# Patient Record
Sex: MALE | Race: WHITE | NOT HISPANIC OR LATINO | Employment: OTHER | URBAN - METROPOLITAN AREA
[De-identification: names, ages, dates, MRNs, and addresses within clinical notes are randomized per-mention and may not be internally consistent; named-entity substitution may affect disease eponyms.]

---

## 2017-01-20 DIAGNOSIS — E11.9 TYPE 2 DIABETES MELLITUS WITHOUT COMPLICATION, WITHOUT LONG-TERM CURRENT USE OF INSULIN (HCC): ICD-10-CM

## 2017-06-07 ENCOUNTER — HOSPITAL ENCOUNTER (OUTPATIENT)
Facility: MEDICAL CENTER | Age: 72
End: 2017-06-07
Attending: FAMILY MEDICINE
Payer: MEDICARE

## 2017-06-07 ENCOUNTER — OFFICE VISIT (OUTPATIENT)
Dept: MEDICAL GROUP | Facility: MEDICAL CENTER | Age: 72
End: 2017-06-07
Payer: MEDICARE

## 2017-06-07 VITALS
HEIGHT: 71 IN | WEIGHT: 228 LBS | BODY MASS INDEX: 31.92 KG/M2 | TEMPERATURE: 98.9 F | OXYGEN SATURATION: 96 % | DIASTOLIC BLOOD PRESSURE: 86 MMHG | HEART RATE: 94 BPM | SYSTOLIC BLOOD PRESSURE: 126 MMHG

## 2017-06-07 DIAGNOSIS — B02.9 HERPES ZOSTER WITHOUT COMPLICATION: ICD-10-CM

## 2017-06-07 DIAGNOSIS — E11.9 TYPE 2 DIABETES MELLITUS WITHOUT COMPLICATION, WITHOUT LONG-TERM CURRENT USE OF INSULIN (HCC): ICD-10-CM

## 2017-06-07 DIAGNOSIS — R35.0 INCREASED URINARY FREQUENCY: ICD-10-CM

## 2017-06-07 DIAGNOSIS — R10.9 LEFT FLANK PAIN: ICD-10-CM

## 2017-06-07 LAB
APPEARANCE UR: NORMAL
BILIRUB UR STRIP-MCNC: NORMAL MG/DL
COLOR UR AUTO: YELLOW
GLUCOSE BLD-MCNC: 210 MG/DL (ref 70–100)
GLUCOSE UR STRIP.AUTO-MCNC: 250 MG/DL
HBA1C MFR BLD: 8.5 % (ref ?–5.8)
INT CON NEG: NEGATIVE
INT CON POS: POSITIVE
KETONES UR STRIP.AUTO-MCNC: NORMAL MG/DL
LEUKOCYTE ESTERASE UR QL STRIP.AUTO: NORMAL
NITRITE UR QL STRIP.AUTO: NORMAL
PH UR STRIP.AUTO: 5 [PH] (ref 5–8)
PROT UR QL STRIP: 30 MG/DL
RBC UR QL AUTO: NORMAL
SP GR UR STRIP.AUTO: 1.03
UROBILINOGEN UR STRIP-MCNC: NORMAL MG/DL

## 2017-06-07 PROCEDURE — 87086 URINE CULTURE/COLONY COUNT: CPT

## 2017-06-07 PROCEDURE — 4040F PNEUMOC VAC/ADMIN/RCVD: CPT | Performed by: FAMILY MEDICINE

## 2017-06-07 PROCEDURE — 3045F PR MOST RECENT HEMOGLOBIN A1C LEVEL 7.0-9.0%: CPT | Performed by: FAMILY MEDICINE

## 2017-06-07 PROCEDURE — 81002 URINALYSIS NONAUTO W/O SCOPE: CPT | Performed by: FAMILY MEDICINE

## 2017-06-07 PROCEDURE — 3017F COLORECTAL CA SCREEN DOC REV: CPT | Mod: 8P | Performed by: FAMILY MEDICINE

## 2017-06-07 PROCEDURE — 1036F TOBACCO NON-USER: CPT | Performed by: FAMILY MEDICINE

## 2017-06-07 PROCEDURE — 83036 HEMOGLOBIN GLYCOSYLATED A1C: CPT | Performed by: FAMILY MEDICINE

## 2017-06-07 PROCEDURE — 1101F PT FALLS ASSESS-DOCD LE1/YR: CPT | Performed by: FAMILY MEDICINE

## 2017-06-07 PROCEDURE — G8417 CALC BMI ABV UP PARAM F/U: HCPCS | Performed by: FAMILY MEDICINE

## 2017-06-07 PROCEDURE — 99214 OFFICE O/P EST MOD 30 MIN: CPT | Performed by: FAMILY MEDICINE

## 2017-06-07 PROCEDURE — G8432 DEP SCR NOT DOC, RNG: HCPCS | Performed by: FAMILY MEDICINE

## 2017-06-07 RX ORDER — VALACYCLOVIR HYDROCHLORIDE 1 G/1
1000 TABLET, FILM COATED ORAL 3 TIMES DAILY
Qty: 21 TAB | Refills: 0 | Status: SHIPPED | OUTPATIENT
Start: 2017-06-07 | End: 2018-08-07

## 2017-06-07 ASSESSMENT — PATIENT HEALTH QUESTIONNAIRE - PHQ9: CLINICAL INTERPRETATION OF PHQ2 SCORE: 0

## 2017-06-07 NOTE — ASSESSMENT & PLAN NOTE
Patient is complaining of left flank pain for the last 5 days. He denies any injury to the area. He has not noticed any hematuria or pain with urination. He denies any trauma to the area. He reports that in the past when he has strained the area it resolves within a couple of days. Patient has never had shingles.

## 2017-06-07 NOTE — ASSESSMENT & PLAN NOTE
Patient is concerned because his blood sugars have been running high for the last 5 days. They've been over 200 which is unusual for him. He hasn't changed his medications or what he is eating. He is urinating more frequently since this started. He denies any fever or chills. No nausea or vomiting.

## 2017-06-07 NOTE — MR AVS SNAPSHOT
"Saleem Giraldofrancisco javier   2017 1:40 PM   Office Visit   MRN: [id number]    Department:  [facility]   Dept Phone:  [phone]    Description:  Male : 1945   Provider:  Georgia Kuhn M.D.           Reason for Visit     Back Pain left lower side      Allergies as of 2017     No Known Allergies      You were diagnosed with     Left flank pain   [558990]       Type 2 diabetes mellitus without complication, without long-term current use of insulin (CMS-HCC)   [7277110]       Increased urinary frequency   [762567]       Herpes zoster without complication   [990431]         Vital Signs     Blood Pressure Pulse Temperature Height Weight Body Mass Index    126/86 mmHg 94 37.2 °C (98.9 °F) 1.791 m (5' 10.51\") 103.42 kg (228 lb) 32.24 kg/m2    Oxygen Saturation Smoking Status                96% Former Smoker          Basic Information     Date Of Birth Sex Race Ethnicity Preferred Language    1945 Male White Non- English      Problem List              ICD-10-CM Priority Class Noted - Resolved    Swelling of left hand M79.89   2016 - Present    Obesity (BMI 30-39.9) E66.9   2016 - Present    Type 2 diabetes mellitus without complication, without long-term current use of insulin (CMS-HCC) E11.9   2016 - Present    Essential hypertension I10   2016 - Present    Acquired hypothyroidism E03.9   2016 - Present    Mixed hyperlipidemia E78.2   2016 - Present    Primary insomnia F51.01   2016 - Present    Screening for AAA (abdominal aortic aneurysm) Z13.6   2016 - Present    Left flank pain R10.9   2017 - Present    Increased urinary frequency R35.0   2017 - Present      Health Maintenance        Date Due Completion Dates    DIABETES MONOFILAMENT / LE EXAM 1946 ---    RETINAL SCREENING 10/20/1961 ---    URINE ACR / MICROALBUMIN 10/20/1961 ---    IMM DTaP/Tdap/Td Vaccine (1 - Tdap) 10/20/1962 ---    COLONOSCOPY 10/20/1993 ---    IMM ZOSTER " VACCINE 10/20/2003 ---    SERUM CREATININE 8/11/2015 8/11/2014    FASTING LIPID PROFILE 11/9/2017 11/9/2016    A1C SCREENING 12/7/2017 6/7/2017, 11/29/2016            Results     POCT A1C      Component    Glycohemoglobin    8.5    Internal Control Negative    Negative    Internal Control Positive    Positive                POCT Urinalysis      Component Value Standard Range & Units    POC Color YELLOW Negative    POC Appearance CLOUDY Negative    POC Leukocyte Esterase NEG Negative    POC Nitrites NEG Negative    POC Urobiligen NEG Negative (0.2) mg/dL    POC Protein 30 Negative mg/dL    POC Urine PH 5.0 5.0 - 8.0    POC Blood NEG Negative    POC Specific Gravity 1.030 <1.005 - >1.030    POC Ketones NEG Negative mg/dL    POC Biliruben NEG Negative mg/dL    POC Glucose 250 Negative mg/dL                POC GLUCOSE      Component Value Standard Range & Units    Glucose - Accu-Ck 210 70 - 100 mg/dL                        Current Immunizations     13-VALENT PCV PREVNAR 10/8/2015    Hep A/HEP B Combined Vaccine (TwinRix) 2/9/2017    Influenza Vaccine Adult HD 10/15/2016    Pneumococcal polysaccharide vaccine (PPSV-23) 11/9/2016      Below and/or attached are the medications your provider expects you to take. Review all of your home medications and newly ordered medications with your provider and/or pharmacist. Follow medication instructions as directed by your provider and/or pharmacist. Please keep your medication list with you and share with your provider. Update the information when medications are discontinued, doses are changed, or new medications (including over-the-counter products) are added; and carry medication information at all times in the event of emergency situations     Allergies:  No Known Allergies          Medications  Valid as of: June 07, 2017 -  2:55 PM    Generic Name Brand Name Tablet Size Instructions for use    Aspirin (Chew Tab) ASA 81 MG Take 1 Tab by mouth every day.        Atorvastatin  Calcium (Tab) LIPITOR 10 MG Take 1 Tab by mouth every day.        Cholecalciferol (Tab) cholecalciferol 1000 UNIT Take 1 Tab by mouth every day.        GlipiZIDE (Tab) GLUCOTROL 5 MG Take 1 Tab by mouth 2 times a day.        Levothyroxine Sodium (Tab) SYNTHROID 75 MCG Take 1 Tab by mouth every day.        Losartan Potassium (Tab) COZAAR 100 MG Take 1 Tab by mouth every day.        MetFORMIN HCl (Tab) GLUCOPHAGE 1000 MG Take 1 Tab by mouth 2 times a day.        TraZODone HCl (Tab) DESYREL 100 MG Take 1 Tab by mouth at bedtime as needed for Sleep.        ValACYclovir HCl (Tab) VALTREX 1 GM Take 1 Tab by mouth 3 times a day.        .                 Medicines prescribed today were sent to:     [facility] - [place], [place] - [address]    [address] [address]    Phone: [phone] Fax: [phone]    Open 24 Hours?: No    [facility] [address] [place] [address], [place] - [address]    [address] [facility] [address]    Phone: [phone] Fax: [phone]    Open 24 Hours?: No      Medication refill instructions:       If your prescription bottle indicates you have medication refills left, it is not necessary to call your provider’s office. Please contact your pharmacy and they will refill your medication.    If your prescription bottle indicates you do not have any refills left, you may request refills at any time through one of the following ways: The online [employer] system (except Urgent Care), by calling your provider’s office, or by asking your pharmacy to contact your provider’s office with a refill request. Medication refills are processed only during regular business hours and may not be available until the next business day. Your provider may request additional information or to have a follow-up visit with you prior to refilling your medication.   *Please Note: Medication refills are assigned a new Rx number when refilled electronically. Your pharmacy may  indicate that no refills were authorized even though a new prescription for the same medication is available at the pharmacy. Please request the medicine by name with the pharmacy before contacting your provider for a refill.        Your To Do List     Future Labs/Procedures Complete By Expires    URINE CULTURE(NEW)  As directed 6/8/2018         MyChart Status: Patient Declined

## 2017-06-07 NOTE — PROGRESS NOTES
Subjective:     Chief Complaint   Patient presents with   • Back Pain     left lower side       Saleem Whitney is a 71 y.o. male here today for evaluation and management of:    Left flank pain  Patient is complaining of left flank pain for the last 5 days. He denies any injury to the area. He has not noticed any hematuria or pain with urination. He denies any trauma to the area. He reports that in the past when he has strained the area it resolves within a couple of days. Patient has never had shingles.    Type 2 diabetes mellitus without complication, without long-term current use of insulin (CMS-HCC)  Patient is concerned because his blood sugars have been running high for the last 5 days. They've been over 200 which is unusual for him. He hasn't changed his medications or what he is eating. He is urinating more frequently since this started. He denies any fever or chills. No nausea or vomiting.       No Known Allergies    Current medicines (including changes today)  Current Outpatient Prescriptions   Medication Sig Dispense Refill   • valacyclovir (VALTREX) 1 GM Tab Take 1 Tab by mouth 3 times a day. 21 Tab 0   • metformin (GLUCOPHAGE) 1000 MG tablet Take 1 Tab by mouth 2 times a day. 180 Tab 3   • aspirin (ASA) 81 MG Chew Tab chewable tablet Take 1 Tab by mouth every day. 90 Tab 3   • atorvastatin (LIPITOR) 10 MG Tab Take 1 Tab by mouth every day. 90 Tab 3   • glipiZIDE (GLUCOTROL) 5 MG Tab Take 1 Tab by mouth 2 times a day. 180 Tab 3   • levothyroxine (SYNTHROID) 75 MCG Tab Take 1 Tab by mouth every day. 90 Tab 3   • losartan (COZAAR) 100 MG Tab Take 1 Tab by mouth every day. 90 Tab 3   • vitamin D (CHOLECALCIFEROL) 1000 UNIT Tab Take 1 Tab by mouth every day. 90 Tab 3   • trazodone (DESYREL) 100 MG Tab Take 1 Tab by mouth at bedtime as needed for Sleep. (Patient not taking: Reported on 6/7/2017) 90 Tab 3     No current facility-administered medications for this visit.       He  has a past medical history  "of Hypertension; Type II or unspecified type diabetes mellitus without mention of complication, not stated as uncontrolled; Hypothyroid; and Hyperlipidemia. He also has no past medical history of Low vitamin D level.    Patient Active Problem List    Diagnosis Date Noted   • Left flank pain 06/07/2017   • Increased urinary frequency 06/07/2017   • Swelling of left hand 11/09/2016   • Obesity (BMI 30-39.9) 11/09/2016   • Type 2 diabetes mellitus without complication, without long-term current use of insulin (CMS-HCC) 11/09/2016   • Essential hypertension 11/09/2016   • Acquired hypothyroidism 11/09/2016   • Mixed hyperlipidemia 11/09/2016   • Primary insomnia 11/09/2016   • Screening for AAA (abdominal aortic aneurysm) 11/09/2016       ROS   No fever or chills.  No nausea or vomiting.  No chest pain or palpitations.  No cough or SOB.  No pain with urination or hematuria.  No black or bloody stools.       Objective:     Blood pressure 126/86, pulse 94, temperature 37.2 °C (98.9 °F), height 1.791 m (5' 10.51\"), weight 103.42 kg (228 lb), SpO2 96 %. Body mass index is 32.24 kg/(m^2).   Physical Exam:  Well developed, well nourished.  Alert, oriented in no acute distress.  Eye contact is good, speech goal directed, affect calm  Eyes: conjunctiva non-injected, sclera non-icteric.  Lungs: clear to auscultation bilaterally with good excursion. No wheezes or rhonchi  CV: regular rate and rhythm. No murmur  Abdomen: soft, nontender, no masses or organomegaly.  No rebound or guarding  Back: There is no spinous process tenderness. There are a grouping of vesicular lesions to the left of midline in the area of pain. There is hyperesthesia to light touch but no other lesions noted. There is no muscle spasm or mass present no CVA tenderness          Assessment and Plan:   The following treatment plan was discussed    1. Left flank pain  This appears to be secondary to early herpes zoster. We'll start Valtrex 1 g 3 times daily. " Patient to call if symptoms worsen    2. Type 2 diabetes mellitus without complication, without long-term current use of insulin (CMS-HCC)  Worsening control of his diabetes. This could be secondary to the zoster infection as it just started 5 days ago. Patient to follow-up with Dr. Spangler but call sooner if his sugars are increasing.  - POCT A1C  - POC GLUCOSE    3. Increased urinary frequency  Urine culture pending  - URINE CULTURE(NEW); Future  - POCT Urinalysis    4. Herpes zoster without complication  Valtrex as directed. Call if pain increases.  - valacyclovir (VALTREX) 1 GM Tab; Take 1 Tab by mouth 3 times a day.  Dispense: 21 Tab; Refill: 0    Any change or worsening of signs or symptoms, patient encouraged to follow-up or report to the emergency room for further evaluation. Patient understands and agrees.    Followup: Return in about 2 weeks (around 6/21/2017).

## 2017-06-08 ENCOUNTER — TELEPHONE (OUTPATIENT)
Dept: MEDICAL GROUP | Facility: MEDICAL CENTER | Age: 72
End: 2017-06-08

## 2017-06-08 NOTE — TELEPHONE ENCOUNTER
----- Message from Georgia Kuhn M.D. sent at 6/8/2017  1:37 PM PDT -----  Please notify patient of their normal lab results.  Georgia Kuhn M.D.

## 2017-06-09 LAB
BACTERIA UR CULT: NORMAL
SIGNIFICANT IND 70042: NORMAL
SITE SITE: NORMAL
SOURCE SOURCE: NORMAL

## 2017-06-12 ENCOUNTER — TELEPHONE (OUTPATIENT)
Dept: MEDICAL GROUP | Facility: MEDICAL CENTER | Age: 72
End: 2017-06-12

## 2017-06-12 ENCOUNTER — RX ONLY (OUTPATIENT)
Age: 72
Setting detail: RX ONLY
End: 2017-06-12

## 2017-06-12 NOTE — TELEPHONE ENCOUNTER
----- Message from Georgia Kuhn M.D. sent at 6/9/2017  5:19 PM PDT -----  Please notify patient of their normal urine culture results results.  Georgia Kuhn M.D.

## 2017-06-22 ENCOUNTER — OFFICE VISIT (OUTPATIENT)
Dept: MEDICAL GROUP | Facility: LAB | Age: 72
End: 2017-06-22
Payer: MEDICARE

## 2017-06-22 VITALS
BODY MASS INDEX: 32.38 KG/M2 | WEIGHT: 226.19 LBS | RESPIRATION RATE: 16 BRPM | SYSTOLIC BLOOD PRESSURE: 160 MMHG | OXYGEN SATURATION: 96 % | HEART RATE: 65 BPM | DIASTOLIC BLOOD PRESSURE: 90 MMHG | TEMPERATURE: 97.1 F | HEIGHT: 70 IN

## 2017-06-22 DIAGNOSIS — M62.08 DIASTASIS OF RECTUS ABDOMINIS: ICD-10-CM

## 2017-06-22 DIAGNOSIS — E11.9 TYPE 2 DIABETES MELLITUS WITHOUT COMPLICATION, WITHOUT LONG-TERM CURRENT USE OF INSULIN (HCC): ICD-10-CM

## 2017-06-22 DIAGNOSIS — E66.9 OBESITY (BMI 30-39.9): ICD-10-CM

## 2017-06-22 DIAGNOSIS — E78.2 MIXED HYPERLIPIDEMIA: ICD-10-CM

## 2017-06-22 DIAGNOSIS — R25.2 CRAMP OF BOTH LOWER EXTREMITIES: ICD-10-CM

## 2017-06-22 PROCEDURE — 99214 OFFICE O/P EST MOD 30 MIN: CPT | Performed by: FAMILY MEDICINE

## 2017-06-22 RX ORDER — GLIPIZIDE 10 MG/1
10 TABLET ORAL 2 TIMES DAILY
Qty: 180 TAB | Refills: 1 | Status: SHIPPED | OUTPATIENT
Start: 2017-06-22 | End: 2017-09-25 | Stop reason: SDUPTHER

## 2017-06-22 NOTE — MR AVS SNAPSHOT
"        Saleem BRANTLEY Chelo   2017 4:20 PM   Office Visit   MRN: [id number]    Department:  [facility]   Dept Phone:  [phone]    Description:  Male : 1945   Provider:  Patricia Spangler M.D.           Reason for Visit     Medication Refill REVIEW MEDS      Allergies as of 2017     No Known Allergies      You were diagnosed with     Type 2 diabetes mellitus without complication, without long-term current use of insulin (CMS-HCC)   [3787113]       Obesity (BMI 30-39.9)   [753005]       Diastasis of rectus abdominis   [0629772]       Mixed hyperlipidemia   [272.2.ICD-9-CM]       Cramp of both lower extremities   [2584895]         Vital Signs     Blood Pressure Pulse Temperature Respirations Height Weight    160/90 mmHg 65 36.2 °C (97.1 °F) 16 1.778 m (5' 10\") 102.6 kg (226 lb 3.1 oz)    Body Mass Index Oxygen Saturation Smoking Status             32.46 kg/m2 96% Former Smoker         Basic Information     Date Of Birth Sex Race Ethnicity Preferred Language    1945 Male White Non- English      Problem List              ICD-10-CM Priority Class Noted - Resolved    Swelling of left hand M79.89   2016 - Present    Obesity (BMI 30-39.9) E66.9   2016 - Present    Type 2 diabetes mellitus without complication, without long-term current use of insulin (CMS-HCC) E11.9   2016 - Present    Essential hypertension I10   2016 - Present    Acquired hypothyroidism E03.9   2016 - Present    Mixed hyperlipidemia E78.2   2016 - Present    Primary insomnia F51.01   2016 - Present    Screening for AAA (abdominal aortic aneurysm) Z13.6   2016 - Present    Left flank pain R10.9   2017 - Present    Increased urinary frequency R35.0   2017 - Present    Diastasis of rectus abdominis M62.08   2017 - Present      Health Maintenance        Date Due Completion Dates    RETINAL SCREENING 10/20/1961 ---    URINE ACR / MICROALBUMIN 10/20/1961 ---   " IMM DTaP/Tdap/Td Vaccine (1 - Tdap) 10/20/1962 ---    COLONOSCOPY 10/20/1993 ---    IMM ZOSTER VACCINE 10/20/2003 ---    SERUM CREATININE 8/11/2015 8/11/2014    FASTING LIPID PROFILE 11/9/2017 11/9/2016    A1C SCREENING 12/7/2017 6/7/2017, 11/29/2016    DIABETES MONOFILAMENT / LE EXAM 6/22/2018 6/22/2017            Current Immunizations     13-VALENT PCV PREVNAR 10/8/2015    Hep A/HEP B Combined Vaccine (TwinRix) 2/9/2017    Influenza Vaccine Adult HD 10/15/2016    Pneumococcal polysaccharide vaccine (PPSV-23) 11/9/2016      Below and/or attached are the medications your provider expects you to take. Review all of your home medications and newly ordered medications with your provider and/or pharmacist. Follow medication instructions as directed by your provider and/or pharmacist. Please keep your medication list with you and share with your provider. Update the information when medications are discontinued, doses are changed, or new medications (including over-the-counter products) are added; and carry medication information at all times in the event of emergency situations     Allergies:  No Known Allergies          Medications  Valid as of: June 22, 2017 -  4:28 PM    Generic Name Brand Name Tablet Size Instructions for use    Aspirin (Chew Tab) ASA 81 MG Take 1 Tab by mouth every day.        Atorvastatin Calcium (Tab) LIPITOR 10 MG Take 1 Tab by mouth every day.        Cholecalciferol (Tab) cholecalciferol 1000 UNIT Take 1 Tab by mouth every day.        GlipiZIDE (Tab) GLUCOTROL 10 MG Take 1 Tab by mouth 2 times a day.        Levothyroxine Sodium (Tab) SYNTHROID 75 MCG Take 1 Tab by mouth every day.        Losartan Potassium (Tab) COZAAR 100 MG Take 1 Tab by mouth every day.        MetFORMIN HCl (Tab) GLUCOPHAGE 1000 MG Take 1 Tab by mouth 2 times a day.        TraZODone HCl (Tab) DESYREL 100 MG Take 1 Tab by mouth at bedtime as needed for Sleep.        ValACYclovir HCl (Tab) VALTREX 1 GM Take 1 Tab by mouth 3  times a day.        .                 Medicines prescribed today were sent to:     [facility] - [place], [place] - [address]    [address] [facility] [address]    Phone: [phone] Fax: [phone]    Open 24 Hours?: No    [facility] - [place], [place] - [address]    [address] [place] [address]    Phone: [phone] Fax: [phone]    Open 24 Hours?: No      Medication refill instructions:       If your prescription bottle indicates you have medication refills left, it is not necessary to call your provider’s office. Please contact your pharmacy and they will refill your medication.    If your prescription bottle indicates you do not have any refills left, you may request refills at any time through one of the following ways: The online [employer] system (except Urgent Care), by calling your provider’s office, or by asking your pharmacy to contact your provider’s office with a refill request. Medication refills are processed only during regular business hours and may not be available until the next business day. Your provider may request additional information or to have a follow-up visit with you prior to refilling your medication.   *Please Note: Medication refills are assigned a new Rx number when refilled electronically. Your pharmacy may indicate that no refills were authorized even though a new prescription for the same medication is available at the pharmacy. Please request the medicine by name with the pharmacy before contacting your provider for a refill.        Your To Do List     Future Labs/Procedures Complete By Expires    COMP METABOLIC PANEL  As directed 6/22/2018    HEMOGLOBIN A1C  As directed 6/22/2018    LIPID PROFILE  As directed 6/22/2018    MICROALBUMIN CREAT RATIO URINE (CLINIC COLLECT)  As directed 6/22/2018    MICROALBUMIN CREAT RATIO URINE (LAB COLLECT)  As directed 6/22/2018         [employer] Status: Patient Declined

## 2017-06-23 NOTE — PROGRESS NOTES
Subjective:   Saleem Whitney is a 71 y.o. male here today for diabetes management  Chief Complaint   Patient presents with   • Medication Refill     REVIEW MEDS       1. Type 2 diabetes mellitus without complication, without long-term current use of insulin (CMS-[facility])  This is chronic. Worsening fingerstick blood glucose at home. Currently taking metformin 1000 mg twice a day, glipizide 5 mg daily rather than twice a day as instructed. He is also taking a daily aspirin, and ACE-I/ARB, he recently stopped his statin because his cholesterol was in good control.   Denies side effects or hypoglycemic events from medications.  Last HbA1c is 8.5% on 6/7/17  He is monitoring blood sugar regularly at home. Fasting blood sugars are 200s  Reports diet is poor  He is not exercising regularly.  Last eye exam: Yearly with Dr. Yang  Doing regular foot exams and care.  Denies polydipsia, blurred vision, early satiety, or neuropathies. He is experiencing polyuria at times    2. Obesity (BMI 30-39.9)  Chronic. Weight is stable. He is not exercising regularly. Increasing abdominal circumference.     3. Diastasis of rectus abdominis  This is a new problem to discuss. Patient states that he has noticed a separation in his abdominal muscles and a bulging from his sternum down to his pubis swelling increasing abdominal pressure.    4. Mixed hyperlipidemia  This is chronic. Last labs done December 2016. He does have a history of diabetes. He did recently stop his statin    5. Cramp of both lower extremities  This is a new problem to discuss. Patient has noticed for the last 2 weeks behind his right knees while laying down. This is not exacerbated by walking. It feels like a charley horse. He has been drinking more water.      Current medicines (including changes today)  Current Outpatient Prescriptions   Medication Sig Dispense Refill   • glipiZIDE (GLUCOTROL) 10 MG Tab Take 1 Tab by mouth 2 times a day. 180 Tab 1   • valacyclovir  "(VALTREX) 1 GM Tab Take 1 Tab by mouth 3 times a day. 21 Tab 0   • metformin (GLUCOPHAGE) 1000 MG tablet Take 1 Tab by mouth 2 times a day. 180 Tab 3   • aspirin (ASA) 81 MG Chew Tab chewable tablet Take 1 Tab by mouth every day. 90 Tab 3   • atorvastatin (LIPITOR) 10 MG Tab Take 1 Tab by mouth every day. 90 Tab 3   • levothyroxine (SYNTHROID) 75 MCG Tab Take 1 Tab by mouth every day. 90 Tab 3   • losartan (COZAAR) 100 MG Tab Take 1 Tab by mouth every day. 90 Tab 3   • trazodone (DESYREL) 100 MG Tab Take 1 Tab by mouth at bedtime as needed for Sleep. (Patient not taking: Reported on 6/7/2017) 90 Tab 3   • vitamin D (CHOLECALCIFEROL) 1000 UNIT Tab Take 1 Tab by mouth every day. 90 Tab 3     No current facility-administered medications for this visit.     He  has a past medical history of Hypertension; Type II or unspecified type diabetes mellitus without mention of complication, not stated as uncontrolled; Hypothyroid; and Hyperlipidemia. He also has no past medical history of Low vitamin D level.    ROS   No fevers  No bowel changes  No LE edema       Objective:     Blood pressure 160/90, pulse 65, temperature 36.2 °C (97.1 °F), resp. rate 16, height 1.778 m (5' 10\"), weight 102.6 kg (226 lb 3.1 oz), SpO2 96 %. Body mass index is 32.46 kg/(m^2).   Physical Exam:  Constitutional: Alert, no distress. Obese  Skin: Warm, dry, good turgor, no rashes in visible areas.  Eye: Equal, round and reactive, conjunctiva clear, lids normal.  ENMT: Lips without lesions, good dentition, oropharynx clear.  Neck: Trachea midline, no masses, no thyromegaly. No cervical or supraclavicular lymphadenopathy  Respiratory: Unlabored respiratory effort, lungs clear to auscultation, no wheezes, no ronchi.  Cardiovascular: Normal S1, S2, RRR, no murmur, no edema.  Abdomen: Soft, non-tender, no masses, diastasis, no hepatosplenomegaly.  Psych: Alert and oriented x3, normal affect and mood.  Diabetic foot exam: No lesions or calluses noted. 2+ " pedal pulses. Sensation intact with 10 out of 10 on monofilament test.      Assessment and Plan:   The following treatment plan was discussed    1. Type 2 diabetes mellitus without complication, without long-term current use of insulin (CMS-[facility])  Chronic, not controlled  Labs ordered  Increase glipizide to 10 mg twice a day  Continue metformin 1000 mg twice a day  Follow-up in one month  - glipiZIDE (GLUCOTROL) 10 MG Tab; Take 1 Tab by mouth 2 times a day.  Dispense: 180 Tab; Refill: 1  - MICROALBUMIN CREAT RATIO URINE (CLINIC COLLECT); Future  - Diabetic Monofilament Lower Extremity Exam  - COMP METABOLIC PANEL; Future  - LIPID PROFILE; Future  - HEMOGLOBIN A1C; Future  - MICROALBUMIN CREAT RATIO URINE (LAB COLLECT); Future    2. Obesity (BMI 30-39.9)  Chronic, stable  Discussed importance of diet and exercise given his diabetes and increasing abdominal circumference    3. Diastasis of rectus abdominis  New, likely secondary to deconditioning weight     4. Mixed hyperlipidemia  Chronic, stable, discussed importance of continuing statin medication as he has recently stopped this  - LIPID PROFILE; Future    5. Cramp of both lower extremities  Chronic, discuss magnesium supplementation. Getting labs. Follow up in one month      Followup: Return in about 4 weeks (around 7/20/2017) for DM RN Appt.       This note was created using voice recognition software. I have made every reasonable attempt to correct errors, however, I do anticipate some grammatical errors.

## 2017-06-27 ENCOUNTER — HOSPITAL ENCOUNTER (OUTPATIENT)
Dept: LAB | Facility: MEDICAL CENTER | Age: 72
End: 2017-06-27
Attending: FAMILY MEDICINE
Payer: MEDICARE

## 2017-06-27 DIAGNOSIS — E11.9 TYPE 2 DIABETES MELLITUS WITHOUT COMPLICATION, WITHOUT LONG-TERM CURRENT USE OF INSULIN (HCC): ICD-10-CM

## 2017-06-27 DIAGNOSIS — E78.2 MIXED HYPERLIPIDEMIA: ICD-10-CM

## 2017-06-27 LAB
ALBUMIN SERPL BCP-MCNC: 4.1 G/DL (ref 3.2–4.9)
ALBUMIN/GLOB SERPL: 1.3 G/DL
ALP SERPL-CCNC: 61 U/L (ref 30–99)
ALT SERPL-CCNC: 26 U/L (ref 2–50)
ANION GAP SERPL CALC-SCNC: 5 MMOL/L (ref 0–11.9)
AST SERPL-CCNC: 18 U/L (ref 12–45)
BILIRUB SERPL-MCNC: 0.4 MG/DL (ref 0.1–1.5)
BUN SERPL-MCNC: 25 MG/DL (ref 8–22)
CALCIUM SERPL-MCNC: 9.6 MG/DL (ref 8.5–10.5)
CHLORIDE SERPL-SCNC: 104 MMOL/L (ref 96–112)
CHOLEST SERPL-MCNC: 121 MG/DL (ref 100–199)
CO2 SERPL-SCNC: 29 MMOL/L (ref 20–33)
CREAT SERPL-MCNC: 1.08 MG/DL (ref 0.5–1.4)
CREAT UR-MCNC: 141 MG/DL
EST. AVERAGE GLUCOSE BLD GHB EST-MCNC: 197 MG/DL
GFR SERPL CREATININE-BSD FRML MDRD: >60 ML/MIN/1.73 M 2
GLOBULIN SER CALC-MCNC: 3.1 G/DL (ref 1.9–3.5)
GLUCOSE SERPL-MCNC: 206 MG/DL (ref 65–99)
HBA1C MFR BLD: 8.5 % (ref 0–5.6)
HDLC SERPL-MCNC: 37 MG/DL
LDLC SERPL CALC-MCNC: 25 MG/DL
MICROALBUMIN UR-MCNC: 3.5 MG/DL
MICROALBUMIN/CREAT UR: 25 MG/G (ref 0–30)
POTASSIUM SERPL-SCNC: 4.5 MMOL/L (ref 3.6–5.5)
PROT SERPL-MCNC: 7.2 G/DL (ref 6–8.2)
SODIUM SERPL-SCNC: 138 MMOL/L (ref 135–145)
TRIGL SERPL-MCNC: 293 MG/DL (ref 0–149)

## 2017-06-27 PROCEDURE — 80061 LIPID PANEL: CPT

## 2017-06-27 PROCEDURE — 82570 ASSAY OF URINE CREATININE: CPT

## 2017-06-27 PROCEDURE — 80053 COMPREHEN METABOLIC PANEL: CPT

## 2017-06-27 PROCEDURE — 36415 COLL VENOUS BLD VENIPUNCTURE: CPT

## 2017-06-27 PROCEDURE — 82043 UR ALBUMIN QUANTITATIVE: CPT

## 2017-06-27 PROCEDURE — 83036 HEMOGLOBIN GLYCOSYLATED A1C: CPT | Mod: GA

## 2017-06-28 ENCOUNTER — TELEPHONE (OUTPATIENT)
Dept: MEDICAL GROUP | Facility: LAB | Age: 72
End: 2017-06-28

## 2017-06-28 NOTE — TELEPHONE ENCOUNTER
----- Message from Patricia Spangler M.D. sent at 6/28/2017 11:06 AM PDT -----  Please let the patient know that his labs do show that his diabetes is not under good control as we discussed at our last meeting. Make sure he has a follow up appointment within 3 months so we can recheck his hemoglobin A1c.     Thanks!  Patricia Spangler M.D.

## 2017-07-11 RX ORDER — LANCETS 30 GAUGE
EACH MISCELLANEOUS
Qty: 100 EACH | Refills: 3 | Status: SHIPPED | OUTPATIENT
Start: 2017-07-11 | End: 2017-09-25 | Stop reason: SDUPTHER

## 2017-09-25 ENCOUNTER — OFFICE VISIT (OUTPATIENT)
Dept: MEDICAL GROUP | Facility: MEDICAL CENTER | Age: 72
End: 2017-09-25
Payer: MEDICARE

## 2017-09-25 VITALS
OXYGEN SATURATION: 94 % | SYSTOLIC BLOOD PRESSURE: 136 MMHG | RESPIRATION RATE: 16 BRPM | HEIGHT: 72 IN | HEART RATE: 71 BPM | WEIGHT: 228 LBS | TEMPERATURE: 98.8 F | BODY MASS INDEX: 30.88 KG/M2 | DIASTOLIC BLOOD PRESSURE: 80 MMHG

## 2017-09-25 DIAGNOSIS — G89.29 CHRONIC PAIN OF LEFT KNEE: ICD-10-CM

## 2017-09-25 DIAGNOSIS — I10 ESSENTIAL HYPERTENSION: ICD-10-CM

## 2017-09-25 DIAGNOSIS — E11.9 TYPE 2 DIABETES MELLITUS WITHOUT COMPLICATION, WITHOUT LONG-TERM CURRENT USE OF INSULIN (HCC): ICD-10-CM

## 2017-09-25 DIAGNOSIS — E66.9 OBESITY (BMI 30-39.9): ICD-10-CM

## 2017-09-25 DIAGNOSIS — M25.562 CHRONIC PAIN OF LEFT KNEE: ICD-10-CM

## 2017-09-25 PROCEDURE — 99214 OFFICE O/P EST MOD 30 MIN: CPT | Performed by: FAMILY MEDICINE

## 2017-09-25 RX ORDER — LANCETS 30 GAUGE
EACH MISCELLANEOUS
Qty: 100 EACH | Refills: 3 | Status: SHIPPED | OUTPATIENT
Start: 2017-09-25 | End: 2019-05-29 | Stop reason: SDUPTHER

## 2017-09-25 RX ORDER — GLIPIZIDE 10 MG/1
10 TABLET ORAL 2 TIMES DAILY
Qty: 180 TAB | Refills: 1 | Status: SHIPPED | OUTPATIENT
Start: 2017-09-25 | End: 2018-06-02 | Stop reason: SDUPTHER

## 2017-09-25 NOTE — ASSESSMENT & PLAN NOTE
Uncontrolled, patient seems to be stable at approximately 230 pounds.    ROS is NEGATIVE for blurred vision, polydipsia, polyuria, diaphoresis, palpitations, fatigue, irritability, flank pain, BLE paresthesias.    ROS is NEGATIVE for: cold or heat intolerance, anxiety/depression, chest pain/pressure, palpitations, hair thickening/coarsening/falling out/thinning, skin changes, diarrhea/constipation, unexpected weight change.

## 2017-09-25 NOTE — ASSESSMENT & PLAN NOTE
Stable, controlled. Patient is taking his medications as directed.    ROS is NEGATIVE for dizziness, generalized weakness/fatigue, cold sweats, dizziness,  vision/hearing changes, jaw pain/paresthesias, BUE pain/paresthesias/numbness/weakness, chest pain/pressure, palpitations, dyspnea, nausea, RUQ abdominal pain, oliguria/anuria, BLE edema.

## 2017-09-25 NOTE — LETTER
[facility]  Kirk Calabrese M.D.  [address]  [place] [address]  Fax: [phone]   Authorization for Release/Disclosure of   Protected Health Information   Name: SALEEM ARREAGA : 1945 SSN: [id number]   Address: [address] [place] [address] [facility]    [place] [address] Phone:    [phone] (home)    I authorize the entity listed below to release/disclose the PHI below to:   [facility]/Kirk Calabrese M.D. and Kirk Calabrese M.D.   Provider or Entity Name:     Address   City, State, Zip   Phone:      Fax:     Reason for request: continuity of care   Information to be released:    [  ] LAST COLONOSCOPY,  including any PATH REPORT and follow-up  [  ] LAST FIT/COLOGUARD RESULT [  ] LAST DEXA  [  ] LAST MAMMOGRAM  [  ] LAST PAP  [  ] LAST LABS [  ] RETINA EXAM REPORT  [  ] IMMUNIZATION RECORDS  [  ] Release all info      [  ] Check here and initial the line next to each item to release ALL health information INCLUDING  _____ Care and treatment for drug and / or alcohol abuse  _____ HIV testing, infection status, or AIDS  _____ Genetic Testing    DATES OF SERVICE OR TIME PERIOD TO BE DISCLOSED: _____________  I understand and acknowledge that:  * This Authorization may be revoked at any time by you in writing, except if your health information has already been used or disclosed.  * Your health information that will be used or disclosed as a result of you signing this authorization could be re-disclosed by the recipient. If this occurs, your re-disclosed health information may no longer be protected by State or Federal laws.  * You may refuse to sign this Authorization. Your refusal will not affect your ability to obtain treatment.  * This Authorization becomes effective upon signing and will  on (date) __________.      If no date is indicated, this Authorization will  one (1) year from the signature date.    Name: Saleem Arreaga    Signature:   Date:     2017       PLEASE  FAX REQUESTED RECORDS BACK TO: [phone]

## 2017-09-25 NOTE — ASSESSMENT & PLAN NOTE
Uncontrolled, with recent A1c of 8.5%. Patient and I reviewed basic pathophysiology of diabetes as well as medications. Furthermore, patient and I discussed that lifestyle changes can reverse his diabetes to the point where he can take increasingly less dosages of his diabetes medication, as well as one day perhaps being donated off of all his medications. Patient verbalizes understanding.     Patient has no diabetic complications as far as he knows. Review of his recent labs revealed that his CMP is within normal limits, urine microalbumin to creatinine ratio is within normal limits, lipid profile shows hypertriglyceridemia as well as low HDL (he has mixed dyslipidemia).      ROS is NEGATIVE for blurred vision, polydipsia, polyuria, diaphoresis, palpitations, fatigue, irritability, flank pain, BLE paresthesias.

## 2017-09-25 NOTE — TELEPHONE ENCOUNTER
Was the patient seen in the last year in this department? Yes     Does patient have an active prescription for medications requested? No     Received Request Via: Patient     Last seen 09/11/2017

## 2017-09-25 NOTE — PROGRESS NOTES
Subjective:   Chief Complaint/History of Present Illness:  Saleem Whitney is a 71 y.o. male established patient who presents today to Establish primary medical care with me, particularly to discuss diabetes management, as well as concerns for acute on chronic left knee pain:    Type 2 diabetes mellitus without complication, without long-term current use of insulin (CMS-[facility])  Uncontrolled, with recent A1c of 8.5%. Patient and I reviewed basic pathophysiology of diabetes as well as medications. Furthermore, patient and I discussed that lifestyle changes can reverse his diabetes to the point where he can take increasingly less dosages of his diabetes medication, as well as one day perhaps being donated off of all his medications. Patient verbalizes understanding.     Patient has no diabetic complications as far as he knows. Review of his recent labs revealed that his CMP is within normal limits, urine microalbumin to creatinine ratio is within normal limits, lipid profile shows hypertriglyceridemia as well as low HDL (he has mixed dyslipidemia).      ROS is NEGATIVE for blurred vision, polydipsia, polyuria, diaphoresis, palpitations, fatigue, irritability, flank pain, BLE paresthesias.     Obesity (BMI 30-39.9)  Uncontrolled, patient seems to be stable at approximately 230 pounds.    ROS is NEGATIVE for blurred vision, polydipsia, polyuria, diaphoresis, palpitations, fatigue, irritability, flank pain, BLE paresthesias.    ROS is NEGATIVE for: cold or heat intolerance, anxiety/depression, chest pain/pressure, palpitations, hair thickening/coarsening/falling out/thinning, skin changes, diarrhea/constipation, unexpected weight change.    Essential hypertension  Stable, controlled. Patient is taking his medications as directed.    ROS is NEGATIVE for dizziness, generalized weakness/fatigue, cold sweats, dizziness,  vision/hearing changes, jaw pain/paresthesias, BUE pain/paresthesias/numbness/weakness, chest  pain/pressure, palpitations, dyspnea, nausea, RUQ abdominal pain, oliguria/anuria, BLE edema.     Chronic pain of left knee  Patient states that he has had pain of his left knee for at least 1-2 months. Patient states that it hurts to put pressure on the lateral aspect of the lower portion of his left thigh. Patient has not been using any medications at present.    ROS is negative for left lower extremity radicular pain/numbness/weakness/paresthesias, gait instability      Patient Active Problem List    Diagnosis Date Noted   • Chronic pain of left knee 09/25/2017   • Diastasis of rectus abdominis 06/22/2017   • Left flank pain 06/07/2017   • Increased urinary frequency 06/07/2017   • Swelling of left hand 11/09/2016   • Obesity (BMI 30-39.9) 11/09/2016   • Type 2 diabetes mellitus without complication, without long-term current use of insulin (CMS-[facility]) 11/09/2016   • Essential hypertension 11/09/2016   • Acquired hypothyroidism 11/09/2016   • Mixed hyperlipidemia 11/09/2016   • Primary insomnia 11/09/2016   • Screening for AAA (abdominal aortic aneurysm) 11/09/2016       Additional History:   Allergies:    Review of patient's allergies indicates no known allergies.     Current Medications:     Current Outpatient Prescriptions   Medication Sig Dispense Refill   • glipiZIDE (GLUCOTROL) 10 MG Tab Take 1 Tab by mouth 2 times a day. 180 Tab 1   • metformin (GLUCOPHAGE) 1000 MG tablet Take 1 Tab by mouth 2 times a day. 180 Tab 3   • aspirin (ASA) 81 MG Chew Tab chewable tablet Take 1 Tab by mouth every day. 90 Tab 3   • atorvastatin (LIPITOR) 10 MG Tab Take 1 Tab by mouth every day. 90 Tab 3   • levothyroxine (SYNTHROID) 75 MCG Tab Take 1 Tab by mouth every day. 90 Tab 3   • losartan (COZAAR) 100 MG Tab Take 1 Tab by mouth every day. 90 Tab 3   • vitamin D (CHOLECALCIFEROL) 1000 UNIT Tab Take 1 Tab by mouth every day. 90 Tab 3   • Lancets Misc Lancets order: Lancets for One Touch Ultra meter. Sig: use 3 times daily and  prn ssx high or low sugar. #100 RF x 3 100 Each 3   • Blood Glucose Monitoring Suppl SUPPLIES Misc Test strips order: Test strips for One Touch Ultra meter. Sig: use 3 times daily and prn ssx high or low sugar #100 RF x 3 100 Strip 3   • valacyclovir (VALTREX) 1 GM Tab Take 1 Tab by mouth 3 times a day. 21 Tab 0   • trazodone (DESYREL) 100 MG Tab Take 1 Tab by mouth at bedtime as needed for Sleep. (Patient not taking: Reported on 6/7/2017) 90 Tab 3     No current facility-administered medications for this visit.         Social History:     Social History   Substance Use Topics   • Smoking status: Former Smoker     Packs/day: 1.00     Years: 5.00     Types: Cigarettes     Quit date: 11/9/1971   • Smokeless tobacco: Never Used   • Alcohol use 4.2 oz/week     7 Glasses of wine per week      Comment: daily       ROS:     - NOTE: All other systems reviewed and are negative, except as in HPI.     Objective:   Physical Exam:    Vitals: Blood pressure 136/80, pulse 71, temperature 37.1 °C (98.8 °F), resp. rate 16, height 1.829 m (6'), weight 103.4 kg (228 lb), SpO2 94 %.   BMI: Body mass index is 30.92 kg/m².   General/Constitutional: Vitals as above, Well nourished, well developed male in no acute distress   Head/Eyes: Head is grossly normal & atraumatic, bilateral conjunctivae clear and not injected, bilateral EOMI, bilateral PERRL   ENT: Bilateral external ears grossly normal in appearance, Hearing grossly intact, External nares normal in appearance and without discharge/bleeding   Respiratory: No respiratory distress, bilateral lungs are clear to ausculation in all lung fields (anterior/lateral/posterior), no wheezing/rhonchi/rales   Cardiovascular: Regular rate and rhythm without murmur/gallops/rubs, distal pulses are intact and equal bilaterally (radial, posterior tibial), no bilateral lower extremity edema   MSK: Testing of distal aspect of the iliotibial band approximately 5 cm superior to lateral aspect of the  femur reveals point tenderness, Gait grossly normal & not antalgic   Integumentary: No apparent rashes   Psych: Judgment grossly appropriate, no apparent depression/anxiety      Health Maintenance:      - Declines flu vaccine at present    Imaging/Labs:      - Labs reviewed and interpreted as in history of present illness above    Assessment and Plan:   1. Type 2 diabetes mellitus without complication, without long-term current use of insulin (CMS-[facility])  Uncontrolled, with A1c of 8.5%.    A) Lifestyle education: Patient and I discussed the importance of lifestyle changes, with particular emphasis on plant-based nutrition (for the purposes of weight loss, general health, DM2, HTN), as well as cardiovascular exercise, proper sleep, and stress management.  This discussion is briefly summarized in the patient instruction section below.  Patient verbalized understanding.   B) Medication management: Patient and I reviewed that he does not need to be on insulin present time, however he was given her precautions for hypoglycemic episodes, and also cautioned to use glipizide only when he is eating a meal at same time. Refill glipizide as below.    - glipiZIDE (GLUCOTROL) 10 MG Tab; Take 1 Tab by mouth 2 times a day.  Dispense: 180 Tab; Refill: 1   C) Evaluation: Patient instructed to record his fasting blood sugars, and to use his to our postprandial sugars as a measure of how his dietary choices are affecting his glycemic control.    2. Obesity (BMI 30-39.9)  Uncontrolled, lifestyle changes as in #1 above    3. Essential hypertension  Stable, well-controlled. Continue current medications as directed.    4. Chronic pain of left knee  Uncontrolled, patient at least has aspect of iliotibial band syndrome. Patient given conservative care instructions (NSAIDs, stretching, warm & cold compresses, OTC oral and topical analgesics) as well as instructions re: stretches sourced from Sports Medicine Advisor regarding iliotibial band  syndrome      RTC: On October 5 for diabetes management.    PLEASE NOTE: This dictation was created using voice recognition software. I have made every reasonable attempt to correct obvious errors, but I expect that there are errors of grammar and possibly content that I did not discover before finalizing the note.

## 2017-09-25 NOTE — ASSESSMENT & PLAN NOTE
Patient states that he has had pain of his left knee for at least 1-2 months. Patient states that it hurts to put pressure on the lateral aspect of the lower portion of his left thigh. Patient has not been using any medications at present.    Patient cannot recall any history of acute trauma.    ROS is negative for left lower extremity radicular pain/numbness/weakness/paresthesias, gait instability

## 2017-09-29 ENCOUNTER — PATIENT MESSAGE (OUTPATIENT)
Dept: MEDICAL GROUP | Facility: MEDICAL CENTER | Age: 72
End: 2017-09-29

## 2017-09-29 DIAGNOSIS — I10 ESSENTIAL HYPERTENSION: ICD-10-CM

## 2017-09-29 NOTE — TELEPHONE ENCOUNTER
From: Saleem Whitney  To: Kirk Calabrese M.D.  Sent: 2017 1:38 PM PDT  Subject: Prescription Question    I have sent in requests twice now through My Chart and have not received any response from your PA so I am not sure the requests were sent in. First of all, I told you that I would renew all my Rx's, but I found out that two have  and I need for you to send in new Rx's to [facility] - they are: LOSARTAN 100MG TAB and LEVOTHYROXINE 75 MCG TAB. Also, I asked you to send in a request for diabetes lancets and test strips to [facility] and they don't do them, so would you please sent the Rx's to the [facility] on [facility], I would like 90 day Rx's for all of the above.

## 2017-10-03 RX ORDER — LOSARTAN POTASSIUM 100 MG/1
100 TABLET ORAL DAILY
Qty: 90 TAB | Refills: 3 | Status: SHIPPED | OUTPATIENT
Start: 2017-10-03 | End: 2017-10-06 | Stop reason: SDUPTHER

## 2017-10-05 ENCOUNTER — OFFICE VISIT (OUTPATIENT)
Dept: MEDICAL GROUP | Facility: MEDICAL CENTER | Age: 72
End: 2017-10-05
Payer: MEDICARE

## 2017-10-05 VITALS
TEMPERATURE: 97.3 F | BODY MASS INDEX: 29.66 KG/M2 | OXYGEN SATURATION: 96 % | WEIGHT: 219 LBS | HEART RATE: 60 BPM | RESPIRATION RATE: 14 BRPM | HEIGHT: 72 IN | DIASTOLIC BLOOD PRESSURE: 80 MMHG | SYSTOLIC BLOOD PRESSURE: 130 MMHG

## 2017-10-05 DIAGNOSIS — E11.9 TYPE 2 DIABETES MELLITUS WITHOUT COMPLICATION, WITHOUT LONG-TERM CURRENT USE OF INSULIN (HCC): ICD-10-CM

## 2017-10-05 PROCEDURE — 99214 OFFICE O/P EST MOD 30 MIN: CPT | Performed by: FAMILY MEDICINE

## 2017-10-05 NOTE — ASSESSMENT & PLAN NOTE
Patient has been checking his fasting blood sugar, which have improved per his report, previously at approximately 200 or more each morning. However, review current blood sugars reveals that he is ranging somewhere around 180 most mornings, with lows in the 160s and highs in the 200s.    Patient is trying to eat a more vegetarian diet, but states that he may be eating too much fruit (nectarines, prunes, plums, pears, strawberries, raspberries, 1/2banana), and nuts (3-4handfuls per day).  Salads (iceberg lettuce, jicama, celery, tomatoes, blue cheese dressings, ranch dressings), beans (turkey chili).    ROS is NEGATIVE for blurred vision, polydipsia, polyuria, diaphoresis, palpitations, fatigue, irritability, flank pain, BLE paresthesias.

## 2017-10-06 DIAGNOSIS — I10 ESSENTIAL HYPERTENSION: ICD-10-CM

## 2017-10-06 RX ORDER — LOSARTAN POTASSIUM 100 MG/1
100 TABLET ORAL DAILY
Qty: 90 TAB | Refills: 0 | Status: SHIPPED | OUTPATIENT
Start: 2017-10-06 | End: 2018-05-15 | Stop reason: SDUPTHER

## 2017-10-16 ENCOUNTER — TELEPHONE (OUTPATIENT)
Dept: MEDICAL GROUP | Facility: MEDICAL CENTER | Age: 72
End: 2017-10-16

## 2017-10-16 NOTE — TELEPHONE ENCOUNTER
----- Message from Kirk Calabrese M.D. sent at 10/15/2017 10:24 PM PDT -----  Please call patient to schedule diabetes follow-up.

## 2017-10-16 NOTE — TELEPHONE ENCOUNTER
Phone Number Called: [phone] (home)       Message: left msg for patient to schedule f/v for DM     Left Message for patient to call back: yes

## 2017-10-16 NOTE — PROGRESS NOTES
Subjective:   Chief Complaint/History of Present Illness:  Saleem Whitney is a 71 y.o. male established patient who presents today to discuss diabetes management:    Type 2 diabetes mellitus without complication, without long-term current use of insulin (CMS-[facility])  Patient has been checking his fasting blood sugar, which have improved per his report, previously at approximately 200 or more each morning. However, review current blood sugars reveals that he is ranging somewhere around 180 most mornings, with lows in the 160s and highs in the 200s.    Patient is trying to eat a more vegetarian diet, but states that he may be eating too much fruit (nectarines, prunes, plums, pears, strawberries, raspberries, 1/2banana), and nuts (3-4handfuls per day).  Salads (iceberg lettuce, jicama, celery, tomatoes, blue cheese dressings, ranch dressings), beans (turkey chili).    ROS is NEGATIVE for blurred vision, polydipsia, polyuria, diaphoresis, palpitations, fatigue, irritability, flank pain, BLE paresthesias.      Patient Active Problem List    Diagnosis Date Noted   • Chronic pain of left knee 09/25/2017   • Diastasis of rectus abdominis 06/22/2017   • Left flank pain 06/07/2017   • Increased urinary frequency 06/07/2017   • Swelling of left hand 11/09/2016   • Obesity (BMI 30-39.9) 11/09/2016   • Type 2 diabetes mellitus without complication, without long-term current use of insulin (CMS-HCC) 11/09/2016   • Essential hypertension 11/09/2016   • Acquired hypothyroidism 11/09/2016   • Mixed hyperlipidemia 11/09/2016   • Primary insomnia 11/09/2016   • Screening for AAA (abdominal aortic aneurysm) 11/09/2016       Additional History:   Allergies:    Review of patient's allergies indicates no known allergies.     Current Medications:     Current Outpatient Prescriptions   Medication Sig Dispense Refill   • Blood Glucose Monitoring Suppl Supplies Misc Test strips order: Test strips for OneTouch Ultra meter. Sig: use fasting  first thing in the morning and prn ssx high or low sugar 100 Each 3   • losartan (COZAAR) 100 MG Tab Take 1 Tab by mouth every day. 90 Tab 0   • glipiZIDE (GLUCOTROL) 10 MG Tab Take 1 Tab by mouth 2 times a day. 180 Tab 1   • Lancets Misc Lancets order: Lancets for One Touch Ultra meter. Sig: use 3 times daily and prn ssx high or low sugar. #100 RF x 3 100 Each 3   • Blood Glucose Monitoring Suppl Supplies Misc Test strips order: Test strips for One Touch Ultra meter. Sig: use 3 times daily and prn ssx high or low sugar #100 RF x 3 100 Strip 3   • valacyclovir (VALTREX) 1 GM Tab Take 1 Tab by mouth 3 times a day. 21 Tab 0   • metformin (GLUCOPHAGE) 1000 MG tablet Take 1 Tab by mouth 2 times a day. 180 Tab 3   • aspirin (ASA) 81 MG Chew Tab chewable tablet Take 1 Tab by mouth every day. 90 Tab 3   • atorvastatin (LIPITOR) 10 MG Tab Take 1 Tab by mouth every day. 90 Tab 3   • levothyroxine (SYNTHROID) 75 MCG Tab Take 1 Tab by mouth every day. 90 Tab 3   • trazodone (DESYREL) 100 MG Tab Take 1 Tab by mouth at bedtime as needed for Sleep. (Patient not taking: Reported on 6/7/2017) 90 Tab 3   • vitamin D (CHOLECALCIFEROL) 1000 UNIT Tab Take 1 Tab by mouth every day. 90 Tab 3     No current facility-administered medications for this visit.         Social History:     Social History   Substance Use Topics   • Smoking status: Former Smoker     Packs/day: 1.00     Years: 5.00     Types: Cigarettes     Quit date: 11/9/1971   • Smokeless tobacco: Never Used   • Alcohol use 4.2 oz/week     7 Glasses of wine per week      Comment: daily       ROS:     - NOTE: All other systems reviewed and are negative, except as in HPI.     Objective:   Physical Exam:    Vitals: Blood pressure 130/80, pulse 60, temperature 36.3 °C (97.3 °F), resp. rate 14, height 1.829 m (6'), weight 99.3 kg (219 lb), SpO2 96 %.   BMI: Body mass index is 29.7 kg/m².   General/Constitutional: Vitals as above, Well nourished, well developed male in no acute  distress   Head/Eyes: Head is grossly normal & atraumatic, bilateral conjunctivae clear and not injected, bilateral EOMI, bilateral PERRL   ENT: Bilateral external ears grossly normal in appearance, Hearing grossly intact, External nares normal in appearance and without discharge/bleeding   Respiratory: No respiratory distress, bilateral lungs are clear to ausculation in all lung fields (anterior/lateral/posterior), no wheezing/rhonchi/rales   Cardiovascular: Regular rate and rhythm without murmur/gallops/rubs, distal pulses are intact and equal bilaterally (radial, posterior tibial), no bilateral lower extremity edema   MSK: Gait grossly normal & not antalgic   Integumentary: No apparent rashes   Psych: Judgment grossly appropriate, no apparent depression/anxiety    Health Maintenance:      - I have requested previous records, and will update accordingly.    Imaging/Labs:      - Reviewed and interpreted as in HPI above    Assessment and Plan:   1. Type 2 diabetes mellitus without complication, without long-term current use of insulin (CMS-[facility])  Uncontrolled, but control improving.  Patient is making healthy lifestyle changes to his nutrition.  Refilled diabetic supplies as below.  Patient to RTC in ~2.5more months for A1c recheck.   - Blood Glucose Monitoring Suppl Supplies Misc; Test strips order: Test strips for OneTouch Ultra meter. Sig: use fasting first thing in the morning and prn ssx high or low sugar  Dispense: 100 Each; Refill: 3        RTC: in 2.5months for diabetes follow-up (A1c).    PLEASE NOTE: This dictation was created using voice recognition software. I have made every reasonable attempt to correct obvious errors, but I expect that there are errors of grammar and possibly content that I did not discover before finalizing the note.

## 2017-10-18 NOTE — TELEPHONE ENCOUNTER
Phone Number Called: [phone] (home)      Message: LEft message for patient to make an appointment.     Left Message for patient to call back: yes

## 2017-10-18 NOTE — TELEPHONE ENCOUNTER
Phone Number Called: [phone] (home)       Message: Left vm for patient to schedule appt.     Left Message for patient to call back: yes

## 2017-12-21 ENCOUNTER — OFFICE VISIT (OUTPATIENT)
Dept: MEDICAL GROUP | Facility: MEDICAL CENTER | Age: 72
End: 2017-12-21
Payer: MEDICARE

## 2017-12-21 ENCOUNTER — HOSPITAL ENCOUNTER (OUTPATIENT)
Facility: MEDICAL CENTER | Age: 72
End: 2017-12-21
Attending: FAMILY MEDICINE
Payer: MEDICARE

## 2017-12-21 VITALS
OXYGEN SATURATION: 96 % | BODY MASS INDEX: 29.57 KG/M2 | DIASTOLIC BLOOD PRESSURE: 82 MMHG | TEMPERATURE: 98.2 F | WEIGHT: 218 LBS | HEART RATE: 55 BPM | SYSTOLIC BLOOD PRESSURE: 140 MMHG

## 2017-12-21 DIAGNOSIS — I10 ESSENTIAL HYPERTENSION: ICD-10-CM

## 2017-12-21 DIAGNOSIS — E78.2 MIXED HYPERLIPIDEMIA: ICD-10-CM

## 2017-12-21 DIAGNOSIS — Z12.11 COLON CANCER SCREENING: ICD-10-CM

## 2017-12-21 DIAGNOSIS — E03.9 ACQUIRED HYPOTHYROIDISM: ICD-10-CM

## 2017-12-21 DIAGNOSIS — E11.9 TYPE 2 DIABETES MELLITUS WITHOUT COMPLICATION, WITHOUT LONG-TERM CURRENT USE OF INSULIN (HCC): ICD-10-CM

## 2017-12-21 PROBLEM — R10.9 LEFT FLANK PAIN: Status: RESOLVED | Noted: 2017-06-07 | Resolved: 2017-12-21

## 2017-12-21 PROBLEM — R35.0 INCREASED URINARY FREQUENCY: Status: RESOLVED | Noted: 2017-06-07 | Resolved: 2017-12-21

## 2017-12-21 LAB
HBA1C MFR BLD: 7.1 % (ref ?–5.8)
INT CON NEG: NEGATIVE
INT CON POS: POSITIVE

## 2017-12-21 PROCEDURE — 83036 HEMOGLOBIN GLYCOSYLATED A1C: CPT | Mod: QW | Performed by: FAMILY MEDICINE

## 2017-12-21 PROCEDURE — 82274 ASSAY TEST FOR BLOOD FECAL: CPT

## 2017-12-21 PROCEDURE — 99214 OFFICE O/P EST MOD 30 MIN: CPT | Performed by: FAMILY MEDICINE

## 2017-12-21 RX ORDER — ATORVASTATIN CALCIUM 10 MG/1
10 TABLET, FILM COATED ORAL DAILY
Qty: 90 TAB | Refills: 1 | Status: SHIPPED | OUTPATIENT
Start: 2017-12-21 | End: 2018-07-26 | Stop reason: SDUPTHER

## 2017-12-21 RX ORDER — LEVOTHYROXINE SODIUM 0.07 MG/1
75 TABLET ORAL DAILY
Qty: 90 TAB | Refills: 1 | Status: SHIPPED | OUTPATIENT
Start: 2017-12-21 | End: 2018-07-02 | Stop reason: SDUPTHER

## 2017-12-21 NOTE — ASSESSMENT & PLAN NOTE
Chronic, stable, well-controlled on current medications.    ROS is NEGATIVE for dizziness, generalized weakness/fatigue, cold sweats, dizziness,  vision/hearing changes, jaw pain/paresthesias, BUE pain/paresthesias/numbness/weakness, chest pain/pressure, palpitations, dyspnea, nausea, RUQ abdominal pain, oliguria/anuria, BLE edema.

## 2017-12-21 NOTE — ASSESSMENT & PLAN NOTE
Chronic, stable, uncontrolled. Review of lab records from December 2016 as well as June 2017 reveals the patient has consistent mixed dyslipidemia with hypertriglyceridemia as well as low HDL. Patient interviewed that he needs to eat more complex carbohydrates, and cut down on simple carbohydrates as well as sugars. Patient verbalizes understanding.    Patient is taking his atorvastatin as directed, is requesting refill present time.

## 2017-12-21 NOTE — PROGRESS NOTES
Subjective:   Chief Complaint/History of Present Illness:  Saleem Whitney is a 72 y.o. male established patient who presents today to discuss the following medical conditions:    Type 2 diabetes mellitus without complication, without long-term current use of insulin (CMS-[facility])  Patient states that he was in vacation in [place] on several different safari trip, and review of weight reveals the patient did not gain weight during his trips.      Patient has been trying to cut down on simple carbohydrates, is still trying to eat a good amount of vegetables and fruit in his diet.    ROS is NEGATIVE for blurred vision, polydipsia, polyuria, diaphoresis, palpitations, fatigue, irritability, flank pain, BLE paresthesias.    Essential hypertension  Chronic, stable, well-controlled on current medications.    ROS is NEGATIVE for dizziness, generalized weakness/fatigue, cold sweats, dizziness,  vision/hearing changes, jaw pain/paresthesias, BUE pain/paresthesias/numbness/weakness, chest pain/pressure, palpitations, dyspnea, nausea, RUQ abdominal pain, oliguria/anuria, BLE edema.    Acquired hypothyroidism  Chronic, stable, well-controlled on current medication.    ROS is NEGATIVE for: cold or heat intolerance, anxiety/depression, chest pain/pressure, palpitations, hair thickening/coarsening/falling out/thinning, skin changes, diarrhea/constipation, unexpected weight change.    Mixed hyperlipidemia  Chronic, stable, uncontrolled. Review of lab records from December 2016 as well as June 2017 reveals the patient has consistent mixed dyslipidemia with hypertriglyceridemia as well as low HDL. Patient interviewed that he needs to eat more complex carbohydrates, and cut down on simple carbohydrates as well as sugars. Patient verbalizes understanding.    Patient is taking his atorvastatin as directed, is requesting refill present time.      Patient Active Problem List    Diagnosis Date Noted   • Chronic pain of left knee  09/25/2017   • Diastasis of rectus abdominis 06/22/2017   • Obesity (BMI 30-39.9) 11/09/2016   • Type 2 diabetes mellitus without complication, without long-term current use of insulin (CMS-HCC) 11/09/2016   • Essential hypertension 11/09/2016   • Acquired hypothyroidism 11/09/2016   • Mixed hyperlipidemia 11/09/2016   • Primary insomnia 11/09/2016   • Screening for AAA (abdominal aortic aneurysm) 11/09/2016       Additional History:   Allergies:    Patient has no known allergies.     Current Medications:     Current Outpatient Prescriptions   Medication Sig Dispense Refill   • atorvastatin (LIPITOR) 10 MG Tab Take 1 Tab by mouth every day. 90 Tab 1   • levothyroxine (SYNTHROID) 75 MCG Tab Take 1 Tab by mouth every day. 90 Tab 1   • losartan (COZAAR) 100 MG Tab Take 1 Tab by mouth every day. 90 Tab 0   • Blood Glucose Monitoring Suppl Supplies Misc Test strips order: Test strips for OneTouch Ultra meter. Sig: use fasting first thing in the morning and prn ssx high or low sugar 100 Each 3   • glipiZIDE (GLUCOTROL) 10 MG Tab Take 1 Tab by mouth 2 times a day. 180 Tab 1   • Lancets Misc Lancets order: Lancets for One Touch Ultra meter. Sig: use 3 times daily and prn ssx high or low sugar. #100 RF x 3 100 Each 3   • Blood Glucose Monitoring Suppl Supplies Misc Test strips order: Test strips for One Touch Ultra meter. Sig: use 3 times daily and prn ssx high or low sugar #100 RF x 3 100 Strip 3   • valacyclovir (VALTREX) 1 GM Tab Take 1 Tab by mouth 3 times a day. 21 Tab 0   • metformin (GLUCOPHAGE) 1000 MG tablet Take 1 Tab by mouth 2 times a day. 180 Tab 3   • aspirin (ASA) 81 MG Chew Tab chewable tablet Take 1 Tab by mouth every day. 90 Tab 3   • vitamin D (CHOLECALCIFEROL) 1000 UNIT Tab Take 1 Tab by mouth every day. 90 Tab 3     No current facility-administered medications for this visit.         Social History:     Social History   Substance Use Topics   • Smoking status: Former Smoker     Packs/day: 1.00     Years:  5.00     Types: Cigarettes     Quit date: 11/9/1971   • Smokeless tobacco: Never Used   • Alcohol use 4.2 oz/week     7 Glasses of wine per week      Comment: daily       ROS:     - NOTE: All other systems reviewed and are negative, except as in HPI.     Objective:   Physical Exam:    Vitals: Blood pressure 140/82, pulse (!) 55, temperature 36.8 °C (98.2 °F), weight 98.9 kg (218 lb), SpO2 96 %.   BMI: Body mass index is 29.57 kg/m².   General/Constitutional: Vitals as above, Well nourished, well developed male in no acute distress   Head/Eyes: Head is grossly normal & atraumatic, bilateral conjunctivae clear and not injected, bilateral EOMI, bilateral PERRL   ENT: Bilateral external ears grossly normal in appearance, Hearing grossly intact, External nares normal in appearance and without discharge/bleeding   Respiratory: No respiratory distress, bilateral lungs are clear to ausculation in all lung fields (anterior/lateral/posterior), no wheezing/rhonchi/rales   Cardiovascular: Regular rate and rhythm without murmur/gallops/rubs, distal pulses are intact and equal bilaterally (radial, posterior tibial), no bilateral lower extremity edema   MSK: Gait grossly normal & not antalgic   Integumentary: No apparent rashes   Psych: Judgment grossly appropriate, no apparent depression/anxiety    Health Maintenance:     - Colon cancer screening ordered with FIT    - Retinal exam ordered, and scheduled. However, patient has seen his optometrist, and we will request records from Dr. Vital.    Imaging/Labs:     -Point-of-care A1c is 7.1%    Assessment and Plan:   1. Type 2 diabetes mellitus without complication, without long-term current use of insulin (CMS-[facility])  Chronic, stable, well controlled for geriatric diabetic patient, with A1c less than 7.5%.  Continue with lifestyle changes, particularly to diet. Continue current medications of metformin and glipizide.   - POCT  A1C   - POCT Retinal Eye Exam    2. Essential  hypertension  Chronic, stable, well controlled. Continue blood pressure medications as directed.    3. Acquired hypothyroidism  Chronic, stable, well controlled. Medications refilled, and we will retest her thyroid labs in June.   - levothyroxine (SYNTHROID) 75 MCG Tab; Take 1 Tab by mouth every day.  Dispense: 90 Tab; Refill: 1    4. Mixed hyperlipidemia  Chronic, stable, uncontrolled. Continue atorvastatin as refilled. We will recheck labs in June.   - atorvastatin (LIPITOR) 10 MG Tab; Take 1 Tab by mouth every day.  Dispense: 90 Tab; Refill: 1    5. Colon cancer screening  Unknown control, FIT for evaluation.   - OCCULT BLOOD FECES IMMUNOASSAY (FIT); Future    RTC: in 04/2018 for Medicare Annual Wellness Exam.    PLEASE NOTE: This dictation was created using voice recognition software. I have made every reasonable attempt to correct obvious errors, but I expect that there are errors of grammar and possibly content that I did not discover before finalizing the note.

## 2017-12-21 NOTE — LETTER
[facility]  Kirk Calabrese M.D.  [address]  [place] [address]  Fax: [phone]   Authorization for Release/Disclosure of   Protected Health Information   Name: SALEEM ARREAGA : 1945 SSN: [id number]   Address: [address] [place] [address]    [place] [address] Phone:    [phone] (home)    I authorize the entity listed below to release/disclose the PHI below to:   [facility]/Kirk Calabrese M.D. and Kirk Calabrese M.D.   Provider or Entity Name:  Saleem Vital   Address   City, [facility]   Phone:  [phone]    Fax:     Reason for request: continuity of care   Information to be released:    [  ] LAST COLONOSCOPY,  including any PATH REPORT and follow-up  [  ] LAST FIT/COLOGUARD RESULT [  ] LAST DEXA  [  ] LAST MAMMOGRAM  [  ] LAST PAP  [  ] LAST LABS [] RETINA EXAM REPORT  [  ] IMMUNIZATION RECORDS  [  ] Release all info      [  ] Check here and initial the line next to each item to release ALL health information INCLUDING  _____ Care and treatment for drug and / or alcohol abuse  _____ HIV testing, infection status, or AIDS  _____ Genetic Testing    DATES OF SERVICE OR TIME PERIOD TO BE DISCLOSED: _____________  I understand and acknowledge that:  * This Authorization may be revoked at any time by you in writing, except if your health information has already been used or disclosed.  * Your health information that will be used or disclosed as a result of you signing this authorization could be re-disclosed by the recipient. If this occurs, your re-disclosed health information may no longer be protected by State or Federal laws.  * You may refuse to sign this Authorization. Your refusal will not affect your ability to obtain treatment.  * This Authorization becomes effective upon signing and will  on (date) __________.      If no date is indicated, this Authorization will  one (1) year from the signature date.    Name: Saleem Bolaños  Chelo    Signature:   Date:     12/21/2017       PLEASE FAX REQUESTED RECORDS BACK TO: [phone]

## 2017-12-21 NOTE — ASSESSMENT & PLAN NOTE
Chronic, stable, well-controlled on current medication.    ROS is NEGATIVE for: cold or heat intolerance, anxiety/depression, chest pain/pressure, palpitations, hair thickening/coarsening/falling out/thinning, skin changes, diarrhea/constipation, unexpected weight change.

## 2017-12-21 NOTE — ASSESSMENT & PLAN NOTE
Patient states that he was in vacation in [place] on several different safari trip, and review of weight reveals the patient did not gain weight during his trips.      Patient has been trying to cut down on simple carbohydrates, is still trying to eat a good amount of vegetables and fruit in his diet.    ROS is NEGATIVE for blurred vision, polydipsia, polyuria, diaphoresis, palpitations, fatigue, irritability, flank pain, BLE paresthesias.

## 2017-12-22 ENCOUNTER — HOSPITAL ENCOUNTER (OUTPATIENT)
Facility: MEDICAL CENTER | Age: 72
End: 2017-12-22
Attending: FAMILY MEDICINE
Payer: MEDICARE

## 2017-12-27 LAB — HEMOCCULT STL QL IA: NEGATIVE

## 2018-01-23 ENCOUNTER — PATIENT MESSAGE (OUTPATIENT)
Dept: MEDICAL GROUP | Facility: MEDICAL CENTER | Age: 73
End: 2018-01-23

## 2018-01-23 DIAGNOSIS — E11.9 TYPE 2 DIABETES MELLITUS WITHOUT COMPLICATION, WITHOUT LONG-TERM CURRENT USE OF INSULIN (HCC): ICD-10-CM

## 2018-05-15 ENCOUNTER — TELEPHONE (OUTPATIENT)
Dept: MEDICAL GROUP | Facility: MEDICAL CENTER | Age: 73
End: 2018-05-15

## 2018-05-15 DIAGNOSIS — I10 ESSENTIAL HYPERTENSION: ICD-10-CM

## 2018-05-15 RX ORDER — LOSARTAN POTASSIUM 100 MG/1
100 TABLET ORAL DAILY
Qty: 90 TAB | Refills: 0 | Status: SHIPPED | OUTPATIENT
Start: 2018-05-15 | End: 2018-06-04 | Stop reason: SDUPTHER

## 2018-05-15 NOTE — TELEPHONE ENCOUNTER
Future Appointments       Provider Department Center    5/24/2018 2:40 PM Kirk Calabrese M.D.; [facility]        ANNUAL WELLNESS VISIT PRE-VISIT PLANNING WITHOUT OUTREACH    1.  Reviewed note from last office visit with PCP: YES  12/21/2017  2.  If any orders were placed at last visit, do we have Results/Consult Notes?        •  Labs -FIT TEST COMP-POCT EYE EXAM   Note: If patient appointment is for lab review and patient did not complete labs, check with provider if OK to reschedule patient until labs completed.       •  Imaging - Imaging was not ordered at last office visit.       •  Referrals - No referrals were ordered at last office visit.    3.  Immunizations were updated in [employer] using [employer]?: Yes              •  Is patient due for Tdap? NO       •  Is patient due for Shingles? NO     4.  Patient is due for the following Health Maintenance Topics:   Health Maintenance Due   Topic Date Due   • Annual Wellness Visit  1945   • RETINAL SCREENING  10/20/1961       5.  Reviewed/Updated the following with patient:       •   Preferred Pharmacy? YES       •   Preferred Lab? YES       •   Preferred Communication? YES       •   Allergies? YES       •   Medications? YES. Was Abstract Encounter opened and chart updated? YES       •   Social History? YES. Was Abstract Encounter opened and chart updated? YES       •   Family History (document living status of immediate family members and if + hx of  cancer, diabetes, hypertension, hyperlipidemia, heart attack, stroke) YES. Was Abstract Encounter opened and chart updated? YES    6.  Care Team Updated:       •   DME Company (gait device, O2, CPAP, etc.):        •   Other Specialists (eye doctor, derm, GYN, cardiology, endo, etc): YES    7.  Patient has the following Care Path diagnoses on Problem List:  DIABETES    Has patient ever had diabetes education?    8.  MDX printed and highlighted for  Provider? NO    9.  Patient was advised: “This is a free wellness visit. The provider will screen for medical conditions to help you stay healthy. If you have other concerns to address you may be asked to discuss these at a separate visit or there may be an additional fee.”     10.  Patient was informed to arrive 15 min prior to their scheduled appointment and bring in their medication bottles.

## 2018-05-24 ENCOUNTER — OFFICE VISIT (OUTPATIENT)
Dept: MEDICAL GROUP | Facility: MEDICAL CENTER | Age: 73
End: 2018-05-24
Payer: MEDICARE

## 2018-05-24 VITALS
HEART RATE: 64 BPM | RESPIRATION RATE: 16 BRPM | TEMPERATURE: 97.5 F | SYSTOLIC BLOOD PRESSURE: 146 MMHG | BODY MASS INDEX: 28.71 KG/M2 | DIASTOLIC BLOOD PRESSURE: 70 MMHG | WEIGHT: 212 LBS | OXYGEN SATURATION: 97 % | HEIGHT: 72 IN

## 2018-05-24 DIAGNOSIS — I15.2 HYPERTENSION ASSOCIATED WITH DIABETES (HCC): ICD-10-CM

## 2018-05-24 DIAGNOSIS — M62.08 DIASTASIS OF RECTUS ABDOMINIS: ICD-10-CM

## 2018-05-24 DIAGNOSIS — E66.9 OBESITY, DIABETES, AND HYPERTENSION SYNDROME (HCC): ICD-10-CM

## 2018-05-24 DIAGNOSIS — Z13.6 SCREENING FOR AAA (ABDOMINAL AORTIC ANEURYSM): ICD-10-CM

## 2018-05-24 DIAGNOSIS — F51.01 PRIMARY INSOMNIA: ICD-10-CM

## 2018-05-24 DIAGNOSIS — G89.29 CHRONIC PAIN OF LEFT KNEE: ICD-10-CM

## 2018-05-24 DIAGNOSIS — Z00.00 MEDICARE ANNUAL WELLNESS VISIT, SUBSEQUENT: Primary | ICD-10-CM

## 2018-05-24 DIAGNOSIS — E11.59 HYPERTENSION ASSOCIATED WITH DIABETES (HCC): ICD-10-CM

## 2018-05-24 DIAGNOSIS — M25.562 CHRONIC PAIN OF LEFT KNEE: ICD-10-CM

## 2018-05-24 DIAGNOSIS — R41.3 MEMORY CHANGES: ICD-10-CM

## 2018-05-24 DIAGNOSIS — E78.5 HYPERLIPIDEMIA DUE TO TYPE 2 DIABETES MELLITUS (HCC): ICD-10-CM

## 2018-05-24 DIAGNOSIS — E11.69 OBESITY, DIABETES, AND HYPERTENSION SYNDROME (HCC): ICD-10-CM

## 2018-05-24 DIAGNOSIS — E11.69 HYPERLIPIDEMIA DUE TO TYPE 2 DIABETES MELLITUS (HCC): ICD-10-CM

## 2018-05-24 DIAGNOSIS — E11.59 OBESITY, DIABETES, AND HYPERTENSION SYNDROME (HCC): ICD-10-CM

## 2018-05-24 DIAGNOSIS — E11.9 TYPE 2 DIABETES MELLITUS WITHOUT COMPLICATION, WITHOUT LONG-TERM CURRENT USE OF INSULIN (HCC): ICD-10-CM

## 2018-05-24 DIAGNOSIS — E03.9 ACQUIRED HYPOTHYROIDISM: ICD-10-CM

## 2018-05-24 DIAGNOSIS — I15.2 OBESITY, DIABETES, AND HYPERTENSION SYNDROME (HCC): ICD-10-CM

## 2018-05-24 PROCEDURE — G0439 PPPS, SUBSEQ VISIT: HCPCS | Performed by: FAMILY MEDICINE

## 2018-05-24 RX ORDER — AMLODIPINE BESYLATE 10 MG/1
10 TABLET ORAL DAILY
Qty: 90 TAB | Refills: 0 | Status: SHIPPED | OUTPATIENT
Start: 2018-05-24 | End: 2018-08-07

## 2018-05-24 ASSESSMENT — PATIENT HEALTH QUESTIONNAIRE - PHQ9: CLINICAL INTERPRETATION OF PHQ2 SCORE: 0

## 2018-05-24 ASSESSMENT — ENCOUNTER SYMPTOMS: GENERAL WELL-BEING: GOOD

## 2018-05-24 ASSESSMENT — PAIN SCALES - GENERAL: PAINLEVEL: NO PAIN

## 2018-05-24 ASSESSMENT — ACTIVITIES OF DAILY LIVING (ADL): BATHING_REQUIRES_ASSISTANCE: 0

## 2018-05-24 NOTE — PROGRESS NOTES
Chief Complaint   Patient presents with   • Annual Exam         HPI:  Saleem is a 72 y.o. here for Medicare Annual Wellness Visit    Type 2 diabetes mellitus without complication, without long-term current use of insulin (CMS-HCC) (HCC)  This problem is chronic, stable, and monitored appropriately by history/labs/imaging as needed, and is nearly well-controlled on the current regimen (A1c in 12/2017 was 7.1%).  Please continue current regimen     Hypertension associated with diabetes (HCC)  This problem is chronic, stable, and monitored appropriately by history/labs/imaging as needed, and is well-controlled on the current regimen.  Please continue current regimen    Hyperlipidemia due to type 2 diabetes mellitus (HCC)  This problem is chronic, stable, and monitored appropriately by history/labs/imaging as needed, and is uncontrolled on the current regimen--patient with mixed DLD, uncontrolled at latest check in 06/2017, although this has hopefully improved due his lifestyle changes (I.e. Mirroring his improvements with DM2 as evidenced by the A1c).  Please continue current regimen    Obesity, diabetes, and hypertension syndrome (HCC)  This problem is chronic, stable, and monitored appropriately by history/labs/imaging as needed, and is well-controlled on the current regimen.  Please continue current regimen     Diastasis of rectus abdominis  This problem is chronic, stable, and monitored appropriately by history/labs/imaging as needed, and is well-controlled on the current regimen.  Please continue current regimen     Screening for AAA (abdominal aortic aneurysm)  This problem is chronic, stable, and monitored appropriately by history/labs/imaging as needed, and is well-controlled on the current regimen.  Please continue current regimen     Primary insomnia  This problem is chronic, stable, and monitored appropriately by history/labs/imaging as needed, and is well-controlled on the current regimen.  Please continue  current regimen     Chronic pain of left knee  This problem is chronic, stable, and monitored appropriately by history/labs/imaging as needed, and is well-controlled on the current regimen.  Please continue current regimen     Acquired hypothyroidism  This problem is chronic, stable, and monitored appropriately by history/labs/imaging as needed, and is well-controlled on the current regimen.  Please continue current regimen     Memory changes  New problem, uncontrolled, does not appear to be associated with motor signs/sx of stroke.  Patient with difficulty on today's cognitive screening exercises, also states that he has problems with short-term memory formation & recall.  Of note, patient is an avid reader, usually with a new, thick novel every time he comes to our clinic visits.  Referral to Neurology for further evaluation/management.    ROS is NEGATIVE for confusion, altered mentation, diplopia, visual scotomas, facial droop, dysarthria, dysphagia, hemiplegia, gait instability, new/abnormal paresthesias/numbness.      Patient Active Problem List    Diagnosis Date Noted   • Memory changes 05/24/2018   • Chronic pain of left knee 09/25/2017   • Diastasis of rectus abdominis 06/22/2017   • Obesity, diabetes, and hypertension syndrome (HCC) 11/09/2016   • Type 2 diabetes mellitus without complication, without long-term current use of insulin (HCC) 11/09/2016   • Hypertension associated with diabetes (HCC) 11/09/2016   • Acquired hypothyroidism 11/09/2016   • Hyperlipidemia due to type 2 diabetes mellitus (HCC) 11/09/2016   • Primary insomnia 11/09/2016   • Screening for AAA (abdominal aortic aneurysm) 11/09/2016       Current Outpatient Prescriptions   Medication Sig Dispense Refill   • amLODIPine (NORVASC) 10 MG Tab Take 1 Tab by mouth every day. 90 Tab 0   • losartan (COZAAR) 100 MG Tab Take 1 Tab by mouth every day. 90 Tab 0   • metformin (GLUCOPHAGE) 1000 MG tablet Take 1 Tab by mouth 2 times a day. 180 Tab 1    • atorvastatin (LIPITOR) 10 MG Tab Take 1 Tab by mouth every day. 90 Tab 1   • levothyroxine (SYNTHROID) 75 MCG Tab Take 1 Tab by mouth every day. 90 Tab 1   • Blood Glucose Monitoring Suppl Supplies Misc Test strips order: Test strips for OneTouch Ultra meter. Sig: use fasting first thing in the morning and prn ssx high or low sugar 100 Each 3   • glipiZIDE (GLUCOTROL) 10 MG Tab Take 1 Tab by mouth 2 times a day. 180 Tab 1   • Lancets Misc Lancets order: Lancets for One Touch Ultra meter. Sig: use 3 times daily and prn ssx high or low sugar. #100 RF x 3 100 Each 3   • Blood Glucose Monitoring Suppl Supplies Misc Test strips order: Test strips for One Touch Ultra meter. Sig: use 3 times daily and prn ssx high or low sugar #100 RF x 3 100 Strip 3   • valacyclovir (VALTREX) 1 GM Tab Take 1 Tab by mouth 3 times a day. 21 Tab 0   • aspirin (ASA) 81 MG Chew Tab chewable tablet Take 1 Tab by mouth every day. 90 Tab 3   • vitamin D (CHOLECALCIFEROL) 1000 UNIT Tab Take 1 Tab by mouth every day. 90 Tab 3     No current facility-administered medications for this visit.         Patient is taking medications as noted in medication list.  Current supplements as per medication list.     Allergies: Patient has no known allergies.    Current social contact/activities: Key clubs, game night, occ dinner out and with friends.      Is patient current with immunizations? Yes.    He  reports that he quit smoking about 46 years ago. His smoking use included Cigarettes. He has a 5.00 pack-year smoking history. He has never used smokeless tobacco. He reports that he drinks about 4.2 oz of alcohol per week . He reports that he does not use drugs.  Counseling given: Not Answered        DPA/Advanced directive: Patient has Durable Power of , but it is not on file. Instructed to bring in a copy to scan into their chart.    ROS:    Gait: Uses no assistive device   Ostomy: no   Other tubes: no   Amputations: no   Chronic oxygen use no    Last eye exam 05/02/2018   Wears hearing aids: no   : Reports urinary leakage during the last 6 months that has not interfered at all with their daily activities or sleep.      Screening:  DIABETES    Has patient ever had diabetes education? Yes, and is NOT interested in more at this time.            Depression Screening    Little interest or pleasure in doing things?  0 - not at all  Feeling down, depressed, or hopeless? 0 - not at all  Patient Health Questionnaire Score: 0    If depressive symptoms identified deferred to follow up visit unless specifically addressed in assessment and plan.    Interpretation of PHQ-9 Total Score   Score Severity   1-4 No Depression   5-9 Mild Depression   10-14 Moderate Depression   15-19 Moderately Severe Depression   20-27 Severe Depression    Screening for Cognitive Impairment    Three Minute Recall (leader, season, table)  1/3 leader, season, table  Rodrick clock face with all 12 numbers and set the hands to show 10 past 11.  Yes 4/5  If cognitive concerns identified, deferred for follow up unless specifically addressed in assessment and plan.    Fall Risk Assessment    Has the patient had two or more falls in the last year or any fall with injury in the last year?  Yes  If fall risk identified, deferred for follow up unless specifically addressed in assessment and plan.    Safety Assessment    Throw rugs on floor.  Yes  Handrails on all stairs.  Yes  Good lighting in all hallways.  Yes  Difficulty hearing.  No  Patient counseled about all safety risks that were identified.    Functional Assessment ADLs    Are there any barriers preventing you from cooking for yourself or meeting nutritional needs?  No.    Are there any barriers preventing you from driving safely or obtaining transportation?  No.    Are there any barriers preventing you from using a telephone or calling for help?  No.    Are there any barriers preventing you from shopping?  No.    Are there any barriers  preventing you from taking care of your own finances?  No.    Are there any barriers preventing you from managing your medications?  No.    Are there any barriers preventing you from showering, bathing or dressing yourself?  No.    Are you currently engaging in any exercise or physical activity?  Yes.  Walking   What is your perception of your health?  Good.    Health Maintenance Summary                Annual Wellness Visit Overdue 1945     RETINAL SCREENING Overdue 10/20/1961     A1C SCREENING Next Due 6/21/2018      Done 12/21/2017 POCT A1C     Patient has more history with this topic...    DIABETES MONOFILAMENT / LE EXAM Next Due 6/22/2018      Done 6/22/2017 AMB DIABETIC MONOFILAMENT LOWER EXTREMITY EXAM    FASTING LIPID PROFILE Next Due 6/27/2018      Done 6/27/2017 LIPID PROFILE      Patient has more history with this topic...    URINE ACR / MICROALBUMIN Next Due 6/27/2018      Done 6/27/2017 MICROALBUMIN CREAT RATIO URINE    SERUM CREATININE Next Due 6/27/2018      Done 6/27/2017 COMP METABOLIC PANEL      Patient has more history with this topic...    COLON CANCER SCREENING ANNUAL FIT Next Due 12/21/2018      Done 12/21/2017 OCCULT BLOOD FECES IMMUNOASSAY    IMM DTaP/Tdap/Td Vaccine Next Due 8/8/2027      Done 8/8/2017 Imm Admin: Tdap Vaccine          Patient Care Team:  Kirk Calabrese M.D. as PCP - General (Family Medicine)  Saleem Vital O.D. as Consulting Physician (Optometry)    Social History   Substance Use Topics   • Smoking status: Former Smoker     Packs/day: 1.00     Years: 5.00     Types: Cigarettes     Quit date: 11/9/1971   • Smokeless tobacco: Never Used   • Alcohol use 4.2 oz/week     7 Glasses of wine per week      Comment: daily     Family History   Problem Relation Age of Onset   • Diabetes Mother    • Stroke Father      45   • Diabetes Sister    • Diabetes Maternal Uncle      He  has a past medical history of Hyperlipidemia; Hypertension; Hypothyroid; and Type II or unspecified  type diabetes mellitus without mention of complication, not stated as uncontrolled. He also has no past medical history of Low vitamin D level.   Past Surgical History:   Procedure Laterality Date   • HAND SURGERY             Exam:     Blood pressure 146/70, pulse 64, temperature 36.4 °C (97.5 °F), resp. rate 16, height 1.829 m (6'), weight 96.2 kg (212 lb), SpO2 97 %. Body mass index is 28.75 kg/m².    Hearing excellent.    Dentition good  Alert, oriented in no acute distress.  Eye contact is good, speech goal directed, affect calm      Assessment and Plan. The following treatment and monitoring plan is recommended:    1. Medicare annual wellness visit, subsequent  OR ANNUAL WELLNESS VISIT-INCLUDES PPPS SUBSEQUE*    HEMOGLOBIN A1C    LIPID PROFILE    MICROALB/CREAT RATIO RAND. UR    COMP METABOLIC PANEL    TSH WITH REFLEX TO FT4   2. Memory changes  REFERRAL TO NEUROLOGY    HEMOGLOBIN A1C    LIPID PROFILE    COMP METABOLIC PANEL    TSH WITH REFLEX TO FT4   3. Type 2 diabetes mellitus without complication, without long-term current use of insulin (HCC)  OR ANNUAL WELLNESS VISIT-INCLUDES PPPS SUBSEQUE*    HEMOGLOBIN A1C    LIPID PROFILE    MICROALB/CREAT RATIO RAND. UR    COMP METABOLIC PANEL   4. Hypertension associated with diabetes (HCC)  amLODIPine (NORVASC) 10 MG Tab    OR ANNUAL WELLNESS VISIT-INCLUDES PPPS SUBSEQUE*    HEMOGLOBIN A1C    LIPID PROFILE    MICROALB/CREAT RATIO RAND. UR    COMP METABOLIC PANEL   5. Hyperlipidemia due to type 2 diabetes mellitus (HCC)  OR ANNUAL WELLNESS VISIT-INCLUDES PPPS SUBSEQUE*    LIPID PROFILE   6. Obesity, diabetes, and hypertension syndrome (HCC)  OR ANNUAL WELLNESS VISIT-INCLUDES PPPS SUBSEQUE*   7. Diastasis of rectus abdominis  OR ANNUAL WELLNESS VISIT-INCLUDES PPPS SUBSEQUE*   8. Screening for AAA (abdominal aortic aneurysm)  OR ANNUAL WELLNESS VISIT-INCLUDES PPPS SUBSEQUE*   9. Primary insomnia  OR ANNUAL WELLNESS VISIT-INCLUDES PPPS SUBSEQUE*   10. Chronic pain of  left knee  KS ANNUAL WELLNESS VISIT-INCLUDES PPPS SUBSEQUE*   11. Acquired hypothyroidism  [place] ANNUAL WELLNESS VISIT-INCLUDES PPPS SUBSEQUE*    COMP METABOLIC PANEL    TSH WITH REFLEX TO FT4         Services suggested: No services needed at this time  Health Care Screening recommendations as per orders if indicated.  Referrals offered: PT/OT/Nutrition counseling/Behavioral Health/Smoking cessation as per orders if indicated.    Discussion today about general wellness and lifestyle habits:    · Prevent falls and reduce trip hazards; Cautioned about securing or removing rugs.  · Have a working fire alarm and carbon monoxide detector;   · Engage in regular physical activity and social activities       Follow-up: Return in about 6 months (around 11/12/2018) for Follow-up on Memory.

## 2018-05-27 NOTE — ASSESSMENT & PLAN NOTE
This problem is chronic, stable, and monitored appropriately by history/labs/imaging as needed, and is nearly well-controlled on the current regimen (A1c in 12/2017 was 7.1%).  Please continue current regimen

## 2018-05-27 NOTE — ASSESSMENT & PLAN NOTE
This problem is chronic, stable, and monitored appropriately by history/labs/imaging as needed, and is uncontrolled on the current regimen--patient with mixed DLD, uncontrolled at latest check in 06/2017, although this has hopefully improved due his lifestyle changes (I.e. Mirroring his improvements with DM2 as evidenced by the A1c).  Please continue current regimen

## 2018-05-27 NOTE — ASSESSMENT & PLAN NOTE
New problem, uncontrolled, does not appear to be associated with motor signs/sx of stroke.  Patient with difficulty on today's cognitive screening exercises, also states that he has problems with short-term memory formation & recall.  Of note, patient is an avid reader, usually with a new, thick novel every time he comes to our clinic visits.  Referral to Neurology for further evaluation/management.    ROS is NEGATIVE for confusion, altered mentation, diplopia, visual scotomas, facial droop, dysarthria, dysphagia, hemiplegia, gait instability, new/abnormal paresthesias/numbness.

## 2018-06-02 DIAGNOSIS — E11.9 TYPE 2 DIABETES MELLITUS WITHOUT COMPLICATION, WITHOUT LONG-TERM CURRENT USE OF INSULIN (HCC): ICD-10-CM

## 2018-06-04 DIAGNOSIS — I10 ESSENTIAL HYPERTENSION: ICD-10-CM

## 2018-06-04 RX ORDER — GLIPIZIDE 10 MG/1
TABLET ORAL
Qty: 180 TAB | Refills: 0 | Status: SHIPPED | OUTPATIENT
Start: 2018-06-04 | End: 2018-08-23 | Stop reason: SDUPTHER

## 2018-06-04 RX ORDER — LOSARTAN POTASSIUM 100 MG/1
100 TABLET ORAL DAILY
Qty: 90 TAB | Refills: 0 | Status: SHIPPED | OUTPATIENT
Start: 2018-06-04 | End: 2018-11-04

## 2018-06-21 ENCOUNTER — OFFICE VISIT (OUTPATIENT)
Dept: URGENT CARE | Facility: CLINIC | Age: 73
End: 2018-06-21
Payer: MEDICARE

## 2018-06-21 ENCOUNTER — HOSPITAL ENCOUNTER (OUTPATIENT)
Dept: LAB | Facility: MEDICAL CENTER | Age: 73
End: 2018-06-21
Attending: FAMILY MEDICINE
Payer: MEDICARE

## 2018-06-21 VITALS
OXYGEN SATURATION: 98 % | RESPIRATION RATE: 16 BRPM | HEART RATE: 62 BPM | HEIGHT: 73 IN | WEIGHT: 220 LBS | SYSTOLIC BLOOD PRESSURE: 122 MMHG | BODY MASS INDEX: 29.16 KG/M2 | DIASTOLIC BLOOD PRESSURE: 79 MMHG | TEMPERATURE: 97.8 F

## 2018-06-21 DIAGNOSIS — E11.9 TYPE 2 DIABETES MELLITUS WITHOUT COMPLICATION, WITHOUT LONG-TERM CURRENT USE OF INSULIN (HCC): ICD-10-CM

## 2018-06-21 DIAGNOSIS — E78.5 HYPERLIPIDEMIA DUE TO TYPE 2 DIABETES MELLITUS (HCC): ICD-10-CM

## 2018-06-21 DIAGNOSIS — E03.9 ACQUIRED HYPOTHYROIDISM: ICD-10-CM

## 2018-06-21 DIAGNOSIS — E11.69 HYPERLIPIDEMIA DUE TO TYPE 2 DIABETES MELLITUS (HCC): ICD-10-CM

## 2018-06-21 DIAGNOSIS — M25.572 ARTHRALGIA OF ANKLE, LEFT: ICD-10-CM

## 2018-06-21 DIAGNOSIS — R41.3 MEMORY CHANGES: ICD-10-CM

## 2018-06-21 DIAGNOSIS — I15.2 HYPERTENSION ASSOCIATED WITH DIABETES (HCC): ICD-10-CM

## 2018-06-21 DIAGNOSIS — Z00.00 MEDICARE ANNUAL WELLNESS VISIT, SUBSEQUENT: ICD-10-CM

## 2018-06-21 DIAGNOSIS — E11.59 HYPERTENSION ASSOCIATED WITH DIABETES (HCC): ICD-10-CM

## 2018-06-21 LAB
ALBUMIN SERPL BCP-MCNC: 4.2 G/DL (ref 3.2–4.9)
ALBUMIN/GLOB SERPL: 1.7 G/DL
ALP SERPL-CCNC: 52 U/L (ref 30–99)
ALT SERPL-CCNC: 23 U/L (ref 2–50)
ANION GAP SERPL CALC-SCNC: 8 MMOL/L (ref 0–11.9)
AST SERPL-CCNC: 18 U/L (ref 12–45)
BILIRUB SERPL-MCNC: 0.5 MG/DL (ref 0.1–1.5)
BUN SERPL-MCNC: 26 MG/DL (ref 8–22)
CALCIUM SERPL-MCNC: 9.4 MG/DL (ref 8.5–10.5)
CHLORIDE SERPL-SCNC: 105 MMOL/L (ref 96–112)
CHOLEST SERPL-MCNC: 105 MG/DL (ref 100–199)
CO2 SERPL-SCNC: 27 MMOL/L (ref 20–33)
CREAT SERPL-MCNC: 1.15 MG/DL (ref 0.5–1.4)
CREAT UR-MCNC: 75.6 MG/DL
EST. AVERAGE GLUCOSE BLD GHB EST-MCNC: 189 MG/DL
GLOBULIN SER CALC-MCNC: 2.5 G/DL (ref 1.9–3.5)
GLUCOSE SERPL-MCNC: 205 MG/DL (ref 65–99)
HBA1C MFR BLD: 8.2 % (ref 0–5.6)
HDLC SERPL-MCNC: 41 MG/DL
LDLC SERPL CALC-MCNC: 34 MG/DL
MICROALBUMIN UR-MCNC: 2.1 MG/DL
MICROALBUMIN/CREAT UR: 28 MG/G (ref 0–30)
POTASSIUM SERPL-SCNC: 4.6 MMOL/L (ref 3.6–5.5)
PROT SERPL-MCNC: 6.7 G/DL (ref 6–8.2)
SODIUM SERPL-SCNC: 140 MMOL/L (ref 135–145)
TRIGL SERPL-MCNC: 150 MG/DL (ref 0–149)
TSH SERPL DL<=0.005 MIU/L-ACNC: 4.83 UIU/ML (ref 0.38–5.33)

## 2018-06-21 PROCEDURE — 82043 UR ALBUMIN QUANTITATIVE: CPT

## 2018-06-21 PROCEDURE — 80053 COMPREHEN METABOLIC PANEL: CPT

## 2018-06-21 PROCEDURE — 83036 HEMOGLOBIN GLYCOSYLATED A1C: CPT | Mod: GA

## 2018-06-21 PROCEDURE — 82570 ASSAY OF URINE CREATININE: CPT

## 2018-06-21 PROCEDURE — 84443 ASSAY THYROID STIM HORMONE: CPT

## 2018-06-21 PROCEDURE — 99214 OFFICE O/P EST MOD 30 MIN: CPT | Performed by: NURSE PRACTITIONER

## 2018-06-21 PROCEDURE — 80061 LIPID PANEL: CPT

## 2018-06-21 PROCEDURE — 36415 COLL VENOUS BLD VENIPUNCTURE: CPT | Mod: GA

## 2018-06-21 RX ORDER — COLCHICINE 0.6 MG/1
TABLET ORAL
Qty: 3 TAB | Refills: 0 | Status: SHIPPED | OUTPATIENT
Start: 2018-06-21 | End: 2018-08-07

## 2018-06-21 ASSESSMENT — ENCOUNTER SYMPTOMS
TINGLING: 1
FEVER: 0
CHILLS: 0
SENSORY CHANGE: 1

## 2018-06-21 NOTE — PROGRESS NOTES
"Subjective:      Saleem Whitney is a 72 y.o. male who presents with Ankle Pain (was up all night sharp needle poke, left ankle, started yesterday)            HPI New problem. 72 year old with left ankle pain since last night. Describes sharp, shooting intermittent pain that he rates as 8/10. Denies fever, chills, myalgia or other joint pain. He is type 2 diabetic, no neuropathy per his report. No history of gout. No trauma. He has taken an aleve and an ibuprofen at 0300 today with some relief.  Patient has no known allergies.      Review of Systems   Constitutional: Negative for chills and fever.   Musculoskeletal: Positive for joint pain.   Skin: Negative for itching and rash.   Neurological: Positive for tingling and sensory change.          Objective:     /79   Pulse 62   Temp 36.6 °C (97.8 °F)   Resp 16   Ht 1.854 m (6' 1\")   Wt 99.8 kg (220 lb)   SpO2 98%   BMI 29.03 kg/m²      Physical Exam   Constitutional: He is oriented to person, place, and time. He appears well-developed and well-nourished. No distress.   Cardiovascular: Normal rate, regular rhythm and normal heart sounds.    No murmur heard.  Pulmonary/Chest: Effort normal and breath sounds normal.   Musculoskeletal: Normal range of motion.   Tenderness to lateral malleolus with some mild erythema.   Neurological: He is alert and oriented to person, place, and time.   Skin: Skin is warm and dry.   Nursing note and vitals reviewed.              Assessment/Plan:     1. Arthralgia of ankle, left  colchicine (COLCRYS) 0.6 MG Tab     Unsure of etiology of this new onset pain. Could be gout versus neuropathy but onset is acute. Will trial on colchicine. Cautioned on use of aleve and ibuprofen together. I discouraged this although he states he was told by an MD this was ok.      "

## 2018-06-25 ENCOUNTER — TELEPHONE (OUTPATIENT)
Dept: MEDICAL GROUP | Facility: MEDICAL CENTER | Age: 73
End: 2018-06-25

## 2018-06-25 PROBLEM — M25.572 ARTHRALGIA OF LEFT ANKLE: Status: ACTIVE | Noted: 2018-06-25

## 2018-07-02 ENCOUNTER — PATIENT MESSAGE (OUTPATIENT)
Dept: MEDICAL GROUP | Facility: MEDICAL CENTER | Age: 73
End: 2018-07-02

## 2018-07-02 DIAGNOSIS — E03.9 ACQUIRED HYPOTHYROIDISM: ICD-10-CM

## 2018-07-02 RX ORDER — LEVOTHYROXINE SODIUM 0.07 MG/1
75 TABLET ORAL DAILY
Qty: 90 TAB | Refills: 3 | Status: SHIPPED | OUTPATIENT
Start: 2018-07-02 | End: 2019-05-13 | Stop reason: SDUPTHER

## 2018-07-26 DIAGNOSIS — E78.2 MIXED HYPERLIPIDEMIA: ICD-10-CM

## 2018-07-26 DIAGNOSIS — E11.9 TYPE 2 DIABETES MELLITUS WITHOUT COMPLICATION, WITHOUT LONG-TERM CURRENT USE OF INSULIN (HCC): ICD-10-CM

## 2018-07-26 RX ORDER — ATORVASTATIN CALCIUM 10 MG/1
TABLET, FILM COATED ORAL
Qty: 90 TAB | Refills: 2 | Status: SHIPPED | OUTPATIENT
Start: 2018-07-26 | End: 2018-08-07

## 2018-08-07 ENCOUNTER — OFFICE VISIT (OUTPATIENT)
Dept: MEDICAL GROUP | Facility: MEDICAL CENTER | Age: 73
End: 2018-08-07
Payer: MEDICARE

## 2018-08-07 VITALS
SYSTOLIC BLOOD PRESSURE: 120 MMHG | OXYGEN SATURATION: 94 % | BODY MASS INDEX: 29.9 KG/M2 | HEART RATE: 68 BPM | TEMPERATURE: 98.8 F | HEIGHT: 73 IN | DIASTOLIC BLOOD PRESSURE: 64 MMHG | WEIGHT: 225.6 LBS

## 2018-08-07 DIAGNOSIS — R60.9 PITTING EDEMA: ICD-10-CM

## 2018-08-07 DIAGNOSIS — R60.0 LOCALIZED EDEMA: ICD-10-CM

## 2018-08-07 DIAGNOSIS — R35.0 INCREASED FREQUENCY OF URINATION: ICD-10-CM

## 2018-08-07 DIAGNOSIS — Z12.5 SCREENING FOR PROSTATE CANCER: ICD-10-CM

## 2018-08-07 DIAGNOSIS — M25.562 CHRONIC PAIN OF LEFT KNEE: ICD-10-CM

## 2018-08-07 DIAGNOSIS — G89.29 CHRONIC PAIN OF LEFT KNEE: ICD-10-CM

## 2018-08-07 PROBLEM — M1A.0720 IDIOPATHIC CHRONIC GOUT, LEFT ANKLE AND FOOT, WITHOUT TOPHUS (TOPHI): Status: ACTIVE | Noted: 2018-06-25

## 2018-08-07 LAB
APPEARANCE UR: CLEAR
BILIRUB UR STRIP-MCNC: NEGATIVE MG/DL
COLOR UR AUTO: YELLOW
GLUCOSE UR STRIP.AUTO-MCNC: NORMAL MG/DL
KETONES UR STRIP.AUTO-MCNC: NEGATIVE MG/DL
LEUKOCYTE ESTERASE UR QL STRIP.AUTO: NEGATIVE
NITRITE UR QL STRIP.AUTO: NEGATIVE
PH UR STRIP.AUTO: 5.5 [PH] (ref 5–8)
PROT UR QL STRIP: NEGATIVE MG/DL
RBC UR QL AUTO: NEGATIVE
SP GR UR STRIP.AUTO: 1.02
UROBILINOGEN UR STRIP-MCNC: 0.2 MG/DL

## 2018-08-07 PROCEDURE — 81002 URINALYSIS NONAUTO W/O SCOPE: CPT | Performed by: FAMILY MEDICINE

## 2018-08-07 PROCEDURE — 99215 OFFICE O/P EST HI 40 MIN: CPT | Performed by: FAMILY MEDICINE

## 2018-08-07 RX ORDER — THIAMINE HCL 100 MG
1000 TABLET ORAL
COMMUNITY
End: 2019-06-10 | Stop reason: SDUPTHER

## 2018-08-11 NOTE — ASSESSMENT & PLAN NOTE
Acute exacerbation of chronic condition, uncontrolled, patient with increased frequency of urination without any other signs of UTI.  We will evaluate for possible BPH versus prostate cancer.    ROS is NEGATIVE for fevers, chills, rigors, flank pain, dysuria, hematuria, pyuria, polyuria, increased frequency of urination, diarrhea, constipation.

## 2018-08-11 NOTE — ASSESSMENT & PLAN NOTE
Chronic, stable, well-controlled present time without any pain.  However, this could have mild worsening that could explain the difference in bilateral lower extremity pitting edema.

## 2018-08-11 NOTE — PROGRESS NOTES
Subjective:   Chief Complaint/History of Present Illness:  Saleem Whitney is a 72 y.o. male established patient who presents today to discuss medical problems as listed below    Diagnoses of Pitting edema -- LLE 2+ to mid-calf, 1+ from mid-calf to upper calf, Chronic pain of left knee, Increased frequency of urination, Screening for prostate cancer, and Localized edema  were pertinent to this visit.    Pitting edema -- LLE 2+ to mid-calf, 1+ from mid-calf to upper calf  New problem, uncontrolled, patient with left lower extremity pitting edema 2+ midcalf and 1+ pitting edema from mid calf to upper calf.  Therefore, due to differential pitting edema, I am concerned the patient may have left lower extremity DVT.    Otherwise, this is not due to exacerbation of hypertensive emergency and suspicion for acute coronary syndrome is low.    Differential diagnosis also includes left knee osteoarthritis.    ROS is NEGATIVE for dizziness, generalized weakness/fatigue, cold sweats, dizziness,  vision/hearing changes, jaw pain/paresthesias, BUE pain/paresthesias/numbness/weakness, chest pain/pressure, palpitations, dyspnea, nausea, RUQ abdominal pain, oliguria/anuria, BLE edema.     Chronic pain of left knee  Chronic, stable, well-controlled present time without any pain.  However, this could have mild worsening that could explain the difference in bilateral lower extremity pitting edema.    Increased frequency of urination  Acute exacerbation of chronic condition, uncontrolled, patient with increased frequency of urination without any other signs of UTI.  We will evaluate for possible BPH versus prostate cancer.    ROS is NEGATIVE for fevers, chills, rigors, flank pain, dysuria, hematuria, pyuria, polyuria, increased frequency of urination, diarrhea, constipation.       Patient Active Problem List    Diagnosis Date Noted   • Pitting edema -- LLE 2+ to mid-calf, 1+ from mid-calf to upper calf 08/07/2018   • Idiopathic  chronic gout, left ankle and foot, without tophus (tophi) 06/25/2018   • Memory changes 05/24/2018   • Chronic pain of left knee 09/25/2017   • Diastasis of rectus abdominis 06/22/2017   • Increased frequency of urination 06/07/2017   • Obesity, diabetes, and hypertension syndrome (HCC) 11/09/2016   • Type 2 diabetes mellitus without complication, without long-term current use of insulin ([facility]) 11/09/2016   • Hypertension associated with diabetes ([facility]) 11/09/2016   • Acquired hypothyroidism 11/09/2016   • Hyperlipidemia due to type 2 diabetes mellitus ([facility]) 11/09/2016   • Primary insomnia 11/09/2016   • Screening for AAA (abdominal aortic aneurysm) 11/09/2016       Additional History:   Allergies:    Patient has no known allergies.     Current Medications:     Current Outpatient Prescriptions   Medication Sig Dispense Refill   • Magnesium 500 MG Tab Take 1,000 mg by mouth.     • metformin (GLUCOPHAGE) 1000 MG tablet TAKE 1 TABLET TWICE DAILY 180 Tab 2   • levothyroxine (SYNTHROID) 75 MCG Tab Take 1 Tab by mouth every day. 90 Tab 3   • glipiZIDE (GLUCOTROL) 10 MG Tab TAKE 1 TABLET TWICE DAILY 180 Tab 0   • losartan (COZAAR) 100 MG Tab Take 1 Tab by mouth every day. 90 Tab 0   • Blood Glucose Monitoring Suppl Supplies Misc Test strips order: Test strips for OneTouch Ultra meter. Sig: use fasting first thing in the morning and prn ssx high or low sugar 100 Each 3   • Lancets Misc Lancets order: Lancets for One Touch Ultra meter. Sig: use 3 times daily and prn ssx high or low sugar. #100 RF x 3 100 Each 3   • Blood Glucose Monitoring Suppl Supplies Misc Test strips order: Test strips for One Touch Ultra meter. Sig: use 3 times daily and prn ssx high or low sugar #100 RF x 3 100 Strip 3   • aspirin (ASA) 81 MG Chew Tab chewable tablet Take 1 Tab by mouth every day. 90 Tab 3   • vitamin D (CHOLECALCIFEROL) 1000 UNIT Tab Take 1 Tab by mouth every day. 90 Tab 3     No current facility-administered medications for this visit.  "        Social History:     Social History   Substance Use Topics   • Smoking status: Former Smoker     Packs/day: 1.00     Years: 5.00     Types: Cigarettes     Quit date: 11/9/1971   • Smokeless tobacco: Never Used   • Alcohol use 4.2 oz/week     7 Glasses of wine per week      Comment: daily       ROS:     - NOTE: All other systems reviewed and are negative, except as in HPI.     Objective:   Physical Exam:    Vitals: Blood pressure 120/64, pulse 68, temperature 37.1 °C (98.8 °F), height 1.854 m (6' 1\"), weight 102.3 kg (225 lb 9.6 oz), SpO2 94 %.   BMI: Body mass index is 29.76 kg/m².   General/Constitutional: Vitals as above, Well nourished, well developed male in no acute distress   Head/Eyes: Head is grossly normal & atraumatic, bilateral conjunctivae clear and not injected, bilateral EOMI, bilateral PERRL   ENT: Bilateral external ears grossly normal in appearance, Hearing grossly intact, External nares normal in appearance and without discharge/bleeding   Respiratory: No respiratory distress, bilateral lungs are clear to ausculation in all lung fields (anterior/lateral/posterior), no wheezing/rhonchi/rales   Cardiovascular: Regular rate and rhythm without murmur/gallops/rubs, distal pulses are intact and equal bilaterally (radial, posterior tibial), bilateral lower extremity edema as described in HPI above   MSK: No pain on passive range of motion, gait grossly normal & not antalgic   Integumentary: No apparent rashes   Psych: Judgment grossly appropriate, no apparent depression/anxiety    Health Maintenance:     - We will do but diabetic monofilament test on next exam.    Imaging/Labs:     -Point-of-care urinalysis negative for UTI    - 06/21/18 -- UC for L ankle pain --> possible gout --> Trial colchicine    Assessment and Plan:   1. Pitting edema -- LLE 2+ to mid-calf, 1+ from mid-calf to upper calf  2. Chronic pain of left knee  5. Localized edema   New problem, uncontrolled, could be acute exacerbation " of #2.  Examine with ultrasound for rule out DVT.   - US-EXTREMITY VENOUS UNILATERAL-LOWER LEFT; Future    3. Increased frequency of urination  4. Screening for prostate cancer  Acute exacerbation of chronic condition, evaluate for possible BPH versus prostate cancer.   - PROSTATE SPECIFIC AG SCREENING; Future   - POCT Urinalysis    RTC: as needed.    PLEASE NOTE: This dictation was created using voice recognition software. I have made every reasonable attempt to correct obvious errors, but I expect that there are errors of grammar and possibly content that I did not discover before finalizing the note.

## 2018-08-11 NOTE — ASSESSMENT & PLAN NOTE
New problem, uncontrolled, patient with left lower extremity pitting edema 2+ midcalf and 1+ pitting edema from mid calf to upper calf.  Therefore, due to differential pitting edema, I am concerned the patient may have left lower extremity DVT.    Otherwise, this is not due to exacerbation of hypertensive emergency and suspicion for acute coronary syndrome is low.    Differential diagnosis also includes left knee osteoarthritis.    ROS is NEGATIVE for dizziness, generalized weakness/fatigue, cold sweats, dizziness,  vision/hearing changes, jaw pain/paresthesias, BUE pain/paresthesias/numbness/weakness, chest pain/pressure, palpitations, dyspnea, nausea, RUQ abdominal pain, oliguria/anuria, BLE edema.

## 2018-08-15 ENCOUNTER — HOSPITAL ENCOUNTER (OUTPATIENT)
Dept: LAB | Facility: MEDICAL CENTER | Age: 73
End: 2018-08-15
Attending: FAMILY MEDICINE
Payer: MEDICARE

## 2018-08-15 DIAGNOSIS — Z12.5 SCREENING FOR PROSTATE CANCER: ICD-10-CM

## 2018-08-15 DIAGNOSIS — R35.0 INCREASED FREQUENCY OF URINATION: ICD-10-CM

## 2018-08-15 LAB — PSA SERPL-MCNC: 0.69 NG/ML (ref 0–4)

## 2018-08-15 PROCEDURE — 84153 ASSAY OF PSA TOTAL: CPT | Mod: GA

## 2018-08-15 PROCEDURE — 36415 COLL VENOUS BLD VENIPUNCTURE: CPT | Mod: GA

## 2018-08-20 ENCOUNTER — APPOINTMENT (RX ONLY)
Dept: URBAN - METROPOLITAN AREA CLINIC 4 | Facility: CLINIC | Age: 73
Setting detail: DERMATOLOGY
End: 2018-08-20

## 2018-08-20 DIAGNOSIS — L82.0 INFLAMED SEBORRHEIC KERATOSIS: ICD-10-CM

## 2018-08-20 DIAGNOSIS — L81.4 OTHER MELANIN HYPERPIGMENTATION: ICD-10-CM

## 2018-08-20 DIAGNOSIS — Z85.828 PERSONAL HISTORY OF OTHER MALIGNANT NEOPLASM OF SKIN: ICD-10-CM

## 2018-08-20 DIAGNOSIS — L82.1 OTHER SEBORRHEIC KERATOSIS: ICD-10-CM

## 2018-08-20 DIAGNOSIS — D18.0 HEMANGIOMA: ICD-10-CM

## 2018-08-20 DIAGNOSIS — L57.0 ACTINIC KERATOSIS: ICD-10-CM

## 2018-08-20 PROBLEM — D18.01 HEMANGIOMA OF SKIN AND SUBCUTANEOUS TISSUE: Status: ACTIVE | Noted: 2018-08-20

## 2018-08-20 PROBLEM — E13.9 OTHER SPECIFIED DIABETES MELLITUS WITHOUT COMPLICATIONS: Status: ACTIVE | Noted: 2018-08-20

## 2018-08-20 PROCEDURE — 17000 DESTRUCT PREMALG LESION: CPT

## 2018-08-20 PROCEDURE — 99213 OFFICE O/P EST LOW 20 MIN: CPT | Mod: 25

## 2018-08-20 PROCEDURE — ? LIQUID NITROGEN

## 2018-08-20 ASSESSMENT — LOCATION ZONE DERM
LOCATION ZONE: ARM
LOCATION ZONE: TRUNK
LOCATION ZONE: SCALP

## 2018-08-20 ASSESSMENT — LOCATION SIMPLE DESCRIPTION DERM
LOCATION SIMPLE: LEFT OCCIPITAL SCALP
LOCATION SIMPLE: LEFT FOREARM
LOCATION SIMPLE: RIGHT LOWER BACK
LOCATION SIMPLE: RIGHT SHOULDER
LOCATION SIMPLE: RIGHT UPPER BACK
LOCATION SIMPLE: LEFT UPPER BACK

## 2018-08-20 ASSESSMENT — LOCATION DETAILED DESCRIPTION DERM
LOCATION DETAILED: RIGHT SUPERIOR MEDIAL MIDBACK
LOCATION DETAILED: RIGHT POSTERIOR SHOULDER
LOCATION DETAILED: LEFT DISTAL RADIAL DORSAL FOREARM
LOCATION DETAILED: LEFT SUPERIOR UPPER BACK
LOCATION DETAILED: RIGHT INFERIOR MEDIAL UPPER BACK
LOCATION DETAILED: LEFT SUPERIOR OCCIPITAL SCALP
LOCATION DETAILED: LEFT PROXIMAL DORSAL FOREARM
LOCATION DETAILED: RIGHT MEDIAL UPPER BACK

## 2018-08-20 NOTE — PROCEDURE: LIQUID NITROGEN
Add 52 Modifier (Optional): no
Detail Level: Detailed
Medical Necessity Information: It is in your best interest to select a reason for this procedure from the list below. All of these items fulfill various CMS LCD requirements except the new and changing color options.
Post-Care Instructions: I reviewed with the patient in detail post-care instructions. Patient is to wear sunprotection, and avoid picking at any of the treated lesions. Pt may apply Vaseline to crusted or scabbing areas.
Consent: The patient's consent was obtained including but not limited to risks of crusting, scabbing, blistering, scarring, darker or lighter pigmentary change, recurrence, incomplete removal and infection.
Medical Necessity Clause: This procedure was medically necessary because the lesions that were treated were:
Number Of Freeze-Thaw Cycles: 1 freeze-thaw cycle
Duration Of Freeze Thaw-Cycle (Seconds): 5

## 2018-08-21 ENCOUNTER — OFFICE VISIT (OUTPATIENT)
Dept: NEUROLOGY | Facility: MEDICAL CENTER | Age: 73
End: 2018-08-21
Payer: MEDICARE

## 2018-08-21 VITALS
HEART RATE: 86 BPM | WEIGHT: 224 LBS | BODY MASS INDEX: 29.69 KG/M2 | OXYGEN SATURATION: 98 % | TEMPERATURE: 97.7 F | HEIGHT: 73 IN | DIASTOLIC BLOOD PRESSURE: 72 MMHG | SYSTOLIC BLOOD PRESSURE: 140 MMHG

## 2018-08-21 DIAGNOSIS — R41.3 MEMORY CHANGES: ICD-10-CM

## 2018-08-21 PROCEDURE — 99204 OFFICE O/P NEW MOD 45 MIN: CPT | Performed by: PSYCHIATRY & NEUROLOGY

## 2018-08-21 NOTE — PROGRESS NOTES
CHIEF COMPLAINT  Chief Complaint   Patient presents with   • New Patient     memory changes       HPI  Saleem Whitney is a 72 y.o. male who presents for changes with memory and occasionally getting lost.  Memory changes  Pt has noticed memory changes over the last year. Pt states that he has not had any head trauma. Pt has never had any neuroimaging. Pt with word finding issues and he feels like sometimes he forgets how to get places. Pt has lived in [place] 35 years and he has no family history of dementia. 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 Pt has been diabetic for about 10 years now. Pt has a history of HTN.  Pt does not do much regular exercise regularly but he goes to aquatic exercise. Pt states that he states he does some walking. Pt states he has some neuropathy which limits his walking in addition to gout. He has some swelling in his feet. Pt states that he sleeps well but not more than 5-6 hours a night. Pt states he doses off during the day. Pt denies tremor.    REVIEW OF SYSTEMS  Pertinent Positives:feet tingling, DM, overeating   All other systems are negative.     PAST MEDICAL HISTORY  Past Medical History:   Diagnosis Date   • Hyperlipidemia    • Hypertension    • Hypothyroid    • Type II or unspecified type diabetes mellitus without mention of complication, not stated as uncontrolled        SOCIAL HISTORY  Social History     Social History   • Marital status:      Spouse name: N/A   • Number of children: N/A   • Years of education: N/A     Occupational History   • Not on file.     Social History Main Topics   • Smoking status: Former Smoker     Packs/day: 1.00     Years: 5.00     Types: Cigarettes     Quit date: 11/9/1971   • Smokeless tobacco: Never Used   • Alcohol use 4.2 oz/week     7 Glasses of wine per week      Comment: daily   • Drug use: No   • Sexual activity: Yes     Partners: Female     Other Topics Concern   • Not on file     Social History Narrative   • No  "narrative on file       SURGICAL HISTORY  Past Surgical History:   Procedure Laterality Date   • HAND SURGERY         CURRENT MEDICATIONS  Current Outpatient Prescriptions   Medication Sig Dispense Refill   • Magnesium 500 MG Tab Take 1,000 mg by mouth.     • metformin (GLUCOPHAGE) 1000 MG tablet TAKE 1 TABLET TWICE DAILY 180 Tab 2   • levothyroxine (SYNTHROID) 75 MCG Tab Take 1 Tab by mouth every day. 90 Tab 3   • glipiZIDE (GLUCOTROL) 10 MG Tab TAKE 1 TABLET TWICE DAILY 180 Tab 0   • losartan (COZAAR) 100 MG Tab Take 1 Tab by mouth every day. 90 Tab 0   • Blood Glucose Monitoring Suppl Supplies Misc Test strips order: Test strips for OneTouch Ultra meter. Sig: use fasting first thing in the morning and prn ssx high or low sugar 100 Each 3   • Lancets Misc Lancets order: Lancets for One Touch Ultra meter. Sig: use 3 times daily and prn ssx high or low sugar. #100 RF x 3 100 Each 3   • Blood Glucose Monitoring Suppl Supplies Misc Test strips order: Test strips for One Touch Ultra meter. Sig: use 3 times daily and prn ssx high or low sugar #100 RF x 3 100 Strip 3   • aspirin (ASA) 81 MG Chew Tab chewable tablet Take 1 Tab by mouth every day. 90 Tab 3   • vitamin D (CHOLECALCIFEROL) 1000 UNIT Tab Take 1 Tab by mouth every day. 90 Tab 3     No current facility-administered medications for this visit.        ALLERGIES  No Known Allergies    PHYSICAL EXAM  VITAL SIGNS: /72   Pulse 86   Temp 36.5 °C (97.7 °F)   Ht 1.854 m (6' 1\")   Wt 101.6 kg (224 lb)   SpO2 98%   BMI 29.55 kg/m²   Constitutional: Well developed, Well nourished, No acute distress, Non-toxic appearance.   HENT: normocephalic, atraumatic   Eyes: disc sharp   Neck: Normal range of motion, No tenderness, Supple, No stridor.   Cardiovascular: Normal heart rate, Normal rhythm, No murmurs, No rubs, No gallops.   Thorax & Lungs: Normal breath sounds, No respiratory distress, No wheezing, No chest tenderness.   Abdomen: Bowel sounds normal, Soft, No " tenderness, No masses, No pulsatile masses.   Skin: Warm, Dry, No erythema, No rash.   Back: No tenderness, No CVA tenderness.   Extremities: Intact distal pulses, No edema, No tenderness, No cyanosis, No clubbing.   Neurologic: MOCA 26/30, CN: 2-12 intact, motor:  No tremor, normal strength and bulk throughout, sensory: Intact to modalities, DTRs 1+ and symmetric, gait normal heel toe slightly decreased tandem, cerebellar exam no dysmetria or ataxia  Psychiatric: Affect normal, Judgment normal, Mood normal.   Lab: Reviewed with patient in detail and as noted in results.    EEG  No eeg in the past    RADIOLOGY/PROCEDURES  No previous neuro imaging    ASSESSMENT AND PLAN  1. Memory changes  Patient symptoms are most consistent with a mild cognitive impairment. We discussed lifestyle modification such as increasing exercise, healthy or diet and cognitive activities to help improve his memory. I will order a brain scan to determine if there is any structural lesion that may explain his worsening memory loss. We discussed definitions of MCI versus dementia and ramifications for the future in caring for his ADLs and finances.  - MR-BRAIN-W/O; Future     I spent 45 minutes with this patient, over fifty percent was spent counseling patient on their condition, best management practices, reviewing test results and risks and benefits of treatment.  Electronically signed by: Melissa P Bloch, 8/21/2018 10:30 AM

## 2018-08-21 NOTE — ASSESSMENT & PLAN NOTE
Pt has noticed memory changes over the last year. Pt states that he has not had any head trauma. Pt has never had any neuroimaging. Pt with word finding issues and he feels like sometimes he forgets how to get places. Pt has lived in [place] 35 years and he has no family history of dementia. 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 Pt has been diabetic for about 10 years now. Pt has a history of HTN.  Pt does not do much regular exercise regularly but he goes to aquatic exercise. Pt states that he states he does some walking. Pt states he has some neuropathy which limits his walking in addition to gout. He has some swelling in his feet. Pt states that he sleeps well but not more than 5-6 hours a night. Pt states he doses off during the day. Pt denies tremor.

## 2018-08-22 ENCOUNTER — HOSPITAL ENCOUNTER (OUTPATIENT)
Dept: RADIOLOGY | Facility: MEDICAL CENTER | Age: 73
End: 2018-08-22
Attending: FAMILY MEDICINE
Payer: MEDICARE

## 2018-08-22 DIAGNOSIS — M25.562 CHRONIC PAIN OF LEFT KNEE: ICD-10-CM

## 2018-08-22 DIAGNOSIS — R60.9 PITTING EDEMA: ICD-10-CM

## 2018-08-22 DIAGNOSIS — R60.0 LOCALIZED EDEMA: ICD-10-CM

## 2018-08-22 DIAGNOSIS — G89.29 CHRONIC PAIN OF LEFT KNEE: ICD-10-CM

## 2018-08-22 PROCEDURE — 93971 EXTREMITY STUDY: CPT | Mod: LT

## 2018-08-23 ENCOUNTER — PATIENT MESSAGE (OUTPATIENT)
Dept: MEDICAL GROUP | Facility: MEDICAL CENTER | Age: 73
End: 2018-08-23

## 2018-08-23 ENCOUNTER — HOSPITAL ENCOUNTER (OUTPATIENT)
Dept: RADIOLOGY | Facility: MEDICAL CENTER | Age: 73
End: 2018-08-23
Attending: PSYCHIATRY & NEUROLOGY
Payer: MEDICARE

## 2018-08-23 DIAGNOSIS — R41.3 MEMORY CHANGES: ICD-10-CM

## 2018-08-23 DIAGNOSIS — E11.9 TYPE 2 DIABETES MELLITUS WITHOUT COMPLICATION, WITHOUT LONG-TERM CURRENT USE OF INSULIN (HCC): ICD-10-CM

## 2018-08-23 DIAGNOSIS — I15.2 HYPERTENSION ASSOCIATED WITH DIABETES (HCC): ICD-10-CM

## 2018-08-23 DIAGNOSIS — E11.59 HYPERTENSION ASSOCIATED WITH DIABETES (HCC): ICD-10-CM

## 2018-08-23 PROCEDURE — 70551 MRI BRAIN STEM W/O DYE: CPT

## 2018-08-25 RX ORDER — AMLODIPINE BESYLATE 10 MG/1
10 TABLET ORAL DAILY
Qty: 90 TAB | Refills: 2 | Status: SHIPPED | OUTPATIENT
Start: 2018-08-25 | End: 2018-11-05

## 2018-08-25 RX ORDER — GLIPIZIDE 10 MG/1
10 TABLET ORAL 2 TIMES DAILY
Qty: 180 TAB | Refills: 2 | Status: SHIPPED | OUTPATIENT
Start: 2018-08-25 | End: 2019-06-10 | Stop reason: SDUPTHER

## 2018-11-04 DIAGNOSIS — I10 ESSENTIAL HYPERTENSION: ICD-10-CM

## 2018-11-04 RX ORDER — LOSARTAN POTASSIUM 100 MG/1
TABLET ORAL
Qty: 90 TAB | Refills: 1 | Status: SHIPPED | OUTPATIENT
Start: 2018-11-04 | End: 2019-05-13 | Stop reason: SDUPTHER

## 2018-11-05 ENCOUNTER — HOSPITAL ENCOUNTER (OUTPATIENT)
Dept: LAB | Facility: MEDICAL CENTER | Age: 73
End: 2018-11-05
Attending: FAMILY MEDICINE
Payer: MEDICARE

## 2018-11-05 ENCOUNTER — OFFICE VISIT (OUTPATIENT)
Dept: MEDICAL GROUP | Facility: MEDICAL CENTER | Age: 73
End: 2018-11-05
Payer: MEDICARE

## 2018-11-05 VITALS
HEART RATE: 80 BPM | DIASTOLIC BLOOD PRESSURE: 68 MMHG | TEMPERATURE: 99.9 F | OXYGEN SATURATION: 95 % | BODY MASS INDEX: 29.91 KG/M2 | SYSTOLIC BLOOD PRESSURE: 130 MMHG | WEIGHT: 226.7 LBS

## 2018-11-05 DIAGNOSIS — S76.119A STRAIN OF QUADRICEPS MUSCLE, UNSPECIFIED LATERALITY, INITIAL ENCOUNTER: ICD-10-CM

## 2018-11-05 DIAGNOSIS — R60.9 PITTING EDEMA: ICD-10-CM

## 2018-11-05 DIAGNOSIS — E11.9 TYPE 2 DIABETES MELLITUS WITHOUT COMPLICATION, WITHOUT LONG-TERM CURRENT USE OF INSULIN (HCC): ICD-10-CM

## 2018-11-05 DIAGNOSIS — R35.1 NOCTURIA MORE THAN TWICE PER NIGHT: ICD-10-CM

## 2018-11-05 DIAGNOSIS — R41.3 MEMORY CHANGES: ICD-10-CM

## 2018-11-05 LAB
APPEARANCE UR: CLEAR
BILIRUB UR STRIP-MCNC: NEGATIVE MG/DL
COLOR UR AUTO: NORMAL
EST. AVERAGE GLUCOSE BLD GHB EST-MCNC: 194 MG/DL
GLUCOSE UR STRIP.AUTO-MCNC: NORMAL MG/DL
HBA1C MFR BLD: 8.4 % (ref 0–5.6)
KETONES UR STRIP.AUTO-MCNC: NORMAL MG/DL
LEUKOCYTE ESTERASE UR QL STRIP.AUTO: NEGATIVE
NITRITE UR QL STRIP.AUTO: NEGATIVE
PH UR STRIP.AUTO: 6.5 [PH] (ref 5–8)
PROT UR QL STRIP: NORMAL MG/DL
RBC UR QL AUTO: NEGATIVE
SP GR UR STRIP.AUTO: 1.02
UROBILINOGEN UR STRIP-MCNC: 0.2 MG/DL

## 2018-11-05 PROCEDURE — 83036 HEMOGLOBIN GLYCOSYLATED A1C: CPT | Mod: GA

## 2018-11-05 PROCEDURE — 80053 COMPREHEN METABOLIC PANEL: CPT

## 2018-11-05 PROCEDURE — 99215 OFFICE O/P EST HI 40 MIN: CPT | Performed by: FAMILY MEDICINE

## 2018-11-05 PROCEDURE — 36415 COLL VENOUS BLD VENIPUNCTURE: CPT

## 2018-11-05 PROCEDURE — 81002 URINALYSIS NONAUTO W/O SCOPE: CPT | Performed by: FAMILY MEDICINE

## 2018-11-05 NOTE — ASSESSMENT & PLAN NOTE
Chronic, uncontrolled, but improving. Patient reports former word-finding difficulty which seems to have abated.      We discussed his evaluation with the Neurologist, that she felt that he is having age-related changes, and that the MRI Brain seems to be consistent with age-related changes of atrophy.    However, patient has patient is still concerned for these changes, and we discussed possible modalities to address brain plasticity over time, as well as healthy vessel changes that he can pursue to improve perfusion as well as maintenance and toxins clearance/decrease.    ROS is NEGATIVE for confusion, altered mentation, word finding difficulty, memory loss, diplopia, visual scotomas, facial droop, dysarthria, dysphagia, hemiplegia, gait instability, new/abnormal paresthesias/numbness.

## 2018-11-05 NOTE — ASSESSMENT & PLAN NOTE
Acute exacerbation of chronic condition, uncontrolled, patient states that while he was on vacation, he did consume more simple carbohydrates than usual.  Patient is finding that his fasting blood sugar readings are in low 200s, with a high of 217.  This will be consistent with an A1c that would have risen to approximately 9.0.    Patient may be having associated symptoms of hyperglycemia, with reported bilateral feet paresthesias/sharp stabbing pains.  Therefore, we need to consider augmenting his therapy.

## 2018-11-05 NOTE — ASSESSMENT & PLAN NOTE
"This is a new problem for medical evaluation, uncontrolled, patient describes having muscular aches to his bilateral quadriceps particularly stated as \"charley horses, \"in addition to bilateral knee pain.  This was induced by physical exertion during his recent medications to [facility], as well as illustrated.  Patient states that he was walking on flat ground, was not hiking, was not walking at severe grades of incline, but was walking more so than normal.    We reviewed that posture can play a part in terms of physiologic compensation, that forward leaning gait will cause quadricep strain as well as increased pressure onto the bilateral knees.  However, we will examine him physically today, and make appropriate recommendations.      Patient still has continued bilateral lower extremity edema, left greater than right, which he is still concerned for.  On review of imaging studies from August 2018, patient was not found to have DVTs in left lower extremity.    ROS is NEGATIVE for fevers, chills, generalized weakness/fatigue, dizziness, vertigo, lightheadedness, tachypnea, dyspnea, pain on deep inspiration, tachycardia, palpitations, bilateral lower extremity pain/paresthesias/pallor/poikilothermia/weakness/paralysis.  "

## 2018-11-06 LAB
ALBUMIN SERPL BCP-MCNC: 4.5 G/DL (ref 3.2–4.9)
ALBUMIN/GLOB SERPL: 1.6 G/DL
ALP SERPL-CCNC: 58 U/L (ref 30–99)
ALT SERPL-CCNC: 37 U/L (ref 2–50)
ANION GAP SERPL CALC-SCNC: 8 MMOL/L (ref 0–11.9)
AST SERPL-CCNC: 24 U/L (ref 12–45)
BILIRUB SERPL-MCNC: 0.8 MG/DL (ref 0.1–1.5)
BUN SERPL-MCNC: 22 MG/DL (ref 8–22)
CALCIUM SERPL-MCNC: 9.9 MG/DL (ref 8.5–10.5)
CHLORIDE SERPL-SCNC: 101 MMOL/L (ref 96–112)
CO2 SERPL-SCNC: 28 MMOL/L (ref 20–33)
CREAT SERPL-MCNC: 1.09 MG/DL (ref 0.5–1.4)
FASTING STATUS PATIENT QL REPORTED: NORMAL
GLOBULIN SER CALC-MCNC: 2.9 G/DL (ref 1.9–3.5)
GLUCOSE SERPL-MCNC: 208 MG/DL (ref 65–99)
POTASSIUM SERPL-SCNC: 4.4 MMOL/L (ref 3.6–5.5)
PROT SERPL-MCNC: 7.4 G/DL (ref 6–8.2)
SODIUM SERPL-SCNC: 137 MMOL/L (ref 135–145)

## 2018-11-06 NOTE — PROGRESS NOTES
"Subjective:   Chief Complaint/History of Present Illness:  Saleem Whitney is a 73 y.o. male established patient who presents today to discuss medical problems as listed below    Diagnoses of Memory changes, Strain of quadriceps muscle, unspecified laterality, initial encounter, Pitting edema, Type 2 diabetes mellitus without complication, without long-term current use of insulin (HCC), and Nocturia more than twice per night were pertinent to this visit.    Memory changes  Chronic, uncontrolled, but improving. Patient reports former word-finding difficulty which seems to have abated.      We discussed his evaluation with the Neurologist, that she felt that he is having age-related changes, and that the MRI Brain seems to be consistent with age-related changes of atrophy.    However, patient has patient is still concerned for these changes, and we discussed possible modalities to address brain plasticity over time, as well as healthy vessel changes that he can pursue to improve perfusion as well as maintenance and toxins clearance/decrease.    ROS is NEGATIVE for confusion, altered mentation, word finding difficulty, memory loss, diplopia, visual scotomas, facial droop, dysarthria, dysphagia, hemiplegia, gait instability, new/abnormal paresthesias/numbness.    Quadriceps muscle strain  This is a new problem for medical evaluation, uncontrolled, patient describes having muscular aches to his bilateral quadriceps particularly stated as \"charley horses, \"in addition to bilateral knee pain.  This was induced by physical exertion during his recent medications to [facility], as well as illustrated.  Patient states that he was walking on flat ground, was not hiking, was not walking at severe grades of incline, but was walking more so than normal.    We reviewed that posture can play a part in terms of physiologic compensation, that forward leaning gait will cause quadricep strain as well as increased " pressure onto the bilateral knees.  However, we will examine him physically today, and make appropriate recommendations.      Patient still has continued bilateral lower extremity edema, left greater than right, which he is still concerned for.  On review of imaging studies from August 2018, patient was not found to have DVTs in left lower extremity.    ROS is NEGATIVE for fevers, chills, generalized weakness/fatigue, dizziness, vertigo, lightheadedness, tachypnea, dyspnea, pain on deep inspiration, tachycardia, palpitations, bilateral lower extremity pain/paresthesias/pallor/poikilothermia/weakness/paralysis.    Pitting edema  Chronic, uncontrolled, please see notes from same date of service 11/05/18 re: quadriceps strain.    Type 2 diabetes mellitus without complication, without long-term current use of insulin (CMS-[facility]) ([facility])  Acute exacerbation of chronic condition, uncontrolled, patient states that while he was on vacation, he did consume more simple carbohydrates than usual.  Patient is finding that his fasting blood sugar readings are in low 200s, with a high of 217.  This will be consistent with an A1c that would have risen to approximately 9.0.    Patient may be having associated symptoms of hyperglycemia, with reported bilateral feet paresthesias/sharp stabbing pains.  Therefore, we need to consider augmenting his therapy.      Patient Active Problem List    Diagnosis Date Noted   • Quadriceps muscle strain 11/05/2018   • Nocturia more than twice per night 11/05/2018   • Pitting edema 08/07/2018   • Idiopathic chronic gout, left ankle and foot, without tophus (tophi) 06/25/2018   • Memory changes 05/24/2018   • Chronic pain of left knee 09/25/2017   • Diastasis of rectus abdominis 06/22/2017   • Increased frequency of urination 06/07/2017   • Obesity, diabetes, and hypertension syndrome (HCC) 11/09/2016   • Type 2 diabetes mellitus without complication, without long-term current use of insulin ([facility])  11/09/2016   • Hypertension associated with diabetes (HCC) 11/09/2016   • Acquired hypothyroidism 11/09/2016   • Hyperlipidemia due to type 2 diabetes mellitus (HCC) 11/09/2016   • Primary insomnia 11/09/2016   • Screening for AAA (abdominal aortic aneurysm) 11/09/2016       Additional History:   Allergies:    Patient has no known allergies.     Current Medications:     Current Outpatient Prescriptions   Medication Sig Dispense Refill   • linagliptin (TRADJENTA) 5 MG Tab tablet Take 1 Tab by mouth every day. 90 Tab 0   • losartan (COZAAR) 100 MG Tab TAKE ONE TABLET BY MOUTH ONCE DAILY 90 Tab 1   • glipiZIDE (GLUCOTROL) 10 MG Tab Take 1 Tab by mouth 2 times a day. 180 Tab 2   • Magnesium 500 MG Tab Take 1,000 mg by mouth.     • metformin (GLUCOPHAGE) 1000 MG tablet TAKE 1 TABLET TWICE DAILY 180 Tab 2   • levothyroxine (SYNTHROID) 75 MCG Tab Take 1 Tab by mouth every day. 90 Tab 3   • Blood Glucose Monitoring Suppl Supplies Misc Test strips order: Test strips for OneTouch Ultra meter. Sig: use fasting first thing in the morning and prn ssx high or low sugar 100 Each 3   • Lancets Misc Lancets order: Lancets for One Touch Ultra meter. Sig: use 3 times daily and prn ssx high or low sugar. #100 RF x 3 100 Each 3   • Blood Glucose Monitoring Suppl Supplies Misc Test strips order: Test strips for One Touch Ultra meter. Sig: use 3 times daily and prn ssx high or low sugar #100 RF x 3 100 Strip 3   • aspirin (ASA) 81 MG Chew Tab chewable tablet Take 1 Tab by mouth every day. 90 Tab 3   • vitamin D (CHOLECALCIFEROL) 1000 UNIT Tab Take 1 Tab by mouth every day. 90 Tab 3     No current facility-administered medications for this visit.         Social History:     Social History   Substance Use Topics   • Smoking status: Former Smoker     Packs/day: 1.00     Years: 5.00     Types: Cigarettes     Quit date: 11/9/1971   • Smokeless tobacco: Never Used   • Alcohol use 4.2 oz/week     7 Glasses of wine per week      Comment: daily        ROS:     - NOTE: All other systems reviewed and are negative, except as in HPI.     Objective:   Physical Exam:    Vitals: Blood pressure 130/68, pulse 80, temperature 37.7 °C (99.9 °F), weight 102.8 kg (226 lb 11.2 oz), SpO2 95 %.   BMI: Body mass index is 29.91 kg/m².   General/Constitutional: Vitals as above, Well nourished, well developed male in no acute distress   Head/Eyes: Head is grossly normal & atraumatic, bilateral conjunctivae clear and not injected, bilateral EOMI, bilateral PERRL   ENT: Bilateral external ears grossly normal in appearance, Hearing grossly intact, External nares normal in appearance and without discharge/bleeding   Respiratory: No respiratory distress, bilateral lungs are clear to ausculation in all lung fields (anterior/lateral/posterior), no wheezing/rhonchi/rales   Cardiovascular: Regular rate and rhythm without murmur/gallops/rubs, distal pulses are intact and equal bilaterally (radial, posterior tibial), bilateral lower extremity edema   MSK: Gait grossly normal & not antalgic   Integumentary: No apparent rashes   Psych: Judgment grossly appropriate, no apparent depression/anxiety    Health Maintenance:     - Patient received flu vaccine on September 20, 2018    Imaging/Labs:     - Point-of-care urinalysis negative for UTI, however did have 2+ glucose, therefore patient's increased frequency of urination is likely due to glucosuria.    Assessment and Plan:   1. Memory changes  Chronic, stable, well-controlled at present time.  However, patient advised to engage with various types of cognitively stimulating activities.  Additionally, patient advised to document potential changes over the next few months.    2. Strain of quadriceps muscle, unspecified laterality, initial encounter  New problem for medical evaluation, uncontrolled, patient advised to work on posture as well as stretching of bilateral lower extremities.  Patient can also use over-the-counter oral and topical  analgesics.  Patient verbalized understanding.   - COMP METABOLIC PANEL; Future    3. Pitting edema   Acute exacerbation condition, uncontrolled, I discussed with patient how this can be related to hepatic etiology, chronic venous insufficiency, cardiac etiology (CHF), or combination of the above, and that we should evaluate with the following orders as below   - COMP METABOLIC PANEL; Future   - HEMOGLOBIN A1C; Future   - EC-ECHOCARDIOGRAM COMPLETE W/O CONT; Future    4. Type 2 diabetes mellitus without complication, without long-term current use of insulin (HCC)  Chronic, uncontrolled, patient advised to pursue lifestyle changes, particularly cardiovascular exercise and increasing proportion of plant-based nutrition.  Additionally, patient would like to be referred to diabetic education/diabetic nurse.  Lastly, I am increasing his medication therapy to include Tradjenta.  Patient to get labs now to find out if there were any interval changes between June and now.   - REFERRAL TO DIABETIC EDUCATION Diabetes Self Management Education / Training (DSME/T) and Medical Nutrition Therapy (MNT): Initial Group DSME/MNT as authorized by payor, Follow-Up DSME/MNT as authorized by payor; DSME/T Content: Monitoring Diabete...   - HEMOGLOBIN A1C; Future    - linagliptin (TRADJENTA) 5 MG Tab tablet; Take 1 Tab by mouth every day.  Dispense: 90 Tab; Refill: 0    5. Nocturia more than twice per night  New problem for medical evaluation, uncontrolled, likely related to uncontrolled diabetes type 2.  However, this could also be a feature of congestive heart failure.  Therefore, evaluate with A1c and echocardiogram, as below.   - HEMOGLOBIN A1C; Future   - POCT Urinalysis   - EC-ECHOCARDIOGRAM COMPLETE W/O CONT; Future    NOTE: A total of 40minutes was spent in direct face-to-face time with the patient, of which over 50% of the time was spent in counseling and/or coordination of care as discussed above.        RTC: In 2 months for  Follow-up on Leg Edema, Quadriceps strain.    PLEASE NOTE: This dictation was created using voice recognition software. I have made every reasonable attempt to correct obvious errors, but I expect that there are errors of grammar and possibly content that I did not discover before finalizing the note.

## 2018-11-07 ENCOUNTER — HOSPITAL ENCOUNTER (OUTPATIENT)
Dept: CARDIOLOGY | Facility: MEDICAL CENTER | Age: 73
End: 2018-11-07
Attending: FAMILY MEDICINE
Payer: MEDICARE

## 2018-11-07 DIAGNOSIS — R35.1 NOCTURIA MORE THAN TWICE PER NIGHT: ICD-10-CM

## 2018-11-07 DIAGNOSIS — R60.9 PITTING EDEMA: ICD-10-CM

## 2018-11-07 LAB
LV EJECT FRACT  99904: 60
LV EJECT FRACT MOD 2C 99903: 48.09
LV EJECT FRACT MOD 4C 99902: 46.13
LV EJECT FRACT MOD BP 99901: 44.69

## 2018-11-07 PROCEDURE — 93306 TTE W/DOPPLER COMPLETE: CPT

## 2018-11-07 PROCEDURE — 93306 TTE W/DOPPLER COMPLETE: CPT | Mod: 26 | Performed by: INTERNAL MEDICINE

## 2018-12-10 ENCOUNTER — OFFICE VISIT (OUTPATIENT)
Dept: NEUROLOGY | Facility: MEDICAL CENTER | Age: 73
End: 2018-12-10
Payer: MEDICARE

## 2018-12-10 VITALS
DIASTOLIC BLOOD PRESSURE: 78 MMHG | OXYGEN SATURATION: 95 % | BODY MASS INDEX: 29.82 KG/M2 | SYSTOLIC BLOOD PRESSURE: 152 MMHG | HEART RATE: 84 BPM | TEMPERATURE: 98 F | WEIGHT: 225 LBS | HEIGHT: 73 IN

## 2018-12-10 DIAGNOSIS — R41.3 MEMORY CHANGES: ICD-10-CM

## 2018-12-10 PROCEDURE — 99214 OFFICE O/P EST MOD 30 MIN: CPT | Performed by: PSYCHIATRY & NEUROLOGY

## 2018-12-10 NOTE — PROGRESS NOTES
CHIEF COMPLAINT  Chief Complaint   Patient presents with   • Follow-Up     memory changes       HPI  Saleem Whitney is a 72 y.o. male who presents for changes with memory and occasionally getting lost.  Memory changes  Pt feels like he is better with memory and he is not losing words as much. Pt is swimming three times and he does things around the house. Pt feels a lot better than he did last time he was ear. Pt states he reads all the time. Pt is not having any trouble remembering the story line. Pt states that he has to stay with the same story. Pt states he sleeps about 6 hours at night max.    REVIEW OF SYSTEMS  Pertinent Positives:feet tingling, DM, overeating and overweight   All other systems are negative.     PAST MEDICAL HISTORY  Past Medical History:   Diagnosis Date   • Hyperlipidemia    • Hypertension    • Hypothyroid    • Type II or unspecified type diabetes mellitus without mention of complication, not stated as uncontrolled        SOCIAL HISTORY  Social History     Social History   • Marital status:      Spouse name: N/A   • Number of children: N/A   • Years of education: N/A     Occupational History   • Not on file.     Social History Main Topics   • Smoking status: Former Smoker     Packs/day: 1.00     Years: 5.00     Types: Cigarettes     Quit date: 11/9/1971   • Smokeless tobacco: Never Used   • Alcohol use 4.2 oz/week     7 Glasses of wine per week      Comment: daily   • Drug use: No   • Sexual activity: Yes     Partners: Female     Other Topics Concern   • Not on file     Social History Narrative   • No narrative on file       SURGICAL HISTORY  Past Surgical History:   Procedure Laterality Date   • HAND SURGERY         CURRENT MEDICATIONS  Current Outpatient Prescriptions   Medication Sig Dispense Refill   • linagliptin (TRADJENTA) 5 MG Tab tablet Take 1 Tab by mouth every day. (Patient not taking: Reported on 12/10/2018) 90 Tab 0   • losartan (COZAAR) 100 MG Tab TAKE ONE  "TABLET BY MOUTH ONCE DAILY 90 Tab 1   • glipiZIDE (GLUCOTROL) 10 MG Tab Take 1 Tab by mouth 2 times a day. 180 Tab 2   • Magnesium 500 MG Tab Take 1,000 mg by mouth.     • metformin (GLUCOPHAGE) 1000 MG tablet TAKE 1 TABLET TWICE DAILY 180 Tab 2   • levothyroxine (SYNTHROID) 75 MCG Tab Take 1 Tab by mouth every day. 90 Tab 3   • Blood Glucose Monitoring Suppl Supplies Misc Test strips order: Test strips for OneTouch Ultra meter. Sig: use fasting first thing in the morning and prn ssx high or low sugar 100 Each 3   • Lancets Misc Lancets order: Lancets for One Touch Ultra meter. Sig: use 3 times daily and prn ssx high or low sugar. #100 RF x 3 100 Each 3   • Blood Glucose Monitoring Suppl Supplies Misc Test strips order: Test strips for One Touch Ultra meter. Sig: use 3 times daily and prn ssx high or low sugar #100 RF x 3 100 Strip 3   • aspirin (ASA) 81 MG Chew Tab chewable tablet Take 1 Tab by mouth every day. 90 Tab 3   • vitamin D (CHOLECALCIFEROL) 1000 UNIT Tab Take 1 Tab by mouth every day. 90 Tab 3     No current facility-administered medications for this visit.        ALLERGIES  No Known Allergies    PHYSICAL EXAM  VITAL SIGNS: /78 (BP Location: Left arm, Patient Position: Sitting, BP Cuff Size: Adult)   Pulse 84   Temp 36.7 °C (98 °F) (Temporal)   Ht 1.854 m (6' 1\")   Wt 102.1 kg (225 lb)   SpO2 95%   BMI 29.69 kg/m²   Constitutional: Well developed, Well nourished, No acute distress, Non-toxic appearance.   HENT: normocephalic, atraumatic   Eyes: disc sharp   Neck: Normal range of motion, No tenderness, Supple, No stridor.   Cardiovascular: Normal heart rate, Normal rhythm, No murmurs, No rubs, No gallops.   Thorax & Lungs: Normal breath sounds, No respiratory distress, No wheezing, No chest tenderness.   Abdomen: Bowel sounds normal, Soft, No tenderness, No masses, No pulsatile masses.   Skin: Warm, Dry, No erythema, No rash.   Back: No tenderness, No CVA tenderness.   Extremities: Intact " distal pulses, No edema, No tenderness, No cyanosis, No clubbing.   Neurologic: MOCA 26/30, CN: 2-12 intact, motor:  No tremor, normal strength and bulk throughout, sensory: Intact to modalities, DTRs 1+ and symmetric, gait normal heel toe slightly decreased tandem, cerebellar exam no dysmetria or ataxia  Psychiatric: Affect normal, Judgment normal, Mood normal.   Lab: Reviewed with patient in detail and as noted in results.    EEG  No eeg in the past    RADIOLOGY/PROCEDURES  No previous neuro imaging    ASSESSMENT AND PLAN  1. Memory changes  Patient symptoms are most consistent with a mild cognitive impairment. We discussed lifestyle modification such as increasing exercise, healthy or diet and cognitive activities to help improve his memory. I will order a brain scan to determine if there is any structural lesion that may explain his worsening memory loss. We discussed definitions of MCI versus dementia and ramifications for the future in caring for his ADLs and finances. Pt is walking and ready for brain health.       I spent 25 minutes with this patient, over fifty percent was spent counseling patient on their condition, best management practices, reviewing test results and risks and benefits of treatment.

## 2018-12-10 NOTE — ASSESSMENT & PLAN NOTE
Pt feels like he is better with memory and he is not losing words as much. Pt is swimming three times and he does things around the house. Pt feels a lot better than he did last time he was ear. Pt states he reads all the time. Pt is not having any trouble remembering the story line. Pt states that he has to stay with the same story. Pt states he sleeps about 6 hours at night max.

## 2019-01-07 ENCOUNTER — APPOINTMENT (OUTPATIENT)
Dept: MEDICAL GROUP | Facility: MEDICAL CENTER | Age: 74
End: 2019-01-07
Payer: MEDICARE

## 2019-01-08 ENCOUNTER — OFFICE VISIT (OUTPATIENT)
Dept: MEDICAL GROUP | Facility: MEDICAL CENTER | Age: 74
End: 2019-01-08
Payer: MEDICARE

## 2019-01-08 VITALS
BODY MASS INDEX: 30.27 KG/M2 | WEIGHT: 228.4 LBS | OXYGEN SATURATION: 96 % | HEART RATE: 78 BPM | HEIGHT: 73 IN | TEMPERATURE: 97.9 F | SYSTOLIC BLOOD PRESSURE: 140 MMHG | DIASTOLIC BLOOD PRESSURE: 60 MMHG

## 2019-01-08 DIAGNOSIS — M25.562 CHRONIC PAIN OF LEFT KNEE: ICD-10-CM

## 2019-01-08 DIAGNOSIS — E78.5 HYPERLIPIDEMIA DUE TO TYPE 2 DIABETES MELLITUS (HCC): ICD-10-CM

## 2019-01-08 DIAGNOSIS — Z12.11 COLON CANCER SCREENING: ICD-10-CM

## 2019-01-08 DIAGNOSIS — R60.9 PITTING EDEMA: ICD-10-CM

## 2019-01-08 DIAGNOSIS — M79.604 BILATERAL LOWER EXTREMITY PAIN: ICD-10-CM

## 2019-01-08 DIAGNOSIS — M79.605 BILATERAL LOWER EXTREMITY PAIN: ICD-10-CM

## 2019-01-08 DIAGNOSIS — E11.69 HYPERLIPIDEMIA DUE TO TYPE 2 DIABETES MELLITUS (HCC): ICD-10-CM

## 2019-01-08 DIAGNOSIS — G89.29 CHRONIC PAIN OF LEFT KNEE: ICD-10-CM

## 2019-01-08 PROBLEM — S76.119A QUADRICEPS MUSCLE STRAIN: Status: RESOLVED | Noted: 2018-11-05 | Resolved: 2019-01-08

## 2019-01-08 PROCEDURE — 99214 OFFICE O/P EST MOD 30 MIN: CPT | Performed by: FAMILY MEDICINE

## 2019-01-08 ASSESSMENT — PATIENT HEALTH QUESTIONNAIRE - PHQ9: CLINICAL INTERPRETATION OF PHQ2 SCORE: 0

## 2019-01-09 ENCOUNTER — HOSPITAL ENCOUNTER (OUTPATIENT)
Dept: LAB | Facility: MEDICAL CENTER | Age: 74
End: 2019-01-09
Attending: FAMILY MEDICINE
Payer: MEDICARE

## 2019-01-09 ENCOUNTER — HOSPITAL ENCOUNTER (OUTPATIENT)
Facility: MEDICAL CENTER | Age: 74
End: 2019-01-09
Attending: FAMILY MEDICINE
Payer: MEDICARE

## 2019-01-09 DIAGNOSIS — M79.605 BILATERAL LOWER EXTREMITY PAIN: ICD-10-CM

## 2019-01-09 DIAGNOSIS — E11.69 HYPERLIPIDEMIA DUE TO TYPE 2 DIABETES MELLITUS (HCC): ICD-10-CM

## 2019-01-09 DIAGNOSIS — M79.604 BILATERAL LOWER EXTREMITY PAIN: ICD-10-CM

## 2019-01-09 DIAGNOSIS — E78.5 HYPERLIPIDEMIA DUE TO TYPE 2 DIABETES MELLITUS (HCC): ICD-10-CM

## 2019-01-09 DIAGNOSIS — R60.9 PITTING EDEMA: ICD-10-CM

## 2019-01-09 LAB
ALBUMIN SERPL BCP-MCNC: 4 G/DL (ref 3.2–4.9)
ALBUMIN/GLOB SERPL: 1.2 G/DL
ALP SERPL-CCNC: 56 U/L (ref 30–99)
ALT SERPL-CCNC: 37 U/L (ref 2–50)
ANION GAP SERPL CALC-SCNC: 8 MMOL/L (ref 0–11.9)
AST SERPL-CCNC: 27 U/L (ref 12–45)
BASOPHILS # BLD AUTO: 0.5 % (ref 0–1.8)
BASOPHILS # BLD: 0.04 K/UL (ref 0–0.12)
BILIRUB SERPL-MCNC: 0.5 MG/DL (ref 0.1–1.5)
BUN SERPL-MCNC: 24 MG/DL (ref 8–22)
CALCIUM SERPL-MCNC: 9.1 MG/DL (ref 8.5–10.5)
CHLORIDE SERPL-SCNC: 105 MMOL/L (ref 96–112)
CO2 SERPL-SCNC: 25 MMOL/L (ref 20–33)
CREAT SERPL-MCNC: 1.1 MG/DL (ref 0.5–1.4)
CRP SERPL HS-MCNC: 4.2 MG/L (ref 0–7.5)
EOSINOPHIL # BLD AUTO: 0.19 K/UL (ref 0–0.51)
EOSINOPHIL NFR BLD: 2.2 % (ref 0–6.9)
ERYTHROCYTE [DISTWIDTH] IN BLOOD BY AUTOMATED COUNT: 39.4 FL (ref 35.9–50)
ERYTHROCYTE [SEDIMENTATION RATE] IN BLOOD BY WESTERGREN METHOD: 13 MM/HOUR (ref 0–20)
GLOBULIN SER CALC-MCNC: 3.3 G/DL (ref 1.9–3.5)
GLUCOSE SERPL-MCNC: 154 MG/DL (ref 65–99)
HCT VFR BLD AUTO: 42.7 % (ref 42–52)
HGB BLD-MCNC: 14.4 G/DL (ref 14–18)
IMM GRANULOCYTES # BLD AUTO: 0.02 K/UL (ref 0–0.11)
IMM GRANULOCYTES NFR BLD AUTO: 0.2 % (ref 0–0.9)
LYMPHOCYTES # BLD AUTO: 2.37 K/UL (ref 1–4.8)
LYMPHOCYTES NFR BLD: 26.9 % (ref 22–41)
MCH RBC QN AUTO: 29.1 PG (ref 27–33)
MCHC RBC AUTO-ENTMCNC: 33.7 G/DL (ref 33.7–35.3)
MCV RBC AUTO: 86.3 FL (ref 81.4–97.8)
MONOCYTES # BLD AUTO: 0.65 K/UL (ref 0–0.85)
MONOCYTES NFR BLD AUTO: 7.4 % (ref 0–13.4)
NEUTROPHILS # BLD AUTO: 5.54 K/UL (ref 1.82–7.42)
NEUTROPHILS NFR BLD: 62.8 % (ref 44–72)
NRBC # BLD AUTO: 0 K/UL
NRBC BLD-RTO: 0 /100 WBC
PLATELET # BLD AUTO: 240 K/UL (ref 164–446)
PMV BLD AUTO: 9.9 FL (ref 9–12.9)
POTASSIUM SERPL-SCNC: 3.8 MMOL/L (ref 3.6–5.5)
PROT SERPL-MCNC: 7.3 G/DL (ref 6–8.2)
RBC # BLD AUTO: 4.95 M/UL (ref 4.7–6.1)
SODIUM SERPL-SCNC: 138 MMOL/L (ref 135–145)
URATE SERPL-MCNC: 5 MG/DL (ref 2.5–8.3)
WBC # BLD AUTO: 8.8 K/UL (ref 4.8–10.8)

## 2019-01-09 PROCEDURE — 80053 COMPREHEN METABOLIC PANEL: CPT

## 2019-01-09 PROCEDURE — 36415 COLL VENOUS BLD VENIPUNCTURE: CPT

## 2019-01-09 PROCEDURE — 86141 C-REACTIVE PROTEIN HS: CPT

## 2019-01-09 PROCEDURE — 84550 ASSAY OF BLOOD/URIC ACID: CPT

## 2019-01-09 PROCEDURE — 82274 ASSAY TEST FOR BLOOD FECAL: CPT

## 2019-01-09 PROCEDURE — 85025 COMPLETE CBC W/AUTO DIFF WBC: CPT

## 2019-01-09 PROCEDURE — 85652 RBC SED RATE AUTOMATED: CPT

## 2019-01-14 DIAGNOSIS — Z12.11 COLON CANCER SCREENING: ICD-10-CM

## 2019-01-14 NOTE — PROGRESS NOTES
Subjective:   Chief Complaint/History of Present Illness:  Saleem Whitney is a 73 y.o. male established patient who presents today to discuss medical problems as listed below    Diagnoses of Pitting edema, Chronic pain of left knee, Bilateral lower extremity pain, Hyperlipidemia due to type 2 diabetes mellitus (HCC), and Colon cancer screening were pertinent to this visit.    Pitting edema  Chronic, uncontrolled, improving.  Patient and I reviewed his recent labs were negative for gout.  However, patient continues to have some bilateral lower extremity edema along with erythema.      Therefore, We discussed that he might be having a resolving gout attack, since he did not have the characteristic burning or tingling at present time.    Alternatively, patient could be having bilateral knee arthritis causing him to have poor venous return due to altered mechanics of the vasculature.    Chronic pain of left knee  Acute exacerbation chronic condition, controlled, in fact, patient has bilateral lower extremity pain, which is likely secondary to his bilateral knee arthritis.  Please see notes from same date of service 1/8/2019 regarding bilateral lower extremity edema    Bilateral lower extremity pain  New problem, uncontrolled, patient has bilateral extremity pain secondary to bilateral knee pain, also possibly secondary to reduced/resolving gout attack.  Please see notes from same date of service 1/8/2019 regarding bilateral lower extremity edema      Patient Active Problem List    Diagnosis Date Noted   • Bilateral lower extremity pain 01/08/2019   • Nocturia more than twice per night 11/05/2018   • Pitting edema 08/07/2018   • Idiopathic chronic gout, left ankle and foot, without tophus (tophi) 06/25/2018   • Memory changes 05/24/2018   • Chronic pain of left knee 09/25/2017   • Diastasis of rectus abdominis 06/22/2017   • Increased frequency of urination 06/07/2017   • Obesity, diabetes, and hypertension  syndrome ([facility]) 11/09/2016   • Type 2 diabetes mellitus without complication, without long-term current use of insulin ([facility]) 11/09/2016   • Hypertension associated with diabetes ([facility]) 11/09/2016   • Acquired hypothyroidism 11/09/2016   • Hyperlipidemia due to type 2 diabetes mellitus ([facility]) 11/09/2016   • Primary insomnia 11/09/2016       Additional History:   Allergies:    Patient has no known allergies.     Current Medications:     Current Outpatient Prescriptions   Medication Sig Dispense Refill   • linagliptin (TRADJENTA) 5 MG Tab tablet Take 1 Tab by mouth every day. 90 Tab 0   • losartan (COZAAR) 100 MG Tab TAKE ONE TABLET BY MOUTH ONCE DAILY 90 Tab 1   • glipiZIDE (GLUCOTROL) 10 MG Tab Take 1 Tab by mouth 2 times a day. 180 Tab 2   • Magnesium 500 MG Tab Take 1,000 mg by mouth.     • metformin (GLUCOPHAGE) 1000 MG tablet TAKE 1 TABLET TWICE DAILY 180 Tab 2   • levothyroxine (SYNTHROID) 75 MCG Tab Take 1 Tab by mouth every day. 90 Tab 3   • Blood Glucose Monitoring Suppl Supplies Misc Test strips order: Test strips for OneTouch Ultra meter. Sig: use fasting first thing in the morning and prn ssx high or low sugar 100 Each 3   • Lancets Misc Lancets order: Lancets for One Touch Ultra meter. Sig: use 3 times daily and prn ssx high or low sugar. #100 RF x 3 100 Each 3   • Blood Glucose Monitoring Suppl Supplies Misc Test strips order: Test strips for One Touch Ultra meter. Sig: use 3 times daily and prn ssx high or low sugar #100 RF x 3 100 Strip 3   • aspirin (ASA) 81 MG Chew Tab chewable tablet Take 1 Tab by mouth every day. 90 Tab 3   • vitamin D (CHOLECALCIFEROL) 1000 UNIT Tab Take 1 Tab by mouth every day. 90 Tab 3     No current facility-administered medications for this visit.         Social History:     Social History   Substance Use Topics   • Smoking status: Former Smoker     Packs/day: 1.00     Years: 5.00     Types: Cigarettes     Quit date: 11/9/1971   • Smokeless tobacco: Never Used   • Alcohol use  "4.2 oz/week     7 Glasses of wine per week      Comment: daily       ROS:     - NOTE: All other systems reviewed and are negative, except as in HPI.     Objective:   Physical Exam:    Vitals: Blood pressure 140/60, pulse 78, temperature 36.6 °C (97.9 °F), height 1.854 m (6' 0.99\"), weight 103.6 kg (228 lb 6.4 oz), SpO2 96 %.   BMI: Body mass index is 30.14 kg/m².   General/Constitutional: Vitals as above, Well nourished, well developed male in no acute distress   Head/Eyes: Head is grossly normal & atraumatic, bilateral conjunctivae clear and not injected, bilateral EOMI, bilateral PERRL   ENT: Bilateral external ears grossly normal in appearance, Hearing grossly intact, External nares normal in appearance and without discharge/bleeding   Respiratory: No respiratory distress, bilateral lungs are clear to ausculation in all lung fields (anterior/lateral/posterior), no wheezing/rhonchi/rales   Cardiovascular: Regular rate and rhythm without murmur/gallops/rubs, distal pulses are intact and equal bilaterally (radial, posterior tibial), no bilateral lower extremity edema   MSK: Patient with bilateral lower extremity 1+ pitting edema with erythema from 2 knees that is minimal to mild, no tenderness on Yarely's bilaterally, however diffuse tenderness palpation that is minimal to mild on direct pressure, gait grossly normal & not antalgic   Integumentary: No apparent rashes   Neuro: Patient with tenderness to palpation of bilateral lower extremities however without burning or tingling sensation   Psych: Judgment grossly appropriate, no apparent depression/anxiety    Health Maintenance:     -Fit ordered as below    Imaging/Labs:     - 01/09/19 -- elevated FBS, CMP o/w normal, NEG for gout    - 11/06/18 -- unchanged diabetes, but normal liver & kidney labs    - 11/07/18 -- Echo WNL    - 08/22/18 -- Leg u/s NEGATIVE for DVT    Assessment and Plan:   1. Pitting edema  Chronic, uncontrolled, improving.  Patient may be having " multifactorial reasons for bilateral lower extremity pitting edema.  Essentially, differential diagnosis includes  vascular changes secondary to arthritis, chronic recent insufficiency is a possibility, as well as possible resolving gout.   - CBC WITH DIFFERENTIAL; Future   - COMP METABOLIC PANEL; Future   - URIC ACID; Future   - WESTERGREN SED RATE; Future    - CRP HIGH SENSITIVE (CARDIAC); Future    2. Chronic pain of left knee  Acute exacerbation of chronic condition, uncontrolled, likely secondary to #1.    3. Bilateral lower extremity pain  New problem, uncontrolled, see assessment and plan as in #1.   - CBC WITH DIFFERENTIAL; Future   - COMP METABOLIC PANEL; Future   - URIC ACID; Future   - WESTERGREN SED RATE; Future   - CRP HIGH SENSITIVE (CARDIAC); Future    4. Hyperlipidemia due to type 2 diabetes mellitus (HCC)  Chronic, uncontrolled, patient advised to pursue lifestyle changes, particularly cardiovascular exercise and increasing proportion of plant-based nutrition.   - CRP HIGH SENSITIVE (CARDIAC); Future    5. Colon cancer screening   - OCCULT BLOOD FECES IMMUNOASSAY; Future        RTC: In 5 months for Medicare Annual Wellness Visit.    PLEASE NOTE: This dictation was created using voice recognition software. I have made every reasonable attempt to correct obvious errors, but I expect that there are errors of grammar and possibly content that I did not discover before finalizing the note.

## 2019-01-14 NOTE — ASSESSMENT & PLAN NOTE
Acute exacerbation chronic condition, controlled, in fact, patient has bilateral lower extremity pain, which is likely secondary to his bilateral knee arthritis.  Please see notes from same date of service 1/8/2019 regarding bilateral lower extremity edema

## 2019-01-14 NOTE — ASSESSMENT & PLAN NOTE
Chronic, uncontrolled, improving.  Patient and I reviewed his recent labs were negative for gout.  However, patient continues to have some bilateral lower extremity edema along with erythema.      Therefore, We discussed that he might be having a resolving gout attack, since he did not have the characteristic burning or tingling at present time.    Alternatively, patient could be having bilateral knee arthritis causing him to have poor venous return due to altered mechanics of the vasculature.

## 2019-01-14 NOTE — ASSESSMENT & PLAN NOTE
New problem, uncontrolled, patient has bilateral extremity pain secondary to bilateral knee pain, also possibly secondary to reduced/resolving gout attack.  Please see notes from same date of service 1/8/2019 regarding bilateral lower extremity edema

## 2019-01-15 LAB — HEMOCCULT STL QL IA: POSITIVE

## 2019-01-22 ENCOUNTER — NON-PROVIDER VISIT (OUTPATIENT)
Dept: HEALTH INFORMATION MANAGEMENT | Facility: MEDICAL CENTER | Age: 74
End: 2019-01-22
Payer: MEDICARE

## 2019-01-22 DIAGNOSIS — E11.9 TYPE 2 DIABETES MELLITUS WITHOUT COMPLICATION, WITHOUT LONG-TERM CURRENT USE OF INSULIN (HCC): ICD-10-CM

## 2019-01-22 PROCEDURE — G0109 DIAB MANAGE TRN IND/GROUP: HCPCS | Performed by: INTERNAL MEDICINE

## 2019-01-22 NOTE — PROGRESS NOTES
"Saleem presents for Type II DM self management program.  He has been taking metformin, glipizide and Tradjenta, Hba1c= 8.4%.      Define Type 2 diabetes: Education taught  Define Type 1 diabetes: Education taught  Understand feaures and benefits of education and management: Education taught  Describe who is responsible for diabetes management: Education taught      Diabetes Lifestyle Changes / Goals  State benefits of making appropriate lifestyle changes: Education taught  Identify lifestyle behaviors participant wants to change: Education taught  Identify risk factors that interfere with health and strategies to reduce : Education taught  Verbalize need for and frequency of health care follow-up: Education taught  Develop behavioral objectives and expected health outcomes: Education taught    Diabetes Exercise and Activity  Describe role of exercise in diabetes management: Education taught  State relationship of exercise to blood sugar: Education taught  State the benefits/risk(s) of exercise and precautions to follow: Education taught    Diabetes Self Blood Glucose Monitoring  Discuss rationale and importance of SBGM: Education taught  Discuss appropriate record keeping: Education taught  Discuss how to use results from blood glucose testing: Education taught  Evaluation and interpretation of blood glucose patterns: Education taught    Diabetes Disease Process  Discuss signs, symptoms, TX and prevention of hyperglycemia: Education taught  Discuss beta cell dysfunction and insulin resistance: Education taught  Discuss Insulin and its role in the body: Education taught  Discuss the role of the liver in glucose metabolism: Education taught  Discuss hormonal regulation: Education taught  Define benefits of good control and discuss what it means to be in \"good control\": Education taught  Discuss impact of exercise, food, meds, stress, and special factors on diabetes: Education taught  Discuss when to confer with HCP " for possible treatment plan adjustments: Education taught    Diabetes Insulin and Medications  Action of oral medications, onset and duration: Education taught  Discuss incretin secretagogs and their use in diabetes management: Education taught  Discuss proper injection technique and site rotation: Education taught  Discuss storage of insulin and disposal of sharps: Education taught    Hypoglycemia  List signs, symptoms and causes of hypoglycemia: Education taught  Discuss physiology of hypoglycemic reactions: Education taught  Accurately describe appropriate treatment and prevention: Education taught  Discuss when to contact HCP: Education taught    Sick Day Care  Verbalize important items to monitor when sick and when to contact HCP: Education taught  Review sick day box: Education taught  State diabetes medication adjustments on sick days: Education taught    Complications (Chronic)  Explain prevention, TX, signs/symptoms of: retinopathy, neuropathy, nephropathy, infections: Education taught  Explain prevention, TX, signs/symptoms of: CAD, cerebral-vascular disease and sexual dysfunction: Education taught  Identify when to notify HCP of complications: Education taught  State principles of skin, dental and foot care.  Discuss proper foot care, prevention of foot probelms when to notify HCP>: Education taught  Demonstrate how to examine feet and what to look for: Education taught    Psychosocial adjustment  Identify sources of stress: Education taught  Identify resources and techniques for stress reduction: Education taught  Discuss health care referral network / community resources for support: Education taught

## 2019-01-22 NOTE — LETTER
January 22, 2019      Kirk Calabrese M.D.  [address]  [address]  [place] [address]          RE: Saleem Whitney  [birth year] [id number]    Dear:Kirk Calabrese M.D.    The above referenced patient received 3 hours of diabetes education from [facility].    Topics taught (may include but not limited to):  Introduction to diabetes, benefits and responsibilities of patient, physiology of diabetes and the diease process, benefits of blood glucose monitoring and record keeping, medication action and possible side effects, hypoglycemia, sick day management, exercise, stress reduction and travel with diabetes.     Provided meter and instructed in use: no. Pt using One Touch Ultra 2meter.  Blood pressure:    Goal is less than 130/80  Dilated eye exam within the past year: yes Goal is for patient to have yearly.   Lipid panel:   Lab Results   Component Value Date/Time    CHOLSTRLTOT 105 06/21/2018 07:36 AM    LDL 34 06/21/2018 07:36 AM    HDL 41 06/21/2018 07:36 AM    TRIGLYCERIDE 150 (H) 06/21/2018 07:36 AM    Chol./HDL ratio 2.56  Goal is less than 5   Micro-albumin/Creat. Ratio: 28  Date: 6/21/18  Goal is less than 20 ng/dl  HbA1c:   Lab Results   Component Value Date/Time    HBA1C 8.4 (H) 11/05/2018 10:01 AM   Goal is <7%  in the next 3 months.  Vitamin D level: not available Goal is greater than 30 ng/ml.    Daily aspirin use if >30 years old w/o contradictions:yes dose :81 mg    Patient/caregiver appeared to understand the content as demonstrated by appropriate questions.     Notes: Saleem presents for Type II DM self management program.  He has been taking metformin, glipizide and Tradjenta, Hba1c= 8.4%.   Consider replacing glipizide and tradjenta with trulicity for improved diabetes control and weight loss.    The patient was provided with written materials to back-up verbal education.   Thank you for this consultation,     Nina Myles R.N.  Certified Diabetes Nurse  Educator

## 2019-01-25 ENCOUNTER — NON-PROVIDER VISIT (OUTPATIENT)
Dept: HEALTH INFORMATION MANAGEMENT | Facility: MEDICAL CENTER | Age: 74
End: 2019-01-25
Payer: MEDICARE

## 2019-01-25 VITALS — HEIGHT: 73 IN | WEIGHT: 220 LBS | BODY MASS INDEX: 29.16 KG/M2

## 2019-01-25 DIAGNOSIS — E11.9 TYPE 2 DIABETES MELLITUS WITHOUT COMPLICATION, WITHOUT LONG-TERM CURRENT USE OF INSULIN (HCC): ICD-10-CM

## 2019-01-25 PROCEDURE — G0109 DIAB MANAGE TRN IND/GROUP: HCPCS | Performed by: INTERNAL MEDICINE

## 2019-01-25 NOTE — LETTER
"           January 25, 2019    Dear: Dr. Calabrese    Your patient Saleem Whitney 1945 was recently seen at [facility] for the Type 2 Diabetes Class from 9-11:00am on Friday 1/25/19     The patient was taught the basics of nutrition, carbohydrate containing foods vs non carbohydrate containing foods, the plate method for portion control, nutrition facts label reading, heart healthy eating, and more. The patient was encouraged to exercise for at least 30 minutes 5 days per week. They were given a number of resources available to them.     Should you desire any notes from this visit please see EPIC \"notes\" and do not hesitate to give us a call at [phone].    Meliza Ralph, JAN, LD, CDE  [phone]         "

## 2019-01-25 NOTE — PROGRESS NOTES
"1/25/2019 Referring Provider: Kirk Calabrese M.D.       Time in/out: 9:00-11:10am     Anthropometrics/Objective  Vitals:    01/25/19 1123   Weight: 99.8 kg (220 lb)   Height: 1.854 m (6' 1\")       Body mass index is 29.03 kg/m².       See comprehensive patient history form for further information     Subjective:  -Pt attended Day 2 of Type 2 class today.     Nutrition Diagnosis (PES Statement)  · Food/nutrition knowledge deficit related to no previous nutrition education as evidenced by limited nutrition knowledge per discussion with patient     Client history:  Condition(s) associated with a diagnosis or treatment (specify) Type 2 DM, hypothyroidism, HLD, HTN    Biochemical data, medical test and procedures  Lab Results   Component Value Date/Time    HBA1C 8.4 (H) 11/05/2018 10:01 AM   @  Lab Results   Component Value Date/Time    POCGLUCOSE 210 (A) 06/07/2017 01:57 PM     Lab Results   Component Value Date/Time    CHOLSTRLTOT 105 06/21/2018 07:36 AM    LDL 34 06/21/2018 07:36 AM    HDL 41 06/21/2018 07:36 AM    TRIGLYCERIDE 150 (H) 06/21/2018 07:36 AM         Nutrition Intervention    Meal and Snack  Recommend a Carb controlled, heart healthy diet     Comprehensive Nutrition education Instruction or training leading to in-depth nutrition related knowledge about:  Combine carb, protein and fat at each meal, Eating out, Meal timing and spacing, Metabolism of carb, protein, fat, Physical activity/exercise, Portion control, Sweets and alcohol in moderation, Heart-healthy guidelines, Increasing/Decreasing PO intake, Label Reading, Handouts provided regarding topics discussed and Theraputic diet for Type 2 DM     Monitoring & Evaluation Plan/ GOALS:    Behavioral-Environmental:  Behavior:  Consistent CHO intake throughout the day. Consider keeping a food journal to track calorie and or carb intake     Physical activity:  Increase as tolerated. Aim for 30mins 5 days per week or more.     Food / Nutrient Intake:  Food " intake:  Use the plate method recommendations for portions/balance at meals    Macronutrient intake:  45-60 grams CHO with meals, </=15 grams CHO with snacks    Physical Signs / Symptoms:  HbA1c profiles:  Within ADA guidelines    Assessment Notes:  Kleber attended day 2 of the Type 2 class today. Dietary guidelines were discussed at length for a carb controlled diet. He was taught the differences between Carbs/protein/fat and their effects on BG’s.   Pt was encouraged to use the plate method to help achieve macronutrient balance. Recommended patient eat protein at each meal and reduce intake of carbohydrate.  Non-starchy vegetables were emphasized as foods that will help satisfy without raising BG’s at meals and snacks.  I stressed to the patient to not go more than 4 hours without eating. To have a healthy high protein snack between meals as needed.     Kleber was taught that exercise works as a medication for controlling DM.    It was emphasized that if exercise is a consistent part of Kleber’s habits that DM control will be the most ideal it can be.      Finally, we discussed the food label and how to effectively read a label using serving size, trans fat, total CHO, and % sodium to evaluate a food.  An alternative way of meal planning was given by using the food label and CHO counting; the patient can eat 45-60 grams CHO at meals and </=15grams CHO at snacks, if needed.      He set goals to try to achieve in the next 3 months to help with DM control; He can follow-up with me, if needed, for a more intensive meal plan and CHO counting education.      Follow up GERONIMO Ralph, RD, LD, CDE  [phone]

## 2019-01-31 ENCOUNTER — PATIENT MESSAGE (OUTPATIENT)
Dept: MEDICAL GROUP | Facility: MEDICAL CENTER | Age: 74
End: 2019-01-31

## 2019-02-18 DIAGNOSIS — E11.9 TYPE 2 DIABETES MELLITUS WITHOUT COMPLICATION, WITHOUT LONG-TERM CURRENT USE OF INSULIN (HCC): ICD-10-CM

## 2019-04-16 ENCOUNTER — OFFICE VISIT (OUTPATIENT)
Dept: MEDICAL GROUP | Facility: MEDICAL CENTER | Age: 74
End: 2019-04-16
Payer: MEDICARE

## 2019-04-16 VITALS
DIASTOLIC BLOOD PRESSURE: 60 MMHG | HEIGHT: 73 IN | OXYGEN SATURATION: 98 % | SYSTOLIC BLOOD PRESSURE: 116 MMHG | BODY MASS INDEX: 29.18 KG/M2 | WEIGHT: 220.2 LBS | HEART RATE: 65 BPM | TEMPERATURE: 97.5 F

## 2019-04-16 DIAGNOSIS — Z12.5 SCREENING FOR PROSTATE CANCER: ICD-10-CM

## 2019-04-16 DIAGNOSIS — M25.469 PAIN AND SWELLING OF KNEE, UNSPECIFIED LATERALITY: ICD-10-CM

## 2019-04-16 DIAGNOSIS — I15.2 HYPERTENSION ASSOCIATED WITH DIABETES (HCC): ICD-10-CM

## 2019-04-16 DIAGNOSIS — E11.69 HYPERLIPIDEMIA DUE TO TYPE 2 DIABETES MELLITUS (HCC): ICD-10-CM

## 2019-04-16 DIAGNOSIS — R60.0 BILATERAL LEG EDEMA: ICD-10-CM

## 2019-04-16 DIAGNOSIS — E11.9 TYPE 2 DIABETES MELLITUS WITHOUT COMPLICATION, WITHOUT LONG-TERM CURRENT USE OF INSULIN (HCC): ICD-10-CM

## 2019-04-16 DIAGNOSIS — Z00.00 MEDICARE ANNUAL WELLNESS VISIT, SUBSEQUENT: ICD-10-CM

## 2019-04-16 DIAGNOSIS — E78.5 HYPERLIPIDEMIA DUE TO TYPE 2 DIABETES MELLITUS (HCC): ICD-10-CM

## 2019-04-16 DIAGNOSIS — M25.569 PAIN AND SWELLING OF KNEE, UNSPECIFIED LATERALITY: ICD-10-CM

## 2019-04-16 DIAGNOSIS — R21 ERYTHEMATOUS RASH: ICD-10-CM

## 2019-04-16 DIAGNOSIS — E11.59 HYPERTENSION ASSOCIATED WITH DIABETES (HCC): ICD-10-CM

## 2019-04-16 PROCEDURE — 99215 OFFICE O/P EST HI 40 MIN: CPT | Performed by: FAMILY MEDICINE

## 2019-04-16 NOTE — ASSESSMENT & PLAN NOTE
This is acute exacerbation of chronic condition, uncontrolled, patient and I have previously done initial workup with echocardiogram, CMP and these were within normal limits.  We also did a left lower extremity venous Doppler which was negative for any DVTs.  However, patient has had persistence of bilateral lower extremity edema as well as acute exacerbation over the last week when he was walking miles per day.  I have suggested both a referral to vascular surgery as well as evaluation of the right upper quadrant to assess possible portal hypertension or other hepatic pathology that could be explained his condition.

## 2019-04-16 NOTE — PATIENT INSTRUCTIONS
"For your leg swelling & rash:      1) wear compression stockings on a daily basis for at least 2 weeks     2) use moisturizing lotion on legs     3) proceed with referral to vascular surgeon     4) we will consider right abdominal ultrasound in the future.    Dr. Calabrese's tips for \"Lifestyle Medicine:\"     Check out the talk/documentary on \"How not to die\" by Dr. Saleem Nichols (on his website nutritionsharing.[email or web address], he also authored a book with this title).       1) Make SMART lifestyle changes: Specific, Measurable, Attainable, Relevant, Time-sensitive.  The lifestyle changes that you need to make are with regards to: nutrition, cardiovascular exercise, sleep, stress management.  Make these changes every 2 weeks, revisiting the previous goals and perhaps revising them and/or setting new ones.       2) Nutrition: Make as many changes as you can to increase the amount of whole-foods (not Whole Foods, necessarily!  ;-)), plant-based diet as possible:   A) Books: Eat to Live (Dr. Mario Louis), The Spectrum (Dr. Mychal Ordoñez), The Starch Solution (Dr. Raj Hathaway)      B) Documentaries (can usually be found on upad): [place] [employer].  Fat, Sick, and Nearly Dead.  Fed Up.           3) Cardiovascular Exercise: The center for disease control recommends a minimum of 150 minutes per week of moderate intensity cardiovascular exercise for weight maintenance and cardiovascular health.  Set this as your initial goal, with at least 30 minutes per session. Types of exercise can include 30 minutes of elliptical, 30 minutes of decently fast jog, 30 minutes of swimming, 30 minutes of heavy gardening (lifting big bags of fertilizer, digging deep holes/ditches).  He can cut down the minute requirements to half, by doing higher intensity sports such as a game of tennis, or soccer.  He notes the library and check out with they have for home exercise programs, as well.       4) Sleep:    A) Goal: Obtain a minimum of 7-8hours of " continuous, uninterrupted, restful sleep per night.    B) Tips for Sleep Hygiene:    I) Go to bed and wake up at consistent times whether work/school day or not.     II) Keep room dark, quiet, and comfortable.  Increase exposure to sunlight during awake times and avoid bright lights (especially anything with a backlight) at least the last 1-2hours before going to sleep.     III) Don't nap.     IV) Avoid stimulant or caffeine use more than 4 hours after wake time.        5) Stress Management: You cannot change the stresses of life dizziness necessarily, but you can change how he responds of them. One good way to manage stress is to write things down in order to help you process how to approach things in general or specifically. Another good way is to talk it out with someone you trusts, specifically your significant other or good friend. A definite great way to deal with stress is to have cardiovascular exercise!

## 2019-04-16 NOTE — ASSESSMENT & PLAN NOTE
This is a new problem, uncontrolled, patient states that over the course of 4 days this last week he did miles of walking per day, and started having increase in bulge/swelling of the posterior aspects of both knees.  This also causes a cramping like sensation in the superior portion of both calves as well.    While this has been an issue before, this has not been to the same degree of severity.    Patient did have a recent fall injury prior to this new symptoms in the knee, however that was a fall onto his right shoulder and left hip and therefore has residual hip and shoulder pains.    Patient does have a history of left knee meniscus tear, otherwise does not have any history of direct blunt force trauma or falls onto the knees.

## 2019-05-01 NOTE — PROGRESS NOTES
Subjective:   Chief Complaint/History of Present Illness:  Saleem Whitney is a 73 y.o. male established patient who presents today to discuss medical problems as listed below    Diagnoses of Pain and swelling of knee, unspecified laterality, Bilateral leg edema, (HCC) Type 2 diabetes mellitus without complication, without long-term current use of insulin, Erythematous rash, Medicare annual wellness visit, subsequent, (HCC) Hypertension associated with diabetes, (HCC) Hyperlipidemia due to type 2 diabetes mellitus, and Screening for prostate cancer were pertinent to this visit.    Pain and swelling of knee  This is a new problem, uncontrolled, patient states that over the course of 4 days this last week he did miles of walking per day, and started having increase in bulge/swelling of the posterior aspects of both knees.  This also causes a cramping like sensation in the superior portion of both calves as well.    While this has been an issue before, this has not been to the same degree of severity.    Patient did have a recent fall injury prior to this new symptoms in the knee, however that was a fall onto his right shoulder and left hip and therefore has residual hip and shoulder pains.    Patient does have a history of left knee meniscus tear, otherwise does not have any history of direct blunt force trauma or falls onto the knees.    Bilateral leg edema  This is acute exacerbation of chronic condition, uncontrolled, patient and I have previously done initial workup with echocardiogram, CMP and these were within normal limits.  We also did a left lower extremity venous Doppler which was negative for any DVTs.  However, patient has had persistence of bilateral lower extremity edema as well as acute exacerbation over the last week when he was walking miles per day.  I have suggested both a referral to vascular surgery as well as evaluation of the right upper quadrant to assess possible portal hypertension  or other hepatic pathology that could be explained his condition.    (HCC) Type 2 diabetes mellitus without complication, without long-term current use of insulin  We discussed that patient is diagnosed with diabetes, uncontrolled, given that the A1c is:   Lab Results   Component Value Date/Time    HBA1C 8.4 (H) 11/05/2018 10:01 AM        Patient is currently taking (glipizide, metformin, tradjenta) for glycemic control, and (aspirin, losartan) for health maintenance related to diabetes mellitus.    We discussed that prediabetes/diabetes mellitus type 2 are medical conditions of lifestyle habits (less than optimal dietary choices, insufficient cardiovascular exercise), and that they can be controlled (in part or in whole) by these lifestyle changes.     Furthermore, we discussed that at present time it is better to pursue lifestyle changes rather than changing medications. Patient is in agreement.     ROS is NEGATIVE for blurred vision, polydipsia, polyuria, diaphoresis, palpitations, fatigue, irritability, flank pain, BLE paresthesias.    Erythematous rash  New problem, mildly uncontrolled, patient advised to use OTC topical steroid.      Patient Active Problem List    Diagnosis Date Noted   • Pain and swelling of knee 04/16/2019   • Erythematous rash 04/16/2019   • Bilateral leg edema 01/08/2019   • Nocturia more than twice per night 11/05/2018   • Pitting edema 08/07/2018   • Idiopathic chronic gout, left ankle and foot, without tophus (tophi) 06/25/2018   • Memory changes 05/24/2018   • Chronic pain of left knee 09/25/2017   • Diastasis of rectus abdominis 06/22/2017   • Increased frequency of urination 06/07/2017   • (HCC) Obesity, diabetes, and hypertension syndrome 11/09/2016   • (HCC) Type 2 diabetes mellitus without complication, without long-term current use of insulin 11/09/2016   • (HCC) Hypertension associated with diabetes 11/09/2016   • Acquired hypothyroidism 11/09/2016   • (HCC) Hyperlipidemia due  "to type 2 diabetes mellitus 11/09/2016   • Primary insomnia 11/09/2016       Additional History:   Allergies:    Patient has no known allergies.     Current Medications:     Current Outpatient Prescriptions   Medication Sig Dispense Refill   • linagliptin (TRADJENTA) 5 MG Tab tablet Take 1 Tab by mouth every day. 90 Tab 2   • glipiZIDE (GLUCOTROL) 10 MG Tab Take 1 Tab by mouth 2 times a day. 180 Tab 2   • metformin (GLUCOPHAGE) 1000 MG tablet TAKE 1 TABLET TWICE DAILY 180 Tab 2   • losartan (COZAAR) 100 MG Tab TAKE ONE TABLET BY MOUTH ONCE DAILY 90 Tab 1   • Magnesium 500 MG Tab Take 1,000 mg by mouth.     • levothyroxine (SYNTHROID) 75 MCG Tab Take 1 Tab by mouth every day. 90 Tab 3   • Blood Glucose Monitoring Suppl Supplies Misc Test strips order: Test strips for OneTouch Ultra meter. Sig: use fasting first thing in the morning and prn ssx high or low sugar 100 Each 3   • Lancets Misc Lancets order: Lancets for One Touch Ultra meter. Sig: use 3 times daily and prn ssx high or low sugar. #100 RF x 3 100 Each 3   • Blood Glucose Monitoring Suppl Supplies Misc Test strips order: Test strips for One Touch Ultra meter. Sig: use 3 times daily and prn ssx high or low sugar #100 RF x 3 100 Strip 3   • aspirin (ASA) 81 MG Chew Tab chewable tablet Take 1 Tab by mouth every day. 90 Tab 3   • vitamin D (CHOLECALCIFEROL) 1000 UNIT Tab Take 1 Tab by mouth every day. 90 Tab 3     No current facility-administered medications for this visit.         Social History:     Social History   Substance Use Topics   • Smoking status: Former Smoker     Packs/day: 1.00     Years: 5.00     Types: Cigarettes     Quit date: 11/9/1971   • Smokeless tobacco: Never Used   • Alcohol use 4.2 oz/week     7 Glasses of wine per week      Comment: daily       ROS:     - NOTE: All other systems reviewed and are negative, except as in HPI.     Objective:   Physical Exam:    Vitals: /60   Pulse 65   Temp 36.4 °C (97.5 °F)   Ht 1.854 m (6' 0.99\") "   Wt 99.9 kg (220 lb 3.2 oz)   SpO2 98%    BMI: Body mass index is 29.06 kg/m².   General/Constitutional: Vitals as above, Well nourished, well developed male in no acute distress   Head/Eyes: Head is grossly normal & atraumatic, bilateral conjunctivae clear and not injected, bilateral EOMI, bilateral PERRL   ENT: Bilateral external ears grossly normal in appearance, Hearing grossly intact, External nares normal in appearance and without discharge/bleeding   Respiratory: No respiratory distress, bilateral lungs are clear to ausculation in all lung fields (anterior/lateral/posterior), no wheezing/rhonchi/rales   Cardiovascular: Regular rate and rhythm without murmur/gallops/rubs, distal pulses are intact and equal bilaterally (radial, posterior tibial)   MSK: Continued BLE knee and shin/calf edema/erythema, Gait grossly normal & not antalgic   Integumentary: No apparent rashes   Psych: Judgment grossly appropriate, no apparent depression/anxiety    Health Maintenance:     - 01/15/19 -- FIT POSITIVE    - 12/27/17 -- FIT NEGATIVE    Imaging/Labs:     - 08/15/18 -- PSA WNL    - 06/21/18 -- worsened control of diabetes (8.2 <-- 7.1), high triglycerides, and mild dehydration.  Otherwise, other labs are normal    Assessment and Plan:   1. Pain and swelling of knee, unspecified laterality  2. Bilateral leg edema  Chronic, uncontrolled, continued bilateral lower extremity edema.  Echocardiogram within normal limits, CMP within normal limits, left lower extremity DVT venous Doppler exam negative. Patient has had acute worsening over the last week, and plans to have international travel within a few months.   - REFERRAL TO VASCULAR SURGERY    3. (HCC) Type 2 diabetes mellitus without complication, without long-term current use of insulin  Chronic, uncontrolled, patient advised to pursue lifestyle changes, particularly cardiovascular exercise and increasing proportion of plant-based nutrition.   - HEMOGLOBIN A1C; Future   -  Comp Metabolic Panel; Future   - Lipid Profile; Future   - MICROALBUMIN CREAT RATIO URINE; Future    4. Erythematous rash  New problem, uncontrolled, trial OTC topical steroid.    5. Medicare annual wellness visit, subsequent  6. (HCC) Hypertension associated with diabetes  7. (HCC) Hyperlipidemia due to type 2 diabetes mellitus  8. Screening for prostate cancer  Labs ordered for annual medical exam.   - HEMOGLOBIN A1C; Future   - Comp Metabolic Panel; Future   - Lipid Profile; Future   - PROSTATE SPECIFIC AG SCREENING; Future   - MICROALBUMIN CREAT RATIO URINE; Future    NOTE: A total of 40minutes was spent in direct face-to-face time with the patient, of which over 50% of the time was spent in counseling and/or coordination of care.      RTC: in 06/2019 for Medicare Annual Wellness.    PLEASE NOTE: This dictation was created using voice recognition software. I have made every reasonable attempt to correct obvious errors, but I expect that there are errors of grammar and possibly content that I did not discover before finalizing the note.

## 2019-05-01 NOTE — ASSESSMENT & PLAN NOTE
We discussed that patient is diagnosed with diabetes, uncontrolled, given that the A1c is:   Lab Results   Component Value Date/Time    HBA1C 8.4 (H) 11/05/2018 10:01 AM        Patient is currently taking (glipizide, metformin, tradjenta) for glycemic control, and (aspirin, losartan) for health maintenance related to diabetes mellitus.    We discussed that prediabetes/diabetes mellitus type 2 are medical conditions of lifestyle habits (less than optimal dietary choices, insufficient cardiovascular exercise), and that they can be controlled (in part or in whole) by these lifestyle changes.     Furthermore, we discussed that at present time it is better to pursue lifestyle changes rather than changing medications. Patient is in agreement.     ROS is NEGATIVE for blurred vision, polydipsia, polyuria, diaphoresis, palpitations, fatigue, irritability, flank pain, BLE paresthesias.

## 2019-05-12 ENCOUNTER — PATIENT MESSAGE (OUTPATIENT)
Dept: MEDICAL GROUP | Facility: MEDICAL CENTER | Age: 74
End: 2019-05-12

## 2019-05-12 DIAGNOSIS — E11.9 TYPE 2 DIABETES MELLITUS WITHOUT COMPLICATION, WITHOUT LONG-TERM CURRENT USE OF INSULIN (HCC): ICD-10-CM

## 2019-05-12 DIAGNOSIS — E11.59 HYPERTENSION ASSOCIATED WITH DIABETES (HCC): ICD-10-CM

## 2019-05-12 DIAGNOSIS — E03.9 ACQUIRED HYPOTHYROIDISM: ICD-10-CM

## 2019-05-12 DIAGNOSIS — E78.2 MIXED HYPERLIPIDEMIA: ICD-10-CM

## 2019-05-12 DIAGNOSIS — I15.2 HYPERTENSION ASSOCIATED WITH DIABETES (HCC): ICD-10-CM

## 2019-05-12 DIAGNOSIS — I10 ESSENTIAL HYPERTENSION: ICD-10-CM

## 2019-05-13 NOTE — TELEPHONE ENCOUNTER
From: Saleem Whitney  To: Kirk Calabrese M.D.  Sent: 5/12/2019 9:17 AM PDT  Subject: Prescription Question    I have no prescription refills available for the listed below :   Levothyroxine 75 MCG 1 a day   Atorvastatin 10 MG 1 a day   Metformin 1000 MG 2 a day   Amlodipine 10 mg 1 a day   Losartan 100 mg 1 day  I will be out before my next appointment on Clare 10 and need refills ordered through [facility] mail order for 90 day supply if each.    Also, after my visit with vein , it was suggested that the pain in my knee might be a cyst and should have a xray and the swelling of my left ankle might be the results of an old break.

## 2019-05-15 RX ORDER — AMLODIPINE BESYLATE 10 MG/1
10 TABLET ORAL DAILY
Qty: 90 TAB | Refills: 2 | Status: SHIPPED | OUTPATIENT
Start: 2019-05-15 | End: 2019-06-10 | Stop reason: SDUPTHER

## 2019-05-15 RX ORDER — LEVOTHYROXINE SODIUM 0.07 MG/1
75 TABLET ORAL DAILY
Qty: 90 TAB | Refills: 0 | Status: SHIPPED | OUTPATIENT
Start: 2019-05-15 | End: 2019-06-10 | Stop reason: SDUPTHER

## 2019-05-15 RX ORDER — LOSARTAN POTASSIUM 100 MG/1
100 TABLET ORAL DAILY
Qty: 90 TAB | Refills: 2 | Status: SHIPPED | OUTPATIENT
Start: 2019-05-15 | End: 2019-06-10 | Stop reason: SDUPTHER

## 2019-05-15 RX ORDER — ATORVASTATIN CALCIUM 10 MG/1
10 TABLET, FILM COATED ORAL DAILY
Qty: 90 TAB | Refills: 0 | Status: SHIPPED | OUTPATIENT
Start: 2019-05-15 | End: 2019-06-10 | Stop reason: SDUPTHER

## 2019-05-21 ENCOUNTER — HOSPITAL ENCOUNTER (OUTPATIENT)
Dept: LAB | Facility: MEDICAL CENTER | Age: 74
End: 2019-05-21
Attending: FAMILY MEDICINE
Payer: MEDICARE

## 2019-05-21 DIAGNOSIS — I15.2 HYPERTENSION ASSOCIATED WITH DIABETES (HCC): ICD-10-CM

## 2019-05-21 DIAGNOSIS — E11.9 TYPE 2 DIABETES MELLITUS WITHOUT COMPLICATION, WITHOUT LONG-TERM CURRENT USE OF INSULIN (HCC): ICD-10-CM

## 2019-05-21 DIAGNOSIS — E11.69 HYPERLIPIDEMIA DUE TO TYPE 2 DIABETES MELLITUS (HCC): ICD-10-CM

## 2019-05-21 DIAGNOSIS — E11.59 HYPERTENSION ASSOCIATED WITH DIABETES (HCC): ICD-10-CM

## 2019-05-21 DIAGNOSIS — Z00.00 MEDICARE ANNUAL WELLNESS VISIT, SUBSEQUENT: ICD-10-CM

## 2019-05-21 DIAGNOSIS — Z12.5 SCREENING FOR PROSTATE CANCER: ICD-10-CM

## 2019-05-21 DIAGNOSIS — E03.9 ACQUIRED HYPOTHYROIDISM: ICD-10-CM

## 2019-05-21 DIAGNOSIS — E78.5 HYPERLIPIDEMIA DUE TO TYPE 2 DIABETES MELLITUS (HCC): ICD-10-CM

## 2019-05-21 LAB
ALBUMIN SERPL BCP-MCNC: 4.3 G/DL (ref 3.2–4.9)
ALBUMIN/GLOB SERPL: 1.6 G/DL
ALP SERPL-CCNC: 51 U/L (ref 30–99)
ALT SERPL-CCNC: 25 U/L (ref 2–50)
ANION GAP SERPL CALC-SCNC: 8 MMOL/L (ref 0–11.9)
AST SERPL-CCNC: 20 U/L (ref 12–45)
BILIRUB SERPL-MCNC: 0.4 MG/DL (ref 0.1–1.5)
BUN SERPL-MCNC: 25 MG/DL (ref 8–22)
CALCIUM SERPL-MCNC: 9 MG/DL (ref 8.5–10.5)
CHLORIDE SERPL-SCNC: 105 MMOL/L (ref 96–112)
CHOLEST SERPL-MCNC: 109 MG/DL (ref 100–199)
CO2 SERPL-SCNC: 29 MMOL/L (ref 20–33)
CREAT SERPL-MCNC: 1.07 MG/DL (ref 0.5–1.4)
CREAT UR-MCNC: 93.3 MG/DL
FASTING STATUS PATIENT QL REPORTED: NORMAL
GLOBULIN SER CALC-MCNC: 2.7 G/DL (ref 1.9–3.5)
GLUCOSE SERPL-MCNC: 158 MG/DL (ref 65–99)
HDLC SERPL-MCNC: 40 MG/DL
LDLC SERPL CALC-MCNC: 45 MG/DL
MICROALBUMIN UR-MCNC: 2.4 MG/DL
MICROALBUMIN/CREAT UR: 26 MG/G (ref 0–30)
POTASSIUM SERPL-SCNC: 3.9 MMOL/L (ref 3.6–5.5)
PROT SERPL-MCNC: 7 G/DL (ref 6–8.2)
PSA SERPL-MCNC: 0.62 NG/ML (ref 0–4)
SODIUM SERPL-SCNC: 142 MMOL/L (ref 135–145)
TRIGL SERPL-MCNC: 119 MG/DL (ref 0–149)
TSH SERPL DL<=0.005 MIU/L-ACNC: 3.97 UIU/ML (ref 0.38–5.33)

## 2019-05-21 PROCEDURE — 36415 COLL VENOUS BLD VENIPUNCTURE: CPT | Mod: GA

## 2019-05-21 PROCEDURE — 80053 COMPREHEN METABOLIC PANEL: CPT

## 2019-05-21 PROCEDURE — 84443 ASSAY THYROID STIM HORMONE: CPT

## 2019-05-21 PROCEDURE — 84153 ASSAY OF PSA TOTAL: CPT | Mod: GA

## 2019-05-21 PROCEDURE — 80061 LIPID PANEL: CPT

## 2019-05-21 PROCEDURE — 82043 UR ALBUMIN QUANTITATIVE: CPT

## 2019-05-21 PROCEDURE — 82570 ASSAY OF URINE CREATININE: CPT

## 2019-05-21 PROCEDURE — 83036 HEMOGLOBIN GLYCOSYLATED A1C: CPT | Mod: GA

## 2019-05-22 LAB
EST. AVERAGE GLUCOSE BLD GHB EST-MCNC: 160 MG/DL
HBA1C MFR BLD: 7.2 % (ref 0–5.6)

## 2019-05-28 ENCOUNTER — PATIENT MESSAGE (OUTPATIENT)
Dept: MEDICAL GROUP | Facility: MEDICAL CENTER | Age: 74
End: 2019-05-28

## 2019-05-28 DIAGNOSIS — E11.9 TYPE 2 DIABETES MELLITUS WITHOUT COMPLICATION, WITHOUT LONG-TERM CURRENT USE OF INSULIN (HCC): ICD-10-CM

## 2019-05-28 NOTE — TELEPHONE ENCOUNTER
From: Saleem Whitney  To: Kirk Calabrese M.D.  Sent: 5/28/2019 10:19 AM PDT  Subject: Prescription Question    I am running out of One Touch Ultra meter Lancets and Blood Glucose Strips and apparently I need a new Rx for them. Also, I was taking Tradjenta 5mg Tabs and I am not sure that I should still be taking them, if so, I need and new Rx sent to [facility] for a 90 day supply.

## 2019-05-29 RX ORDER — LANCETS 30 GAUGE
EACH MISCELLANEOUS
Qty: 100 EACH | Refills: 3 | Status: SHIPPED | OUTPATIENT
Start: 2019-05-29 | End: 2019-06-03 | Stop reason: SDUPTHER

## 2019-06-03 ENCOUNTER — PATIENT MESSAGE (OUTPATIENT)
Dept: MEDICAL GROUP | Facility: MEDICAL CENTER | Age: 74
End: 2019-06-03

## 2019-06-03 DIAGNOSIS — E11.9 TYPE 2 DIABETES MELLITUS WITHOUT COMPLICATION, WITHOUT LONG-TERM CURRENT USE OF INSULIN (HCC): ICD-10-CM

## 2019-06-03 RX ORDER — LANCETS 30 GAUGE
EACH MISCELLANEOUS
Qty: 100 EACH | Refills: 3 | Status: SHIPPED | OUTPATIENT
Start: 2019-06-03 | End: 2020-12-10 | Stop reason: SDUPTHER

## 2019-06-10 ENCOUNTER — OFFICE VISIT (OUTPATIENT)
Dept: MEDICAL GROUP | Facility: MEDICAL CENTER | Age: 74
End: 2019-06-10
Payer: MEDICARE

## 2019-06-10 ENCOUNTER — APPOINTMENT (OUTPATIENT)
Dept: MEDICAL GROUP | Facility: MEDICAL CENTER | Age: 74
End: 2019-06-10
Payer: MEDICARE

## 2019-06-10 VITALS
TEMPERATURE: 97.9 F | HEART RATE: 63 BPM | HEIGHT: 73 IN | WEIGHT: 215 LBS | BODY MASS INDEX: 28.49 KG/M2 | OXYGEN SATURATION: 96 % | DIASTOLIC BLOOD PRESSURE: 72 MMHG | SYSTOLIC BLOOD PRESSURE: 128 MMHG

## 2019-06-10 DIAGNOSIS — E78.5 HYPERLIPIDEMIA DUE TO TYPE 2 DIABETES MELLITUS (HCC): ICD-10-CM

## 2019-06-10 DIAGNOSIS — E11.69 HYPERLIPIDEMIA DUE TO TYPE 2 DIABETES MELLITUS (HCC): ICD-10-CM

## 2019-06-10 DIAGNOSIS — I10 ESSENTIAL HYPERTENSION: ICD-10-CM

## 2019-06-10 DIAGNOSIS — E03.9 ACQUIRED HYPOTHYROIDISM: ICD-10-CM

## 2019-06-10 DIAGNOSIS — M25.561 PAIN AND SWELLING OF RIGHT KNEE: ICD-10-CM

## 2019-06-10 DIAGNOSIS — E11.59 HYPERTENSION ASSOCIATED WITH DIABETES (HCC): ICD-10-CM

## 2019-06-10 DIAGNOSIS — R25.2 LEG CRAMPING: ICD-10-CM

## 2019-06-10 DIAGNOSIS — I15.2 HYPERTENSION ASSOCIATED WITH DIABETES (HCC): ICD-10-CM

## 2019-06-10 DIAGNOSIS — E55.9 VITAMIN D DEFICIENCY: ICD-10-CM

## 2019-06-10 DIAGNOSIS — E11.9 TYPE 2 DIABETES MELLITUS WITHOUT COMPLICATION, WITHOUT LONG-TERM CURRENT USE OF INSULIN (HCC): ICD-10-CM

## 2019-06-10 DIAGNOSIS — M25.461 PAIN AND SWELLING OF RIGHT KNEE: ICD-10-CM

## 2019-06-10 PROCEDURE — 99214 OFFICE O/P EST MOD 30 MIN: CPT | Performed by: FAMILY MEDICINE

## 2019-06-10 RX ORDER — LOSARTAN POTASSIUM 100 MG/1
100 TABLET ORAL DAILY
Qty: 90 TAB | Refills: 3 | Status: SHIPPED | OUTPATIENT
Start: 2019-06-10 | End: 2020-03-02

## 2019-06-10 RX ORDER — ASPIRIN 81 MG/1
81 TABLET, CHEWABLE ORAL DAILY
Qty: 90 TAB | Refills: 3 | Status: SHIPPED | OUTPATIENT
Start: 2019-06-10 | End: 2022-01-03

## 2019-06-10 RX ORDER — ATORVASTATIN CALCIUM 10 MG/1
10 TABLET, FILM COATED ORAL DAILY
Qty: 90 TAB | Refills: 3 | Status: SHIPPED | OUTPATIENT
Start: 2019-06-10 | End: 2019-09-11 | Stop reason: SDUPTHER

## 2019-06-10 RX ORDER — LEVOTHYROXINE SODIUM 0.07 MG/1
75 TABLET ORAL DAILY
Qty: 90 TAB | Refills: 3 | Status: SHIPPED | OUTPATIENT
Start: 2019-06-10 | End: 2019-10-02 | Stop reason: SDUPTHER

## 2019-06-10 RX ORDER — AMLODIPINE BESYLATE 10 MG/1
10 TABLET ORAL DAILY
Qty: 90 TAB | Refills: 3 | Status: SHIPPED | OUTPATIENT
Start: 2019-06-10 | End: 2019-10-02 | Stop reason: SDUPTHER

## 2019-06-10 RX ORDER — GLIPIZIDE 10 MG/1
10 TABLET ORAL 2 TIMES DAILY
Qty: 180 TAB | Refills: 3 | Status: SHIPPED | OUTPATIENT
Start: 2019-06-10 | End: 2019-07-12 | Stop reason: SDUPTHER

## 2019-06-10 RX ORDER — THIAMINE HCL 100 MG
1000 TABLET ORAL DAILY
Qty: 90 TAB | Refills: 3 | Status: SHIPPED | OUTPATIENT
Start: 2019-06-10 | End: 2023-12-05

## 2019-06-10 NOTE — LETTER
Clare 10, 2019         Patient: Saleem Whitney   YOB: 1945   Date of Visit: 6/10/2019           To Whom it May Concern:     Saleem Whitney is my patient, and I am his primary care physician.  He takes the following medications for the following indications:         Amlodipine       -- High blood pressure     Aspirin              -- Stroke and heart attack prevention     Atorvastatin      -- Cholesterol management for Coronary Artery Disease management     Glipizide           -- Diabetes mellitus type 2     Levothyroxine   -- Hypothyroid     Linagliptin         -- Diabetes mellitus type 2     Losartan           -- High blood pressure     Magnesium       -- Leg Cramps     Metformin         -- Diabetes mellitus type 2     Vitamin D         -- Low vitamin D       Ciprofloxacin    -- For infectious diarrhea (aka Traveler's diarrhea)       If you have any questions or concerns, please don't hesitate to call ([phone]).        Sincerely,           Kirk Calabrese M.D.  Electronically Signed

## 2019-06-11 NOTE — ASSESSMENT & PLAN NOTE
Chronic, stable, well-controlled, taking medication as directed.  Patient is requesting to have prescription printed out and explanation of indications for medications as he will be traveling to [place].

## 2019-06-11 NOTE — ASSESSMENT & PLAN NOTE
New problem, uncontrolled, related to patient's knee swelling and pain.  Please see notes from same day service 6/11/2019 regarding pain and swelling of right knee

## 2019-06-11 NOTE — PROGRESS NOTES
Subjective:   Chief Complaint/History of Present Illness:  Saleem Whitney is a 73 y.o. male established patient who presents today to discuss medical problems as listed below    Diagnoses of Pain and swelling of right knee, Type 2 diabetes mellitus without complication, without long-term current use of insulin (HCC), Leg cramping, Hypertension associated with diabetes (HCC), Essential hypertension, Mixed hyperlipidemia, Acquired hypothyroidism, (HCC) Hyperlipidemia due to type 2 diabetes mellitus, and Vitamin D deficiency were pertinent to this visit.    Pain and swelling of right knee  New problem for my medical evaluation, uncontrolled, patient states that he has had pain and swelling of right knee for many years.  However, this is starting to become progressively worsened over the last 3 months.  Patient and I suspect that this may be abnormal compensation for his history of left ankle fracture.      At present time, patient declines further work-up and evaluation including physical exam due to the fact that he has to travel this coming Friday (4 days from now) [place], and therefore would like to delay any evaluation or management that could potentially worsen his knee pain.    Knee pain is described as chronic aching, increased swelling at times, and that these are worsened with prolonged ambulation, improved with rest.    ROS is negative for right lower extremity radicular pain/numbness, right lower extremity foot drop, saddle paresthesias, urinary or bowel incontinence.    (HCC) Hypertension associated with diabetes  Chronic, stable, well-controlled, taking medication as directed.  Patient is requesting to have prescription printed out and explanation of indications for medications as he will be traveling to [place].    (HCC) Hyperlipidemia due to type 2 diabetes mellitus  Chronic, stable, well-controlled, taking medication as directed.  Patient is requesting to have prescription printed  out and explanation of indications for medications as he will be traveling to [facility].    (HCC) Type 2 diabetes mellitus without complication, without long-term current use of insulin  Chronic, mildly uncontrolled, but improving.  Patient is taking medication as directed.  Patient is requesting to have prescription printed out and explanation of indications for medications as he will be traveling to [place].    Acquired hypothyroidism  Chronic, stable, well-controlled, taking medication as directed.  Patient is requesting to have prescription printed out and explanation of indications for medications as he will be traveling to [place].    Vitamin D deficiency  Chronic, stable, well-controlled, taking medication as directed.  Patient is requesting to have prescription printed out and explanation of indications for medications as he will be traveling to [facility].    Leg cramping  New problem, uncontrolled, related to patient's knee swelling and pain.  Please see notes from same day service 6/11/2019 regarding pain and swelling of right knee      Patient Active Problem List    Diagnosis Date Noted   • Vitamin D deficiency 06/10/2019   • Leg cramping 06/10/2019   • Pain and swelling of right knee 04/16/2019   • Erythematous rash 04/16/2019   • Bilateral leg edema 01/08/2019   • Nocturia more than twice per night 11/05/2018   • Pitting edema 08/07/2018   • Idiopathic chronic gout, left ankle and foot, without tophus (tophi) 06/25/2018   • Memory changes 05/24/2018   • Chronic pain of left knee 09/25/2017   • Diastasis of rectus abdominis 06/22/2017   • Increased frequency of urination 06/07/2017   • (HCC) Obesity, diabetes, and hypertension syndrome 11/09/2016   • (HCC) Type 2 diabetes mellitus without complication, without long-term current use of insulin 11/09/2016   • (HCC) Hypertension associated with diabetes 11/09/2016   • Acquired hypothyroidism 11/09/2016   • (HCC) Hyperlipidemia due to type 2 diabetes mellitus 11/09/2016  "  • Primary insomnia 11/09/2016       Additional History:   Allergies:    Patient has no known allergies.     Current Medications:     Current Outpatient Prescriptions   Medication Sig Dispense Refill   • amLODIPine (NORVASC) 10 MG Tab Take 1 Tab by mouth every day. 90 Tab 3   • aspirin (ASA) 81 MG Chew Tab chewable tablet Take 1 Tab by mouth every day. 90 Tab 3   • atorvastatin (LIPITOR) 10 MG Tab Take 1 Tab by mouth every day. 90 Tab 3   • glipiZIDE (GLUCOTROL) 10 MG Tab Take 1 Tab by mouth 2 times a day. 180 Tab 3   • levothyroxine (SYNTHROID) 75 MCG Tab Take 1 Tab by mouth every day. 90 Tab 3   • linagliptin (TRADJENTA) 5 MG Tab tablet Take 1 Tab by mouth every day. 90 Tab 3   • losartan (COZAAR) 100 MG Tab Take 1 Tab by mouth every day. 90 Tab 3   • metformin (GLUCOPHAGE) 1000 MG tablet Take 1 Tab by mouth 2 times a day, with meals. 180 Tab 3   • vitamin D (CHOLECALCIFEROL) 1000 UNIT Tab Take 1 Tab by mouth every day. 90 Tab 3   • Magnesium 500 MG Tab Take 1,000 mg by mouth every day. 90 Tab 3   • Blood Glucose Test Strips Test strips for OneTouch Ultra meter. Sig: use fasting first thing in the morning, and prn ssx high or low sugar 100 Each 3   • Lancets Lancets for OneTouch Ultra meter. Sig: use fasting first thing in the morning, and prn ssx high or low sugar 100 Each 3     No current facility-administered medications for this visit.         Social History:     Social History   Substance Use Topics   • Smoking status: Former Smoker     Packs/day: 1.00     Years: 5.00     Types: Cigarettes     Quit date: 11/9/1971   • Smokeless tobacco: Never Used   • Alcohol use 4.2 oz/week     7 Glasses of wine per week      Comment: daily       ROS:     - NOTE: All other systems reviewed and are negative, except as in HPI.     Objective:   Physical Exam:    Vitals: /72 (BP Location: Left arm, Patient Position: Sitting, BP Cuff Size: Adult)   Pulse 63   Temp 36.6 °C (97.9 °F) (Temporal)   Ht 1.854 m (6' 0.99\")   " Wt 97.5 kg (215 lb)   SpO2 96%    BMI: Body mass index is 28.37 kg/m².   General/Constitutional: Vitals as above, Well nourished, well developed male in no acute distress   Head/Eyes: Head is grossly normal & atraumatic, bilateral conjunctivae clear and not injected, bilateral EOMI, bilateral PERRL   ENT: Bilateral external ears grossly normal in appearance, Hearing grossly intact, External nares normal in appearance and without discharge/bleeding   Respiratory: No respiratory distress, bilateral lungs are clear to ausculation in all lung fields (anterior/lateral/posterior), no wheezing/rhonchi/rales   Cardiovascular: Regular rate and rhythm without murmur/gallops/rubs, distal pulses are intact and equal bilaterally (radial, posterior tibial), no bilateral lower extremity edema   MSK: Declines knee evaluation today, Gait mildly antalgic favoring RLE   Integumentary: No apparent rashes   Psych: Judgment grossly appropriate, no apparent depression/anxiety    Health Maintenance:     - Not reviewed at this visit    Imaging/Labs:     - 05/21/19 --DM2 much improved (7.2%), minimal to mild dehydration (elevated BUN to 25), labs otherwise normal (PSA, TSH, CMP, lipid profile, urine micro evidence creatinine ratio)    Assessment and Plan:   1. Pain and swelling of right knee  New problem, uncontrolled, patient defers evaluation and work-up at this time.  We will follow-up on this after his return from his vacation.    2. Leg cramping  New problem, uncontrolled, see assessment and plan is #1, above   - Magnesium 500 MG Tab; Take 1,000 mg by mouth every day.  Dispense: 90 Tab; Refill: 3    3. Type 2 diabetes mellitus without complication, without long-term current use of insulin (HCC)  Chronic, uncontrolled, improving.  Continue medications as directed.  Increase cardiovascular size and continue to make healthy lifestyle changes otherwise.   - glipiZIDE (GLUCOTROL) 10 MG Tab; Take 1 Tab by mouth 2 times a day.  Dispense: 180  Tab; Refill: 3   - linagliptin (TRADJENTA) 5 MG Tab tablet; Take 1 Tab by mouth every day.  Dispense: 90 Tab; Refill: 3   - metformin (GLUCOPHAGE) 1000 MG tablet; Take 1 Tab by mouth 2 times a day, with meals.  Dispense: 180 Tab; Refill: 3    4. Hypertension associated with diabetes (HCC)  Chronic, stable, well-controlled.  Continue taking medication as directed.    - amLODIPine (NORVASC) 10 MG Tab; Take 1 Tab by mouth every day.  Dispense: 90 Tab; Refill: 3   - losartan (COZAAR) 100 MG Tab; Take 1 Tab by mouth every day.  Dispense: 90 Tab; Refill: 3    5. Acquired hypothyroidism  Chronic, stable, well-controlled.  Continue taking medication as directed.    - levothyroxine (SYNTHROID) 75 MCG Tab; Take 1 Tab by mouth every day.  Dispense: 90 Tab; Refill: 3    6. (HCC) Hyperlipidemia due to type 2 diabetes mellitus  Chronic, stable, well-controlled.  Continue taking medication as directed.    - atorvastatin (LIPITOR) 10 MG Tab; Take 1 Tab by mouth every day.  Dispense: 90 Tab; Refill: 3    7. Vitamin D deficiency  Chronic, stable, well-controlled.  Continue taking vitamin D supplement.   - vitamin D (CHOLECALCIFEROL) 1000 UNIT Tab; Take 1 Tab by mouth every day.  Dispense: 90 Tab; Refill: 3        RTC: Approximately on July 11 of July 12 or later for evaluation of right knee pain and paresthesias..    PLEASE NOTE: This dictation was created using voice recognition software. I have made every reasonable attempt to correct obvious errors, but I expect that there are errors of grammar and possibly content that I did not discover before finalizing the note.

## 2019-06-11 NOTE — ASSESSMENT & PLAN NOTE
New problem for my medical evaluation, uncontrolled, patient states that he has had pain and swelling of right knee for many years.  However, this is starting to become progressively worsened over the last 3 months.  Patient and I suspect that this may be abnormal compensation for his history of left ankle fracture.      At present time, patient declines further work-up and evaluation including physical exam due to the fact that he has to travel this coming Friday (4 days from now) [place], and therefore would like to delay any evaluation or management that could potentially worsen his knee pain.    Knee pain is described as chronic aching, increased swelling at times, and that these are worsened with prolonged ambulation, improved with rest.    ROS is negative for right lower extremity radicular pain/numbness, right lower extremity foot drop, saddle paresthesias, urinary or bowel incontinence.

## 2019-06-11 NOTE — ASSESSMENT & PLAN NOTE
Chronic, mildly uncontrolled, but improving.  Patient is taking medication as directed.  Patient is requesting to have prescription printed out and explanation of indications for medications as he will be traveling to [place].

## 2019-06-12 ENCOUNTER — PATIENT MESSAGE (OUTPATIENT)
Dept: MEDICAL GROUP | Facility: MEDICAL CENTER | Age: 74
End: 2019-06-12

## 2019-06-12 DIAGNOSIS — Z71.84 TRAVEL ADVICE ENCOUNTER: ICD-10-CM

## 2019-06-12 RX ORDER — CIPROFLOXACIN 500 MG/1
500 TABLET, FILM COATED ORAL 2 TIMES DAILY
Qty: 10 TAB | Refills: 0 | Status: SHIPPED | OUTPATIENT
Start: 2019-06-12 | End: 2019-06-12 | Stop reason: SDUPTHER

## 2019-06-12 RX ORDER — CIPROFLOXACIN 500 MG/1
500 TABLET, FILM COATED ORAL 2 TIMES DAILY
Qty: 10 TAB | Refills: 0 | Status: SHIPPED | OUTPATIENT
Start: 2019-06-12 | End: 2019-07-12

## 2019-07-12 ENCOUNTER — OFFICE VISIT (OUTPATIENT)
Dept: MEDICAL GROUP | Facility: MEDICAL CENTER | Age: 74
End: 2019-07-12
Payer: MEDICARE

## 2019-07-12 VITALS
HEART RATE: 61 BPM | RESPIRATION RATE: 17 BRPM | WEIGHT: 220.3 LBS | SYSTOLIC BLOOD PRESSURE: 132 MMHG | HEIGHT: 73 IN | TEMPERATURE: 98.5 F | DIASTOLIC BLOOD PRESSURE: 78 MMHG | OXYGEN SATURATION: 97 % | BODY MASS INDEX: 29.2 KG/M2

## 2019-07-12 DIAGNOSIS — E11.9 TYPE 2 DIABETES MELLITUS WITHOUT COMPLICATION, WITHOUT LONG-TERM CURRENT USE OF INSULIN (HCC): ICD-10-CM

## 2019-07-12 DIAGNOSIS — R60.0 BILATERAL LEG EDEMA: ICD-10-CM

## 2019-07-12 DIAGNOSIS — R60.9 PITTING EDEMA: ICD-10-CM

## 2019-07-12 DIAGNOSIS — R22.41 LEG MASS, RIGHT: ICD-10-CM

## 2019-07-12 PROCEDURE — 99214 OFFICE O/P EST MOD 30 MIN: CPT | Performed by: FAMILY MEDICINE

## 2019-07-12 RX ORDER — GLIPIZIDE 10 MG/1
10 TABLET ORAL 2 TIMES DAILY
Qty: 180 TAB | Refills: 3 | Status: SHIPPED | OUTPATIENT
Start: 2019-07-12 | End: 2020-08-17 | Stop reason: SDUPTHER

## 2019-07-12 RX ORDER — FUROSEMIDE 20 MG/1
20 TABLET ORAL DAILY
Qty: 30 TAB | Refills: 0 | Status: SHIPPED | OUTPATIENT
Start: 2019-07-12 | End: 2021-01-12

## 2019-07-15 ENCOUNTER — HOSPITAL ENCOUNTER (OUTPATIENT)
Dept: RADIOLOGY | Facility: MEDICAL CENTER | Age: 74
End: 2019-07-15
Attending: FAMILY MEDICINE
Payer: MEDICARE

## 2019-07-15 DIAGNOSIS — R22.41 LEG MASS, RIGHT: ICD-10-CM

## 2019-07-15 PROCEDURE — 76882 US LMTD JT/FCL EVL NVASC XTR: CPT | Mod: RT

## 2019-08-06 NOTE — ASSESSMENT & PLAN NOTE
Acute exacerbation chronic condition, uncontrolled, patient has right lower extremity swelling that is worse than left.  Patient is concerned as MI for possible DVTs.  Additionally, patient states that he can feel a palpable mass/cyst in the posterior aspect of his right knee.    ROS is NEGATIVE for fevers, chills, generalized weakness/fatigue, dizziness, vertigo, lightheadedness, tachypnea, dyspnea, pain on deep inspiration, tachycardia, palpitations, bilateral lower extremity pain/paresthesias/pallor/poikilothermia/weakness/paralysis.    ROS is NEGATIVE for dizziness, generalized weakness/fatigue, cold sweats,  vision/hearing changes, jaw pain/paresthesias, BUE pain/paresthesias/numbness/weakness, chest pain/pressure, palpitations, dyspnea, nausea, RUQ abdominal pain, oliguria/anuria, BLE edema.

## 2019-08-06 NOTE — ASSESSMENT & PLAN NOTE
New problem, uncontrolled, please see notes from same day service 7/12/2019 regarding bilateral leg edema.

## 2019-08-06 NOTE — ASSESSMENT & PLAN NOTE
We discussed that patient is diagnosed with diabetes, uncontrolled, but improving given that the A1c is:   Lab Results   Component Value Date/Time    HBA1C 7.2 (H) 05/21/2019 07:29 AM        Patient is currently taking (glipizide, tradjenta, metformin) for glycemic control, and (atorvatatin, losartan) for health maintenance related to diabetes mellitus.    We discussed that at present time it is better to pursue lifestyle changes rather than changing medications. Patient is in agreement.

## 2019-08-06 NOTE — ASSESSMENT & PLAN NOTE
Chronic, uncontrolled, please see notes from same date of service 7/12/2019 regarding bilateral leg edema.

## 2019-08-06 NOTE — PROGRESS NOTES
Subjective:   Chief Complaint/History of Present Illness:  Saleem Whitney is a 73 y.o. male established patient who presents today to discuss medical problems as listed below    Diagnoses of Bilateral leg edema, Pitting edema, Leg mass, right, and Type 2 diabetes mellitus without complication, without long-term current use of insulin (HCC) were pertinent to this visit.    Bilateral leg edema  Acute exacerbation chronic condition, uncontrolled, patient has right lower extremity swelling that is worse than left.  Patient is concerned as MI for possible DVTs.  Additionally, patient states that he can feel a palpable mass/cyst in the posterior aspect of his right knee.    ROS is NEGATIVE for fevers, chills, generalized weakness/fatigue, dizziness, vertigo, lightheadedness, tachypnea, dyspnea, pain on deep inspiration, tachycardia, palpitations, bilateral lower extremity pain/paresthesias/pallor/poikilothermia/weakness/paralysis.    ROS is NEGATIVE for dizziness, generalized weakness/fatigue, cold sweats,  vision/hearing changes, jaw pain/paresthesias, BUE pain/paresthesias/numbness/weakness, chest pain/pressure, palpitations, dyspnea, nausea, RUQ abdominal pain, oliguria/anuria, BLE edema.    Pitting edema  Chronic, uncontrolled, please see notes from same date of service 7/12/2019 regarding bilateral leg edema.    Leg mass, right  New problem, uncontrolled, please see notes from same day service 7/12/2019 regarding bilateral leg edema.    (HCC) Type 2 diabetes mellitus without complication, without long-term current use of insulin  We discussed that patient is diagnosed with diabetes, uncontrolled, but improving given that the A1c is:   Lab Results   Component Value Date/Time    HBA1C 7.2 (H) 05/21/2019 07:29 AM        Patient is currently taking (glipizide, tradjenta, metformin) for glycemic control, and (atorvatatin, losartan) for health maintenance related to diabetes mellitus.    We discussed that at  present time it is better to pursue lifestyle changes rather than changing medications. Patient is in agreement.       Patient Active Problem List    Diagnosis Date Noted   • Leg mass, right 07/12/2019   • Vitamin D deficiency 06/10/2019   • Leg cramping 06/10/2019   • Pain and swelling of right knee 04/16/2019   • Erythematous rash 04/16/2019   • Bilateral leg edema 01/08/2019   • Nocturia more than twice per night 11/05/2018   • Pitting edema 08/07/2018   • Idiopathic chronic gout, left ankle and foot, without tophus (tophi) 06/25/2018   • Memory changes 05/24/2018   • Chronic pain of left knee 09/25/2017   • Diastasis of rectus abdominis 06/22/2017   • Increased frequency of urination 06/07/2017   • (HCC) Obesity, diabetes, and hypertension syndrome 11/09/2016   • (HCC) Type 2 diabetes mellitus without complication, without long-term current use of insulin 11/09/2016   • (HCC) Hypertension associated with diabetes 11/09/2016   • Acquired hypothyroidism 11/09/2016   • (HCC) Hyperlipidemia due to type 2 diabetes mellitus 11/09/2016   • Primary insomnia 11/09/2016       Additional History:   Allergies:    Patient has no known allergies.     Current Medications:     Current Outpatient Medications   Medication Sig Dispense Refill   • glipiZIDE (GLUCOTROL) 10 MG Tab Take 1 Tab by mouth 2 times a day. 180 Tab 3   • furosemide (LASIX) 20 MG Tab Take 1 Tab by mouth every day. 30 Tab 0   • amLODIPine (NORVASC) 10 MG Tab Take 1 Tab by mouth every day. 90 Tab 3   • aspirin (ASA) 81 MG Chew Tab chewable tablet Take 1 Tab by mouth every day. 90 Tab 3   • atorvastatin (LIPITOR) 10 MG Tab Take 1 Tab by mouth every day. 90 Tab 3   • levothyroxine (SYNTHROID) 75 MCG Tab Take 1 Tab by mouth every day. 90 Tab 3   • linagliptin (TRADJENTA) 5 MG Tab tablet Take 1 Tab by mouth every day. 90 Tab 3   • losartan (COZAAR) 100 MG Tab Take 1 Tab by mouth every day. 90 Tab 3   • metformin (GLUCOPHAGE) 1000 MG tablet Take 1 Tab by mouth 2  "times a day, with meals. 180 Tab 3   • vitamin D (CHOLECALCIFEROL) 1000 UNIT Tab Take 1 Tab by mouth every day. 90 Tab 3   • Magnesium 500 MG Tab Take 1,000 mg by mouth every day. 90 Tab 3   • Blood Glucose Test Strips Test strips for OneTouch Ultra meter. Sig: use fasting first thing in the morning, and prn ssx high or low sugar 100 Each 3   • Lancets Lancets for OneTouch Ultra meter. Sig: use fasting first thing in the morning, and prn ssx high or low sugar 100 Each 3     No current facility-administered medications for this visit.         Social History:     Social History     Tobacco Use   • Smoking status: Former Smoker     Packs/day: 1.00     Years: 5.00     Pack years: 5.00     Types: Cigarettes     Last attempt to quit: 1971     Years since quittin.7   • Smokeless tobacco: Never Used   Substance Use Topics   • Alcohol use: Yes     Alcohol/week: 4.2 oz     Types: 7 Glasses of wine per week     Comment: daily   • Drug use: No       ROS:     - NOTE: All other systems reviewed and are negative, except as in HPI.     Objective:   Physical Exam:    Vitals: /78 (BP Location: Left arm, Patient Position: Sitting, BP Cuff Size: Adult)   Pulse 61   Temp 36.9 °C (98.5 °F) (*RETIRED* Temporal)   Resp 17   Ht 1.854 m (6' 0.99\")   Wt 99.9 kg (220 lb 4.8 oz)   SpO2 97%    BMI: Body mass index is 29.07 kg/m².   General/Constitutional: Vitals as above, Well nourished, well developed male in no acute distress   Head/Eyes: Head is grossly normal & atraumatic, bilateral conjunctivae clear and not injected, bilateral EOMI, bilateral PERRL   ENT: Bilateral external ears grossly normal in appearance, Hearing grossly intact, External nares normal in appearance and without discharge/bleeding   Respiratory: No respiratory distress, bilateral lungs are clear to ausculation in all lung fields (anterior/lateral/posterior), no wheezing/rhonchi/rales   Cardiovascular: Regular rate and rhythm without " murmur/gallops/rubs, distal pulses are intact and equal bilaterally (radial, posterior tibial), bilateral lower extremity pitting edema (R>L)   MSK: RLE posterior knee with increased swelling compared to left and mild soft tissue fluid collection vs. Cyst, Gait grossly normal & not antalgic   Integumentary: No apparent rashes   Psych: Judgment grossly appropriate, no apparent depression/anxiety    Health Maintenance:     - Not reviewed at this visit    Imaging/Labs:     -Reviewed and interpreted as in HPI, above    Assessment and Plan:   1. Bilateral leg edema  2. Pitting edema  Chronic, uncontrolled, start Lasix to decrease pitting edema.   - furosemide (LASIX) 20 MG Tab; Take 1 Tab by mouth every day.  Dispense: 30 Tab; Refill: 0    3. Leg mass, right  New problem, uncontrolled, evaluate with ultrasound as below   - US-EXTREMITY NON VASCULAR UNILATERAL RIGHT; Future    4. Type 2 diabetes mellitus without complication, without long-term current use of insulin (HCC)  Chronic, uncontrolled, improving.  Continue taking medication as directed.    - glipiZIDE (GLUCOTROL) 10 MG Tab; Take 1 Tab by mouth 2 times a day.  Dispense: 180 Tab; Refill: 3        RTC: in 3months for Medicare Annual wellness & possibly diabetes management (including foot & retinal exam).    PLEASE NOTE: This dictation was created using voice recognition software. I have made every reasonable attempt to correct obvious errors, but I expect that there are errors of grammar and possibly content that I did not discover before finalizing the note.

## 2019-09-10 ENCOUNTER — PATIENT MESSAGE (OUTPATIENT)
Dept: MEDICAL GROUP | Facility: MEDICAL CENTER | Age: 74
End: 2019-09-10

## 2019-09-10 DIAGNOSIS — E11.69 HYPERLIPIDEMIA DUE TO TYPE 2 DIABETES MELLITUS (HCC): ICD-10-CM

## 2019-09-10 DIAGNOSIS — E78.5 HYPERLIPIDEMIA DUE TO TYPE 2 DIABETES MELLITUS (HCC): ICD-10-CM

## 2019-09-11 RX ORDER — ATORVASTATIN CALCIUM 10 MG/1
10 TABLET, FILM COATED ORAL DAILY
Qty: 90 TAB | Refills: 2 | Status: SHIPPED | OUTPATIENT
Start: 2019-09-11 | End: 2020-06-08 | Stop reason: SDUPTHER

## 2019-09-11 NOTE — PATIENT COMMUNICATION
Patient would like [facility] [place] pharmacy, please send new rx.      Was the patient seen in the last year in this department? Yes    Does patient have an active prescription for medications requested? No     Received Request Via: Patient

## 2019-09-19 ENCOUNTER — APPOINTMENT (OUTPATIENT)
Dept: URGENT CARE | Facility: CLINIC | Age: 74
End: 2019-09-19
Payer: MEDICARE

## 2019-09-19 ENCOUNTER — OFFICE VISIT (OUTPATIENT)
Dept: URGENT CARE | Facility: MEDICAL CENTER | Age: 74
End: 2019-09-19
Payer: MEDICARE

## 2019-09-19 VITALS
BODY MASS INDEX: 28.37 KG/M2 | HEART RATE: 59 BPM | DIASTOLIC BLOOD PRESSURE: 82 MMHG | SYSTOLIC BLOOD PRESSURE: 150 MMHG | OXYGEN SATURATION: 97 % | TEMPERATURE: 98 F | WEIGHT: 215 LBS

## 2019-09-19 DIAGNOSIS — L03.116 CELLULITIS OF LEFT LOWER EXTREMITY: ICD-10-CM

## 2019-09-19 DIAGNOSIS — M79.2 NEUROPATHIC PAIN: ICD-10-CM

## 2019-09-19 DIAGNOSIS — M79.605 PAIN OF LEFT LOWER EXTREMITY: ICD-10-CM

## 2019-09-19 PROCEDURE — 99214 OFFICE O/P EST MOD 30 MIN: CPT | Performed by: NURSE PRACTITIONER

## 2019-09-19 RX ORDER — METHYLPREDNISOLONE 4 MG/1
TABLET ORAL
Qty: 21 TAB | Refills: 0 | Status: SHIPPED | OUTPATIENT
Start: 2019-09-19 | End: 2019-12-17

## 2019-09-19 RX ORDER — AMOXICILLIN AND CLAVULANATE POTASSIUM 875; 125 MG/1; MG/1
1 TABLET, FILM COATED ORAL 2 TIMES DAILY
Qty: 20 TAB | Refills: 0 | Status: SHIPPED | OUTPATIENT
Start: 2019-09-19 | End: 2019-09-29

## 2019-09-19 ASSESSMENT — ENCOUNTER SYMPTOMS
DIZZINESS: 0
HEADACHES: 0
MYALGIAS: 0
CHILLS: 0
SHORTNESS OF BREATH: 0
FEVER: 0
COUGH: 0
FALLS: 0
NECK PAIN: 0
NAUSEA: 0
PALPITATIONS: 0
BACK PAIN: 0

## 2019-09-19 ASSESSMENT — LIFESTYLE VARIABLES: SUBSTANCE_ABUSE: 0

## 2019-09-19 NOTE — PROGRESS NOTES
"Subjective:      Saleem Whitney is a 73 y.o. male who presents with Foot Problem (spasms in top of left foot, continous sice last night )    Reviewed past medical, surgical and family history. Reviewed prescription and OTC medications with patient in electronic health record today      No Known Allergies          HPI this is a new problem.  Saleem is a 73-year-old male patient presents with pain in his left foot \" electrical impulses\" every 10 seconds.  Pain started last night when he was sleeping.  No recent illness.  He has a small red cut on the lateral aspect of his foot.  This is where the pain is originating from.  Had his veins cauterized recently \" 3 weeks ago\" by [facility]. Next FU appt in November.  Denies fever, chills, calf pain, chest pain, increased leg swelling.  Pain makes him jump. Pain 8/10.  Treatment tried: ibuprofen, aleve.   Did not take medications today \" I forgot\". Because of dealing with pain this morning..     Review of Systems   Constitutional: Negative for chills and fever.   Respiratory: Negative for cough and shortness of breath.    Cardiovascular: Negative for chest pain, palpitations and leg swelling (Leg swelling is chronic.  It is improved since he had his venous surgery.).   Gastrointestinal: Negative for nausea.   Musculoskeletal: Negative for back pain, falls, joint pain, myalgias and neck pain.   Neurological: Negative for dizziness and headaches.   Endo/Heme/Allergies: Negative for environmental allergies.   Psychiatric/Behavioral: Negative for substance abuse.           Objective:     /82   Pulse (!) 59   Temp 36.7 °C (98 °F)   Wt 97.5 kg (215 lb)   SpO2 97%   BMI 28.37 kg/m²      Physical Exam   Constitutional: He is oriented to person, place, and time. He appears well-developed and well-nourished.   Cardiovascular: Normal rate and regular rhythm.   Peripheral edema is present bilaterally and equal +1, pitting   Pulmonary/Chest: Effort normal and " breath sounds normal.   Neurological: He is alert and oriented to person, place, and time.   Skin: Capillary refill takes less than 2 seconds. There is erythema.        Psychiatric: He has a normal mood and affect. Thought content normal.   Nursing note and vitals reviewed.              Assessment/Plan:     1. Cellulitis of left lower extremity  REFERRAL TO FAMILY PRACTICE    amoxicillin-clavulanate (AUGMENTIN) 875-125 MG Tab    methylPREDNISolone (MEDROL DOSEPAK) 4 MG Tablet Therapy Pack   2. Pain of left lower extremity  REFERRAL TO FAMILY PRACTICE   3. Neuropathic pain  amoxicillin-clavulanate (AUGMENTIN) 875-125 MG Tab    methylPREDNISolone (MEDROL DOSEPAK) 4 MG Tablet Therapy Pack       Educated in proper administration of medication(s) ordered today including safety, possible SE, risks, benefits, rationale and alternatives to therapy.     Return to clinic or PCP  4-5 days if current symptoms are not resolving in a satisfactory manner or sooner if new or worsening symptoms occur.   Patient was advised of signs and symptoms which would warrant further evaluation and /or emergent evaluation in ER (chest pain, increased leg swelling, calf tenderness, worsening pain, fever).  Verbalized agreement with this treatment plan and seemed to understand without barriers. Questions were encouraged and answered to patients satisfaction.        Warm compresses BID / TID prn x 10 min   Neutral elevation of legs.   No massage.     Resume all prior medications. Take as prescribed.

## 2019-09-30 ENCOUNTER — APPOINTMENT (RX ONLY)
Dept: URBAN - METROPOLITAN AREA CLINIC 4 | Facility: CLINIC | Age: 74
Setting detail: DERMATOLOGY
End: 2019-09-30

## 2019-09-30 DIAGNOSIS — Z85.828 PERSONAL HISTORY OF OTHER MALIGNANT NEOPLASM OF SKIN: ICD-10-CM

## 2019-09-30 DIAGNOSIS — D18.0 HEMANGIOMA: ICD-10-CM

## 2019-09-30 DIAGNOSIS — L82.1 OTHER SEBORRHEIC KERATOSIS: ICD-10-CM

## 2019-09-30 DIAGNOSIS — L81.4 OTHER MELANIN HYPERPIGMENTATION: ICD-10-CM

## 2019-09-30 DIAGNOSIS — L57.0 ACTINIC KERATOSIS: ICD-10-CM

## 2019-09-30 PROBLEM — D48.5 NEOPLASM OF UNCERTAIN BEHAVIOR OF SKIN: Status: ACTIVE | Noted: 2019-09-30

## 2019-09-30 PROBLEM — D18.01 HEMANGIOMA OF SKIN AND SUBCUTANEOUS TISSUE: Status: ACTIVE | Noted: 2019-09-30

## 2019-09-30 PROCEDURE — 11102 TANGNTL BX SKIN SINGLE LES: CPT

## 2019-09-30 PROCEDURE — 17003 DESTRUCT PREMALG LES 2-14: CPT

## 2019-09-30 PROCEDURE — 17000 DESTRUCT PREMALG LESION: CPT | Mod: 59

## 2019-09-30 PROCEDURE — 11103 TANGNTL BX SKIN EA SEP/ADDL: CPT

## 2019-09-30 PROCEDURE — ? LIQUID NITROGEN

## 2019-09-30 PROCEDURE — ? BIOPSY BY SHAVE METHOD

## 2019-09-30 PROCEDURE — 99212 OFFICE O/P EST SF 10 MIN: CPT | Mod: 25

## 2019-09-30 ASSESSMENT — LOCATION DETAILED DESCRIPTION DERM
LOCATION DETAILED: RIGHT INFERIOR MEDIAL MIDBACK
LOCATION DETAILED: RIGHT SUPERIOR HELIX
LOCATION DETAILED: LEFT CENTRAL FRONTAL SCALP
LOCATION DETAILED: INFERIOR LUMBAR SPINE
LOCATION DETAILED: RIGHT MEDIAL UPPER BACK
LOCATION DETAILED: RIGHT LATERAL EYEBROW
LOCATION DETAILED: LEFT SUPERIOR MEDIAL FOREHEAD
LOCATION DETAILED: LEFT ANTERIOR PROXIMAL THIGH
LOCATION DETAILED: INFERIOR THORACIC SPINE
LOCATION DETAILED: LEFT INFERIOR HELIX
LOCATION DETAILED: SUPERIOR THORACIC SPINE

## 2019-09-30 ASSESSMENT — LOCATION ZONE DERM
LOCATION ZONE: TRUNK
LOCATION ZONE: LEG
LOCATION ZONE: SCALP
LOCATION ZONE: EAR
LOCATION ZONE: FACE

## 2019-09-30 ASSESSMENT — LOCATION SIMPLE DESCRIPTION DERM
LOCATION SIMPLE: UPPER BACK
LOCATION SIMPLE: RIGHT UPPER BACK
LOCATION SIMPLE: LOWER BACK
LOCATION SIMPLE: LEFT FOREHEAD
LOCATION SIMPLE: LEFT EAR
LOCATION SIMPLE: RIGHT LOWER BACK
LOCATION SIMPLE: RIGHT EAR
LOCATION SIMPLE: LEFT THIGH
LOCATION SIMPLE: LEFT SCALP
LOCATION SIMPLE: RIGHT EYEBROW

## 2019-09-30 NOTE — PROCEDURE: BIOPSY BY SHAVE METHOD
Dressing: Band-Aid
Electrodesiccation And Curettage Text: The wound bed was treated with electrodesiccation and curettage after the biopsy was performed.
Render Post-Care Instructions In Note?: yes
Render In Bullet Format When Appropriate: No
Hemostasis: Drysol and Electrocautery
Anticipated Plan (Based On Presumed Biopsy Results): Imiquimod if +
Detail Level: Detailed
Biopsy Method: Personna blade
Billing Type: Third-Party Bill
Lab: [id number]
Curettage Text: The wound bed was treated with curettage after the biopsy was performed.
Post-Care Instructions: I reviewed with the patient in detail post-care instructions. Patient is to keep the biopsy site dry overnight, and then apply vasaline twice daily until healed.
Anesthesia Type: 1% lidocaine with epinephrine and a 1:10 solution of 8.4% sodium bicarbonate
Lab Facility: 
Additional Anesthesia Volume In Cc (Will Not Render If 0): 0
Wound Care: Vaseline
Notification Instructions: Patient will be notified of biopsy results. However, patient instructed to call the office if not contacted within 2 weeks.
Electrodesiccation Text: The wound bed was treated with electrodesiccation after the biopsy was performed.
Depth Of Biopsy: dermis
Type Of Destruction Used: Curettage
Consent: Written consent was obtained and risks were reviewed including but not limited to scarring, infection, bleeding, scabbing, incomplete removal, nerve damage and allergy to anesthesia.
Biopsy Type: H and E
Anesthesia Volume In Cc: 0.5

## 2019-10-02 ENCOUNTER — OFFICE VISIT (OUTPATIENT)
Dept: MEDICAL GROUP | Facility: MEDICAL CENTER | Age: 74
End: 2019-10-02
Payer: MEDICARE

## 2019-10-02 VITALS
RESPIRATION RATE: 16 BRPM | HEART RATE: 101 BPM | DIASTOLIC BLOOD PRESSURE: 70 MMHG | OXYGEN SATURATION: 93 % | BODY MASS INDEX: 27.83 KG/M2 | SYSTOLIC BLOOD PRESSURE: 110 MMHG | HEIGHT: 73 IN | WEIGHT: 210 LBS | TEMPERATURE: 97.6 F

## 2019-10-02 DIAGNOSIS — E11.59 HYPERTENSION ASSOCIATED WITH DIABETES (HCC): ICD-10-CM

## 2019-10-02 DIAGNOSIS — I15.2 HYPERTENSION ASSOCIATED WITH DIABETES (HCC): ICD-10-CM

## 2019-10-02 DIAGNOSIS — E03.9 ACQUIRED HYPOTHYROIDISM: ICD-10-CM

## 2019-10-02 DIAGNOSIS — E11.9 TYPE 2 DIABETES MELLITUS WITHOUT COMPLICATION, WITHOUT LONG-TERM CURRENT USE OF INSULIN (HCC): ICD-10-CM

## 2019-10-02 DIAGNOSIS — T75.3XXA MOTION SICKNESS, INITIAL ENCOUNTER: ICD-10-CM

## 2019-10-02 DIAGNOSIS — E78.5 DYSLIPIDEMIA ASSOCIATED WITH TYPE 2 DIABETES MELLITUS (HCC): ICD-10-CM

## 2019-10-02 DIAGNOSIS — Z29.89 ALTITUDE SICKNESS PROPHYLAXIS: ICD-10-CM

## 2019-10-02 DIAGNOSIS — E11.69 DYSLIPIDEMIA ASSOCIATED WITH TYPE 2 DIABETES MELLITUS (HCC): ICD-10-CM

## 2019-10-02 PROCEDURE — 99214 OFFICE O/P EST MOD 30 MIN: CPT | Performed by: NURSE PRACTITIONER

## 2019-10-02 RX ORDER — ACETAZOLAMIDE 125 MG/1
125 TABLET ORAL 2 TIMES DAILY
Qty: 28 TAB | Refills: 0 | Status: SHIPPED | OUTPATIENT
Start: 2019-10-02 | End: 2020-06-16

## 2019-10-02 RX ORDER — MECLIZINE HYDROCHLORIDE 25 MG/1
12.5-25 TABLET ORAL 3 TIMES DAILY PRN
Qty: 30 TAB | Refills: 0 | Status: SHIPPED | OUTPATIENT
Start: 2019-10-02 | End: 2020-06-16

## 2019-10-02 RX ORDER — LEVOTHYROXINE SODIUM 0.07 MG/1
75 TABLET ORAL DAILY
Qty: 90 TAB | Refills: 3 | Status: SHIPPED | OUTPATIENT
Start: 2019-10-02 | End: 2020-06-16

## 2019-10-02 RX ORDER — AMLODIPINE BESYLATE 10 MG/1
10 TABLET ORAL DAILY
Qty: 90 TAB | Refills: 3 | Status: SHIPPED | OUTPATIENT
Start: 2019-10-02 | End: 2021-01-25

## 2019-10-03 NOTE — ASSESSMENT & PLAN NOTE
Reasonably controlled for age with last a1c 7.4. Consistent with medication and glucose monitoring, working on diet and weight loss. No polyuria, polydipsia, CKD

## 2019-10-03 NOTE — PROGRESS NOTES
Subjective:     Chief Complaint   Patient presents with   • Medication Refill     LEVOTHYROXINE, AMLODEPINE     Saleem Whitney is a 73 y.o. male former patient of Dr. Calabrese who is no longer with the medical group, here to discuss:  (HCC) Type 2 diabetes mellitus without complication, without long-term current use of insulin  Reasonably controlled for age with last a1c 7.4. Consistent with medication and glucose monitoring, working on diet and weight loss. No polyuria, polydipsia, CKD    Acquired hypothyroidism  Currently managed with levothyroxine 75 mcg daily, labs in May 2019 in good range. Energy is good, he is actively losing weight with dietary changes. No fatigue, hair loss, heat or cold intolerance    Motion sickness  Upcoming trip where he will be spending a portion of the time traveling in small boats through the [place]. He has had difficulty with nausea on similar trips in the past and requesting medication for as needed use.     Altitude sickness prophylaxis  Upcoming trip to [place] and [place], will be visiting multiple locations at home than 10K feet altitude and is requesting medication to help prevent altitude sickness. No h/o CKD, liver disease, CHF. He understands glucose may fluctuate with acetazolamide use and will be monitoring glucose appropriately       Current medicines (including changes today)  Current Outpatient Medications   Medication Sig Dispense Refill   • amLODIPine (NORVASC) 10 MG Tab Take 1 Tab by mouth every day. 90 Tab 3   • levothyroxine (SYNTHROID) 75 MCG Tab Take 1 Tab by mouth every day. 90 Tab 3   • acetaZOLAMIDE (DIAMOX) 125 MG Tab Take 1 Tab by mouth 2 times a day. 28 Tab 0   • meclizine (ANTIVERT) 25 MG Tab Take 0.5-1 Tabs by mouth 3 times a day as needed. 30 Tab 0   • atorvastatin (LIPITOR) 10 MG Tab Take 1 Tab by mouth every day. 90 Tab 2   • glipiZIDE (GLUCOTROL) 10 MG Tab Take 1 Tab by mouth 2 times a day. 180 Tab 3   • aspirin (ASA) 81 MG Chew Tab  "chewable tablet Take 1 Tab by mouth every day. 90 Tab 3   • linagliptin (TRADJENTA) 5 MG Tab tablet Take 1 Tab by mouth every day. 90 Tab 3   • losartan (COZAAR) 100 MG Tab Take 1 Tab by mouth every day. 90 Tab 3   • metformin (GLUCOPHAGE) 1000 MG tablet Take 1 Tab by mouth 2 times a day, with meals. 180 Tab 3   • vitamin D (CHOLECALCIFEROL) 1000 UNIT Tab Take 1 Tab by mouth every day. 90 Tab 3   • Magnesium 500 MG Tab Take 1,000 mg by mouth every day. 90 Tab 3   • Blood Glucose Test Strips Test strips for OneTouch Ultra meter. Sig: use fasting first thing in the morning, and prn ssx high or low sugar 100 Each 3   • Lancets Lancets for OneTouch Ultra meter. Sig: use fasting first thing in the morning, and prn ssx high or low sugar 100 Each 3   • methylPREDNISolone (MEDROL DOSEPAK) 4 MG Tablet Therapy Pack follow package directions 21 Tab 0   • furosemide (LASIX) 20 MG Tab Take 1 Tab by mouth every day. (Patient not taking: Reported on 9/19/2019) 30 Tab 0     No current facility-administered medications for this visit.      He  has a past medical history of Hyperlipidemia, Hypertension, Hypothyroid, and Type II or unspecified type diabetes mellitus without mention of complication, not stated as uncontrolled. He also has no past medical history of Low vitamin D level.    ROS included above     Objective:     /70 (BP Location: Left arm, Patient Position: Sitting, BP Cuff Size: Adult)   Pulse (!) 101   Temp 36.4 °C (97.6 °F) (Temporal)   Resp 16   Ht 1.854 m (6' 0.99\")   Wt 95.3 kg (210 lb)   SpO2 93%  Body mass index is 27.71 kg/m².     Physical Exam:  General: Alert, oriented in no acute distress.  Eye contact is good, speech is normal, affect calm  Lungs: clear to auscultation bilaterally, normal effort, no wheeze/ rhonchi/ rales.  CV: regular rate and rhythm, S1, S2, no murmur  Ext: no edema, color normal, vascularity normal, temperature normal    Assessment and Plan:   The following treatment plan was " discussed   1. Altitude sickness prophylaxis  Requesting preventative tx for upcoming trip to multiple high-altitude destinations. He understands this medication can cause flucation in glucose and will monitor appropriately. Risks, SE and instructions for use reviewed. May d/c after 2[place]3 [place]. Hydration discussed  acetaZOLAMIDE (DIAMOX) 125 MG Tab   2. Motion sickness, initial encounter  Management strategies reviewed, may try:  meclizine (ANTIVERT) 25 MG Tab 1/2 tab- 1 tab PO Q8 prn, discussed this medication may cause fatigue   3. Type 2 diabetes mellitus without complication, without long-term current use of insulin (HCC)  Last labs reviewed. Compliant with medication and working on weight loss, labs prior to next visit  Comp Metabolic Panel    HEMOGLOBIN A1C   4. Hypertension associated with diabetes (HCC)  stable  amLODIPine (NORVASC) 10 MG Tab   5. Acquired hypothyroidism  stable  levothyroxine (SYNTHROID) 75 MCG Tab   6. Dyslipidemia associated with type 2 diabetes mellitus (HCC)  Lipid Profile       Followup: labs prior to next visit         Please note that this dictation was created using voice recognition software. I have worked with consultants from the vendor as well as technical experts from [facility] [employer] to optimize the interface. I have made every reasonable attempt to correct obvious errors, but I expect that there are errors of grammar and possibly content that I did not discover before finalizing the note.

## 2019-10-03 NOTE — ASSESSMENT & PLAN NOTE
Currently managed with levothyroxine 75 mcg daily, labs in May 2019 in good range. Energy is good, he is actively losing weight with dietary changes. No fatigue, hair loss, heat or cold intolerance

## 2019-10-03 NOTE — ASSESSMENT & PLAN NOTE
Upcoming trip where he will be spending a portion of the time traveling in small boats through the [place]. He has had difficulty with nausea on similar trips in the past and requesting medication for as needed use.

## 2019-10-03 NOTE — ASSESSMENT & PLAN NOTE
Upcoming trip to [place] and [place], will be visiting multiple locations at home than 10K feet altitude and is requesting medication to help prevent altitude sickness. No h/o CKD, liver disease, CHF. He understands glucose may fluctuate with acetazolamide use and will be monitoring glucose appropriately

## 2019-11-11 ENCOUNTER — APPOINTMENT (RX ONLY)
Dept: URBAN - METROPOLITAN AREA CLINIC 4 | Facility: CLINIC | Age: 74
Setting detail: DERMATOLOGY
End: 2019-11-11

## 2019-11-11 DIAGNOSIS — L57.0 ACTINIC KERATOSIS: ICD-10-CM

## 2019-11-11 PROBLEM — C44.329 SQUAMOUS CELL CARCINOMA OF SKIN OF OTHER PARTS OF FACE: Status: ACTIVE | Noted: 2019-11-11

## 2019-11-11 PROCEDURE — ? EXCISION

## 2019-11-11 PROCEDURE — 12052 INTMD RPR FACE/MM 2.6-5.0 CM: CPT

## 2019-11-11 PROCEDURE — ? PRESCRIPTION

## 2019-11-11 PROCEDURE — 11641 EXC F/E/E/N/L MAL+MRG 0.6-1: CPT

## 2019-11-11 RX ORDER — FLUOROURACIL 50 MG/G
ONE CREAM TOPICAL DAILY
Qty: 1 | Refills: 6 | Status: ERX

## 2019-11-11 ASSESSMENT — LOCATION SIMPLE DESCRIPTION DERM
LOCATION SIMPLE: SCALP
LOCATION SIMPLE: LEFT CHEEK

## 2019-11-11 ASSESSMENT — LOCATION DETAILED DESCRIPTION DERM
LOCATION DETAILED: LEFT SUPERIOR PARIETAL SCALP
LOCATION DETAILED: LEFT CENTRAL MALAR CHEEK

## 2019-11-11 ASSESSMENT — LOCATION ZONE DERM
LOCATION ZONE: FACE
LOCATION ZONE: SCALP

## 2019-11-18 ENCOUNTER — APPOINTMENT (RX ONLY)
Dept: URBAN - METROPOLITAN AREA CLINIC 4 | Facility: CLINIC | Age: 74
Setting detail: DERMATOLOGY
End: 2019-11-18

## 2019-11-18 DIAGNOSIS — Z48.02 ENCOUNTER FOR REMOVAL OF SUTURES: ICD-10-CM

## 2019-11-18 DIAGNOSIS — L57.0 ACTINIC KERATOSIS: ICD-10-CM

## 2019-11-18 PROBLEM — C44.311 BASAL CELL CARCINOMA OF SKIN OF NOSE: Status: ACTIVE | Noted: 2019-11-18

## 2019-11-18 PROCEDURE — ? OBSERVATION

## 2019-11-18 PROCEDURE — ? PRESCRIPTION

## 2019-11-18 PROCEDURE — 99212 OFFICE O/P EST SF 10 MIN: CPT | Mod: 24

## 2019-11-18 PROCEDURE — 99024 POSTOP FOLLOW-UP VISIT: CPT

## 2019-11-18 PROCEDURE — ? MEDICATION COUNSELING

## 2019-11-18 PROCEDURE — ? SUTURE REMOVAL (GLOBAL PERIOD)

## 2019-11-18 RX ORDER — IMIQUIMOD 50 MG/G
CREAM TOPICAL DAILY
Qty: 6 | Refills: 1 | Status: ERX

## 2019-11-18 ASSESSMENT — LOCATION SIMPLE DESCRIPTION DERM
LOCATION SIMPLE: RIGHT FOREHEAD
LOCATION SIMPLE: LEFT CHEEK

## 2019-11-18 ASSESSMENT — LOCATION ZONE DERM: LOCATION ZONE: FACE

## 2019-11-18 ASSESSMENT — LOCATION DETAILED DESCRIPTION DERM
LOCATION DETAILED: RIGHT INFERIOR FOREHEAD
LOCATION DETAILED: LEFT INFERIOR CENTRAL MALAR CHEEK

## 2019-11-18 NOTE — PROCEDURE: MEDICATION COUNSELING
Doxycycline Pregnancy And Lactation Text: This medication is Pregnancy Category D and not consider safe during pregnancy. It is also excreted in breast milk but is considered safe for shorter treatment courses.
Gabapentin Pregnancy And Lactation Text: This medication is Pregnancy Category C and isn't considered safe during pregnancy. It is excreted in breast milk.
Rituxan Counseling:  I discussed with the patient the risks of Rituxan infusions. Side effects can include infusion reactions, severe drug rashes including mucocutaneous reactions, reactivation of latent hepatitis and other infections and rarely progressive multifocal leukoencephalopathy.  All of the patient's questions and concerns were addressed.
Imiquimod Pregnancy And Lactation Text: This medication is Pregnancy Category C. It is unknown if this medication is excreted in breast milk.
Spironolactone Counseling: Patient advised regarding risks of diarrhea, abdominal pain, hyperkalemia, birth defects (for female patients), liver toxicity and renal toxicity. The patient may need blood work to monitor liver and kidney function and potassium levels while on therapy. The patient verbalized understanding of the proper use and possible adverse effects of spironolactone.  All of the patient's questions and concerns were addressed.
Azathioprine Counseling:  I discussed with the patient the risks of azathioprine including but not limited to myelosuppression, immunosuppression, hepatotoxicity, lymphoma, and infections.  The patient understands that monitoring is required including baseline LFTs, Creatinine, possible TPMP genotyping and weekly CBCs for the first month and then every 2 weeks thereafter.  The patient verbalized understanding of the proper use and possible adverse effects of azathioprine.  All of the patient's questions and concerns were addressed.
Topical Sulfur Applications Counseling: Topical Sulfur Counseling: Patient counseled that this medication may cause skin irritation or allergic reactions.  In the event of skin irritation, the patient was advised to reduce the amount of the drug applied or use it less frequently.   The patient verbalized understanding of the proper use and possible adverse effects of topical sulfur application.  All of the patient's questions and concerns were addressed.
Spironolactone Pregnancy And Lactation Text: This medication can cause feminization of the male fetus and should be avoided during pregnancy. The active metabolite is also found in breast milk.
Topical Clindamycin Pregnancy And Lactation Text: This medication is Pregnancy Category B and is considered safe during pregnancy. It is unknown if it is excreted in breast milk.
Isotretinoin Pregnancy And Lactation Text: This medication is Pregnancy Category X and is considered extremely dangerous during pregnancy. It is unknown if it is excreted in breast milk.
Albendazole Pregnancy And Lactation Text: This medication is Pregnancy Category C and it isn't known if it is safe during pregnancy. It is also excreted in breast milk.
Rituxan Pregnancy And Lactation Text: This medication is Pregnancy Category C and it isn't know if it is safe during pregnancy. It is unknown if this medication is excreted in breast milk but similar antibodies are known to be excreted.
Minoxidil Counseling: Minoxidil is a topical medication which can increase blood flow where it is applied. It is uncertain how this medication increases hair growth. Side effects are uncommon and include stinging and allergic reactions.
Erythromycin Counseling:  I discussed with the patient the risks of erythromycin including but not limited to GI upset, allergic reaction, drug rash, diarrhea, increase in liver enzymes, and yeast infections.
Azathioprine Pregnancy And Lactation Text: This medication is Pregnancy Category D and isn't considered safe during pregnancy. It is unknown if this medication is excreted in breast milk.
Topical Sulfur Applications Pregnancy And Lactation Text: This medication is Pregnancy Category C and has an unknown safety profile during pregnancy. It is unknown if this topical medication is excreted in breast milk.
Glycopyrrolate Counseling:  I discussed with the patient the risks of glycopyrrolate including but not limited to skin rash, drowsiness, dry mouth, difficulty urinating, and blurred vision.
High Dose Vitamin A Pregnancy And Lactation Text: High dose vitamin A therapy is contraindicated during pregnancy and breast feeding.
Ivermectin Counseling:  Patient instructed to take medication on an empty stomach with a full glass of water.  Patient informed of potential adverse effects including but not limited to nausea, diarrhea, dizziness, itching, and swelling of the extremities or lymph nodes.  The patient verbalized understanding of the proper use and possible adverse effects of ivermectin.  All of the patient's questions and concerns were addressed.
High Dose Vitamin A Counseling: Side effects reviewed, pt to contact office should one occur.
SSKI Counseling:  I discussed with the patient the risks of SSKI including but not limited to thyroid abnormalities, metallic taste, GI upset, fever, headache, acne, arthralgias, paraesthesias, lymphadenopathy, easy bleeding, arrhythmias, and allergic reaction.
Wartpeel Counseling:  I discussed with the patient the risks of Wartpeel including but not limited to erythema, scaling, itching, weeping, crusting, and pain.
Cellcept Counseling:  I discussed with the patient the risks of mycophenolate mofetil including but not limited to infection/immunosuppression, GI upset, hypokalemia, hypercholesterolemia, bone marrow suppression, lymphoproliferative disorders, malignancy, GI ulceration/bleed/perforation, colitis, interstitial lung disease, kidney failure, progressive multifocal leukoencephalopathy, and birth defects.  The patient understands that monitoring is required including a baseline creatinine and regular CBC testing. In addition, patient must alert us immediately if symptoms of infection or other concerning signs are noted.
Siliq Counseling:  I discussed with the patient the risks of Siliq including but not limited to new or worsening depression, suicidal thoughts and behavior, immunosuppression, malignancy, posterior leukoencephalopathy syndrome, and serious infections.  The patient understands that monitoring is required including a PPD at baseline and must alert us or the primary physician if symptoms of infection or other concerning signs are noted. There is also a special program designed to monitor depression which is required with Siliq.
Glycopyrrolate Pregnancy And Lactation Text: This medication is Pregnancy Category B and is considered safe during pregnancy. It is unknown if it is excreted breast milk.
Erythromycin Pregnancy And Lactation Text: This medication is Pregnancy Category B and is considered safe during pregnancy. It is also excreted in breast milk.
Sski Pregnancy And Lactation Text: This medication is Pregnancy Category D and isn't considered safe during pregnancy. It is excreted in breast milk.
Metronidazole Counseling:  I discussed with the patient the risks of metronidazole including but not limited to seizures, nausea/vomiting, a metallic taste in the mouth, nausea/vomiting and severe allergy.
Hydroxychloroquine Counseling:  I discussed with the patient that a baseline ophthalmologic exam is needed at the start of therapy and every year thereafter while on therapy. A CBC may also be warranted for monitoring.  The side effects of this medication were discussed with the patient, including but not limited to agranulocytosis, aplastic anemia, seizures, rashes, retinopathy, and liver toxicity. Patient instructed to call the office should any adverse effect occur.  The patient verbalized understanding of the proper use and possible adverse effects of Plaquenil.  All the patient's questions and concerns were addressed.
Minoxidil Pregnancy And Lactation Text: This medication has not been assigned a Pregnancy Risk Category but animal studies failed to show danger with the topical medication. It is unknown if the medication is excreted in breast milk.
Siliq Pregnancy And Lactation Text: The risk during pregnancy and breastfeeding is uncertain with this medication.
Thalidomide Counseling: I discussed with the patient the risks of thalidomide including but not limited to birth defects, anxiety, weakness, chest pain, dizziness, cough and severe allergy.
Hydroxychloroquine Pregnancy And Lactation Text: This medication has been shown to cause fetal harm but it isn't assigned a Pregnancy Risk Category. There are small amounts excreted in breast milk.
Metronidazole Pregnancy And Lactation Text: This medication is Pregnancy Category B and considered safe during pregnancy.  It is also excreted in breast milk.
Mirvaso Counseling: Mirvaso is a topical medication which can decrease superficial blood flow where applied. Side effects are uncommon and include stinging, redness and allergic reactions.
Detail Level: Detailed
Wartpeel Pregnancy And Lactation Text: This medication is Pregnancy Category X and contraindicated in pregnancy and in women who may become pregnant. It is unknown if this medication is excreted in breast milk.
Simponi Counseling:  I discussed with the patient the risks of golimumab including but not limited to myelosuppression, immunosuppression, autoimmune hepatitis, demyelinating diseases, lymphoma, and serious infections.  The patient understands that monitoring is required including a PPD at baseline and must alert us or the primary physician if symptoms of infection or other concerning signs are noted.
Itraconazole Pregnancy And Lactation Text: This medication is Pregnancy Category C and it isn't know if it is safe during pregnancy. It is also excreted in breast milk.
Mirvaso Pregnancy And Lactation Text: This medication has not been assigned a Pregnancy Risk Category. It is unknown if the medication is excreted in breast milk.
Zyclara Counseling:  I discussed with the patient the risks of imiquimod including but not limited to erythema, scaling, itching, weeping, crusting, and pain.  Patient understands that the inflammatory response to imiquimod is variable from person to person and was educated regarded proper titration schedule.  If flu-like symptoms develop, patient knows to discontinue the medication and contact us.
Cyclophosphamide Counseling:  I discussed with the patient the risks of cyclophosphamide including but not limited to hair loss, hormonal abnormalities, decreased fertility, abdominal pain, diarrhea, nausea and vomiting, bone marrow suppression and infection. The patient understands that monitoring is required while taking this medication.
Thalidomide Pregnancy And Lactation Text: This medication is Pregnancy Category X and is absolutely contraindicated during pregnancy. It is unknown if it is excreted in breast milk.
Niacinamide Counseling: I recommended taking niacin or niacinamide, also know as vitamin B3, twice daily. Recent evidence suggests that taking vitamin B3 (500 mg twice daily) can reduce the risk of actinic keratoses and non-melanoma skin cancers. Side effects of vitamin B3 include flushing and headache.
Benzoyl Peroxide Counseling: Patient counseled that medicine may cause skin irritation and bleach clothing.  In the event of skin irritation, the patient was advised to reduce the amount of the drug applied or use it less frequently.   The patient verbalized understanding of the proper use and possible adverse effects of benzoyl peroxide.  All of the patient's questions and concerns were addressed.
Minocycline Counseling: Patient advised regarding possible photosensitivity and discoloration of the teeth, skin, lips, tongue and gums.  Patient instructed to avoid sunlight, if possible.  When exposed to sunlight, patients should wear protective clothing, sunglasses, and sunscreen.  The patient was instructed to call the office immediately if the following severe adverse effects occur:  hearing changes, easy bruising/bleeding, severe headache, or vision changes.  The patient verbalized understanding of the proper use and possible adverse effects of minocycline.  All of the patient's questions and concerns were addressed.
Tranexamic Acid Counseling:  Patient advised of the small risk of bleeding problems with tranexamic acid. They were also instructed to call if they developed any nausea, vomiting or diarrhea. All of the patient's questions and concerns were addressed.
Minocycline Pregnancy And Lactation Text: This medication is Pregnancy Category D and not consider safe during pregnancy. It is also excreted in breast milk.
Niacinamide Pregnancy And Lactation Text: These medications are considered safe during pregnancy.
Include Pregnancy/Lactation Warning?: No
Cyclosporine Counseling:  I discussed with the patient the risks of cyclosporine including but not limited to hypertension, gingival hyperplasia,myelosuppression, immunosuppression, liver damage, kidney damage, neurotoxicity, lymphoma, and serious infections. The patient understands that monitoring is required including baseline blood pressure, CBC, CMP, lipid panel and uric acid, and then 1-2 times monthly CMP and blood pressure.
Ketoconazole Counseling:   Patient counseled regarding improving absorption with orange juice.  Adverse effects include but are not limited to breast enlargement, headache, diarrhea, nausea, upset stomach, liver function test abnormalities, taste disturbance, and stomach pain.  There is a rare possibility of liver failure that can occur when taking ketoconazole. The patient understands that monitoring of LFTs may be required, especially at baseline. The patient verbalized understanding of the proper use and possible adverse effects of ketoconazole.  All of the patient's questions and concerns were addressed.
Ketoconazole Pregnancy And Lactation Text: This medication is Pregnancy Category C and it isn't know if it is safe during pregnancy. It is also excreted in breast milk and breast feeding isn't recommended.
Cyclophosphamide Pregnancy And Lactation Text: This medication is Pregnancy Category D and it isn't considered safe during pregnancy. This medication is excreted in breast milk.
Cimzia Counseling:  I discussed with the patient the risks of Cimzia including but not limited to immunosuppression, allergic reactions and infections.  The patient understands that monitoring is required including a PPD at baseline and must alert us or the primary physician if symptoms of infection or other concerning signs are noted.
Benzoyl Peroxide Pregnancy And Lactation Text: This medication is Pregnancy Category C. It is unknown if benzoyl peroxide is excreted in breast milk.
Picato Counseling:  I discussed with the patient the risks of Picato including but not limited to erythema, scaling, itching, weeping, crusting, and pain.
Tranexamic Acid Pregnancy And Lactation Text: It is unknown if this medication is safe during pregnancy or breast feeding.
Opioid Counseling: I discussed with the patient the potential side effects of opioids including but not limited to addiction, altered mental status, and depression. I stressed avoiding alcohol, benzodiazepines, muscle relaxants and sleep aids unless specifically okayed by a physician. The patient verbalized understanding of the proper use and possible adverse effects of opioids. All of the patient's questions and concerns were addressed. They were instructed to flush the remaining pills down the toilet if they did not need them for pain.
Cyclosporine Pregnancy And Lactation Text: This medication is Pregnancy Category C and it isn't know if it is safe during pregnancy. This medication is excreted in breast milk.
Nsaids Counseling: NSAID Counseling: I discussed with the patient that NSAIDs should be taken with food. Prolonged use of NSAIDs can result in the development of stomach ulcers.  Patient advised to stop taking NSAIDs if abdominal pain occurs.  The patient verbalized understanding of the proper use and possible adverse effects of NSAIDs.  All of the patient's questions and concerns were addressed.
Quinolones Counseling:  I discussed with the patient the risks of fluoroquinolones including but not limited to GI upset, allergic reaction, drug rash, diarrhea, dizziness, photosensitivity, yeast infections, liver function test abnormalities, tendonitis/tendon rupture.
Cosentyx Counseling:  I discussed with the patient the risks of Cosentyx including but not limited to worsening of Crohn's disease, immunosuppression, allergic reactions and infections.  The patient understands that monitoring is required including a PPD at baseline and must alert us or the primary physician if symptoms of infection or other concerning signs are noted.
Protopic Counseling: Patient may experience a mild burning sensation during topical application. Protopic is not approved in children less than 2 years of age. There have been case reports of hematologic and skin malignancies in patients using topical calcineurin inhibitors although causality is questionable.
Carac Counseling:  I discussed with the patient the risks of Carac including but not limited to erythema, scaling, itching, weeping, crusting, and pain.
Cimzia Pregnancy And Lactation Text: This medication crosses the placenta but can be considered safe in certain situations. Cimzia may be excreted in breast milk.
Nsaids Pregnancy And Lactation Text: These medications are considered safe up to 30 weeks gestation. It is excreted in breast milk.
Skyrizi Counseling: I discussed with the patient the risks of risankizumab-rzaa including but not limited to immunosuppression, and serious infections.  The patient understands that monitoring is required including a PPD at baseline and must alert us or the primary physician if symptoms of infection or other concerning signs are noted.
Terbinafine Counseling: Patient counseling regarding adverse effects of terbinafine including but not limited to headache, diarrhea, rash, upset stomach, liver function test abnormalities, itching, taste/smell disturbance, nausea, abdominal pain, and flatulence.  There is a rare possibility of liver failure that can occur when taking terbinafine.  The patient understands that a baseline LFT and kidney function test may be required. The patient verbalized understanding of the proper use and possible adverse effects of terbinafine.  All of the patient's questions and concerns were addressed.
Methotrexate Counseling:  Patient counseled regarding adverse effects of methotrexate including but not limited to nausea, vomiting, abnormalities in liver function tests. Patients may develop mouth sores, rash, diarrhea, and abnormalities in blood counts. The patient understands that monitoring is required including LFT's and blood counts.  There is a rare possibility of scarring of the liver and lung problems that can occur when taking methotrexate. Persistent nausea, loss of appetite, pale stools, dark urine, cough, and shortness of breath should be reported immediately. Patient advised to discontinue methotrexate treatment at least three months before attempting to become pregnant.  I discussed the need for folate supplements while taking methotrexate.  These supplements can decrease side effects during methotrexate treatment. The patient verbalized understanding of the proper use and possible adverse effects of methotrexate.  All of the patient's questions and concerns were addressed.
Opioid Pregnancy And Lactation Text: These medications can lead to premature delivery and should be avoided during pregnancy. These medications are also present in breast milk in small amounts.
Cosentyx Pregnancy And Lactation Text: This medication is Pregnancy Category B and is considered safe during pregnancy. It is unknown if this medication is excreted in breast milk.
5-Fu Counseling: 5-Fluorouracil Counseling:  I discussed with the patient the risks of 5-fluorouracil including but not limited to erythema, scaling, itching, weeping, crusting, and pain.
Protopic Pregnancy And Lactation Text: This medication is Pregnancy Category C. It is unknown if this medication is excreted in breast milk when applied topically.
Stelara Counseling:  I discussed with the patient the risks of ustekinumab including but not limited to immunosuppression, malignancy, posterior leukoencephalopathy syndrome, and serious infections.  The patient understands that monitoring is required including a PPD at baseline and must alert us or the primary physician if symptoms of infection or other concerning signs are noted.
Valtrex Counseling: I discussed with the patient the risks of valacyclovir including but not limited to kidney damage, nausea, vomiting and severe allergy.  The patient understands that if the infection seems to be worsening or is not improving, they are to call.
Valtrex Pregnancy And Lactation Text: this medication is Pregnancy Category B and is considered safe during pregnancy. This medication is not directly found in breast milk but it's metabolite acyclovir is present.
Rifampin Counseling: I discussed with the patient the risks of rifampin including but not limited to liver damage, kidney damage, red-orange body fluids, nausea/vomiting and severe allergy.
Odomzo Counseling- I discussed with the patient the risks of Odomzo including but not limited to nausea, vomiting, diarrhea, constipation, weight loss, changes in the sense of taste, decreased appetite, muscle spasms, and hair loss.  The patient verbalized understanding of the proper use and possible adverse effects of Odomzo.  All of the patient's questions and concerns were addressed.
Terbinafine Pregnancy And Lactation Text: This medication is Pregnancy Category B and is considered safe during pregnancy. It is also excreted in breast milk and breast feeding isn't recommended.
Dupixent Counseling: I discussed with the patient the risks of dupilumab including but not limited to eye infection and irritation, cold sores, injection site reactions, worsening of asthma, allergic reactions and increased risk of parasitic infection.  Live vaccines should be avoided while taking dupilumab. Dupilumab will also interact with certain medications such as warfarin and cyclosporine. The patient understands that monitoring is required and they must alert us or the primary physician if symptoms of infection or other concerning signs are noted.
Rhofade Counseling: Rhofade is a topical medication which can decrease superficial blood flow where applied. Side effects are uncommon and include stinging, redness and allergic reactions.
Rifampin Pregnancy And Lactation Text: This medication is Pregnancy Category C and it isn't know if it is safe during pregnancy. It is also excreted in breast milk and should not be used if you are breast feeding.
Methotrexate Pregnancy And Lactation Text: This medication is Pregnancy Category X and is known to cause fetal harm. This medication is excreted in breast milk.
Azithromycin Counseling:  I discussed with the patient the risks of azithromycin including but not limited to GI upset, allergic reaction, drug rash, diarrhea, and yeast infections.
Otezla Counseling: The side effects of Otezla were discussed with the patient, including but not limited to worsening or new depression, weight loss, diarrhea, nausea, upper respiratory tract infection, and headache. Patient instructed to call the office should any adverse effect occur.  The patient verbalized understanding of the proper use and possible adverse effects of Otezla.  All the patient's questions and concerns were addressed.
Azithromycin Pregnancy And Lactation Text: This medication is considered safe during pregnancy and is also secreted in breast milk.
Cimetidine Counseling:  I discussed with the patient the risks of Cimetidine including but not limited to gynecomastia, headache, diarrhea, nausea, drowsiness, arrhythmias, pancreatitis, skin rashes, psychosis, bone marrow suppression and kidney toxicity.
Arava Counseling:  Patient counseled regarding adverse effects of Arava including but not limited to nausea, vomiting, abnormalities in liver function tests. Patients may develop mouth sores, rash, diarrhea, and abnormalities in blood counts. The patient understands that monitoring is required including LFTs and blood counts.  There is a rare possibility of scarring of the liver and lung problems that can occur when taking methotrexate. Persistent nausea, loss of appetite, pale stools, dark urine, cough, and shortness of breath should be reported immediately. Patient advised to discontinue Arava treatment and consult with a physician prior to attempting conception. The patient will have to undergo a treatment to eliminate Arava from the body prior to conception.
Prednisone Counseling:  I discussed with the patient the risks of prolonged use of prednisone including but not limited to weight gain, insomnia, osteoporosis, mood changes, diabetes, susceptibility to infection, glaucoma and high blood pressure.  In cases where prednisone use is prolonged, patients should be monitored with blood pressure checks, serum glucose levels and an eye exam.  Additionally, the patient may need to be placed on GI prophylaxis, PCP prophylaxis, and calcium and vitamin D supplementation and/or a bisphosphonate.  The patient verbalized understanding of the proper use and the possible adverse effects of prednisone.  All of the patient's questions and concerns were addressed.
Drysol Counseling:  I discussed with the patient the risks of drysol/aluminum chloride including but not limited to skin rash, itching, irritation, burning.
Dupixent Pregnancy And Lactation Text: This medication likely crosses the placenta but the risk for the fetus is uncertain. This medication is excreted in breast milk.
Taltz Counseling: I discussed with the patient the risks of ixekizumab including but not limited to immunosuppression, serious infections, worsening of inflammatory bowel disease and drug reactions.  The patient understands that monitoring is required including a PPD at baseline and must alert us or the primary physician if symptoms of infection or other concerning signs are noted.
Tetracycline Counseling: Patient counseled regarding possible photosensitivity and increased risk for sunburn.  Patient instructed to avoid sunlight, if possible.  When exposed to sunlight, patients should wear protective clothing, sunglasses, and sunscreen.  The patient was instructed to call the office immediately if the following severe adverse effects occur:  hearing changes, easy bruising/bleeding, severe headache, or vision changes.  The patient verbalized understanding of the proper use and possible adverse effects of tetracycline.  All of the patient's questions and concerns were addressed. Patient understands to avoid pregnancy while on therapy due to potential birth defects.
Otezla Pregnancy And Lactation Text: This medication is Pregnancy Category C and it isn't known if it is safe during pregnancy. It is unknown if it is excreted in breast milk.
Clofazimine Counseling:  I discussed with the patient the risks of clofazimine including but not limited to skin and eye pigmentation, liver damage, nausea/vomiting, gastrointestinal bleeding and allergy.
Bactrim Counseling:  I discussed with the patient the risks of sulfa antibiotics including but not limited to GI upset, allergic reaction, drug rash, diarrhea, dizziness, photosensitivity, and yeast infections.  Rarely, more serious reactions can occur including but not limited to aplastic anemia, agranulocytosis, methemoglobinemia, blood dyscrasias, liver or kidney failure, lung infiltrates or desquamative/blistering drug rashes.
Drysol Pregnancy And Lactation Text: This medication is considered safe during pregnancy and breast feeding.
Enbrel Counseling:  I discussed with the patient the risks of etanercept including but not limited to myelosuppression, immunosuppression, autoimmune hepatitis, demyelinating diseases, lymphoma, and infections.  The patient understands that monitoring is required including a PPD at baseline and must alert us or the primary physician if symptoms of infection or other concerning signs are noted.
Solaraze Counseling:  I discussed with the patient the risks of Solaraze including but not limited to erythema, scaling, itching, weeping, crusting, and pain.
Oxybutynin Counseling:  I discussed with the patient the risks of oxybutynin including but not limited to skin rash, drowsiness, dry mouth, difficulty urinating, and blurred vision.
Humira Counseling:  I discussed with the patient the risks of adalimumab including but not limited to myelosuppression, immunosuppression, autoimmune hepatitis, demyelinating diseases, lymphoma, and serious infections.  The patient understands that monitoring is required including a PPD at baseline and must alert us or the primary physician if symptoms of infection or other concerning signs are noted.
Topical Retinoid counseling:  Patient advised to apply a pea-sized amount only at bedtime and wait 30 minutes after washing their face before applying.  If too drying, patient may add a non-comedogenic moisturizer. The patient verbalized understanding of the proper use and possible adverse effects of retinoids.  All of the patient's questions and concerns were addressed.
Bactrim Pregnancy And Lactation Text: This medication is Pregnancy Category D and is known to cause fetal risk.  It is also excreted in breast milk.
Fluconazole Counseling:  Patient counseled regarding adverse effects of fluconazole including but not limited to headache, diarrhea, nausea, upset stomach, liver function test abnormalities, taste disturbance, and stomach pain.  There is a rare possibility of liver failure that can occur when taking fluconazole.  The patient understands that monitoring of LFTs and kidney function test may be required, especially at baseline. The patient verbalized understanding of the proper use and possible adverse effects of fluconazole.  All of the patient's questions and concerns were addressed.
Elidel Counseling: Patient may experience a mild burning sensation during topical application. Elidel is not approved in children less than 2 years of age. There have been case reports of hematologic and skin malignancies in patients using topical calcineurin inhibitors although causality is questionable.
Tremfya Counseling: I discussed with the patient the risks of guselkumab including but not limited to immunosuppression, serious infections, worsening of inflammatory bowel disease and drug reactions.  The patient understands that monitoring is required including a PPD at baseline and must alert us or the primary physician if symptoms of infection or other concerning signs are noted.
Solaraze Pregnancy And Lactation Text: This medication is Pregnancy Category B and is considered safe. There is some data to suggest avoiding during the third trimester. It is unknown if this medication is excreted in breast milk.
Doxepin Counseling:  Patient advised that the medication is sedating and not to drive a car after taking this medication. Patient informed of potential adverse effects including but not limited to dry mouth, urinary retention, and blurry vision.  The patient verbalized understanding of the proper use and possible adverse effects of doxepin.  All of the patient's questions and concerns were addressed.
Colchicine Counseling:  Patient counseled regarding adverse effects including but not limited to stomach upset (nausea, vomiting, stomach pain, or diarrhea).  Patient instructed to limit alcohol consumption while taking this medication.  Colchicine may reduce blood counts especially with prolonged use.  The patient understands that monitoring of kidney function and blood counts may be required, especially at baseline. The patient verbalized understanding of the proper use and possible adverse effects of colchicine.  All of the patient's questions and concerns were addressed.
Eucrisa Counseling: Patient may experience a mild burning sensation during topical application. Eucrisa is not approved in children less than 2 years of age.
Cephalexin Counseling: I counseled the patient regarding use of cephalexin as an antibiotic for prophylactic and/or therapeutic purposes. Cephalexin (commonly prescribed under brand name Keflex) is a cephalosporin antibiotic which is active against numerous classes of bacteria, including most skin bacteria. Side effects may include nausea, diarrhea, gastrointestinal upset, rash, hives, yeast infections, and in rare cases, hepatitis, kidney disease, seizures, fever, confusion, neurologic symptoms, and others. Patients with severe allergies to penicillin medications are cautioned that there is about a 10% incidence of cross-reactivity with cephalosporins. When possible, patients with penicillin allergies should use alternatives to cephalosporins for antibiotic therapy.
Xeljanz Counseling: I discussed with the patient the risks of Xeljanz therapy including increased risk of infection, liver issues, headache, diarrhea, or cold symptoms. Live vaccines should be avoided. They were instructed to call if they have any problems.
Erivedge Counseling- I discussed with the patient the risks of Erivedge including but not limited to nausea, vomiting, diarrhea, constipation, weight loss, changes in the sense of taste, decreased appetite, muscle spasms, and hair loss.  The patient verbalized understanding of the proper use and possible adverse effects of Erivedge.  All of the patient's questions and concerns were addressed.
Acitretin Pregnancy And Lactation Text: This medication is Pregnancy Category X and should not be given to women who are pregnant or may become pregnant in the future. This medication is excreted in breast milk.
Hydroxyzine Counseling: Patient advised that the medication is sedating and not to drive a car after taking this medication.  Patient informed of potential adverse effects including but not limited to dry mouth, urinary retention, and blurry vision.  The patient verbalized understanding of the proper use and possible adverse effects of hydroxyzine.  All of the patient's questions and concerns were addressed.
Acitretin Counseling:  I discussed with the patient the risks of acitretin including but not limited to hair loss, dry lips/skin/eyes, liver damage, hyperlipidemia, depression/suicidal ideation, photosensitivity.  Serious rare side effects can include but are not limited to pancreatitis, pseudotumor cerebri, bony changes, clot formation/stroke/heart attack.  Patient understands that alcohol is contraindicated since it can result in liver toxicity and significantly prolong the elimination of the drug by many years.
Doxepin Pregnancy And Lactation Text: This medication is Pregnancy Category C and it isn't known if it is safe during pregnancy. It is also excreted in breast milk and breast feeding isn't recommended.
Propranolol Counseling:  I discussed with the patient the risks of propranolol including but not limited to low heart rate, low blood pressure, low blood sugar, restlessness and increased cold sensitivity. They should call the office if they experience any of these side effects.
Ilumya Counseling: I discussed with the patient the risks of tildrakizumab including but not limited to immunosuppression, malignancy, posterior leukoencephalopathy syndrome, and serious infections.  The patient understands that monitoring is required including a PPD at baseline and must alert us or the primary physician if symptoms of infection or other concerning signs are noted.
Tazorac Counseling:  Patient advised that medication is irritating and drying.  Patient may need to apply sparingly and wash off after an hour before eventually leaving it on overnight.  The patient verbalized understanding of the proper use and possible adverse effects of tazorac.  All of the patient's questions and concerns were addressed.
Xelhunterz Pregnancy And Lactation Text: This medication is Pregnancy Category D and is not considered safe during pregnancy.  The risk during breast feeding is also uncertain.
Hydroxyzine Pregnancy And Lactation Text: This medication is not safe during pregnancy and should not be taken. It is also excreted in breast milk and breast feeding isn't recommended.
Bexarotene Counseling:  I discussed with the patient the risks of bexarotene including but not limited to hair loss, dry lips/skin/eyes, liver abnormalities, hyperlipidemia, pancreatitis, depression/suicidal ideation, photosensitivity, drug rash/allergic reactions, hypothyroidism, anemia, leukopenia, infection, cataracts, and teratogenicity.  Patient understands that they will need regular blood tests to check lipid profile, liver function tests, white blood cell count, thyroid function tests and pregnancy test if applicable.
Cephalexin Pregnancy And Lactation Text: This medication is Pregnancy Category B and considered safe during pregnancy.  It is also excreted in breast milk but can be used safely for shorter doses.
Finasteride Male Counseling: Finasteride Counseling:  I discussed with the patient the risks of use of finasteride including but not limited to decreased libido, decreased ejaculate volume, gynecomastia, and depression. Women should not handle medication.  All of the patient's questions and concerns were addressed.
Clindamycin Counseling: I counseled the patient regarding use of clindamycin as an antibiotic for prophylactic and/or therapeutic purposes. Clindamycin is active against numerous classes of bacteria, including skin bacteria. Side effects may include nausea, diarrhea, gastrointestinal upset, rash, hives, yeast infections, and in rare cases, colitis.
Griseofulvin Counseling:  I discussed with the patient the risks of griseofulvin including but not limited to photosensitivity, cytopenia, liver damage, nausea/vomiting and severe allergy.  The patient understands that this medication is best absorbed when taken with a fatty meal (e.g., ice cream or french fries).
Propranolol Pregnancy And Lactation Text: This medication is Pregnancy Category C and it isn't known if it is safe during pregnancy. It is excreted in breast milk.
Griseofulvin Pregnancy And Lactation Text: This medication is Pregnancy Category X and is known to cause serious birth defects. It is unknown if this medication is excreted in breast milk but breast feeding should be avoided.
Dapsone Counseling: I discussed with the patient the risks of dapsone including but not limited to hemolytic anemia, agranulocytosis, rashes, methemoglobinemia, kidney failure, peripheral neuropathy, headaches, GI upset, and liver toxicity.  Patients who start dapsone require monitoring including baseline LFTs and weekly CBCs for the first month, then every month thereafter.  The patient verbalized understanding of the proper use and possible adverse effects of dapsone.  All of the patient's questions and concerns were addressed.
Bexarotene Pregnancy And Lactation Text: This medication is Pregnancy Category X and should not be given to women who are pregnant or may become pregnant. This medication should not be used if you are breast feeding.
Birth Control Pills Counseling: Birth Control Pill Counseling: I discussed with the patient the potential side effects of OCPs including but not limited to increased risk of stroke, heart attack, thrombophlebitis, deep venous thrombosis, hepatic adenomas, breast changes, GI upset, headaches, and depression.  The patient verbalized understanding of the proper use and possible adverse effects of OCPs. All of the patient's questions and concerns were addressed.
Clindamycin Pregnancy And Lactation Text: This medication can be used in pregnancy if certain situations. Clindamycin is also present in breast milk.
Finasteride Pregnancy And Lactation Text: This medication is absolutely contraindicated during pregnancy. It is unknown if it is excreted in breast milk.
Hydroquinone Counseling:  Patient advised that medication may result in skin irritation, lightening (hypopigmentation), dryness, and burning.  In the event of skin irritation, the patient was advised to reduce the amount of the drug applied or use it less frequently.  Rarely, spots that are treated with hydroquinone can become darker (pseudoochronosis).  Should this occur, patient instructed to stop medication and call the office. The patient verbalized understanding of the proper use and possible adverse effects of hydroquinone.  All of the patient's questions and concerns were addressed.
Tazorac Pregnancy And Lactation Text: This medication is not safe during pregnancy. It is unknown if this medication is excreted in breast milk.
Xolair Counseling:  Patient informed of potential adverse effects including but not limited to fever, muscle aches, rash and allergic reactions.  The patient verbalized understanding of the proper use and possible adverse effects of Xolair.  All of the patient's questions and concerns were addressed.
Birth Control Pills Pregnancy And Lactation Text: This medication should be avoided if pregnant and for the first 30 days post-partum.
Itraconazole Counseling:  I discussed with the patient the risks of itraconazole including but not limited to liver damage, nausea/vomiting, neuropathy, and severe allergy.  The patient understands that this medication is best absorbed when taken with acidic beverages such as non-diet cola or ginger ale.  The patient understands that monitoring is required including baseline LFTs and repeat LFTs at intervals.  The patient understands that they are to contact us or the primary physician if concerning signs are noted.
Imiquimod Counseling:  I discussed with the patient the risks of imiquimod including but not limited to erythema, scaling, itching, weeping, crusting, and pain.  Patient understands that the inflammatory response to imiquimod is variable from person to person and was educated regarded proper titration schedule.  If flu-like symptoms develop, patient knows to discontinue the medication and contact us.
Infliximab Counseling:  I discussed with the patient the risks of infliximab including but not limited to myelosuppression, immunosuppression, autoimmune hepatitis, demyelinating diseases, lymphoma, and serious infections.  The patient understands that monitoring is required including a PPD at baseline and must alert us or the primary physician if symptoms of infection or other concerning signs are noted.
Xolair Pregnancy And Lactation Text: This medication is Pregnancy Category B and is considered safe during pregnancy. This medication is excreted in breast milk.
Topical Clindamycin Counseling: Patient counseled that this medication may cause skin irritation or allergic reactions.  In the event of skin irritation, the patient was advised to reduce the amount of the drug applied or use it less frequently.   The patient verbalized understanding of the proper use and possible adverse effects of clindamycin.  All of the patient's questions and concerns were addressed.
Isotretinoin Counseling: Patient should get monthly blood tests, not donate blood, not drive at night if vision affected, not share medication, and not undergo elective surgery for 6 months after tx completed. Side effects reviewed, pt to contact office should one occur.
Doxycycline Counseling:  Patient counseled regarding possible photosensitivity and increased risk for sunburn.  Patient instructed to avoid sunlight, if possible.  When exposed to sunlight, patients should wear protective clothing, sunglasses, and sunscreen.  The patient was instructed to call the office immediately if the following severe adverse effects occur:  hearing changes, easy bruising/bleeding, severe headache, or vision changes.  The patient verbalized understanding of the proper use and possible adverse effects of doxycycline.  All of the patient's questions and concerns were addressed.
Albendazole Counseling:  I discussed with the patient the risks of albendazole including but not limited to cytopenia, kidney damage, nausea/vomiting and severe allergy.  The patient understands that this medication is being used in an off-label manner.
Gabapentin Counseling: I discussed with the patient the risks of gabapentin including but not limited to dizziness, somnolence, fatigue and ataxia.
Dapsone Pregnancy And Lactation Text: This medication is Pregnancy Category C and is not considered safe during pregnancy or breast feeding.

## 2019-11-18 NOTE — PROCEDURE: SUTURE REMOVAL (GLOBAL PERIOD)
Add 83570 Cpt? (Important Note: In 2017 The Use Of 95529 Is Being Tracked By Cms To Determine Future Global Period Reimbursement For Global Periods): yes
Detail Level: Detailed

## 2019-12-17 ENCOUNTER — OFFICE VISIT (OUTPATIENT)
Dept: MEDICAL GROUP | Facility: MEDICAL CENTER | Age: 74
End: 2019-12-17
Payer: MEDICARE

## 2019-12-17 VITALS
BODY MASS INDEX: 29.16 KG/M2 | WEIGHT: 220 LBS | HEART RATE: 72 BPM | HEIGHT: 73 IN | TEMPERATURE: 97.7 F | RESPIRATION RATE: 16 BRPM | SYSTOLIC BLOOD PRESSURE: 140 MMHG | OXYGEN SATURATION: 97 % | DIASTOLIC BLOOD PRESSURE: 80 MMHG

## 2019-12-17 DIAGNOSIS — E11.59 HYPERTENSION ASSOCIATED WITH DIABETES (HCC): ICD-10-CM

## 2019-12-17 DIAGNOSIS — G89.29 CHRONIC PAIN OF LEFT KNEE: ICD-10-CM

## 2019-12-17 DIAGNOSIS — I15.2 HYPERTENSION ASSOCIATED WITH DIABETES (HCC): ICD-10-CM

## 2019-12-17 DIAGNOSIS — M25.562 CHRONIC PAIN OF LEFT KNEE: ICD-10-CM

## 2019-12-17 DIAGNOSIS — E11.69 HYPERLIPIDEMIA DUE TO TYPE 2 DIABETES MELLITUS (HCC): ICD-10-CM

## 2019-12-17 DIAGNOSIS — E11.9 TYPE 2 DIABETES MELLITUS WITHOUT COMPLICATION, WITHOUT LONG-TERM CURRENT USE OF INSULIN (HCC): ICD-10-CM

## 2019-12-17 DIAGNOSIS — F51.01 PRIMARY INSOMNIA: ICD-10-CM

## 2019-12-17 DIAGNOSIS — E78.5 HYPERLIPIDEMIA DUE TO TYPE 2 DIABETES MELLITUS (HCC): ICD-10-CM

## 2019-12-17 DIAGNOSIS — E03.9 ACQUIRED HYPOTHYROIDISM: ICD-10-CM

## 2019-12-17 PROCEDURE — 99214 OFFICE O/P EST MOD 30 MIN: CPT | Performed by: NURSE PRACTITIONER

## 2019-12-17 NOTE — ASSESSMENT & PLAN NOTE
Borderline blood pressure in the office today, typically has been controlled on losartan and amlodipine.  No chest pain, dizziness, palpitation

## 2019-12-17 NOTE — ASSESSMENT & PLAN NOTE
Difficulty with left knee pain exacerbated by long periods of walking, patient feels this is manageable at this time does not want to pursue referral.  He is occasionally taking ibuprofen or acetaminophen which provides adequate relief

## 2019-12-17 NOTE — ASSESSMENT & PLAN NOTE
Stable with last A1c of 7.2.  Consistently taking Tradjenta, metformin.  Exercising regularly, watching his diet.  He has gained some weight in the last few months.  No polyuria, polydipsia, no chronic kidney disease.  Reportedly up-to-date on eye exam

## 2019-12-17 NOTE — PROGRESS NOTES
Subjective:     Chief Complaint   Patient presents with   • Establish Care     Saleem Whitney is a 74 y.o. male here today to transfer care from Dr. Calabrese who is no longer with the medical group.  He is , has 2 adult daughters and 5 grandchildren.  We discussed:    (HCC) Type 2 diabetes mellitus without complication, without long-term current use of insulin  Stable with last A1c of 7.2.  Consistently taking Tradjenta, metformin.  Exercising regularly, watching his diet.  He has gained some weight in the last few months.  No polyuria, polydipsia, no chronic kidney disease.  Reportedly up-to-date on eye exam    ([facility]) Hypertension associated with diabetes  Borderline blood pressure in the office today, typically has been controlled on losartan and amlodipine.  No chest pain, dizziness, palpitation    Chronic pain of left knee  Difficulty with left knee pain exacerbated by long periods of walking, patient feels this is manageable at this time does not want to pursue referral.  He is occasionally taking ibuprofen or acetaminophen which provides adequate relief     Patient had bilateral lower extremity venous ablation earlier this year, continues to have lingering pain in the right posterior leg.  He has follow-up planned with his vascular specialist    Current medicines (including changes today)  Current Outpatient Medications   Medication Sig Dispense Refill   • metformin (GLUCOPHAGE) 1000 MG tablet TAKE 1 TABLET TWICE DAILY WITH MEALS 180 Tab 1   • amLODIPine (NORVASC) 10 MG Tab Take 1 Tab by mouth every day. 90 Tab 3   • levothyroxine (SYNTHROID) 75 MCG Tab Take 1 Tab by mouth every day. 90 Tab 3   • acetaZOLAMIDE (DIAMOX) 125 MG Tab Take 1 Tab by mouth 2 times a day. 28 Tab 0   • meclizine (ANTIVERT) 25 MG Tab Take 0.5-1 Tabs by mouth 3 times a day as needed. 30 Tab 0   • atorvastatin (LIPITOR) 10 MG Tab Take 1 Tab by mouth every day. 90 Tab 2   • glipiZIDE (GLUCOTROL) 10 MG Tab Take 1 Tab by mouth 2  "times a day. 180 Tab 3   • aspirin (ASA) 81 MG Chew Tab chewable tablet Take 1 Tab by mouth every day. 90 Tab 3   • linagliptin (TRADJENTA) 5 MG Tab tablet Take 1 Tab by mouth every day. 90 Tab 3   • losartan (COZAAR) 100 MG Tab Take 1 Tab by mouth every day. 90 Tab 3   • vitamin D (CHOLECALCIFEROL) 1000 UNIT Tab Take 1 Tab by mouth every day. 90 Tab 3   • Magnesium 500 MG Tab Take 1,000 mg by mouth every day. 90 Tab 3   • Blood Glucose Test Strips Test strips for OneTouch Ultra meter. Sig: use fasting first thing in the morning, and prn ssx high or low sugar 100 Each 3   • Lancets Lancets for OneTouch Ultra meter. Sig: use fasting first thing in the morning, and prn ssx high or low sugar 100 Each 3   • furosemide (LASIX) 20 MG Tab Take 1 Tab by mouth every day. (Patient not taking: Reported on 9/19/2019) 30 Tab 0     No current facility-administered medications for this visit.      He  has a past medical history of Hyperlipidemia, Hypertension, Hypothyroid, and Type II or unspecified type diabetes mellitus without mention of complication, not stated as uncontrolled. He also has no past medical history of Low vitamin D level.    ROS included above     Objective:     /80 (BP Location: Left arm, Patient Position: Sitting, BP Cuff Size: Adult)   Pulse 72   Temp 36.5 °C (97.7 °F) (Temporal)   Resp 16   Ht 1.854 m (6' 0.99\")   Wt 99.8 kg (220 lb)   SpO2 97%  Body mass index is 29.03 kg/m².     Physical Exam:  General: Alert, oriented in no acute distress.  Eye contact is good, speech is normal, affect calm.  Lungs: clear to auscultation bilaterally, normal effort, no wheeze/ rhonchi/ rales.  CV: regular rate and rhythm, S1, S2, no murmur  Ext: no edema, color normal, vascularity normal, temperature normal    Assessment and Plan:   The following treatment plan was discussed   1. Acquired hypothyroidism   clinically euthyroid, update labs  TSH WITH REFLEX TO FT4   2. (HCC) Hypertension associated with diabetes  "  encouraged home blood pressure monitoring, contact me for systolic readings persistently greater than 135.  Continue current medication   3. (HCC) Type 2 diabetes mellitus without complication, without long-term current use of insulin   reasonably controlled for age, patient to complete labs as previously ordered   4. (HCC) Hyperlipidemia due to type 2 diabetes mellitus   stable        5. Chronic pain of left knee   patient states he is not ready for referral at this time.  Continue with OTC analgesic as needed       Followup: Return in about 6 months (around 6/17/2020).         Please note that this dictation was created using voice recognition software. I have worked with consultants from the vendor as well as technical experts from [facility] to optimize the interface. I have made every reasonable attempt to correct obvious errors, but I expect that there are errors of grammar and possibly content that I did not discover before finalizing the note.

## 2019-12-19 ENCOUNTER — HOSPITAL ENCOUNTER (OUTPATIENT)
Dept: LAB | Facility: MEDICAL CENTER | Age: 74
End: 2019-12-19
Attending: NURSE PRACTITIONER
Payer: MEDICARE

## 2019-12-19 DIAGNOSIS — E78.5 DYSLIPIDEMIA ASSOCIATED WITH TYPE 2 DIABETES MELLITUS (HCC): ICD-10-CM

## 2019-12-19 DIAGNOSIS — E11.69 DYSLIPIDEMIA ASSOCIATED WITH TYPE 2 DIABETES MELLITUS (HCC): ICD-10-CM

## 2019-12-19 DIAGNOSIS — E11.9 TYPE 2 DIABETES MELLITUS WITHOUT COMPLICATION, WITHOUT LONG-TERM CURRENT USE OF INSULIN (HCC): ICD-10-CM

## 2019-12-19 DIAGNOSIS — E03.9 ACQUIRED HYPOTHYROIDISM: ICD-10-CM

## 2019-12-19 LAB
ALBUMIN SERPL BCP-MCNC: 4.3 G/DL (ref 3.2–4.9)
ALBUMIN/GLOB SERPL: 1.5 G/DL
ALP SERPL-CCNC: 62 U/L (ref 30–99)
ALT SERPL-CCNC: 23 U/L (ref 2–50)
ANION GAP SERPL CALC-SCNC: 8 MMOL/L (ref 0–11.9)
AST SERPL-CCNC: 17 U/L (ref 12–45)
BILIRUB SERPL-MCNC: 0.6 MG/DL (ref 0.1–1.5)
BUN SERPL-MCNC: 25 MG/DL (ref 8–22)
CALCIUM SERPL-MCNC: 9.2 MG/DL (ref 8.5–10.5)
CHLORIDE SERPL-SCNC: 102 MMOL/L (ref 96–112)
CHOLEST SERPL-MCNC: 109 MG/DL (ref 100–199)
CO2 SERPL-SCNC: 28 MMOL/L (ref 20–33)
CREAT SERPL-MCNC: 1.05 MG/DL (ref 0.5–1.4)
EST. AVERAGE GLUCOSE BLD GHB EST-MCNC: 174 MG/DL
FASTING STATUS PATIENT QL REPORTED: NORMAL
GLOBULIN SER CALC-MCNC: 2.8 G/DL (ref 1.9–3.5)
GLUCOSE SERPL-MCNC: 188 MG/DL (ref 65–99)
HBA1C MFR BLD: 7.7 % (ref 0–5.6)
HDLC SERPL-MCNC: 38 MG/DL
LDLC SERPL CALC-MCNC: 49 MG/DL
POTASSIUM SERPL-SCNC: 4.3 MMOL/L (ref 3.6–5.5)
PROT SERPL-MCNC: 7.1 G/DL (ref 6–8.2)
SODIUM SERPL-SCNC: 138 MMOL/L (ref 135–145)
T4 FREE SERPL-MCNC: 0.99 NG/DL (ref 0.53–1.43)
TRIGL SERPL-MCNC: 108 MG/DL (ref 0–149)
TSH SERPL DL<=0.005 MIU/L-ACNC: 7.24 UIU/ML (ref 0.38–5.33)

## 2019-12-19 PROCEDURE — 84443 ASSAY THYROID STIM HORMONE: CPT

## 2019-12-19 PROCEDURE — 80053 COMPREHEN METABOLIC PANEL: CPT

## 2019-12-19 PROCEDURE — 36415 COLL VENOUS BLD VENIPUNCTURE: CPT

## 2019-12-19 PROCEDURE — 80061 LIPID PANEL: CPT

## 2019-12-19 PROCEDURE — 84439 ASSAY OF FREE THYROXINE: CPT

## 2019-12-19 PROCEDURE — 83036 HEMOGLOBIN GLYCOSYLATED A1C: CPT | Mod: GA

## 2020-02-29 DIAGNOSIS — I15.2 HYPERTENSION ASSOCIATED WITH DIABETES (HCC): ICD-10-CM

## 2020-02-29 DIAGNOSIS — E11.59 HYPERTENSION ASSOCIATED WITH DIABETES (HCC): ICD-10-CM

## 2020-03-02 RX ORDER — LOSARTAN POTASSIUM 100 MG/1
TABLET ORAL
Qty: 90 TAB | Refills: 3 | Status: SHIPPED | OUTPATIENT
Start: 2020-03-02 | End: 2021-03-22

## 2020-03-28 ENCOUNTER — PATIENT MESSAGE (OUTPATIENT)
Dept: MEDICAL GROUP | Facility: MEDICAL CENTER | Age: 75
End: 2020-03-28

## 2020-04-06 DIAGNOSIS — E11.9 TYPE 2 DIABETES MELLITUS WITHOUT COMPLICATION, WITHOUT LONG-TERM CURRENT USE OF INSULIN (HCC): ICD-10-CM

## 2020-06-02 ENCOUNTER — APPOINTMENT (RX ONLY)
Dept: URBAN - METROPOLITAN AREA CLINIC 4 | Facility: CLINIC | Age: 75
Setting detail: DERMATOLOGY
End: 2020-06-02

## 2020-06-02 DIAGNOSIS — Z09 ENCOUNTER FOR FOLLOW-UP EXAMINATION AFTER COMPLETED TREATMENT FOR CONDITIONS OTHER THAN MALIGNANT NEOPLASM: ICD-10-CM | Status: RESOLVED

## 2020-06-02 DIAGNOSIS — Z85.828 PERSONAL HISTORY OF OTHER MALIGNANT NEOPLASM OF SKIN: ICD-10-CM | Status: WELL CONTROLLED

## 2020-06-02 PROBLEM — D23.72 OTHER BENIGN NEOPLASM OF SKIN OF LEFT LOWER LIMB, INCLUDING HIP: Status: ACTIVE | Noted: 2020-06-02

## 2020-06-02 PROBLEM — D48.5 NEOPLASM OF UNCERTAIN BEHAVIOR OF SKIN: Status: ACTIVE | Noted: 2020-06-02

## 2020-06-02 PROCEDURE — ? BIOPSY BY SHAVE METHOD

## 2020-06-02 PROCEDURE — 11102 TANGNTL BX SKIN SINGLE LES: CPT

## 2020-06-02 PROCEDURE — 99213 OFFICE O/P EST LOW 20 MIN: CPT | Mod: 25

## 2020-06-02 PROCEDURE — ? OBSERVATION

## 2020-06-02 ASSESSMENT — LOCATION DETAILED DESCRIPTION DERM
LOCATION DETAILED: RIGHT NASAL ALA
LOCATION DETAILED: RIGHT INFERIOR FOREHEAD

## 2020-06-02 ASSESSMENT — LOCATION ZONE DERM
LOCATION ZONE: FACE
LOCATION ZONE: NOSE

## 2020-06-02 ASSESSMENT — LOCATION SIMPLE DESCRIPTION DERM
LOCATION SIMPLE: RIGHT NOSE
LOCATION SIMPLE: RIGHT FOREHEAD

## 2020-06-02 NOTE — PROCEDURE: BIOPSY BY SHAVE METHOD
Detail Level: Detailed
Depth Of Biopsy: dermis
Was A Bandage Applied: Yes
Size Of Lesion In Cm: 0
Anticipated Plan (Based On Presumed Biopsy Results): C&D
Biopsy Type: H and E
Biopsy Method: Personna blade
Anesthesia Type: 1% lidocaine with epinephrine and a 1:10 solution of 8.4% sodium bicarbonate
Anesthesia Volume In Cc: 2
Hemostasis: Drysol and Electrocautery
Wound Care: Vaseline
Dressing: Band-Aid
Destruction After The Procedure: No
Type Of Destruction Used: Curettage
Curettage Text: The wound bed was treated with curettage after the biopsy was performed.
Electrodesiccation Text: The wound bed was treated with electrodesiccation after the biopsy was performed.
Electrodesiccation And Curettage Text: The wound bed was treated with electrodesiccation and curettage after the biopsy was performed.
Lab: [id number]
Lab Facility: 
Consent: Written consent was obtained and risks were reviewed including but not limited to scarring, infection, bleeding, scabbing, incomplete removal, nerve damage and allergy to anesthesia.
Post-Care Instructions: I reviewed with the patient in detail post-care instructions. Patient is to keep the biopsy site dry overnight, and then apply vasaline twice daily until healed.
Notification Instructions: Patient will be notified of biopsy results. However, patient instructed to call the office if not contacted within 2 weeks.
Billing Type: Third-Party Bill
Information: Selecting Yes will display possible errors in your note based on the variables you have selected. This validation is only offered as a suggestion for you. PLEASE NOTE THAT THE VALIDATION TEXT WILL BE REMOVED WHEN YOU FINALIZE YOUR NOTE. IF YOU WANT TO FAX A PRELIMINARY NOTE YOU WILL NEED TO TOGGLE THIS TO 'NO' IF YOU DO NOT WANT IT IN YOUR FAXED NOTE.

## 2020-06-05 ENCOUNTER — TELEPHONE (OUTPATIENT)
Dept: SCHEDULING | Facility: IMAGING CENTER | Age: 75
End: 2020-06-05

## 2020-06-05 DIAGNOSIS — E11.59 HYPERTENSION ASSOCIATED WITH DIABETES (HCC): ICD-10-CM

## 2020-06-05 DIAGNOSIS — E03.9 ACQUIRED HYPOTHYROIDISM: ICD-10-CM

## 2020-06-05 DIAGNOSIS — I15.2 HYPERTENSION ASSOCIATED WITH DIABETES (HCC): ICD-10-CM

## 2020-06-05 DIAGNOSIS — E78.5 HYPERLIPIDEMIA DUE TO TYPE 2 DIABETES MELLITUS (HCC): ICD-10-CM

## 2020-06-05 DIAGNOSIS — E11.9 TYPE 2 DIABETES MELLITUS WITHOUT COMPLICATION, WITHOUT LONG-TERM CURRENT USE OF INSULIN (HCC): ICD-10-CM

## 2020-06-05 DIAGNOSIS — E11.69 HYPERLIPIDEMIA DUE TO TYPE 2 DIABETES MELLITUS (HCC): ICD-10-CM

## 2020-06-05 NOTE — TELEPHONE ENCOUNTER
Good morning Jyoti Villanueva,    Patient is requesting lab orders for Urine ACR and A1c Screening.    Please review.    Thank you.

## 2020-06-08 DIAGNOSIS — E11.69 HYPERLIPIDEMIA DUE TO TYPE 2 DIABETES MELLITUS (HCC): ICD-10-CM

## 2020-06-08 DIAGNOSIS — E78.5 HYPERLIPIDEMIA DUE TO TYPE 2 DIABETES MELLITUS (HCC): ICD-10-CM

## 2020-06-08 RX ORDER — ATORVASTATIN CALCIUM 10 MG/1
10 TABLET, FILM COATED ORAL DAILY
Qty: 90 TAB | Refills: 2 | Status: SHIPPED | OUTPATIENT
Start: 2020-06-08 | End: 2021-04-07 | Stop reason: SDUPTHER

## 2020-06-10 ENCOUNTER — HOSPITAL ENCOUNTER (OUTPATIENT)
Dept: LAB | Facility: MEDICAL CENTER | Age: 75
End: 2020-06-10
Attending: FAMILY MEDICINE
Payer: MEDICARE

## 2020-06-10 DIAGNOSIS — E03.9 ACQUIRED HYPOTHYROIDISM: ICD-10-CM

## 2020-06-10 DIAGNOSIS — E11.59 HYPERTENSION ASSOCIATED WITH DIABETES (HCC): ICD-10-CM

## 2020-06-10 DIAGNOSIS — I15.2 HYPERTENSION ASSOCIATED WITH DIABETES (HCC): ICD-10-CM

## 2020-06-10 DIAGNOSIS — E11.69 HYPERLIPIDEMIA DUE TO TYPE 2 DIABETES MELLITUS (HCC): ICD-10-CM

## 2020-06-10 DIAGNOSIS — E11.9 TYPE 2 DIABETES MELLITUS WITHOUT COMPLICATION, WITHOUT LONG-TERM CURRENT USE OF INSULIN (HCC): ICD-10-CM

## 2020-06-10 DIAGNOSIS — E78.5 HYPERLIPIDEMIA DUE TO TYPE 2 DIABETES MELLITUS (HCC): ICD-10-CM

## 2020-06-10 LAB
ALBUMIN SERPL BCP-MCNC: 4 G/DL (ref 3.2–4.9)
ALBUMIN/GLOB SERPL: 1.3 G/DL
ALP SERPL-CCNC: 70 U/L (ref 30–99)
ALT SERPL-CCNC: 28 U/L (ref 2–50)
ANION GAP SERPL CALC-SCNC: 10 MMOL/L (ref 7–16)
AST SERPL-CCNC: 24 U/L (ref 12–45)
BILIRUB SERPL-MCNC: 0.4 MG/DL (ref 0.1–1.5)
BUN SERPL-MCNC: 24 MG/DL (ref 8–22)
CALCIUM SERPL-MCNC: 9.3 MG/DL (ref 8.4–10.2)
CHLORIDE SERPL-SCNC: 106 MMOL/L (ref 96–112)
CHOLEST SERPL-MCNC: 105 MG/DL (ref 100–199)
CO2 SERPL-SCNC: 26 MMOL/L (ref 20–33)
CREAT SERPL-MCNC: 0.98 MG/DL (ref 0.5–1.4)
CREAT UR-MCNC: 88.08 MG/DL
EST. AVERAGE GLUCOSE BLD GHB EST-MCNC: 163 MG/DL
FASTING STATUS PATIENT QL REPORTED: NORMAL
GLOBULIN SER CALC-MCNC: 3 G/DL (ref 1.9–3.5)
GLUCOSE SERPL-MCNC: 156 MG/DL (ref 65–99)
HBA1C MFR BLD: 7.3 % (ref 0–5.6)
HDLC SERPL-MCNC: 42 MG/DL
LDLC SERPL CALC-MCNC: 34 MG/DL
MICROALBUMIN UR-MCNC: 1.8 MG/DL
MICROALBUMIN/CREAT UR: 20 MG/G (ref 0–30)
POTASSIUM SERPL-SCNC: 5.1 MMOL/L (ref 3.6–5.5)
PROT SERPL-MCNC: 7 G/DL (ref 6–8.2)
SODIUM SERPL-SCNC: 142 MMOL/L (ref 135–145)
T4 FREE SERPL-MCNC: 1.28 NG/DL (ref 0.93–1.7)
TRIGL SERPL-MCNC: 144 MG/DL (ref 0–149)
TSH SERPL DL<=0.005 MIU/L-ACNC: 5.66 UIU/ML (ref 0.38–5.33)

## 2020-06-10 PROCEDURE — 80053 COMPREHEN METABOLIC PANEL: CPT

## 2020-06-10 PROCEDURE — 80061 LIPID PANEL: CPT

## 2020-06-10 PROCEDURE — 83036 HEMOGLOBIN GLYCOSYLATED A1C: CPT | Mod: GA

## 2020-06-10 PROCEDURE — 84443 ASSAY THYROID STIM HORMONE: CPT

## 2020-06-10 PROCEDURE — 82043 UR ALBUMIN QUANTITATIVE: CPT

## 2020-06-10 PROCEDURE — 36415 COLL VENOUS BLD VENIPUNCTURE: CPT

## 2020-06-10 PROCEDURE — 82570 ASSAY OF URINE CREATININE: CPT

## 2020-06-10 PROCEDURE — 84439 ASSAY OF FREE THYROXINE: CPT

## 2020-06-15 ENCOUNTER — APPOINTMENT (RX ONLY)
Dept: URBAN - METROPOLITAN AREA CLINIC 4 | Facility: CLINIC | Age: 75
Setting detail: DERMATOLOGY
End: 2020-06-15

## 2020-06-15 DIAGNOSIS — L84 CORNS AND CALLOSITIES: ICD-10-CM

## 2020-06-15 PROBLEM — C44.612 BASAL CELL CARCINOMA OF SKIN OF RIGHT UPPER LIMB, INCLUDING SHOULDER: Status: ACTIVE | Noted: 2020-06-15

## 2020-06-15 PROCEDURE — ? LIQUID NITROGEN (SELF-PAY)

## 2020-06-15 PROCEDURE — ? CURETTAGE AND DESTRUCTION

## 2020-06-15 PROCEDURE — 17262 DSTRJ MAL LES T/A/L 1.1-2.0: CPT

## 2020-06-15 ASSESSMENT — LOCATION SIMPLE DESCRIPTION DERM: LOCATION SIMPLE: RIGHT THUMB

## 2020-06-15 ASSESSMENT — LOCATION DETAILED DESCRIPTION DERM: LOCATION DETAILED: DORSAL INTERPHALANGEAL JOINT RIGHT THUMB

## 2020-06-15 ASSESSMENT — LOCATION ZONE DERM: LOCATION ZONE: FINGER

## 2020-06-15 NOTE — PROCEDURE: CURETTAGE AND DESTRUCTION
Detail Level: Detailed
Biopsy Photograph Reviewed: Yes
Accession # (Optional): [id number]
Size Of Lesion After Curettage: 1.1
Add Intralesional Injection: No
Concentration (Mg/Ml Or Millions Of Plaque Forming Units/Cc): 0.01
Total Volume (Ccs): 1
Anesthesia Type: 1% lidocaine with epinephrine and a 1:10 solution of 8.4% sodium bicarbonate
Anesthesia Volume In Cc: 3
Cautery Type: electrodesiccation
What Was Performed First?: Curettage
Additional Information: (Optional): The wound was cleaned, and a bandage was applied.  The patient received detailed post-op instructions.
Consent was obtained from the patient. The risks, benefits and alternatives to therapy were discussed in detail. Specifically, the risks of infection, scarring, bleeding, prolonged wound healing, nerve injury, incomplete removal, allergy to anesthesia and recurrence were addressed. Alternatives to ED&C, such as: surgical removal and XRT were also discussed.  Prior to the procedure, the treatment site was clearly identified and confirmed by the patient. All components of Universal Protocol/PAUSE Rule completed.
Post-Care Instructions: I reviewed with the patient in detail post-care instructions. Patient is to keep the area dry for 48 hours, and not to engage in any swimming until the area is healed. Should the patient develop any fevers, chills, bleeding, severe pain patient will contact the office immediately.
Bill As A Line Item Or As Units: Line Item

## 2020-06-16 ENCOUNTER — OFFICE VISIT (OUTPATIENT)
Dept: MEDICAL GROUP | Facility: MEDICAL CENTER | Age: 75
End: 2020-06-16
Payer: MEDICARE

## 2020-06-16 VITALS
WEIGHT: 216.05 LBS | HEART RATE: 68 BPM | BODY MASS INDEX: 29.26 KG/M2 | RESPIRATION RATE: 14 BRPM | DIASTOLIC BLOOD PRESSURE: 60 MMHG | SYSTOLIC BLOOD PRESSURE: 128 MMHG | HEIGHT: 72 IN | OXYGEN SATURATION: 97 % | TEMPERATURE: 98 F

## 2020-06-16 DIAGNOSIS — E03.9 ACQUIRED HYPOTHYROIDISM: ICD-10-CM

## 2020-06-16 DIAGNOSIS — E11.9 TYPE 2 DIABETES MELLITUS WITHOUT COMPLICATION, WITHOUT LONG-TERM CURRENT USE OF INSULIN (HCC): ICD-10-CM

## 2020-06-16 DIAGNOSIS — M25.561 POSTERIOR KNEE PAIN, RIGHT: ICD-10-CM

## 2020-06-16 DIAGNOSIS — E78.5 HYPERLIPIDEMIA DUE TO TYPE 2 DIABETES MELLITUS (HCC): ICD-10-CM

## 2020-06-16 DIAGNOSIS — E11.69 HYPERLIPIDEMIA DUE TO TYPE 2 DIABETES MELLITUS (HCC): ICD-10-CM

## 2020-06-16 DIAGNOSIS — R60.0 BILATERAL LEG EDEMA: ICD-10-CM

## 2020-06-16 PROCEDURE — 99214 OFFICE O/P EST MOD 30 MIN: CPT | Performed by: NURSE PRACTITIONER

## 2020-06-16 RX ORDER — LEVOTHYROXINE SODIUM 88 UG/1
88 TABLET ORAL
Qty: 90 TAB | Refills: 3 | Status: SHIPPED | OUTPATIENT
Start: 2020-06-16 | End: 2021-04-07 | Stop reason: SDUPTHER

## 2020-06-16 RX ORDER — FUROSEMIDE 20 MG/1
20 TABLET ORAL
Qty: 30 TAB | Refills: 2 | Status: SHIPPED | OUTPATIENT
Start: 2020-06-16 | End: 2021-01-12

## 2020-06-16 ASSESSMENT — FIBROSIS 4 INDEX: FIB4 SCORE: 1.44

## 2020-06-16 ASSESSMENT — PATIENT HEALTH QUESTIONNAIRE - PHQ9: CLINICAL INTERPRETATION OF PHQ2 SCORE: 0

## 2020-06-16 ASSESSMENT — ACTIVITIES OF DAILY LIVING (ADL): BATHING_REQUIRES_ASSISTANCE: 0

## 2020-06-16 NOTE — PROGRESS NOTES
Subjective:     Chief Complaint   Patient presents with   • Follow-Up     6 month// states possibly for diabetes   • Lab Results     Saleem Whitney is a 74 y.o. male here today to follow up on:    (HCC) Type 2 diabetes mellitus without complication, without long-term current use of insulin  a1c 7.3.   Patient is consistent with Metformin, tradjenta, glipizide.  Watching his diet, doing some light exercise.  No polyuria, polydipsia, chronic kidney disease.  Up-to-date on eye exam although we do not have this record, patient reports no retinopathy    (HCC) Hyperlipidemia due to type 2 diabetes mellitus  Continues to be controlled on atorvastatin.  No chest pain, dizziness, palpitation.  Liver function normal    Acquired hypothyroidism  TSH slightly elevated in recent labs.  He reports that he is consistently taking levothyroxine 75 mcg daily.  Some fatigue but otherwise no complaints related to hypothyroidism     Posterior knee pain  Right posterior knee has been giving him some trouble off and on over recent years but seems to be getting progressively worse, now limiting activity.  Tends to start after a long period of standing and he does get relief with rest.  He has had an ultrasound in the past to evaluate for popliteal cyst which was negative.  No particular history of injury    Current medicines (including changes today)  Current Outpatient Medications   Medication Sig Dispense Refill   • linagliptin (TRADJENTA) 5 MG Tab tablet Take 1 Tab by mouth every day. 90 Tab 3   • linagliptin (TRADJENTA) 5 MG Tab tablet Take 1 Tab by mouth every day. 30 Tab 0   • levothyroxine (SYNTHROID) 88 MCG Tab Take 1 Tab by mouth Every morning on an empty stomach. 90 Tab 3   • furosemide (LASIX) 20 MG Tab Take 1 Tab by mouth 1 time daily as needed. 30 Tab 2   • atorvastatin (LIPITOR) 10 MG Tab Take 1 Tab by mouth every day. 90 Tab 2   • metformin (GLUCOPHAGE) 1000 MG tablet Take 1 Tab by mouth 2 times a day, with meals.  180 Tab 1   • losartan (COZAAR) 100 MG Tab TAKE 1 TABLET EVERY DAY 90 Tab 3   • amLODIPine (NORVASC) 10 MG Tab Take 1 Tab by mouth every day. 90 Tab 3   • glipiZIDE (GLUCOTROL) 10 MG Tab Take 1 Tab by mouth 2 times a day. 180 Tab 3   • aspirin (ASA) 81 MG Chew Tab chewable tablet Take 1 Tab by mouth every day. 90 Tab 3   • vitamin D (CHOLECALCIFEROL) 1000 UNIT Tab Take 1 Tab by mouth every day. 90 Tab 3   • Magnesium 500 MG Tab Take 1,000 mg by mouth every day. 90 Tab 3   • Blood Glucose Test Strips Test strips for OneTouch Ultra meter. Sig: use fasting first thing in the morning, and prn ssx high or low sugar 100 Each 3   • Lancets Lancets for OneTouch Ultra meter. Sig: use fasting first thing in the morning, and prn ssx high or low sugar 100 Each 3   • furosemide (LASIX) 20 MG Tab Take 1 Tab by mouth every day. (Patient not taking: Reported on 9/19/2019) 30 Tab 0     No current facility-administered medications for this visit.      He  has a past medical history of Hyperlipidemia, Hypertension, Hypothyroid, and Type II or unspecified type diabetes mellitus without mention of complication, not stated as uncontrolled. He also has no past medical history of Low vitamin D level.    ROS included above     Objective:     /60 (BP Location: Right arm, Patient Position: Sitting, BP Cuff Size: Adult)   Pulse 68   Temp 36.7 °C (98 °F) (Temporal)   Resp 14   Ht 1.829 m (6')   Wt 98 kg (216 lb 0.8 oz)   SpO2 97%  Body mass index is 29.3 kg/m².     Physical Exam:  General: Alert, oriented in no acute distress.  Eye contact is good, speech is normal, affect calm  Lungs: clear to auscultation bilaterally, normal effort, no wheeze/ rhonchi/ rales.  CV: regular rate and rhythm, S1, S2, no murmur  MS: Right popliteal tenderness without obvious mass, normal flexion extension of the knee.  No joint swelling  Ext: no edema, color normal, vascularity normal, temperature normal    Assessment and Plan:   The following  treatment plan was discussed  1. Type 2 diabetes mellitus without complication, without long-term current use of insulin (HCC)   stable and reasonably controlled for age, encouraged to work on diet and exercise regimen.  Continue current medications  linagliptin (TRADJENTA) 5 MG Tab tablet    HEMOGLOBIN A1C    Lipid Profile    Comp Metabolic Panel   2. Bilateral leg edema   medication refill provided, encourage increased ambulation, compression stockings if needed  furosemide (LASIX) 20 MG Tab   3. Posterior knee pain, right   intermittent issues over the last several years, ultrasound in the past not definitive for popliteal cyst.  He is interested in seeing an orthopedic at this point as it is limiting his activity  DX-KNEE 2- RIGHT    REFERRAL TO ORTHOPEDICS   4. Acquired hypothyroidism   TSH slightly elevated, we will increase levothyroxine to 88 mcg daily.  Repeat prior to next visit  TSH WITH REFLEX TO FT4   5. (HCC) Hyperlipidemia due to type 2 diabetes mellitus   continue statin       Followup: 6 months         Please note that this dictation was created using voice recognition software. I have worked with consultants from the vendor as well as technical experts from [facility] [employer] to optimize the interface. I have made every reasonable attempt to correct obvious errors, but I expect that there are errors of grammar and possibly content that I did not discover before finalizing the note.

## 2020-06-16 NOTE — ASSESSMENT & PLAN NOTE
a1c 7.3.   Patient is consistent with Metformin, tradjenta, glipizide.  Watching his diet, doing some light exercise.  No polyuria, polydipsia, chronic kidney disease.  Up-to-date on eye exam although we do not have this record, patient reports no retinopathy

## 2020-06-16 NOTE — ASSESSMENT & PLAN NOTE
Continues to be controlled on atorvastatin.  No chest pain, dizziness, palpitation.  Liver function normal

## 2020-06-16 NOTE — ASSESSMENT & PLAN NOTE
TSH slightly elevated in recent labs.  He reports that he is consistently taking levothyroxine 75 mcg daily.  Some fatigue but otherwise no complaints related to hypothyroidism

## 2020-08-03 ENCOUNTER — PATIENT MESSAGE (OUTPATIENT)
Dept: MEDICAL GROUP | Facility: MEDICAL CENTER | Age: 75
End: 2020-08-03

## 2020-08-03 NOTE — TELEPHONE ENCOUNTER
From: Saleem Whitney  To: SCOOBY Ulloa  Sent: 8/3/2020 4:47 PM PDT  Subject: Non-Urgent Medical Question    I have a 1[20 appointment on Thursday.      ----- Message -----   From:SCOOBY Ulloa   Sent:8/3/2020 4:38 PM PDT   To:Saleem Miky Chelo   Subject:RE: Non-Urgent Medical Question    Saleem,  You really do need an appointment for this. We will need to do some urine testing. Please call to schedule. If you have difficulty getting an appointment let me know, or you can certainly go to urgent care. I am out of the office until Wednesday.  Meron LYONS      ----- Message -----   From:Saleem Bolaños Chelo   Sent:8/3/2020 6:25 AM PDT   To:SCOOBY Ulloa   Subject:Non-Urgent Medical Question    I think I have a kidney infection and am wondering whether or not I need to make an appointment or if you can just send a Rx to the [facility]? I am having severe pains in my lower back on the left side and am peeing more than twice as much as normal (4/5 times at night and 10 to 12 times during the day. There is no blood in my urine nor pain when I go. I am not getting much sleep as every time I move, I get a sharp enough pain to wake me up. Please advise as to whether I need to come in and how soon.

## 2020-08-06 ENCOUNTER — OFFICE VISIT (OUTPATIENT)
Dept: MEDICAL GROUP | Facility: MEDICAL CENTER | Age: 75
End: 2020-08-06
Payer: MEDICARE

## 2020-08-06 VITALS
WEIGHT: 213.85 LBS | DIASTOLIC BLOOD PRESSURE: 82 MMHG | SYSTOLIC BLOOD PRESSURE: 144 MMHG | HEIGHT: 71 IN | BODY MASS INDEX: 29.94 KG/M2 | OXYGEN SATURATION: 97 % | HEART RATE: 66 BPM | TEMPERATURE: 98.4 F | RESPIRATION RATE: 16 BRPM

## 2020-08-06 DIAGNOSIS — M54.50 ACUTE RIGHT-SIDED LOW BACK PAIN WITHOUT SCIATICA: ICD-10-CM

## 2020-08-06 PROCEDURE — 99214 OFFICE O/P EST MOD 30 MIN: CPT | Performed by: NURSE PRACTITIONER

## 2020-08-06 ASSESSMENT — FIBROSIS 4 INDEX: FIB4 SCORE: 1.44

## 2020-08-06 NOTE — PROGRESS NOTES
Subjective:     Chief Complaint   Patient presents with   • Other     possible kidney infection, lower L back side pain, no blood in urine, 8 days     Saleem Whitney is a 74 y.o. male here today with concerns of right low back pain which started a week ago.  States that it comes on suddenly, pain was 9 out of 10 at the time.  He noted increased urination and felt that this may be indication of a kidney infection.  Over the last several days symptoms have slowly subsided, his urinary frequency is resolved.  His pain today in clinic is 3-4 out of 10.  He has not been using any medications for this.  No history of pyelonephritis or renal calculi.  No nausea, vomiting, dysuria, hematuria, fever, chills, fatigue, malaise    No problem-specific Assessment & Plan notes found for this encounter.       Current medicines (including changes today)  Current Outpatient Medications   Medication Sig Dispense Refill   • levothyroxine (SYNTHROID) 88 MCG Tab Take 1 Tab by mouth Every morning on an empty stomach. 90 Tab 3   • furosemide (LASIX) 20 MG Tab Take 1 Tab by mouth 1 time daily as needed. 30 Tab 2   • atorvastatin (LIPITOR) 10 MG Tab Take 1 Tab by mouth every day. 90 Tab 2   • metformin (GLUCOPHAGE) 1000 MG tablet Take 1 Tab by mouth 2 times a day, with meals. 180 Tab 1   • losartan (COZAAR) 100 MG Tab TAKE 1 TABLET EVERY DAY 90 Tab 3   • amLODIPine (NORVASC) 10 MG Tab Take 1 Tab by mouth every day. 90 Tab 3   • glipiZIDE (GLUCOTROL) 10 MG Tab Take 1 Tab by mouth 2 times a day. 180 Tab 3   • aspirin (ASA) 81 MG Chew Tab chewable tablet Take 1 Tab by mouth every day. 90 Tab 3   • vitamin D (CHOLECALCIFEROL) 1000 UNIT Tab Take 1 Tab by mouth every day. 90 Tab 3   • Magnesium 500 MG Tab Take 1,000 mg by mouth every day. 90 Tab 3   • Blood Glucose Test Strips Test strips for OneTouch Ultra meter. Sig: use fasting first thing in the morning, and prn ssx high or low sugar 100 Each 3   • Lancets Lancets for OneTouch Ultra  "meter. Sig: use fasting first thing in the morning, and prn ssx high or low sugar 100 Each 3   • linagliptin (TRADJENTA) 5 MG Tab tablet Take 1 Tab by mouth every day. (Patient not taking: Reported on 8/6/2020) 90 Tab 3   • linagliptin (TRADJENTA) 5 MG Tab tablet Take 1 Tab by mouth every day. (Patient not taking: Reported on 8/6/2020) 30 Tab 0   • furosemide (LASIX) 20 MG Tab Take 1 Tab by mouth every day. (Patient not taking: Reported on 9/19/2019) 30 Tab 0     No current facility-administered medications for this visit.      He  has a past medical history of Hyperlipidemia, Hypertension, Hypothyroid, and Type II or unspecified type diabetes mellitus without mention of complication, not stated as uncontrolled. He also has no past medical history of Low vitamin D level.    ROS included above     Objective:     /82 (BP Location: Left arm, Patient Position: Sitting, BP Cuff Size: Adult)   Pulse 66   Temp 36.9 °C (98.4 °F) (Temporal)   Resp 16   Ht 1.803 m (5' 11\")   Wt 97 kg (213 lb 13.5 oz)   SpO2 97%  Body mass index is 29.83 kg/m².     Physical Exam:  General: Alert, oriented in no acute distress.  Eye contact is good, speech is normal, affect calm  Lungs: clear to auscultation bilaterally, normal effort, no wheeze/ rhonchi/ rales.  CV: regular rate and rhythm, S1, S2, no murmur  Abdomen: soft, nontender, No CVAT  MS: No point tenderness over spinous process.  Left low back muscular tenderness without hypertonicity.  Strength is 5 out of 5 in distal and proximal lower extremities.   Ext: no edema, color normal, vascularity normal, temperature normal    Assessment and Plan:   The following treatment plan was discussed  1. Acute right-sided low back pain without sciatica   patient presents today with concerns of possible pyelonephritis.  Location of his discomfort is quite a bit lower than would be expected for kidney pain.  UA negative for blood, nitrates, leukocytes.  suspect muscular strain.  He has " already started to improve without treatment, reports pain today is 3-4 out of 10.  We will monitor for now but he will contact me for any further concerns       Followup: as needed       Please note that this dictation was created using voice recognition software. I have worked with consultants from the vendor as well as technical experts from [facility] to optimize the interface. I have made every reasonable attempt to correct obvious errors, but I expect that there are errors of grammar and possibly content that I did not discover before finalizing the note.

## 2020-08-07 ENCOUNTER — PATIENT MESSAGE (OUTPATIENT)
Dept: MEDICAL GROUP | Facility: MEDICAL CENTER | Age: 75
End: 2020-08-07

## 2020-08-17 ENCOUNTER — PATIENT MESSAGE (OUTPATIENT)
Dept: MEDICAL GROUP | Facility: MEDICAL CENTER | Age: 75
End: 2020-08-17

## 2020-08-17 DIAGNOSIS — E11.9 TYPE 2 DIABETES MELLITUS WITHOUT COMPLICATION, WITHOUT LONG-TERM CURRENT USE OF INSULIN (HCC): ICD-10-CM

## 2020-08-17 RX ORDER — GLIPIZIDE 10 MG/1
10 TABLET ORAL 2 TIMES DAILY
Qty: 180 TAB | Refills: 3 | Status: SHIPPED | OUTPATIENT
Start: 2020-08-17 | End: 2021-04-07 | Stop reason: SDUPTHER

## 2020-08-17 RX ORDER — GLIPIZIDE 10 MG/1
10 TABLET ORAL 2 TIMES DAILY
Qty: 180 TAB | Refills: 3 | OUTPATIENT
Start: 2020-08-17

## 2020-08-17 NOTE — TELEPHONE ENCOUNTER
Received request via: Pharmacy    Was the patient seen in the last year in this department? Yes    Does the patient have an active prescription (recently filled or refills available) for medication(s) requested? No, ran out on 7/12

## 2020-08-17 NOTE — TELEPHONE ENCOUNTER
From: Saleem Whitney  To: SCOOBY Ulloa  Sent: 8/17/2020 8:45 AM PDT  Subject: Non-Urgent Medical Question    Just to update you, my back pain has reduced to tolerable level but my blood pressure has not (134/70 ; 136/64). I am almost out of Glipizide and have requested a new Rx.

## 2020-11-08 ENCOUNTER — PATIENT MESSAGE (OUTPATIENT)
Dept: MEDICAL GROUP | Facility: MEDICAL CENTER | Age: 75
End: 2020-11-08

## 2020-11-08 NOTE — TELEPHONE ENCOUNTER
From: Saleem Whitney  To: SCOOBY Ulloa  Sent: 11/8/2020 9:39 AM PST  Subject: Non-Urgent Medical Question    My legs, especially behind the knees has gotten worse (no apparent swelling) since I had the veins in my legs cauterized. It has gotten to the point that I cannot get out of a chair without using my arms to push up. Do I need an appointment with you or can you give me a referral to a doctor.

## 2020-11-09 ENCOUNTER — PATIENT MESSAGE (OUTPATIENT)
Dept: MEDICAL GROUP | Facility: MEDICAL CENTER | Age: 75
End: 2020-11-09

## 2020-11-09 DIAGNOSIS — R29.898 LEG WEAKNESS, BILATERAL: ICD-10-CM

## 2020-11-09 NOTE — TELEPHONE ENCOUNTER
From: Saleem Whitney  To: SCOOBY Ulloa  Sent: 11/9/2020 8:50 AM PST  Subject: Non-Urgent Medical Question    I am not sure that it will help, but I am willing to try anything. Can I just go to [facility] or do I need an Rx.      ----- Message -----   From:SCOOBY Ulloa   Sent:11/8/2020 12:03 PM PST   To:Saleem Whitney   Subject:RE: Non-Urgent Medical Question    Would you be interested in trying physical therapy to build up the strength in your legs?  Meron LYONS      ----- Message -----   From:Saleem Whitney   Sent:11/8/2020 9:39 AM PST   To:SCOOBY Ulloa   Subject:Non-Urgent Medical Question    My legs, especially behind the knees has gotten worse (no apparent swelling) since I had the veins in my legs cauterized. It has gotten to the point that I cannot get out of a chair without using my arms to push up. Do I need an appointment with you or can you give me a referral to a doctor.

## 2020-12-01 ENCOUNTER — APPOINTMENT (RX ONLY)
Dept: URBAN - METROPOLITAN AREA CLINIC 4 | Facility: CLINIC | Age: 75
Setting detail: DERMATOLOGY
End: 2020-12-01

## 2020-12-01 DIAGNOSIS — L57.0 ACTINIC KERATOSIS: ICD-10-CM

## 2020-12-01 DIAGNOSIS — L81.4 OTHER MELANIN HYPERPIGMENTATION: ICD-10-CM

## 2020-12-01 DIAGNOSIS — L82.1 OTHER SEBORRHEIC KERATOSIS: ICD-10-CM

## 2020-12-01 DIAGNOSIS — D18.0 HEMANGIOMA: ICD-10-CM

## 2020-12-01 DIAGNOSIS — Z85.828 PERSONAL HISTORY OF OTHER MALIGNANT NEOPLASM OF SKIN: ICD-10-CM

## 2020-12-01 PROBLEM — D18.01 HEMANGIOMA OF SKIN AND SUBCUTANEOUS TISSUE: Status: ACTIVE | Noted: 2020-12-01

## 2020-12-01 PROBLEM — D48.5 NEOPLASM OF UNCERTAIN BEHAVIOR OF SKIN: Status: ACTIVE | Noted: 2020-12-01

## 2020-12-01 PROCEDURE — 17000 DESTRUCT PREMALG LESION: CPT | Mod: 59

## 2020-12-01 PROCEDURE — 11102 TANGNTL BX SKIN SINGLE LES: CPT

## 2020-12-01 PROCEDURE — ? BIOPSY BY SHAVE METHOD

## 2020-12-01 PROCEDURE — ? LIQUID NITROGEN

## 2020-12-01 PROCEDURE — 17003 DESTRUCT PREMALG LES 2-14: CPT

## 2020-12-01 PROCEDURE — ? OBSERVATION

## 2020-12-01 PROCEDURE — 99213 OFFICE O/P EST LOW 20 MIN: CPT | Mod: 25

## 2020-12-01 ASSESSMENT — LOCATION SIMPLE DESCRIPTION DERM
LOCATION SIMPLE: LEFT FOREHEAD
LOCATION SIMPLE: RIGHT SHOULDER
LOCATION SIMPLE: UPPER BACK
LOCATION SIMPLE: RIGHT UPPER BACK
LOCATION SIMPLE: RIGHT FOREHEAD
LOCATION SIMPLE: SUPERIOR FOREHEAD
LOCATION SIMPLE: RIGHT UPPER ARM
LOCATION SIMPLE: RIGHT FOREARM
LOCATION SIMPLE: SCALP
LOCATION SIMPLE: POSTERIOR SCALP

## 2020-12-01 ASSESSMENT — LOCATION DETAILED DESCRIPTION DERM
LOCATION DETAILED: INFERIOR THORACIC SPINE
LOCATION DETAILED: LEFT SUPERIOR FOREHEAD
LOCATION DETAILED: RIGHT MEDIAL UPPER BACK
LOCATION DETAILED: RIGHT PROXIMAL POSTERIOR UPPER ARM
LOCATION DETAILED: RIGHT DISTAL DORSAL FOREARM
LOCATION DETAILED: LEFT CENTRAL PARIETAL SCALP
LOCATION DETAILED: RIGHT SUPERIOR MEDIAL FOREHEAD
LOCATION DETAILED: LEFT SUPERIOR POSTERIOR PARIETAL SCALP
LOCATION DETAILED: SUPERIOR THORACIC SPINE
LOCATION DETAILED: RIGHT FOREHEAD
LOCATION DETAILED: RIGHT POSTERIOR SHOULDER
LOCATION DETAILED: RIGHT INFERIOR FOREHEAD
LOCATION DETAILED: SUPERIOR MID FOREHEAD

## 2020-12-01 ASSESSMENT — LOCATION ZONE DERM
LOCATION ZONE: SCALP
LOCATION ZONE: FACE
LOCATION ZONE: ARM
LOCATION ZONE: TRUNK

## 2020-12-01 NOTE — PROCEDURE: BIOPSY BY SHAVE METHOD
Detail Level: Detailed
Depth Of Biopsy: dermis
Was A Bandage Applied: Yes
Size Of Lesion In Cm: 0
Anticipated Plan (Based On Presumed Biopsy Results): C&D
Biopsy Type: H and E
Biopsy Method: Personna blade
Anesthesia Type: 1% lidocaine with epinephrine and a 1:10 solution of 8.4% sodium bicarbonate
Anesthesia Volume In Cc: 0.5
Hemostasis: Drysol and Electrocautery
Wound Care: Vaseline
Dressing: Band-Aid
Destruction After The Procedure: No
Type Of Destruction Used: Curettage
Curettage Text: The wound bed was treated with curettage after the biopsy was performed.
Electrodesiccation Text: The wound bed was treated with electrodesiccation after the biopsy was performed.
Electrodesiccation And Curettage Text: The wound bed was treated with electrodesiccation and curettage after the biopsy was performed.
Lab: [id number]
Lab Facility: 
Consent: Verbal consent was obtained and risks were reviewed including but not limited to scarring, infection, bleeding, scabbing, incomplete removal, nerve damage and allergy to anesthesia.
Post-Care Instructions: I reviewed with the patient in detail post-care instructions. Patient is to keep the biopsy site dry overnight, and then apply vasaline twice daily until healed.
Notification Instructions: Patient will be notified of biopsy results. However, patient instructed to call the office if not contacted within 2 weeks.
Billing Type: Third-Party Bill
Information: Selecting Yes will display possible errors in your note based on the variables you have selected. This validation is only offered as a suggestion for you. PLEASE NOTE THAT THE VALIDATION TEXT WILL BE REMOVED WHEN YOU FINALIZE YOUR NOTE. IF YOU WANT TO FAX A PRELIMINARY NOTE YOU WILL NEED TO TOGGLE THIS TO 'NO' IF YOU DO NOT WANT IT IN YOUR FAXED NOTE.

## 2020-12-02 DIAGNOSIS — E11.9 TYPE 2 DIABETES MELLITUS WITHOUT COMPLICATION, WITHOUT LONG-TERM CURRENT USE OF INSULIN (HCC): ICD-10-CM

## 2020-12-09 ENCOUNTER — APPOINTMENT (RX ONLY)
Dept: URBAN - METROPOLITAN AREA CLINIC 4 | Facility: CLINIC | Age: 75
Setting detail: DERMATOLOGY
End: 2020-12-09

## 2020-12-09 PROBLEM — C44.719 BASAL CELL CARCINOMA OF SKIN OF LEFT LOWER LIMB, INCLUDING HIP: Status: ACTIVE | Noted: 2020-12-09

## 2020-12-09 PROCEDURE — ? CURETTAGE AND DESTRUCTION

## 2020-12-09 PROCEDURE — ? PRESCRIPTION

## 2020-12-09 PROCEDURE — ? ADDITIONAL NOTES

## 2020-12-09 PROCEDURE — ? MEDICATION COUNSELING

## 2020-12-09 PROCEDURE — 17262 DSTRJ MAL LES T/A/L 1.1-2.0: CPT | Mod: 79

## 2020-12-09 RX ORDER — IMIQUIMOD 50 MG/G
1 CREAM TOPICAL DAILY
Qty: 6 | Refills: 1 | Status: ERX | COMMUNITY
Start: 2020-12-09

## 2020-12-09 RX ADMIN — IMIQUIMOD 1: 50 CREAM TOPICAL at 00:00

## 2020-12-09 NOTE — PROCEDURE: ADDITIONAL NOTES
Additional Notes: The initial biopsy site was marked incorrectly (see biopsy photo to confirm). \\nChanging site to correct location today.\\n\\Lucas addition to doing the ED&C today, plan to use Imiqumod 5% cream to the site for Mo-Fr off on weekends for 6 weeks as tolerated.  \\nIf site reacts strongly to the treatment, stop cream for 7-10 day then resume treatment plan.\\nFollow up in 3 months.
Detail Level: Zone

## 2020-12-09 NOTE — PROCEDURE: MEDICATION COUNSELING
Otezla Counseling: The side effects of Otezla were discussed with the patient, including but not limited to worsening or new depression, weight loss, diarrhea, nausea, upper respiratory tract infection, and headache. Patient instructed to call the office should any adverse effect occur.  The patient verbalized understanding of the proper use and possible adverse effects of Otezla.  All the patient's questions and concerns were addressed.
Use Enhanced Medication Counseling?: No
High Dose Vitamin A Pregnancy And Lactation Text: High dose vitamin A therapy is contraindicated during pregnancy and breast feeding.
Cimetidine Counseling:  I discussed with the patient the risks of Cimetidine including but not limited to gynecomastia, headache, diarrhea, nausea, drowsiness, arrhythmias, pancreatitis, skin rashes, psychosis, bone marrow suppression and kidney toxicity.
Hydroquinone Pregnancy And Lactation Text: This medication has not been assigned a Pregnancy Risk Category but animal studies failed to show danger with the topical medication. It is unknown if the medication is excreted in breast milk.
Rifampin Counseling: I discussed with the patient the risks of rifampin including but not limited to liver damage, kidney damage, red-orange body fluids, nausea/vomiting and severe allergy.
Azithromycin Counseling:  I discussed with the patient the risks of azithromycin including but not limited to GI upset, allergic reaction, drug rash, diarrhea, and yeast infections.
Skyrizi Pregnancy And Lactation Text: The risk during pregnancy and breastfeeding is uncertain with this medication.
Topical Clindamycin Counseling: Patient counseled that this medication may cause skin irritation or allergic reactions.  In the event of skin irritation, the patient was advised to reduce the amount of the drug applied or use it less frequently.   The patient verbalized understanding of the proper use and possible adverse effects of clindamycin.  All of the patient's questions and concerns were addressed.
Imiquimod Counseling:  I discussed with the patient the risks of imiquimod including but not limited to erythema, scaling, itching, weeping, crusting, and pain.  Patient understands that the inflammatory response to imiquimod is variable from person to person and was educated regarded proper titration schedule.  If flu-like symptoms develop, patient knows to discontinue the medication and contact us.
Dupixent Counseling: I discussed with the patient the risks of dupilumab including but not limited to eye infection and irritation, cold sores, injection site reactions, worsening of asthma, allergic reactions and increased risk of parasitic infection.  Live vaccines should be avoided while taking dupilumab. Dupilumab will also interact with certain medications such as warfarin and cyclosporine. The patient understands that monitoring is required and they must alert us or the primary physician if symptoms of infection or other concerning signs are noted.
Dapsone Counseling: I discussed with the patient the risks of dapsone including but not limited to hemolytic anemia, agranulocytosis, rashes, methemoglobinemia, kidney failure, peripheral neuropathy, headaches, GI upset, and liver toxicity.  Patients who start dapsone require monitoring including baseline LFTs and weekly CBCs for the first month, then every month thereafter.  The patient verbalized understanding of the proper use and possible adverse effects of dapsone.  All of the patient's questions and concerns were addressed.
Rifampin Pregnancy And Lactation Text: This medication is Pregnancy Category C and it isn't know if it is safe during pregnancy. It is also excreted in breast milk and should not be used if you are breast feeding.
Cimetidine Pregnancy And Lactation Text: This medication is Pregnancy Category B and is considered safe during pregnancy. It is also excreted in breast milk and breast feeding isn't recommended.
Azithromycin Pregnancy And Lactation Text: This medication is considered safe during pregnancy and is also secreted in breast milk.
Stelara Counseling:  I discussed with the patient the risks of ustekinumab including but not limited to immunosuppression, malignancy, posterior leukoencephalopathy syndrome, and serious infections.  The patient understands that monitoring is required including a PPD at baseline and must alert us or the primary physician if symptoms of infection or other concerning signs are noted.
Otezla Pregnancy And Lactation Text: This medication is Pregnancy Category C and it isn't known if it is safe during pregnancy. It is unknown if it is excreted in breast milk.
Dupixent Pregnancy And Lactation Text: This medication likely crosses the placenta but the risk for the fetus is uncertain. This medication is excreted in breast milk.
Topical Clindamycin Pregnancy And Lactation Text: This medication is Pregnancy Category B and is considered safe during pregnancy. It is unknown if it is excreted in breast milk.
Stelara Pregnancy And Lactation Text: This medication is Pregnancy Category B and is considered safe during pregnancy. It is unknown if this medication is excreted in breast milk.
Bactrim Counseling:  I discussed with the patient the risks of sulfa antibiotics including but not limited to GI upset, allergic reaction, drug rash, diarrhea, dizziness, photosensitivity, and yeast infections.  Rarely, more serious reactions can occur including but not limited to aplastic anemia, agranulocytosis, methemoglobinemia, blood dyscrasias, liver or kidney failure, lung infiltrates or desquamative/blistering drug rashes.
Cellcept Pregnancy And Lactation Text: This medication is Pregnancy Category D and isn't considered safe during pregnancy. It is unknown if this medication is excreted in breast milk.
Dapsone Pregnancy And Lactation Text: This medication is Pregnancy Category C and is not considered safe during pregnancy or breast feeding.
Imiquimod Pregnancy And Lactation Text: This medication is Pregnancy Category C. It is unknown if this medication is excreted in breast milk.
Erivedge Counseling- I discussed with the patient the risks of Erivedge including but not limited to nausea, vomiting, diarrhea, constipation, weight loss, changes in the sense of taste, decreased appetite, muscle spasms, and hair loss.  The patient verbalized understanding of the proper use and possible adverse effects of Erivedge.  All of the patient's questions and concerns were addressed.
Cyclophosphamide Counseling:  I discussed with the patient the risks of cyclophosphamide including but not limited to hair loss, hormonal abnormalities, decreased fertility, abdominal pain, diarrhea, nausea and vomiting, bone marrow suppression and infection. The patient understands that monitoring is required while taking this medication.
Sarecycline Counseling: Patient advised regarding possible photosensitivity and discoloration of the teeth, skin, lips, tongue and gums.  Patient instructed to avoid sunlight, if possible.  When exposed to sunlight, patients should wear protective clothing, sunglasses, and sunscreen.  The patient was instructed to call the office immediately if the following severe adverse effects occur:  hearing changes, easy bruising/bleeding, severe headache, or vision changes.  The patient verbalized understanding of the proper use and possible adverse effects of sarecycline.  All of the patient's questions and concerns were addressed.
Oxybutynin Counseling:  I discussed with the patient the risks of oxybutynin including but not limited to skin rash, drowsiness, dry mouth, difficulty urinating, and blurred vision.
Doxepin Counseling:  Patient advised that the medication is sedating and not to drive a car after taking this medication. Patient informed of potential adverse effects including but not limited to dry mouth, urinary retention, and blurry vision.  The patient verbalized understanding of the proper use and possible adverse effects of doxepin.  All of the patient's questions and concerns were addressed.
Minoxidil Counseling: Minoxidil is a topical medication which can increase blood flow where it is applied. It is uncertain how this medication increases hair growth. Side effects are uncommon and include stinging and allergic reactions.
Benzoyl Peroxide Counseling: Patient counseled that medicine may cause skin irritation and bleach clothing.  In the event of skin irritation, the patient was advised to reduce the amount of the drug applied or use it less frequently.   The patient verbalized understanding of the proper use and possible adverse effects of benzoyl peroxide.  All of the patient's questions and concerns were addressed.
Enbrel Counseling:  I discussed with the patient the risks of etanercept including but not limited to myelosuppression, immunosuppression, autoimmune hepatitis, demyelinating diseases, lymphoma, and infections.  The patient understands that monitoring is required including a PPD at baseline and must alert us or the primary physician if symptoms of infection or other concerning signs are noted.
Taltz Counseling: I discussed with the patient the risks of ixekizumab including but not limited to immunosuppression, serious infections, worsening of inflammatory bowel disease and drug reactions.  The patient understands that monitoring is required including a PPD at baseline and must alert us or the primary physician if symptoms of infection or other concerning signs are noted.
Bactrim Pregnancy And Lactation Text: This medication is Pregnancy Category D and is known to cause fetal risk.  It is also excreted in breast milk.
Topical Sulfur Applications Counseling: Topical Sulfur Counseling: Patient counseled that this medication may cause skin irritation or allergic reactions.  In the event of skin irritation, the patient was advised to reduce the amount of the drug applied or use it less frequently.   The patient verbalized understanding of the proper use and possible adverse effects of topical sulfur application.  All of the patient's questions and concerns were addressed.
Doxepin Pregnancy And Lactation Text: This medication is Pregnancy Category C and it isn't known if it is safe during pregnancy. It is also excreted in breast milk and breast feeding isn't recommended.
Topical Sulfur Applications Pregnancy And Lactation Text: This medication is Pregnancy Category C and has an unknown safety profile during pregnancy. It is unknown if this topical medication is excreted in breast milk.
Erivedge Pregnancy And Lactation Text: This medication is Pregnancy Category X and is absolutely contraindicated during pregnancy. It is unknown if it is excreted in breast milk.
Sarecycline Pregnancy And Lactation Text: This medication is Pregnancy Category D and not consider safe during pregnancy. It is also excreted in breast milk.
Cyclophosphamide Pregnancy And Lactation Text: This medication is Pregnancy Category D and it isn't considered safe during pregnancy. This medication is excreted in breast milk.
Finasteride Male Counseling: Finasteride Counseling:  I discussed with the patient the risks of use of finasteride including but not limited to decreased libido, decreased ejaculate volume, gynecomastia, and depression. Women should not handle medication.  All of the patient's questions and concerns were addressed.
Benzoyl Peroxide Pregnancy And Lactation Text: This medication is Pregnancy Category C. It is unknown if benzoyl peroxide is excreted in breast milk.
Humira Counseling:  I discussed with the patient the risks of adalimumab including but not limited to myelosuppression, immunosuppression, autoimmune hepatitis, demyelinating diseases, lymphoma, and serious infections.  The patient understands that monitoring is required including a PPD at baseline and must alert us or the primary physician if symptoms of infection or other concerning signs are noted.
Wartpeel Counseling:  I discussed with the patient the risks of Wartpeel including but not limited to erythema, scaling, itching, weeping, crusting, and pain.
Tremfya Counseling: I discussed with the patient the risks of guselkumab including but not limited to immunosuppression, serious infections, worsening of inflammatory bowel disease and drug reactions.  The patient understands that monitoring is required including a PPD at baseline and must alert us or the primary physician if symptoms of infection or other concerning signs are noted.
Tetracycline Counseling: Patient counseled regarding possible photosensitivity and increased risk for sunburn.  Patient instructed to avoid sunlight, if possible.  When exposed to sunlight, patients should wear protective clothing, sunglasses, and sunscreen.  The patient was instructed to call the office immediately if the following severe adverse effects occur:  hearing changes, easy bruising/bleeding, severe headache, or vision changes.  The patient verbalized understanding of the proper use and possible adverse effects of tetracycline.  All of the patient's questions and concerns were addressed. Patient understands to avoid pregnancy while on therapy due to potential birth defects.
Cephalexin Counseling: I counseled the patient regarding use of cephalexin as an antibiotic for prophylactic and/or therapeutic purposes. Cephalexin (commonly prescribed under brand name Keflex) is a cephalosporin antibiotic which is active against numerous classes of bacteria, including most skin bacteria. Side effects may include nausea, diarrhea, gastrointestinal upset, rash, hives, yeast infections, and in rare cases, hepatitis, kidney disease, seizures, fever, confusion, neurologic symptoms, and others. Patients with severe allergies to penicillin medications are cautioned that there is about a 10% incidence of cross-reactivity with cephalosporins. When possible, patients with penicillin allergies should use alternatives to cephalosporins for antibiotic therapy.
Cyclosporine Counseling:  I discussed with the patient the risks of cyclosporine including but not limited to hypertension, gingival hyperplasia,myelosuppression, immunosuppression, liver damage, kidney damage, neurotoxicity, lymphoma, and serious infections. The patient understands that monitoring is required including baseline blood pressure, CBC, CMP, lipid panel and uric acid, and then 1-2 times monthly CMP and blood pressure.
Propranolol Counseling:  I discussed with the patient the risks of propranolol including but not limited to low heart rate, low blood pressure, low blood sugar, restlessness and increased cold sensitivity. They should call the office if they experience any of these side effects.
Hydroxyzine Counseling: Patient advised that the medication is sedating and not to drive a car after taking this medication.  Patient informed of potential adverse effects including but not limited to dry mouth, urinary retention, and blurry vision.  The patient verbalized understanding of the proper use and possible adverse effects of hydroxyzine.  All of the patient's questions and concerns were addressed.
Cyclosporine Pregnancy And Lactation Text: This medication is Pregnancy Category C and it isn't know if it is safe during pregnancy. This medication is excreted in breast milk.
Propranolol Pregnancy And Lactation Text: This medication is Pregnancy Category C and it isn't known if it is safe during pregnancy. It is excreted in breast milk.
Mirvaso Counseling: Mirvaso is a topical medication which can decrease superficial blood flow where applied. Side effects are uncommon and include stinging, redness and allergic reactions.
Carac Counseling:  I discussed with the patient the risks of Carac including but not limited to erythema, scaling, itching, weeping, crusting, and pain.
Cephalexin Pregnancy And Lactation Text: This medication is Pregnancy Category B and considered safe during pregnancy.  It is also excreted in breast milk but can be used safely for shorter doses.
Mirvaso Pregnancy And Lactation Text: This medication has not been assigned a Pregnancy Risk Category. It is unknown if the medication is excreted in breast milk.
Hydroxyzine Pregnancy And Lactation Text: This medication is not safe during pregnancy and should not be taken. It is also excreted in breast milk and breast feeding isn't recommended.
Finasteride Pregnancy And Lactation Text: This medication is absolutely contraindicated during pregnancy. It is unknown if it is excreted in breast milk.
Wartpeel Pregnancy And Lactation Text: This medication is Pregnancy Category X and contraindicated in pregnancy and in women who may become pregnant. It is unknown if this medication is excreted in breast milk.
Clindamycin Counseling: I counseled the patient regarding use of clindamycin as an antibiotic for prophylactic and/or therapeutic purposes. Clindamycin is active against numerous classes of bacteria, including skin bacteria. Side effects may include nausea, diarrhea, gastrointestinal upset, rash, hives, yeast infections, and in rare cases, colitis.
Zyclara Counseling:  I discussed with the patient the risks of imiquimod including but not limited to erythema, scaling, itching, weeping, crusting, and pain.  Patient understands that the inflammatory response to imiquimod is variable from person to person and was educated regarded proper titration schedule.  If flu-like symptoms develop, patient knows to discontinue the medication and contact us.
Methotrexate Counseling:  Patient counseled regarding adverse effects of methotrexate including but not limited to nausea, vomiting, abnormalities in liver function tests. Patients may develop mouth sores, rash, diarrhea, and abnormalities in blood counts. The patient understands that monitoring is required including LFT's and blood counts.  There is a rare possibility of scarring of the liver and lung problems that can occur when taking methotrexate. Persistent nausea, loss of appetite, pale stools, dark urine, cough, and shortness of breath should be reported immediately. Patient advised to discontinue methotrexate treatment at least three months before attempting to become pregnant.  I discussed the need for folate supplements while taking methotrexate.  These supplements can decrease side effects during methotrexate treatment. The patient verbalized understanding of the proper use and possible adverse effects of methotrexate.  All of the patient's questions and concerns were addressed.
Birth Control Pills Counseling: Birth Control Pill Counseling: I discussed with the patient the potential side effects of OCPs including but not limited to increased risk of stroke, heart attack, thrombophlebitis, deep venous thrombosis, hepatic adenomas, breast changes, GI upset, headaches, and depression.  The patient verbalized understanding of the proper use and possible adverse effects of OCPs. All of the patient's questions and concerns were addressed.
Ilumya Counseling: I discussed with the patient the risks of tildrakizumab including but not limited to immunosuppression, malignancy, posterior leukoencephalopathy syndrome, and serious infections.  The patient understands that monitoring is required including a PPD at baseline and must alert us or the primary physician if symptoms of infection or other concerning signs are noted.
Picato Counseling:  I discussed with the patient the risks of Picato including but not limited to erythema, scaling, itching, weeping, crusting, and pain.
Xeljanz Counseling: I discussed with the patient the risks of Xeljanz therapy including increased risk of infection, liver issues, headache, diarrhea, or cold symptoms. Live vaccines should be avoided. They were instructed to call if they have any problems.
Gabapentin Counseling: I discussed with the patient the risks of gabapentin including but not limited to dizziness, somnolence, fatigue and ataxia.
Gabapentin Pregnancy And Lactation Text: This medication is Pregnancy Category C and isn't considered safe during pregnancy. It is excreted in breast milk.
Clindamycin Pregnancy And Lactation Text: This medication can be used in pregnancy if certain situations. Clindamycin is also present in breast milk.
Calcipotriene Counseling:  I discussed with the patient the risks of calcipotriene including but not limited to erythema, scaling, itching, and irritation.
Methotrexate Pregnancy And Lactation Text: This medication is Pregnancy Category X and is known to cause fetal harm. This medication is excreted in breast milk.
Birth Control Pills Pregnancy And Lactation Text: This medication should be avoided if pregnant and for the first 30 days post-partum.
Fluconazole Counseling:  Patient counseled regarding adverse effects of fluconazole including but not limited to headache, diarrhea, nausea, upset stomach, liver function test abnormalities, taste disturbance, and stomach pain.  There is a rare possibility of liver failure that can occur when taking fluconazole.  The patient understands that monitoring of LFTs and kidney function test may be required, especially at baseline. The patient verbalized understanding of the proper use and possible adverse effects of fluconazole.  All of the patient's questions and concerns were addressed.
Prednisone Counseling:  I discussed with the patient the risks of prolonged use of prednisone including but not limited to weight gain, insomnia, osteoporosis, mood changes, diabetes, susceptibility to infection, glaucoma and high blood pressure.  In cases where prednisone use is prolonged, patients should be monitored with blood pressure checks, serum glucose levels and an eye exam.  Additionally, the patient may need to be placed on GI prophylaxis, PCP prophylaxis, and calcium and vitamin D supplementation and/or a bisphosphonate.  The patient verbalized understanding of the proper use and the possible adverse effects of prednisone.  All of the patient's questions and concerns were addressed.
Fluconazole Pregnancy And Lactation Text: This medication is Pregnancy Category C and it isn't know if it is safe during pregnancy. It is also excreted in breast milk.
Calcipotriene Pregnancy And Lactation Text: This medication has not been proven safe during pregnancy. It is unknown if this medication is excreted in breast milk.
Spironolactone Counseling: Patient advised regarding risks of diarrhea, abdominal pain, hyperkalemia, birth defects (for female patients), liver toxicity and renal toxicity. The patient may need blood work to monitor liver and kidney function and potassium levels while on therapy. The patient verbalized understanding of the proper use and possible adverse effects of spironolactone.  All of the patient's questions and concerns were addressed.
Albendazole Counseling:  I discussed with the patient the risks of albendazole including but not limited to cytopenia, kidney damage, nausea/vomiting and severe allergy.  The patient understands that this medication is being used in an off-label manner.
Doxycycline Counseling:  Patient counseled regarding possible photosensitivity and increased risk for sunburn.  Patient instructed to avoid sunlight, if possible.  When exposed to sunlight, patients should wear protective clothing, sunglasses, and sunscreen.  The patient was instructed to call the office immediately if the following severe adverse effects occur:  hearing changes, easy bruising/bleeding, severe headache, or vision changes.  The patient verbalized understanding of the proper use and possible adverse effects of doxycycline.  All of the patient's questions and concerns were addressed.
Xelhunterz Pregnancy And Lactation Text: This medication is Pregnancy Category D and is not considered safe during pregnancy.  The risk during breast feeding is also uncertain.
Albendazole Pregnancy And Lactation Text: This medication is Pregnancy Category C and it isn't known if it is safe during pregnancy. It is also excreted in breast milk.
Infliximab Counseling:  I discussed with the patient the risks of infliximab including but not limited to myelosuppression, immunosuppression, autoimmune hepatitis, demyelinating diseases, lymphoma, and serious infections.  The patient understands that monitoring is required including a PPD at baseline and must alert us or the primary physician if symptoms of infection or other concerning signs are noted.
Protopic Counseling: Patient may experience a mild burning sensation during topical application. Protopic is not approved in children less than 2 years of age. There have been case reports of hematologic and skin malignancies in patients using topical calcineurin inhibitors although causality is questionable.
Glycopyrrolate Counseling:  I discussed with the patient the risks of glycopyrrolate including but not limited to skin rash, drowsiness, dry mouth, difficulty urinating, and blurred vision.
Xolair Counseling:  Patient informed of potential adverse effects including but not limited to fever, muscle aches, rash and allergic reactions.  The patient verbalized understanding of the proper use and possible adverse effects of Xolair.  All of the patient's questions and concerns were addressed.
5-Fu Counseling: 5-Fluorouracil Counseling:  I discussed with the patient the risks of 5-fluorouracil including but not limited to erythema, scaling, itching, weeping, crusting, and pain.
Opioid Counseling: I discussed with the patient the potential side effects of opioids including but not limited to addiction, altered mental status, and depression. I stressed avoiding alcohol, benzodiazepines, muscle relaxants and sleep aids unless specifically okayed by a physician. The patient verbalized understanding of the proper use and possible adverse effects of opioids. All of the patient's questions and concerns were addressed. They were instructed to flush the remaining pills down the toilet if they did not need them for pain.
Xolair Pregnancy And Lactation Text: This medication is Pregnancy Category B and is considered safe during pregnancy. This medication is excreted in breast milk.
Protopic Pregnancy And Lactation Text: This medication is Pregnancy Category C. It is unknown if this medication is excreted in breast milk when applied topically.
Doxycycline Pregnancy And Lactation Text: This medication is Pregnancy Category D and not consider safe during pregnancy. It is also excreted in breast milk but is considered safe for shorter treatment courses.
Spironolactone Pregnancy And Lactation Text: This medication can cause feminization of the male fetus and should be avoided during pregnancy. The active metabolite is also found in breast milk.
Glycopyrrolate Pregnancy And Lactation Text: This medication is Pregnancy Category B and is considered safe during pregnancy. It is unknown if it is excreted breast milk.
Griseofulvin Counseling:  I discussed with the patient the risks of griseofulvin including but not limited to photosensitivity, cytopenia, liver damage, nausea/vomiting and severe allergy.  The patient understands that this medication is best absorbed when taken with a fatty meal (e.g., ice cream or french fries).
Ivermectin Counseling:  Patient instructed to take medication on an empty stomach with a full glass of water.  Patient informed of potential adverse effects including but not limited to nausea, diarrhea, dizziness, itching, and swelling of the extremities or lymph nodes.  The patient verbalized understanding of the proper use and possible adverse effects of ivermectin.  All of the patient's questions and concerns were addressed.
Hydroxychloroquine Counseling:  I discussed with the patient that a baseline ophthalmologic exam is needed at the start of therapy and every year thereafter while on therapy. A CBC may also be warranted for monitoring.  The side effects of this medication were discussed with the patient, including but not limited to agranulocytosis, aplastic anemia, seizures, rashes, retinopathy, and liver toxicity. Patient instructed to call the office should any adverse effect occur.  The patient verbalized understanding of the proper use and possible adverse effects of Plaquenil.  All the patient's questions and concerns were addressed.
SSKI Counseling:  I discussed with the patient the risks of SSKI including but not limited to thyroid abnormalities, metallic taste, GI upset, fever, headache, acne, arthralgias, paraesthesias, lymphadenopathy, easy bleeding, arrhythmias, and allergic reaction.
Griseofulvin Pregnancy And Lactation Text: This medication is Pregnancy Category X and is known to cause serious birth defects. It is unknown if this medication is excreted in breast milk but breast feeding should be avoided.
Erythromycin Counseling:  I discussed with the patient the risks of erythromycin including but not limited to GI upset, allergic reaction, drug rash, diarrhea, increase in liver enzymes, and yeast infections.
Rhofade Counseling: Rhofade is a topical medication which can decrease superficial blood flow where applied. Side effects are uncommon and include stinging, redness and allergic reactions.
Drysol Counseling:  I discussed with the patient the risks of drysol/aluminum chloride including but not limited to skin rash, itching, irritation, burning.
Rituxan Counseling:  I discussed with the patient the risks of Rituxan infusions. Side effects can include infusion reactions, severe drug rashes including mucocutaneous reactions, reactivation of latent hepatitis and other infections and rarely progressive multifocal leukoencephalopathy.  All of the patient's questions and concerns were addressed.
Opioid Pregnancy And Lactation Text: These medications can lead to premature delivery and should be avoided during pregnancy. These medications are also present in breast milk in small amounts.
Erythromycin Pregnancy And Lactation Text: This medication is Pregnancy Category B and is considered safe during pregnancy. It is also excreted in breast milk.
Itraconazole Counseling:  I discussed with the patient the risks of itraconazole including but not limited to liver damage, nausea/vomiting, neuropathy, and severe allergy.  The patient understands that this medication is best absorbed when taken with acidic beverages such as non-diet cola or ginger ale.  The patient understands that monitoring is required including baseline LFTs and repeat LFTs at intervals.  The patient understands that they are to contact us or the primary physician if concerning signs are noted.
Sski Pregnancy And Lactation Text: This medication is Pregnancy Category D and isn't considered safe during pregnancy. It is excreted in breast milk.
Rituxan Pregnancy And Lactation Text: This medication is Pregnancy Category C and it isn't know if it is safe during pregnancy. It is unknown if this medication is excreted in breast milk but similar antibodies are known to be excreted.
Hydroxychloroquine Pregnancy And Lactation Text: This medication has been shown to cause fetal harm but it isn't assigned a Pregnancy Risk Category. There are small amounts excreted in breast milk.
Acitretin Counseling:  I discussed with the patient the risks of acitretin including but not limited to hair loss, dry lips/skin/eyes, liver damage, hyperlipidemia, depression/suicidal ideation, photosensitivity.  Serious rare side effects can include but are not limited to pancreatitis, pseudotumor cerebri, bony changes, clot formation/stroke/heart attack.  Patient understands that alcohol is contraindicated since it can result in liver toxicity and significantly prolong the elimination of the drug by many years.
Detail Level: Simple
Azathioprine Counseling:  I discussed with the patient the risks of azathioprine including but not limited to myelosuppression, immunosuppression, hepatotoxicity, lymphoma, and infections.  The patient understands that monitoring is required including baseline LFTs, Creatinine, possible TPMP genotyping and weekly CBCs for the first month and then every 2 weeks thereafter.  The patient verbalized understanding of the proper use and possible adverse effects of azathioprine.  All of the patient's questions and concerns were addressed.
Drysol Pregnancy And Lactation Text: This medication is considered safe during pregnancy and breast feeding.
Arava Counseling:  Patient counseled regarding adverse effects of Arava including but not limited to nausea, vomiting, abnormalities in liver function tests. Patients may develop mouth sores, rash, diarrhea, and abnormalities in blood counts. The patient understands that monitoring is required including LFTs and blood counts.  There is a rare possibility of scarring of the liver and lung problems that can occur when taking methotrexate. Persistent nausea, loss of appetite, pale stools, dark urine, cough, and shortness of breath should be reported immediately. Patient advised to discontinue Arava treatment and consult with a physician prior to attempting conception. The patient will have to undergo a treatment to eliminate Arava from the body prior to conception.
Metronidazole Counseling:  I discussed with the patient the risks of metronidazole including but not limited to seizures, nausea/vomiting, a metallic taste in the mouth, nausea/vomiting and severe allergy.
Acitretin Pregnancy And Lactation Text: This medication is Pregnancy Category X and should not be given to women who are pregnant or may become pregnant in the future. This medication is excreted in breast milk.
Niacinamide Counseling: I recommended taking niacin or niacinamide, also know as vitamin B3, twice daily. Recent evidence suggests that taking vitamin B3 (500 mg twice daily) can reduce the risk of actinic keratoses and non-melanoma skin cancers. Side effects of vitamin B3 include flushing and headache.
Thalidomide Counseling: I discussed with the patient the risks of thalidomide including but not limited to birth defects, anxiety, weakness, chest pain, dizziness, cough and severe allergy.
Bexarotene Counseling:  I discussed with the patient the risks of bexarotene including but not limited to hair loss, dry lips/skin/eyes, liver abnormalities, hyperlipidemia, pancreatitis, depression/suicidal ideation, photosensitivity, drug rash/allergic reactions, hypothyroidism, anemia, leukopenia, infection, cataracts, and teratogenicity.  Patient understands that they will need regular blood tests to check lipid profile, liver function tests, white blood cell count, thyroid function tests and pregnancy test if applicable.
Elidel Counseling: Patient may experience a mild burning sensation during topical application. Elidel is not approved in children less than 2 years of age. There have been case reports of hematologic and skin malignancies in patients using topical calcineurin inhibitors although causality is questionable.
Ketoconazole Counseling:   Patient counseled regarding improving absorption with orange juice.  Adverse effects include but are not limited to breast enlargement, headache, diarrhea, nausea, upset stomach, liver function test abnormalities, taste disturbance, and stomach pain.  There is a rare possibility of liver failure that can occur when taking ketoconazole. The patient understands that monitoring of LFTs may be required, especially at baseline. The patient verbalized understanding of the proper use and possible adverse effects of ketoconazole.  All of the patient's questions and concerns were addressed.
Metronidazole Pregnancy And Lactation Text: This medication is Pregnancy Category B and considered safe during pregnancy.  It is also excreted in breast milk.
Siliq Counseling:  I discussed with the patient the risks of Siliq including but not limited to new or worsening depression, suicidal thoughts and behavior, immunosuppression, malignancy, posterior leukoencephalopathy syndrome, and serious infections.  The patient understands that monitoring is required including a PPD at baseline and must alert us or the primary physician if symptoms of infection or other concerning signs are noted. There is also a special program designed to monitor depression which is required with Siliq.
Solaraze Counseling:  I discussed with the patient the risks of Solaraze including but not limited to erythema, scaling, itching, weeping, crusting, and pain.
Solaraze Pregnancy And Lactation Text: This medication is Pregnancy Category B and is considered safe. There is some data to suggest avoiding during the third trimester. It is unknown if this medication is excreted in breast milk.
Cellcept Counseling:  I discussed with the patient the risks of mycophenolate mofetil including but not limited to infection/immunosuppression, GI upset, hypokalemia, hypercholesterolemia, bone marrow suppression, lymphoproliferative disorders, malignancy, GI ulceration/bleed/perforation, colitis, interstitial lung disease, kidney failure, progressive multifocal leukoencephalopathy, and birth defects.  The patient understands that monitoring is required including a baseline creatinine and regular CBC testing. In addition, patient must alert us immediately if symptoms of infection or other concerning signs are noted.
Niacinamide Pregnancy And Lactation Text: These medications are considered safe during pregnancy.
Ketoconazole Pregnancy And Lactation Text: This medication is Pregnancy Category C and it isn't know if it is safe during pregnancy. It is also excreted in breast milk and breast feeding isn't recommended.
Minocycline Counseling: Patient advised regarding possible photosensitivity and discoloration of the teeth, skin, lips, tongue and gums.  Patient instructed to avoid sunlight, if possible.  When exposed to sunlight, patients should wear protective clothing, sunglasses, and sunscreen.  The patient was instructed to call the office immediately if the following severe adverse effects occur:  hearing changes, easy bruising/bleeding, severe headache, or vision changes.  The patient verbalized understanding of the proper use and possible adverse effects of minocycline.  All of the patient's questions and concerns were addressed.
Clofazimine Counseling:  I discussed with the patient the risks of clofazimine including but not limited to skin and eye pigmentation, liver damage, nausea/vomiting, gastrointestinal bleeding and allergy.
Tranexamic Acid Counseling:  Patient advised of the small risk of bleeding problems with tranexamic acid. They were also instructed to call if they developed any nausea, vomiting or diarrhea. All of the patient's questions and concerns were addressed.
Bexarotene Pregnancy And Lactation Text: This medication is Pregnancy Category X and should not be given to women who are pregnant or may become pregnant. This medication should not be used if you are breast feeding.
Simponi Counseling:  I discussed with the patient the risks of golimumab including but not limited to myelosuppression, immunosuppression, autoimmune hepatitis, demyelinating diseases, lymphoma, and serious infections.  The patient understands that monitoring is required including a PPD at baseline and must alert us or the primary physician if symptoms of infection or other concerning signs are noted.
Topical Retinoid counseling:  Patient advised to apply a pea-sized amount only at bedtime and wait 30 minutes after washing their face before applying.  If too drying, patient may add a non-comedogenic moisturizer. The patient verbalized understanding of the proper use and possible adverse effects of retinoids.  All of the patient's questions and concerns were addressed.
Nsaids Counseling: NSAID Counseling: I discussed with the patient that NSAIDs should be taken with food. Prolonged use of NSAIDs can result in the development of stomach ulcers.  Patient advised to stop taking NSAIDs if abdominal pain occurs.  The patient verbalized understanding of the proper use and possible adverse effects of NSAIDs.  All of the patient's questions and concerns were addressed.
Cimzia Counseling:  I discussed with the patient the risks of Cimzia including but not limited to immunosuppression, allergic reactions and infections.  The patient understands that monitoring is required including a PPD at baseline and must alert us or the primary physician if symptoms of infection or other concerning signs are noted.
Nsaids Pregnancy And Lactation Text: These medications are considered safe up to 30 weeks gestation. It is excreted in breast milk.
Isotretinoin Counseling: Patient should get monthly blood tests, not donate blood, not drive at night if vision affected, not share medication, and not undergo elective surgery for 6 months after tx completed. Side effects reviewed, pt to contact office should one occur.
Eucrisa Counseling: Patient may experience a mild burning sensation during topical application. Eucrisa is not approved in children less than 2 years of age.
Terbinafine Counseling: Patient counseling regarding adverse effects of terbinafine including but not limited to headache, diarrhea, rash, upset stomach, liver function test abnormalities, itching, taste/smell disturbance, nausea, abdominal pain, and flatulence.  There is a rare possibility of liver failure that can occur when taking terbinafine.  The patient understands that a baseline LFT and kidney function test may be required. The patient verbalized understanding of the proper use and possible adverse effects of terbinafine.  All of the patient's questions and concerns were addressed.
Tranexamic Acid Pregnancy And Lactation Text: It is unknown if this medication is safe during pregnancy or breast feeding.
Valtrex Counseling: I discussed with the patient the risks of valacyclovir including but not limited to kidney damage, nausea, vomiting and severe allergy.  The patient understands that if the infection seems to be worsening or is not improving, they are to call.
Cimzia Pregnancy And Lactation Text: This medication crosses the placenta but can be considered safe in certain situations. Cimzia may be excreted in breast milk.
Quinolones Counseling:  I discussed with the patient the risks of fluoroquinolones including but not limited to GI upset, allergic reaction, drug rash, diarrhea, dizziness, photosensitivity, yeast infections, liver function test abnormalities, tendonitis/tendon rupture.
Cosentyx Counseling:  I discussed with the patient the risks of Cosentyx including but not limited to worsening of Crohn's disease, immunosuppression, allergic reactions and infections.  The patient understands that monitoring is required including a PPD at baseline and must alert us or the primary physician if symptoms of infection or other concerning signs are noted.
Tazorac Counseling:  Patient advised that medication is irritating and drying.  Patient may need to apply sparingly and wash off after an hour before eventually leaving it on overnight.  The patient verbalized understanding of the proper use and possible adverse effects of tazorac.  All of the patient's questions and concerns were addressed.
Skyrizi Counseling: I discussed with the patient the risks of risankizumab-rzaa including but not limited to immunosuppression, and serious infections.  The patient understands that monitoring is required including a PPD at baseline and must alert us or the primary physician if symptoms of infection or other concerning signs are noted.
Odomzo Counseling- I discussed with the patient the risks of Odomzo including but not limited to nausea, vomiting, diarrhea, constipation, weight loss, changes in the sense of taste, decreased appetite, muscle spasms, and hair loss.  The patient verbalized understanding of the proper use and possible adverse effects of Odomzo.  All of the patient's questions and concerns were addressed.
Isotretinoin Pregnancy And Lactation Text: This medication is Pregnancy Category X and is considered extremely dangerous during pregnancy. It is unknown if it is excreted in breast milk.
Hydroquinone Counseling:  Patient advised that medication may result in skin irritation, lightening (hypopigmentation), dryness, and burning.  In the event of skin irritation, the patient was advised to reduce the amount of the drug applied or use it less frequently.  Rarely, spots that are treated with hydroquinone can become darker (pseudoochronosis).  Should this occur, patient instructed to stop medication and call the office. The patient verbalized understanding of the proper use and possible adverse effects of hydroquinone.  All of the patient's questions and concerns were addressed.
Colchicine Counseling:  Patient counseled regarding adverse effects including but not limited to stomach upset (nausea, vomiting, stomach pain, or diarrhea).  Patient instructed to limit alcohol consumption while taking this medication.  Colchicine may reduce blood counts especially with prolonged use.  The patient understands that monitoring of kidney function and blood counts may be required, especially at baseline. The patient verbalized understanding of the proper use and possible adverse effects of colchicine.  All of the patient's questions and concerns were addressed.
Tazorac Pregnancy And Lactation Text: This medication is not safe during pregnancy. It is unknown if this medication is excreted in breast milk.
High Dose Vitamin A Counseling: Side effects reviewed, pt to contact office should one occur.
Valtrex Pregnancy And Lactation Text: this medication is Pregnancy Category B and is considered safe during pregnancy. This medication is not directly found in breast milk but it's metabolite acyclovir is present.

## 2020-12-09 NOTE — PROCEDURE: CURETTAGE AND DESTRUCTION
Detail Level: Detailed
Biopsy Photograph Reviewed: Yes
Accession # (Optional): [id number] (previous)
Number Of Curettages: 3
Size Of Lesion After Curettage: 1.2
Add Intralesional Injection: No
Concentration (Mg/Ml Or Millions Of Plaque Forming Units/Cc): 0.01
Total Volume (Ccs): 1
Anesthesia Type: 1% lidocaine with epinephrine and a 1:10 solution of 8.4% sodium bicarbonate
Cautery Type: electrodesiccation
What Was Performed First?: Curettage
Additional Information: (Optional): The wound was cleaned, and a bandage was applied.  The patient received detailed post-op instructions.
Consent was obtained from the patient. The risks, benefits and alternatives to therapy were discussed in detail. Specifically, the risks of infection, scarring, bleeding, prolonged wound healing, nerve injury, incomplete removal, allergy to anesthesia and recurrence were addressed. Alternatives to ED&C, such as: surgical removal and XRT were also discussed.  Prior to the procedure, the treatment site was clearly identified and confirmed by the patient. All components of Universal Protocol/PAUSE Rule completed.
Post-Care Instructions: I reviewed with the patient in detail post-care instructions. Patient is to keep the area dry for 48 hours, and not to engage in any swimming until the area is healed. Should the patient develop any fevers, chills, bleeding, severe pain patient will contact the office immediately.
Bill As A Line Item Or As Units: Line Item

## 2020-12-10 ENCOUNTER — PATIENT MESSAGE (OUTPATIENT)
Dept: MEDICAL GROUP | Facility: MEDICAL CENTER | Age: 75
End: 2020-12-10

## 2020-12-10 DIAGNOSIS — E11.9 TYPE 2 DIABETES MELLITUS WITHOUT COMPLICATION, WITHOUT LONG-TERM CURRENT USE OF INSULIN (HCC): ICD-10-CM

## 2020-12-10 RX ORDER — LANCETS 30 GAUGE
EACH MISCELLANEOUS
Qty: 100 EACH | Refills: 3 | Status: SHIPPED | OUTPATIENT
Start: 2020-12-10 | End: 2022-02-28 | Stop reason: SDUPTHER

## 2020-12-17 ENCOUNTER — PATIENT MESSAGE (OUTPATIENT)
Dept: MEDICAL GROUP | Facility: MEDICAL CENTER | Age: 75
End: 2020-12-17

## 2020-12-17 NOTE — TELEPHONE ENCOUNTER
From: Saleem Whitney  To: SCOOBY Ulloa  Sent: 12/17/2020 2:38 PM PST  Subject: Non-Urgent Medical Question    I thought that it might just be a coincident but I had no other explanation. The area removed is only the size of a nickel, there was a red area about 3 inches in diameter around the removed area, but that has disappeared. I have been taking Tylenol and Ibuprofen without much success. Do you think gabapentin would be better? I have some 100 mg and 300 mg available.      ----- Message -----   From:SCOOBY Ulloa   Sent:12/17/2020 12:54 PM PST   To:Saleem Whitney   Subject:RE: Non-Urgent Medical Question    Matt Monge,  I have not really heard this complaint in relation to the skin cancer surgery in the past. Was it a fairly large area? Is there any redness or drainage that would be suggestive of infection?  Have you tried any over-the-counter medications to see if it relieves the sensation? Perhaps ice would be helpful?  Meron LYONS      ----- Message -----   From:Saleem Whitney   Sent:12/17/2020 9:26 AM PST   To:SCOOBY Ulloa   Subject:Non-Urgent Medical Question    Jyoti: I am not sure that my problem is related to the fact that I had a Basel Cell Cancer removed from my ankle two weeks ago, but yesterday and today I 've been having shooting pains that feel like electric shocks from nerve endings in the same area. Is there something I can do about this or do I need an appointment with you? The pain is quite debilitating.   DISPLAY PLAN FREE TEXT

## 2020-12-18 ENCOUNTER — PATIENT MESSAGE (OUTPATIENT)
Dept: MEDICAL GROUP | Facility: MEDICAL CENTER | Age: 75
End: 2020-12-18

## 2020-12-18 NOTE — TELEPHONE ENCOUNTER
From: Saleem Whitney  To: SCOOBY Ulloa  Sent: 12/18/2020 2:56 PM PST  Subject: Non-Urgent Medical Question    Yes, as soon as possible.      ----- Message -----   From:SCOOBY Ulloa   Sent:12/18/2020 1:13 PM PST   To:Saleem Miky Whitney   Subject:RE: Non-Urgent Medical Question    Matt Monge,  That could certainly be the case as well. I am happy to refer you to Dr. Camargo but there is evaluation required and diagnostic testing before I we refer. Would you like to schedule an appointment?  Meron Villanueva APRN      ----- Message -----   From:Saleem Whitney   Sent:12/18/2020 11:21 AM PST   To:SCOOBY Ulloa   Subject:Non-Urgent Medical Question    I went to my foot Doctor, Dr Templeton, and he suggested that my leg and foot problems may be related to my lower back pain and may be the cause - pinched nerve. My wife has been seeing Doctor Camargo and likes her, perhaps you can send a referral.

## 2020-12-22 ENCOUNTER — OFFICE VISIT (OUTPATIENT)
Dept: MEDICAL GROUP | Facility: MEDICAL CENTER | Age: 75
End: 2020-12-22
Payer: MEDICARE

## 2020-12-22 VITALS
HEART RATE: 73 BPM | TEMPERATURE: 98.6 F | OXYGEN SATURATION: 97 % | HEIGHT: 70 IN | WEIGHT: 218 LBS | BODY MASS INDEX: 31.21 KG/M2 | RESPIRATION RATE: 16 BRPM | SYSTOLIC BLOOD PRESSURE: 146 MMHG | DIASTOLIC BLOOD PRESSURE: 78 MMHG

## 2020-12-22 DIAGNOSIS — M17.0 PRIMARY OSTEOARTHRITIS OF BOTH KNEES: ICD-10-CM

## 2020-12-22 DIAGNOSIS — I73.9 PVD (PERIPHERAL VASCULAR DISEASE) (HCC): ICD-10-CM

## 2020-12-22 DIAGNOSIS — M54.42 CHRONIC BILATERAL LOW BACK PAIN WITH LEFT-SIDED SCIATICA: ICD-10-CM

## 2020-12-22 DIAGNOSIS — G89.29 CHRONIC BILATERAL LOW BACK PAIN WITH LEFT-SIDED SCIATICA: ICD-10-CM

## 2020-12-22 PROCEDURE — 99214 OFFICE O/P EST MOD 30 MIN: CPT | Performed by: NURSE PRACTITIONER

## 2020-12-22 ASSESSMENT — FIBROSIS 4 INDEX: FIB4 SCORE: 1.46

## 2020-12-23 ENCOUNTER — HOSPITAL ENCOUNTER (OUTPATIENT)
Dept: RADIOLOGY | Facility: MEDICAL CENTER | Age: 75
End: 2020-12-23
Attending: NURSE PRACTITIONER
Payer: MEDICARE

## 2020-12-23 DIAGNOSIS — M17.0 PRIMARY OSTEOARTHRITIS OF BOTH KNEES: ICD-10-CM

## 2020-12-23 DIAGNOSIS — M54.42 CHRONIC BILATERAL LOW BACK PAIN WITH LEFT-SIDED SCIATICA: ICD-10-CM

## 2020-12-23 DIAGNOSIS — G89.29 CHRONIC BILATERAL LOW BACK PAIN WITH LEFT-SIDED SCIATICA: ICD-10-CM

## 2020-12-23 PROCEDURE — 73564 X-RAY EXAM KNEE 4 OR MORE: CPT | Mod: LT

## 2020-12-23 PROCEDURE — 73564 X-RAY EXAM KNEE 4 OR MORE: CPT | Mod: RT

## 2020-12-23 PROCEDURE — 72100 X-RAY EXAM L-S SPINE 2/3 VWS: CPT

## 2020-12-24 ENCOUNTER — HOSPITAL ENCOUNTER (OUTPATIENT)
Dept: LAB | Facility: MEDICAL CENTER | Age: 75
End: 2020-12-24
Attending: NURSE PRACTITIONER
Payer: MEDICARE

## 2020-12-24 DIAGNOSIS — E11.9 TYPE 2 DIABETES MELLITUS WITHOUT COMPLICATION, WITHOUT LONG-TERM CURRENT USE OF INSULIN (HCC): ICD-10-CM

## 2020-12-24 DIAGNOSIS — E03.9 ACQUIRED HYPOTHYROIDISM: ICD-10-CM

## 2020-12-24 LAB
ALBUMIN SERPL BCP-MCNC: 4.1 G/DL (ref 3.2–4.9)
ALBUMIN/GLOB SERPL: 1.3 G/DL
ALP SERPL-CCNC: 82 U/L (ref 30–99)
ALT SERPL-CCNC: 24 U/L (ref 2–50)
ANION GAP SERPL CALC-SCNC: 11 MMOL/L (ref 7–16)
AST SERPL-CCNC: 21 U/L (ref 12–45)
BILIRUB SERPL-MCNC: 0.3 MG/DL (ref 0.1–1.5)
BUN SERPL-MCNC: 25 MG/DL (ref 8–22)
CALCIUM SERPL-MCNC: 9.2 MG/DL (ref 8.4–10.2)
CHLORIDE SERPL-SCNC: 105 MMOL/L (ref 96–112)
CHOLEST SERPL-MCNC: 119 MG/DL (ref 100–199)
CO2 SERPL-SCNC: 27 MMOL/L (ref 20–33)
CREAT SERPL-MCNC: 1.01 MG/DL (ref 0.5–1.4)
EST. AVERAGE GLUCOSE BLD GHB EST-MCNC: 174 MG/DL
FASTING STATUS PATIENT QL REPORTED: NORMAL
GLOBULIN SER CALC-MCNC: 3.1 G/DL (ref 1.9–3.5)
GLUCOSE SERPL-MCNC: 186 MG/DL (ref 65–99)
HBA1C MFR BLD: 7.7 % (ref 0–5.6)
HDLC SERPL-MCNC: 42 MG/DL
LDLC SERPL CALC-MCNC: 50 MG/DL
POTASSIUM SERPL-SCNC: 4.4 MMOL/L (ref 3.6–5.5)
PROT SERPL-MCNC: 7.2 G/DL (ref 6–8.2)
SODIUM SERPL-SCNC: 143 MMOL/L (ref 135–145)
TRIGL SERPL-MCNC: 134 MG/DL (ref 0–149)
TSH SERPL DL<=0.005 MIU/L-ACNC: 4.7 UIU/ML (ref 0.38–5.33)

## 2020-12-24 PROCEDURE — 36415 COLL VENOUS BLD VENIPUNCTURE: CPT

## 2020-12-24 PROCEDURE — 83036 HEMOGLOBIN GLYCOSYLATED A1C: CPT | Mod: GA

## 2020-12-24 PROCEDURE — 84443 ASSAY THYROID STIM HORMONE: CPT

## 2020-12-24 PROCEDURE — 80053 COMPREHEN METABOLIC PANEL: CPT

## 2020-12-24 PROCEDURE — 80061 LIPID PANEL: CPT

## 2020-12-26 PROBLEM — I73.9 PVD (PERIPHERAL VASCULAR DISEASE) (HCC): Status: ACTIVE | Noted: 2020-12-26

## 2020-12-26 NOTE — PROGRESS NOTES
"Subjective:     Chief Complaint   Patient presents with   • Referral Needed     w/ Dr. Raman Monge Miky Whitney is a 75 y.o. male established pt here to discuss chronic difficulty with lower back as well as bilateral knee pain.     States he has had difficulty with knees for many years. L knee joint has pain throughout. R knee is more focused in the posterior joint/ popliteal fossa. He has been seen at [facility] for this and is currently undergoing physical therapy. He does not recall having had any imaging recently. No h/o specific injury or surgeries.    The lower back has bothered him on and off for many years. Tends to flare up when more physically active, working in his yard. He does have radiation to either the R or L thighs at times. Questions if some of his knee pain may also be r/t the back. Last week he has sudden onset of \"shooting and burning\" pain near the L ankle which was severe and lasted 2-3 days. He had recently had skin cancer removal from the medial L ankle area as well.  He took a few doses of his wife's gabapentin prescription which help the pain but also made him feel dizzy/ \"out of it\".   Otherwise not using any medications for this.     He does have h/o PVD and varicose veins bilaterally, prior vein ablations. He occasionally has muscle cramping in the bilateral LE    No numbness, tingling, weakness in LE. No saddle anesthesia, no change in bowel or bladder function. No h/o neuropathy    His wife sees Dr. Camargo and he is interested in consultation to discuss possible management options. He is also interested in adding low back exercises to his physical therapy regimen, he is being seen at [facility] currently.    No problem-specific Assessment & Plan notes found for this encounter.       Current medicines (including changes today)  Current Outpatient Medications   Medication Sig Dispense Refill   • Blood Glucose Test Strips Test strips for OneTouch Ultra meter. Sig: use fasting first thing in " "the morning, and prn ssx high or low sugar 100 Each 3   • Lancets Lancets for OneTouch Ultra meter. Sig: use fasting first thing in the morning, and prn ssx high or low sugar 100 Each 3   • metformin (GLUCOPHAGE) 1000 MG tablet TAKE 1 TABLET TWICE DAILY WITH MEALS 180 Tab 1   • glipiZIDE (GLUCOTROL) 10 MG Tab Take 1 Tab by mouth 2 times a day. 180 Tab 3   • levothyroxine (SYNTHROID) 88 MCG Tab Take 1 Tab by mouth Every morning on an empty stomach. 90 Tab 3   • furosemide (LASIX) 20 MG Tab Take 1 Tab by mouth 1 time daily as needed. 30 Tab 2   • atorvastatin (LIPITOR) 10 MG Tab Take 1 Tab by mouth every day. 90 Tab 2   • losartan (COZAAR) 100 MG Tab TAKE 1 TABLET EVERY DAY 90 Tab 3   • amLODIPine (NORVASC) 10 MG Tab Take 1 Tab by mouth every day. 90 Tab 3   • aspirin (ASA) 81 MG Chew Tab chewable tablet Take 1 Tab by mouth every day. 90 Tab 3   • vitamin D (CHOLECALCIFEROL) 1000 UNIT Tab Take 1 Tab by mouth every day. 90 Tab 3   • Magnesium 500 MG Tab Take 1,000 mg by mouth every day. 90 Tab 3   • linagliptin (TRADJENTA) 5 MG Tab tablet Take 1 Tab by mouth every day. (Patient not taking: Reported on 8/6/2020) 90 Tab 3   • linagliptin (TRADJENTA) 5 MG Tab tablet Take 1 Tab by mouth every day. (Patient not taking: Reported on 8/6/2020) 30 Tab 0   • furosemide (LASIX) 20 MG Tab Take 1 Tab by mouth every day. (Patient not taking: Reported on 9/19/2019) 30 Tab 0     No current facility-administered medications for this visit.      He  has a past medical history of Hyperlipidemia, Hypertension, Hypothyroid, and Type II or unspecified type diabetes mellitus without mention of complication, not stated as uncontrolled. He also has no past medical history of Low vitamin D level.    ROS included above     Objective:     /78 (BP Location: Right arm, Patient Position: Sitting, BP Cuff Size: Adult)   Pulse 73   Temp 37 °C (98.6 °F) (Temporal)   Resp 16   Ht 1.778 m (5' 10\")   Wt 98.9 kg (218 lb)   SpO2 97%  Body mass " "index is 31.28 kg/m².     Physical Exam:  General: Alert, oriented in no acute distress.  Eye contact is good, speech is normal, affect calm.  Lungs: clear to auscultation bilaterally, normal effort, no wheeze/ rhonchi/ rales.  CV: regular rate and rhythm, S1, S2, no murmur  MS: several points of tenderness over lumbar spinous process as well as bilateral SI joints. No trochanteric bursa tenderness bilaterally. No point tenderness in the L knee. Posterior R knee tenderness without obvious baker cyst. Stiff and antalgic gait. Strength is 5/5 throughout  Ext: no edema, color normal, vascularity normal, temperature normal    Assessment and Plan:   The following treatment plan was discussed   1. Primary osteoarthritis of both knees  Chronic bilateral knee pain, likely r/t osteoarthritis. No specific h/o injury or instability. He is already undergoing PT at [facility] for this issue but does not recall having had recent imaging. Will obtain xray.  He would like to see Dr. Camargo to discuss additional treatment options  DX-KNEE COMPLETE 4+ LEFT    DX-KNEE COMPLETE 4+ RIGHT    REFERRAL TO PAIN CLINIC    REFERRAL TO PHYSICAL THERAPY   2. Chronic bilateral low back pain with left-sided sciatica  Long standing problem with intermittent flares. Recent \"shooting and burning\" pain in the LLE. He did get improvement with use of gabapentin (wife's medication) but reports side effects. Will evaluate xray. Referral to work on this in PT as well.   Wife is seen by Dr. Camargo and he requests consultation. Referral placed  DX-LUMBAR SPINE-2 OR 3 VIEWS    REFERRAL TO PAIN CLINIC    REFERRAL TO PHYSICAL THERAPY   3. PVD (peripheral vascular disease) (HCC)  Possibly contributing to LE pain. He has h/o vein ablations. ? claudication       Followup: pending imaging         Please note that this dictation was created using voice recognition software. I have worked with consultants from the vendor as well as technical experts from [employer] to " optimize the interface. I have made every reasonable attempt to correct obvious errors, but I expect that there are errors of grammar and possibly content that I did not discover before finalizing the note.

## 2021-01-11 DIAGNOSIS — Z23 NEED FOR VACCINATION: ICD-10-CM

## 2021-01-12 ENCOUNTER — OFFICE VISIT (OUTPATIENT)
Dept: MEDICAL GROUP | Facility: MEDICAL CENTER | Age: 76
End: 2021-01-12
Payer: MEDICARE

## 2021-01-12 VITALS
HEART RATE: 66 BPM | TEMPERATURE: 98.3 F | RESPIRATION RATE: 16 BRPM | DIASTOLIC BLOOD PRESSURE: 70 MMHG | BODY MASS INDEX: 29.16 KG/M2 | SYSTOLIC BLOOD PRESSURE: 138 MMHG | OXYGEN SATURATION: 97 % | WEIGHT: 220 LBS | HEIGHT: 73 IN

## 2021-01-12 DIAGNOSIS — E78.5 HYPERLIPIDEMIA DUE TO TYPE 2 DIABETES MELLITUS (HCC): ICD-10-CM

## 2021-01-12 DIAGNOSIS — I73.9 PVD (PERIPHERAL VASCULAR DISEASE) (HCC): ICD-10-CM

## 2021-01-12 DIAGNOSIS — R60.0 BILATERAL LEG EDEMA: ICD-10-CM

## 2021-01-12 DIAGNOSIS — E11.59 HYPERTENSION ASSOCIATED WITH DIABETES (HCC): ICD-10-CM

## 2021-01-12 DIAGNOSIS — E11.9 TYPE 2 DIABETES MELLITUS WITHOUT COMPLICATION, WITHOUT LONG-TERM CURRENT USE OF INSULIN (HCC): ICD-10-CM

## 2021-01-12 DIAGNOSIS — E11.69 HYPERLIPIDEMIA DUE TO TYPE 2 DIABETES MELLITUS (HCC): ICD-10-CM

## 2021-01-12 DIAGNOSIS — I15.2 HYPERTENSION ASSOCIATED WITH DIABETES (HCC): ICD-10-CM

## 2021-01-12 DIAGNOSIS — M25.561 POSTERIOR KNEE PAIN, RIGHT: ICD-10-CM

## 2021-01-12 DIAGNOSIS — L29.9 ITCHING OF EAR: ICD-10-CM

## 2021-01-12 DIAGNOSIS — E03.9 ACQUIRED HYPOTHYROIDISM: ICD-10-CM

## 2021-01-12 PROCEDURE — 99214 OFFICE O/P EST MOD 30 MIN: CPT | Performed by: NURSE PRACTITIONER

## 2021-01-13 ENCOUNTER — PATIENT MESSAGE (OUTPATIENT)
Dept: MEDICAL GROUP | Facility: MEDICAL CENTER | Age: 76
End: 2021-01-13

## 2021-01-13 PROBLEM — L29.9 ITCHING OF EAR: Status: ACTIVE | Noted: 2021-01-13

## 2021-01-13 NOTE — ASSESSMENT & PLAN NOTE
Last A1c had increased some from 7.3 up to 7.7.  In December.  He attributes this to eating poorly over the holidays and reports that he is working on getting back to healthier diet.  He is consistently taking metformin and glipizide, unclear whether or not he is taking Tradjenta at this point.  He will check his medications at home  No polyuria, polydipsia

## 2021-01-13 NOTE — ASSESSMENT & PLAN NOTE
Is been a chronic issue with the left lower extremity being worse than the right.  Likely related to poor circulation, and activity, peripheral vascular disease

## 2021-01-13 NOTE — PROGRESS NOTES
Subjective:     Chief Complaint   Patient presents with   • Itching     bilateral ear, mainly L     Saleem Whitney is a 75 y.o. male here today to follow up on:    (HCC) Type 2 diabetes mellitus without complication, without long-term current use of insulin  Last A1c had increased some from 7.3 up to 7.7.  In December.  He attributes this to eating poorly over the holidays and reports that he is working on getting back to healthier diet.  He is consistently taking metformin and glipizide, unclear whether or not he is taking Tradjenta at this point.  He will check his medications at home  No polyuria, polydipsia    (HCC) Hypertension associated with diabetes  Blood pressure is stable and controlled on amlodipine 10 mg daily, losartan 10 mg daily.  No chest pain, dizziness, palpitations    (HCC) Hyperlipidemia due to type 2 diabetes mellitus  Consistent with statin use, tolerating medication without difficulty    Acquired hypothyroidism  Energy is stable on current dose of levothyroxine, last labs in December in good range    Bilateral leg edema  Is been a chronic issue with the left lower extremity being worse than the right.  Likely related to poor circulation, and activity, peripheral vascular disease    Posterior knee pain, right  This is been a persistent problem which has been treated with physical therapy.  He is unsure if the physical therapy is helping.  The pain is now higher in the right posterior leg    PVD (peripheral vascular disease) ([facility])  This is been a chronic issue sometimes causing pain and cramping in lower extremities.  He does have history of vein ablation, unfortunately did not feel that this helped his pain.  He is now having a great deal of tightness and discomfort in the right posterior leg just above the knee.  He has been doing physical therapy and does report tends to loosen up with biking but can still be very uncomfortable for him.  He is also had back pain and knee pain which  could be contributing.  He would like to get back to the vascular specialist to determine if any further treatment may be beneficial    Itching of ear  Itching in the ear canal over the last few months.       Current medicines (including changes today)  Current Outpatient Medications   Medication Sig Dispense Refill   • Blood Glucose Test Strips Test strips for OneTouch Ultra meter. Sig: use fasting first thing in the morning, and prn ssx high or low sugar 100 Each 3   • Lancets Lancets for OneTouch Ultra meter. Sig: use fasting first thing in the morning, and prn ssx high or low sugar 100 Each 3   • metformin (GLUCOPHAGE) 1000 MG tablet TAKE 1 TABLET TWICE DAILY WITH MEALS 180 Tab 1   • glipiZIDE (GLUCOTROL) 10 MG Tab Take 1 Tab by mouth 2 times a day. 180 Tab 3   • linagliptin (TRADJENTA) 5 MG Tab tablet Take 1 Tab by mouth every day. (Patient not taking: Reported on 8/6/2020) 90 Tab 3   • linagliptin (TRADJENTA) 5 MG Tab tablet Take 1 Tab by mouth every day. (Patient not taking: Reported on 8/6/2020) 30 Tab 0   • levothyroxine (SYNTHROID) 88 MCG Tab Take 1 Tab by mouth Every morning on an empty stomach. 90 Tab 3   • atorvastatin (LIPITOR) 10 MG Tab Take 1 Tab by mouth every day. 90 Tab 2   • losartan (COZAAR) 100 MG Tab TAKE 1 TABLET EVERY DAY 90 Tab 3   • amLODIPine (NORVASC) 10 MG Tab Take 1 Tab by mouth every day. 90 Tab 3   • aspirin (ASA) 81 MG Chew Tab chewable tablet Take 1 Tab by mouth every day. 90 Tab 3   • vitamin D (CHOLECALCIFEROL) 1000 UNIT Tab Take 1 Tab by mouth every day. 90 Tab 3   • Magnesium 500 MG Tab Take 1,000 mg by mouth every day. 90 Tab 3     No current facility-administered medications for this visit.      He  has a past medical history of Hyperlipidemia, Hypertension, Hypothyroid, and Type II or unspecified type diabetes mellitus without mention of complication, not stated as uncontrolled. He also has no past medical history of Low vitamin D level.    ROS included above     Objective:  "    /70 (BP Location: Right arm, Patient Position: Sitting, BP Cuff Size: Adult)   Pulse 66   Temp 36.8 °C (98.3 °F) (Temporal)   Resp 16   Ht 1.854 m (6' 1\")   Wt 99.8 kg (220 lb)   SpO2 97%  Body mass index is 29.03 kg/m².     Physical Exam:  General: Alert, oriented in no acute distress.  Eye contact is good, speech is normal, affect calm  HEENT: Bilateral ear canals dry, slight erythema.  No swelling.  TMs are gray and reflective   Lungs: clear to auscultation bilaterally, normal effort, no wheeze/ rhonchi/ rales.  CV: regular rate and rhythm, S1, S2, no murmur  Abdomen: soft, nontender  Ext: no edema, color normal, vascularity normal, temperature normal    Assessment and Plan:   The following treatment plan was discussed   1. (HCC) Type 2 diabetes mellitus without complication, without long-term current use of insulin   last A1c increased some.  He is going to work on diet and exercise regimen.  Repeat labs in 6 months  Comp Metabolic Panel    HEMOGLOBIN A1C   2. Acquired hypothyroidism   clinically euthyroid  TSH WITH REFLEX TO FT4   3. (HCC) Hyperlipidemia due to type 2 diabetes mellitus   continue statin  Lipid Profile   4. Itching of ear   slight dryness in the ear canal.  May try all of oil to bilateral ears   5. (HCC) Hypertension associated with diabetes   stable   6. Bilateral leg edema   stable, continue to monitor   7. Posterior knee pain, right   some benefit with physical therapy although also having more pain above the knee   8. PVD (peripheral vascular disease) ([facility])   chronic issue, prior vein treatment through [facility] [place].  Still having difficulty with tightness and aching in the right leg, unclear if this is circulatory or related to his chronic low back problems.  He would like to get back to a vascular specialist, will start another referral.  He does have an upcoming appointment with physiatry as well       Followup: Pending labs         Please note that this dictation was " created using voice recognition software. I have worked with consultants from the vendor as well as technical experts from [facility] to optimize the interface. I have made every reasonable attempt to correct obvious errors, but I expect that there are errors of grammar and possibly content that I did not discover before finalizing the note.

## 2021-01-13 NOTE — ASSESSMENT & PLAN NOTE
Blood pressure is stable and controlled on amlodipine 10 mg daily, losartan 10 mg daily.  No chest pain, dizziness, palpitations

## 2021-01-13 NOTE — ASSESSMENT & PLAN NOTE
This is been a persistent problem which has been treated with physical therapy.  He is unsure if the physical therapy is helping.  The pain is now higher in the right posterior leg

## 2021-01-13 NOTE — ASSESSMENT & PLAN NOTE
This is been a chronic issue sometimes causing pain and cramping in lower extremities.  He does have history of vein ablation, unfortunately did not feel that this helped his pain.  He is now having a great deal of tightness and discomfort in the right posterior leg just above the knee.  He has been doing physical therapy and does report tends to loosen up with biking but can still be very uncomfortable for him.  He is also had back pain and knee pain which could be contributing.  He would like to get back to the vascular specialist to determine if any further treatment may be beneficial

## 2021-01-14 ENCOUNTER — PATIENT MESSAGE (OUTPATIENT)
Dept: MEDICAL GROUP | Facility: MEDICAL CENTER | Age: 76
End: 2021-01-14

## 2021-01-14 ENCOUNTER — IMMUNIZATION (OUTPATIENT)
Dept: FAMILY PLANNING/WOMEN'S HEALTH CLINIC | Facility: IMMUNIZATION CENTER | Age: 76
End: 2021-01-14
Attending: INTERNAL MEDICINE
Payer: MEDICARE

## 2021-01-14 DIAGNOSIS — Z23 NEED FOR VACCINATION: ICD-10-CM

## 2021-01-14 DIAGNOSIS — Z23 ENCOUNTER FOR VACCINATION: Primary | ICD-10-CM

## 2021-01-14 PROCEDURE — 0001A PFIZER SARS-COV-2 VACCINE: CPT | Performed by: INTERNAL MEDICINE

## 2021-01-14 PROCEDURE — 91300 PFIZER SARS-COV-2 VACCINE: CPT | Performed by: INTERNAL MEDICINE

## 2021-01-14 NOTE — TELEPHONE ENCOUNTER
From: Saleem Whitney  To: SCOOBY Ulloa  Sent: 1/13/2021 5:53 PM PST  Subject: Prescription Question    I stopped because you told me to.      ----- Message -----   From:SCOOBY Ulloa   Sent:1/13/2021 5:33 PM PST   To:Saleem Whitney   Subject:RE: Prescription Question    Perhaps you should go back on it, given that your a1c increased recently. Was there specific reason you had stopped?  Meron LYONS      ----- Message -----   From:Saleem Whitney   Sent:1/13/2021 8:37 AM PST   To:SCOOBY Ulloa   Subject:Prescription Question    I am no longer taking Tradjenta, you can take it off my list of drugs.

## 2021-01-14 NOTE — TELEPHONE ENCOUNTER
From: Saleem Whitney  To: SCOOBY Ulloa  Sent: 1/14/2021 7:53 AM PST  Subject: Prescription Question    Yes please renew the Rx as I never received anything the last time.      ----- Message -----   From:SCOOBY Ulloa   Sent:1/13/2021 6:07 PM PST   To:Saleem Whitney   Subject:RE: Prescription Question    Maybe there was some miscommunication. Our last visit that focused on diabetes was June 16th. My notes at that time recommended to continue and the prescription renewal was sent that day. I wondering if maybe there was a mix up in the last few months.  Would you like me to send a new prescription?      ----- Message -----   From:Saleem Whitney   Sent:1/13/2021 5:53 PM PST   To:SCOOBY Ulloa   Subject:Prescription Question    I stopped because you told me to.      ----- Message -----   From:SCOOBY Ulloa   Sent:1/13/2021 5:33 PM PST   To:Saleem Whitney   Subject:RE: Prescription Question    Perhaps you should go back on it, given that your a1c increased recently. Was there specific reason you had stopped?  Meron LYONS      ----- Message -----   From:Saleem Whitney   Sent:1/13/2021 8:37 AM PST   To:SCOOBY Ulloa   Subject:Prescription Question    I am no longer taking Tradjenta, you can take it off my list of drugs.

## 2021-01-23 DIAGNOSIS — I15.2 HYPERTENSION ASSOCIATED WITH DIABETES (HCC): ICD-10-CM

## 2021-01-23 DIAGNOSIS — E11.59 HYPERTENSION ASSOCIATED WITH DIABETES (HCC): ICD-10-CM

## 2021-01-25 RX ORDER — AMLODIPINE BESYLATE 10 MG/1
TABLET ORAL
Qty: 90 TAB | Refills: 3 | Status: SHIPPED | OUTPATIENT
Start: 2021-01-25 | End: 2021-11-19

## 2021-02-05 ENCOUNTER — IMMUNIZATION (OUTPATIENT)
Dept: FAMILY PLANNING/WOMEN'S HEALTH CLINIC | Facility: IMMUNIZATION CENTER | Age: 76
End: 2021-02-05
Attending: INTERNAL MEDICINE
Payer: MEDICARE

## 2021-02-05 DIAGNOSIS — Z23 ENCOUNTER FOR VACCINATION: Primary | ICD-10-CM

## 2021-02-05 PROCEDURE — 0002A PFIZER SARS-COV-2 VACCINE: CPT

## 2021-02-05 PROCEDURE — 91300 PFIZER SARS-COV-2 VACCINE: CPT

## 2021-02-24 ENCOUNTER — OFFICE VISIT (OUTPATIENT)
Dept: PHYSICAL MEDICINE AND REHAB | Facility: MEDICAL CENTER | Age: 76
End: 2021-02-24
Payer: MEDICARE

## 2021-02-24 VITALS
BODY MASS INDEX: 29.51 KG/M2 | OXYGEN SATURATION: 95 % | DIASTOLIC BLOOD PRESSURE: 62 MMHG | WEIGHT: 222.66 LBS | SYSTOLIC BLOOD PRESSURE: 130 MMHG | HEIGHT: 73 IN | TEMPERATURE: 98 F | HEART RATE: 68 BPM

## 2021-02-24 DIAGNOSIS — M47.816 LUMBAR SPONDYLOSIS: ICD-10-CM

## 2021-02-24 DIAGNOSIS — M54.50 LUMBOSACRAL PAIN: ICD-10-CM

## 2021-02-24 DIAGNOSIS — M51.36 DDD (DEGENERATIVE DISC DISEASE), LUMBAR: ICD-10-CM

## 2021-02-24 DIAGNOSIS — M17.0 PRIMARY OSTEOARTHRITIS OF BOTH KNEES: ICD-10-CM

## 2021-02-24 DIAGNOSIS — M54.16 LUMBAR RADICULOPATHY: ICD-10-CM

## 2021-02-24 PROCEDURE — 99204 OFFICE O/P NEW MOD 45 MIN: CPT | Performed by: PHYSICAL MEDICINE & REHABILITATION

## 2021-02-24 RX ORDER — LIDOCAINE 50 MG/G
1 PATCH TOPICAL EVERY 24 HOURS
Qty: 30 PATCH | Refills: 1 | Status: SHIPPED | OUTPATIENT
Start: 2021-02-24 | End: 2021-03-26

## 2021-02-24 ASSESSMENT — PAIN SCALES - GENERAL: PAINLEVEL: 6=MODERATE PAIN

## 2021-02-24 ASSESSMENT — PATIENT HEALTH QUESTIONNAIRE - PHQ9: CLINICAL INTERPRETATION OF PHQ2 SCORE: 0

## 2021-02-24 NOTE — PROGRESS NOTES
"New patient note    Physiatry (physical medicine and  Rehabilitation), interventional spine and sports medicine    Date of Service: 02/24/2021    Chief complaint:   Chief Complaint   Patient presents with   • New Patient     Bilateral low back pain       HISTORY    HPI: Saleem Whitney 75 y.o. male who presents today for evaluation of low back pain.  He has had back pain for the last 30 years.  Usually, this is axial and goes away after a week.  In May 2020, he removed christal brush from 1 acre yard and filled 2-40 yard dumpsters.  He started to have back pain then and it has been constant.  This is primarily on the right side, but has started on the left now.    He has been going to PT and going to [facility] that started about 2.5 months ago.  This had been helpful, but it seems that this is getting worse.  Pain is relatively constant.  Use of \"electric pads\" helped a lot.    From what he reports, he went to the [facility]in doctor and has felt like he had more pain in his legs after that.      Around May, he had severe pain in the right posterior leg.  This was sharp and painful, but was brief.  No particular weakness in legs or feet.  Sitting or standing can relieve symptoms.     No changes in symptoms with coughing or sneezing.  Urinary frequency, long-standing    Pain is a 5/10 on the NRS.     Medical records review:  I reviewed the note from the referring provider Jyoti Villanueva A.P.R.N.     Previous treatments:    Physical Therapy: Yes    Medications the patient is tried: Extra-strength tylenol    Previous interventions: none    Previous surgeries to relieve the above pain:  none      ROS:   Red Flags ROS:   Fever, Chills, Sweats: Denies  Involuntary Weight Loss: Denies  Bladder Incontinence: Denies  Bowel Incontinence: Denies  Saddle Anesthesia: Denies    All other systems reviewed and negative.       PMHx:   Past Medical History:   Diagnosis Date   • Hyperlipidemia    • Hypertension    • Hypothyroid    • Type II " or unspecified type diabetes mellitus without mention of complication, not stated as uncontrolled        PSHx:   Past Surgical History:   Procedure Laterality Date   • HAND SURGERY         Family history   Family History   Problem Relation Age of Onset   • Diabetes Mother    • Stroke Father         45   • Diabetes Sister    • Diabetes Maternal Uncle          Medications:   Current Outpatient Medications   Medication   • lidocaine (LIDODERM) 5 % Patch   • amLODIPine (NORVASC) 10 MG Tab   • linagliptin (TRADJENTA) 5 MG Tab tablet   • Blood Glucose Test Strips   • Lancets   • metformin (GLUCOPHAGE) 1000 MG tablet   • glipiZIDE (GLUCOTROL) 10 MG Tab   • levothyroxine (SYNTHROID) 88 MCG Tab   • atorvastatin (LIPITOR) 10 MG Tab   • losartan (COZAAR) 100 MG Tab   • aspirin (ASA) 81 MG Chew Tab chewable tablet   • vitamin D (CHOLECALCIFEROL) 1000 UNIT Tab   • Magnesium 500 MG Tab     No current facility-administered medications for this visit.       Allergies:   No Known Allergies    Social Hx:   Social History     Socioeconomic History   • Marital status:      Spouse name: Not on file   • Number of children: Not on file   • Years of education: Not on file   • Highest education level: Not on file   Occupational History   • Not on file   Tobacco Use   • Smoking status: Former Smoker     Packs/day: 1.00     Years: 5.00     Pack years: 5.00     Types: Cigarettes     Quit date: 1971     Years since quittin.3   • Smokeless tobacco: Never Used   Substance and Sexual Activity   • Alcohol use: Yes     Alcohol/week: 4.2 oz     Types: 7 Glasses of wine per week     Comment: daily   • Drug use: No   • Sexual activity: Yes     Partners: Female   Other Topics Concern   •  Service No   • Blood Transfusions No   • Caffeine Concern No   • Occupational Exposure No   • Hobby Hazards No   • Sleep Concern No   • Stress Concern No   • Weight Concern No   • Special Diet No   • Back Care No   • Exercise Yes   • Bike  "Helmet No   • Seat Belt Yes   • Self-Exams No   Social History Narrative   • Not on file     Social Determinants of Health     Financial Resource Strain:    • Difficulty of Paying Living Expenses:    Food Insecurity:    • Worried About Running Out of Food in the Last Year:    • Ran Out of Food in the Last Year:    Transportation Needs:    • Lack of Transportation (Medical):    • Lack of Transportation (Non-Medical):    Physical Activity:    • Days of Exercise per Week:    • Minutes of Exercise per Session:    Stress:    • Feeling of Stress :    Social Connections:    • Frequency of Communication with Friends and Family:    • Frequency of Social Gatherings with Friends and Family:    • Attends Jehovah's witness Services:    • Active Member of Clubs or Organizations:    • Attends Club or Organization Meetings:    • Marital Status:    Intimate Partner Violence:    • Fear of Current or Ex-Partner:    • Emotionally Abused:    • Physically Abused:    • Sexually Abused:          EXAMINATION     Physical Exam:   Vitals: /62 (BP Location: Right arm, Patient Position: Sitting, BP Cuff Size: Adult long)   Pulse 68   Temp 36.7 °C (98 °F) (Temporal)   Ht 1.854 m (6' 1\")   Wt 101 kg (222 lb 10.6 oz)   SpO2 95%     Constitutional:   Body Habitus: Body mass index is 29.38 kg/m².  Cooperation: Fully cooperates with exam  Appearance: Well-groomed, well-nourished, not disheveled, in no acute distress    Eyes: No scleral icterus, no proptosis     ENT -no obvious auditory deficits, wearing a face mask    Skin -no rashes or lesions noted     Respiratory-  breathing comfortable on room air, no audible wheezing    Cardiovascular- capillary refills less than 2 seconds. No lower extremity edema is noted.     Psychiatric- alert and oriented ×3. Normal affect.     Gait - normal gait, no use of ambulatory device, nonantalgic. The patient can heel walk with ease. The patient has difficulty with toe walking..     Musculoskeletal -   Cervical " spine   Inspection: No deformities of the skin over the cervical spine. No rashes or lesions.    full  A/P ROM in all directions, without  pain      Spurling’s sign: negative bilaterally    No signs of muscular atrophy in bilateral upper extremities       Thoracic/Lumbar Spine/Sacral Spine/Hips   Inspection: No evidence of atrophy in bilateral lower extremities throughout     ROM: mildly decreased AROM with flexion, extension, lateral flexion, and rotation bilaterally, with pain with quadrant loading bilaterally, right greater than left    Palpation:   No tenderness to palpation in midline at T1-T12 levels. No tenderness to palpation in the left and right of the midline T1-L5  palpation over SI joint: negative bilaterally    palpation over buttock: negative bilaterally    palpation in hip or over the greater trochanters: negative bilaterally      Lumbar spine Special tests  Neuro tension  Straight leg test positive right, negative left      HIP  Range of motion in the hips is within normal limits in internal and external rotation bilaterally    SI joint tests  Observation patient sits on one buttocks: Negative  PHUONG test negative bilaterally       Neuro       Key points for the international standards for neurological classification of spinal cord injury (ISNCSCI) to light touch.     Dermatome R L   C4 2 2   C5 2 2   C6 2 2   C7 2 2   C8 2 2   T1 2 2   T2 2 2   L2 2 2   L3 2 2   L4 2 2   L5 2 2   S1 2 2   S2 2 2           Motor Exam Upper Extremities   ? Myotome R L   Shoulder flexion C5 5 5   Elbow flexion C5 5 5   Wrist extension C6 5 5   Elbow extension C7 5 5   Finger flexion C8 5 5   Finger abduction T1 5 5         Motor Exam Lower Extremities    ? Myotome R L   Hip flexion L2 5 5   Knee extension L3 5 5   Ankle dorsiflexion L4 5 5   Toe extension L5 5 5   Ankle plantarflexion S1 5 5         Leslie’s sign negative bilaterally   Babinski sign negative bilaterally   Clonus of the ankle negative bilaterally      Reflexes  ?  R L   Biceps  2+ 2+   Brachioradialis  2+ 2+   Patella  2+ 2+   Achilles   1+ 2+       MEDICAL DECISION MAKING    Medical records review: see under HPI section.     DATA    Labs:   Lab Results   Component Value Date/Time    SODIUM 143 12/24/2020 07:36 AM    POTASSIUM 4.4 12/24/2020 07:36 AM    CHLORIDE 105 12/24/2020 07:36 AM    CO2 27 12/24/2020 07:36 AM    ANION 11.0 12/24/2020 07:36 AM    GLUCOSE 186 (H) 12/24/2020 07:36 AM    BUN 25 (H) 12/24/2020 07:36 AM    CREATININE 1.01 12/24/2020 07:36 AM    CALCIUM 9.2 12/24/2020 07:36 AM    ASTSGOT 21 12/24/2020 07:36 AM    ALTSGPT 24 12/24/2020 07:36 AM    TBILIRUBIN 0.3 12/24/2020 07:36 AM    ALBUMIN 4.1 12/24/2020 07:36 AM    TOTPROTEIN 7.2 12/24/2020 07:36 AM    GLOBULIN 3.1 12/24/2020 07:36 AM    AGRATIO 1.3 12/24/2020 07:36 AM       No results found for: PROTHROMBTM, INR     Lab Results   Component Value Date/Time    WBC 8.8 01/09/2019 11:43 AM    RBC 4.95 01/09/2019 11:43 AM    HEMOGLOBIN 14.4 01/09/2019 11:43 AM    HEMATOCRIT 42.7 01/09/2019 11:43 AM    MCV 86.3 01/09/2019 11:43 AM    MCH 29.1 01/09/2019 11:43 AM    MCHC 33.7 01/09/2019 11:43 AM    MPV 9.9 01/09/2019 11:43 AM    NEUTSPOLYS 62.80 01/09/2019 11:43 AM    LYMPHOCYTES 26.90 01/09/2019 11:43 AM    MONOCYTES 7.40 01/09/2019 11:43 AM    EOSINOPHILS 2.20 01/09/2019 11:43 AM    BASOPHILS 0.50 01/09/2019 11:43 AM    HYPOCHROMIA 1+ 08/11/2014 05:45 PM        Lab Results   Component Value Date/Time    HBA1C 7.7 (H) 12/24/2020 07:36 AM        Imaging: I personally reviewed following images, these are my reads  Xray lumbar spine 12/23/2020  There is degenerative disc disease in the lumbar spine with loss of disc height and facet arthropathy    IMAGING radiology reads. I reviewed the following radiology reads   Xray right knee 12/23/2020  Impression: Tricompartment degenerative change.  No evidence of fracture    Xray left knee 12/23/2020  Impression: Tricompartment degenerative change.  No  evidence of fracture     Results for orders placed during the hospital encounter of 12/23/20   DX-LUMBAR SPINE-2 OR 3 VIEWS    Impression Multilevel degenerative disc disease and facet degeneration.                                           Diagnosis   Visit Diagnoses     ICD-10-CM   1. Lumbar radiculopathy  M54.16   2. DDD (degenerative disc disease), lumbar  M51.36   3. Lumbar spondylosis  M47.816   4. Lumbosacral pain  M54.5   5. Primary osteoarthritis of both knees  M17.0           ASSESSMENT:  Saleem Whitney 75 y.o. male seen for above     Saleem was seen today for new patient.    Diagnoses and all orders for this visit:    Lumbar radiculopathy  -     MR-LUMBAR SPINE-W/O; Future    DDD (degenerative disc disease), lumbar  -     lidocaine (LIDODERM) 5 % Patch; Place 1 Patch on the skin every 24 hours for 30 days. Apply for 12 hours maximum and then take off for 12 hours.    Lumbar spondylosis    Lumbosacral pain  -     lidocaine (LIDODERM) 5 % Patch; Place 1 Patch on the skin every 24 hours for 30 days. Apply for 12 hours maximum and then take off for 12 hours.    Primary osteoarthritis of both knees    1. Discussed trial of lidoderm patch over the lumbosacral region.  Advised 12 hours on and 12 hours off.  2. MRI lumbar spine ordered.  He has had continued low back and radicular pain with appearance of atrophy in right leg greater than left and weakness and loss of reflexes on exam.  He has participated in PT.  We will revisit treatment targets after MRI as he has symptoms that are likely related to lumbar facet arthropathy and also has findings that suggest lumbar radiculopathy on exam.  3. Continue activity as tolerated.  4. No intervention for knees recommended at this time.      Follow-up: Return after MRI.    Thank you very much for asking me to participate in Saleem Whitney's care.  Please contact me with any questions or concerns.      Please note that this dictation was created  using voice recognition software. I have made every reasonable attempt to correct obvious errors but there may be errors of grammar and content that I may have overlooked prior to finalization of this note.      Sandeep Camargo MD  Physical Medicine and Rehabilitation  Interventional Spine and Sports Physiatry  [facility]           Jyoti Hendrix A.P.R.N. .

## 2021-02-26 ENCOUNTER — APPOINTMENT (OUTPATIENT)
Dept: RADIOLOGY | Facility: MEDICAL CENTER | Age: 76
End: 2021-02-26
Attending: PHYSICAL MEDICINE & REHABILITATION
Payer: MEDICARE

## 2021-02-26 DIAGNOSIS — M54.16 LUMBAR RADICULOPATHY: ICD-10-CM

## 2021-02-26 PROCEDURE — 72148 MRI LUMBAR SPINE W/O DYE: CPT | Mod: ME

## 2021-03-03 ENCOUNTER — OFFICE VISIT (OUTPATIENT)
Dept: PHYSICAL MEDICINE AND REHAB | Facility: MEDICAL CENTER | Age: 76
End: 2021-03-03
Payer: MEDICARE

## 2021-03-03 VITALS
WEIGHT: 220.24 LBS | OXYGEN SATURATION: 96 % | TEMPERATURE: 97.6 F | HEART RATE: 71 BPM | BODY MASS INDEX: 29.19 KG/M2 | HEIGHT: 73 IN | DIASTOLIC BLOOD PRESSURE: 68 MMHG | SYSTOLIC BLOOD PRESSURE: 120 MMHG

## 2021-03-03 DIAGNOSIS — M51.36 DDD (DEGENERATIVE DISC DISEASE), LUMBAR: ICD-10-CM

## 2021-03-03 DIAGNOSIS — M48.061 SPINAL STENOSIS OF LUMBAR REGION, UNSPECIFIED WHETHER NEUROGENIC CLAUDICATION PRESENT: ICD-10-CM

## 2021-03-03 DIAGNOSIS — M47.816 LUMBAR SPONDYLOSIS: ICD-10-CM

## 2021-03-03 DIAGNOSIS — M48.061 NEURAL FORAMINAL STENOSIS OF LUMBAR SPINE: ICD-10-CM

## 2021-03-03 DIAGNOSIS — M54.50 LUMBOSACRAL PAIN: ICD-10-CM

## 2021-03-03 DIAGNOSIS — M17.0 PRIMARY OSTEOARTHRITIS OF BOTH KNEES: ICD-10-CM

## 2021-03-03 PROCEDURE — 99214 OFFICE O/P EST MOD 30 MIN: CPT | Performed by: PHYSICAL MEDICINE & REHABILITATION

## 2021-03-03 ASSESSMENT — PAIN SCALES - GENERAL: PAINLEVEL: 6=MODERATE PAIN

## 2021-03-03 ASSESSMENT — PATIENT HEALTH QUESTIONNAIRE - PHQ9: CLINICAL INTERPRETATION OF PHQ2 SCORE: 0

## 2021-03-07 NOTE — PROGRESS NOTES
Follow-up patient note    Physiatry (physical medicine and  Rehabilitation), interventional spine and sports medicine    Date of Service: 03/3/2021    Chief complaint:   Chief Complaint   Patient presents with   • Follow-Up     Back pain       HISTORY    HPI: Saleem Whitney 75 y.o. male who presents today for evaluation of ongoing low back pain.  From what he reports, since he has stopped going to PT, his back pain has started to reduce to a manageable level.  He uses lidoderm patches for relief.     No changes in symptoms with coughing or sneezing.  Urinary frequency, long-standing    Pain is a 6/10 on the NRS.     Medical records review:  I reviewed the note from the referring provider Jyoti Villanueva A.P.R.N.     Previous treatments:    Physical Therapy: Yes    Medications the patient is tried: Extra-strength tylenol    Previous interventions: none    Previous surgeries to relieve the above pain:  none      ROS:   Red Flags ROS:   Fever, Chills, Sweats: Denies  Involuntary Weight Loss: Denies  Bladder Incontinence: Denies  Bowel Incontinence: Denies  Saddle Anesthesia: Denies    All other systems reviewed and negative.       PMHx:   Past Medical History:   Diagnosis Date   • Hyperlipidemia    • Hypertension    • Hypothyroid    • Type II or unspecified type diabetes mellitus without mention of complication, not stated as uncontrolled        PSHx:   Past Surgical History:   Procedure Laterality Date   • HAND SURGERY         Family history   Family History   Problem Relation Age of Onset   • Diabetes Mother    • Stroke Father         45   • Diabetes Sister    • Diabetes Maternal Uncle          Medications:   Current Outpatient Medications   Medication   • lidocaine (LIDODERM) 5 % Patch   • amLODIPine (NORVASC) 10 MG Tab   • linagliptin (TRADJENTA) 5 MG Tab tablet   • metformin (GLUCOPHAGE) 1000 MG tablet   • glipiZIDE (GLUCOTROL) 10 MG Tab   • levothyroxine (SYNTHROID) 88 MCG Tab   • atorvastatin (LIPITOR)  10 MG Tab   • losartan (COZAAR) 100 MG Tab   • aspirin (ASA) 81 MG Chew Tab chewable tablet   • vitamin D (CHOLECALCIFEROL) 1000 UNIT Tab   • Magnesium 500 MG Tab   • Blood Glucose Test Strips   • Lancets     No current facility-administered medications for this visit.       Allergies:   No Known Allergies    Social Hx:   Social History     Socioeconomic History   • Marital status:      Spouse name: Not on file   • Number of children: Not on file   • Years of education: Not on file   • Highest education level: Not on file   Occupational History   • Not on file   Tobacco Use   • Smoking status: Former Smoker     Packs/day: 1.00     Years: 5.00     Pack years: 5.00     Types: Cigarettes     Quit date: 1971     Years since quittin.3   • Smokeless tobacco: Never Used   Substance and Sexual Activity   • Alcohol use: Yes     Alcohol/week: 4.2 oz     Types: 7 Glasses of wine per week     Comment: daily   • Drug use: No   • Sexual activity: Yes     Partners: Female   Other Topics Concern   •  Service No   • Blood Transfusions No   • Caffeine Concern No   • Occupational Exposure No   • Hobby Hazards No   • Sleep Concern No   • Stress Concern No   • Weight Concern No   • Special Diet No   • Back Care No   • Exercise Yes   • Bike Helmet No   • Seat Belt Yes   • Self-Exams No   Social History Narrative   • Not on file     Social Determinants of Health     Financial Resource Strain:    • Difficulty of Paying Living Expenses:    Food Insecurity:    • Worried About Running Out of Food in the Last Year:    • Ran Out of Food in the Last Year:    Transportation Needs:    • Lack of Transportation (Medical):    • Lack of Transportation (Non-Medical):    Physical Activity:    • Days of Exercise per Week:    • Minutes of Exercise per Session:    Stress:    • Feeling of Stress :    Social Connections:    • Frequency of Communication with Friends and Family:    • Frequency of Social Gatherings with Friends and  "Family:    • Attends Yazidism Services:    • Active Member of Clubs or Organizations:    • Attends Club or Organization Meetings:    • Marital Status:    Intimate Partner Violence:    • Fear of Current or Ex-Partner:    • Emotionally Abused:    • Physically Abused:    • Sexually Abused:          EXAMINATION     Physical Exam:   Vitals: /68 (BP Location: Right arm, Patient Position: Sitting, BP Cuff Size: Adult long)   Pulse 71   Temp 36.4 °C (97.6 °F) (Temporal)   Ht 1.854 m (6' 1\")   Wt 99.9 kg (220 lb 3.8 oz)   SpO2 96%     Constitutional:   Body Habitus: Body mass index is 29.06 kg/m².  Cooperation: Fully cooperates with exam  Appearance: Well-groomed, well-nourished, not disheveled, in no acute distress    Eyes: No scleral icterus, no proptosis     ENT -no obvious auditory deficits, wearing a face mask    Skin -no rashes or lesions noted     Respiratory-  breathing comfortable on room air, no audible wheezing    Cardiovascular- No lower extremity edema is noted.     Psychiatric- alert and oriented ×3. Normal affect.     Gait - normal gait, no use of ambulatory device, nonantalgic.         MEDICAL DECISION MAKING    Medical records review: see under HPI section.     DATA    Labs:   Lab Results   Component Value Date/Time    SODIUM 143 12/24/2020 07:36 AM    POTASSIUM 4.4 12/24/2020 07:36 AM    CHLORIDE 105 12/24/2020 07:36 AM    CO2 27 12/24/2020 07:36 AM    ANION 11.0 12/24/2020 07:36 AM    GLUCOSE 186 (H) 12/24/2020 07:36 AM    BUN 25 (H) 12/24/2020 07:36 AM    CREATININE 1.01 12/24/2020 07:36 AM    CALCIUM 9.2 12/24/2020 07:36 AM    ASTSGOT 21 12/24/2020 07:36 AM    ALTSGPT 24 12/24/2020 07:36 AM    TBILIRUBIN 0.3 12/24/2020 07:36 AM    ALBUMIN 4.1 12/24/2020 07:36 AM    TOTPROTEIN 7.2 12/24/2020 07:36 AM    GLOBULIN 3.1 12/24/2020 07:36 AM    AGRATIO 1.3 12/24/2020 07:36 AM       No results found for: PROTHROMBTM, INR     Lab Results   Component Value Date/Time    WBC 8.8 01/09/2019 11:43 AM    " RBC 4.95 01/09/2019 11:43 AM    HEMOGLOBIN 14.4 01/09/2019 11:43 AM    HEMATOCRIT 42.7 01/09/2019 11:43 AM    MCV 86.3 01/09/2019 11:43 AM    MCH 29.1 01/09/2019 11:43 AM    MCHC 33.7 01/09/2019 11:43 AM    MPV 9.9 01/09/2019 11:43 AM    NEUTSPOLYS 62.80 01/09/2019 11:43 AM    LYMPHOCYTES 26.90 01/09/2019 11:43 AM    MONOCYTES 7.40 01/09/2019 11:43 AM    EOSINOPHILS 2.20 01/09/2019 11:43 AM    BASOPHILS 0.50 01/09/2019 11:43 AM    HYPOCHROMIA 1+ 08/11/2014 05:45 PM        Lab Results   Component Value Date/Time    HBA1C 7.7 (H) 12/24/2020 07:36 AM        Imaging: I personally reviewed following images, these are my reads  Xray lumbar spine 12/23/2020  There is degenerative disc disease in the lumbar spine with loss of disc height and facet arthropathy    IMAGING radiology reads. I reviewed the following radiology reads     MRI lumbar spine 02/26/2021  At L1-2, no central or foraminal stenosis  At L2-3, no central of foraminal stenosis, disc bulge  At L3-4, moderate central canal stenosis second to diffuse disc bulge, ligmentum flavum thickening and facet arthropathy.  At L4-5, mild central canal stenosis, mild foraminal stenosis and facet hypertrophy  At L5-S1, disc bulge without central canal stenosis, facet arthropathy and moderate left foraminal narrowing      Xray right knee 12/23/2020  Impression: Tricompartment degenerative change.  No evidence of fracture    Xray left knee 12/23/2020  Impression: Tricompartment degenerative change.  No evidence of fracture     Results for orders placed during the hospital encounter of 12/23/20   DX-LUMBAR SPINE-2 OR 3 VIEWS    Impression Multilevel degenerative disc disease and facet degeneration.                                           Diagnosis   Visit Diagnoses     ICD-10-CM   1. Lumbosacral pain  M54.5   2. DDD (degenerative disc disease), lumbar  M51.36   3. Lumbar spondylosis  M47.816   4. Primary osteoarthritis of both knees  M17.0   5. Spinal stenosis of lumbar  region, unspecified whether neurogenic claudication present  M48.061   6. Neural foraminal stenosis of lumbar spine  M99.73           ASSESSMENT:  Saleem Whitney 75 y.o. male seen for above     Saleem was seen today for follow-up.    Diagnoses and all orders for this visit:    Lumbosacral pain    DDD (degenerative disc disease), lumbar    Lumbar spondylosis    Primary osteoarthritis of both knees    Spinal stenosis of lumbar region, unspecified whether neurogenic claudication present    Neural foraminal stenosis of lumbar spine         1. Reviewed MRI lumbar spine and discussed his current symptoms.  Overall, since he has stopped doing physical therapy, his back pain is improving and the radicular component of the pain is better.  He feels his current axial pain is manageable for him.  For now, he will continue his conservative care with lidoderm patch over the lumbosacral region.  Advised 12 hours on and 12 hours off.  He declines need for change in medication management.  2. Continue activity as tolerated.  He may continue stretching as tolerated  3. Discussed the finding of the renal cyst on his MRI lumbar spine.  It has the appearance of a simple cyst, but reported that I would contact Radiologist to confirm that.      Follow-up: Return if symptoms worsen or fail to improve.    Thank you very much for asking me to participate in Saleem Whitney's care.  Please contact me with any questions or concerns.      Please note that this dictation was created using voice recognition software. I have made every reasonable attempt to correct obvious errors but there may be errors of grammar and content that I may have overlooked prior to finalization of this note.      Sandeep Camargo MD  Physical Medicine and Rehabilitation  Interventional Spine and Sports Physiatry  [facility]           Jyoti Hendrix A.P.R.N. .

## 2021-03-10 ENCOUNTER — APPOINTMENT (RX ONLY)
Dept: URBAN - METROPOLITAN AREA CLINIC 4 | Facility: CLINIC | Age: 76
Setting detail: DERMATOLOGY
End: 2021-03-10

## 2021-03-10 DIAGNOSIS — L20.89 OTHER ATOPIC DERMATITIS: ICD-10-CM

## 2021-03-10 DIAGNOSIS — L72.8 OTHER FOLLICULAR CYSTS OF THE SKIN AND SUBCUTANEOUS TISSUE: ICD-10-CM

## 2021-03-10 DIAGNOSIS — Z09 ENCOUNTER FOR FOLLOW-UP EXAMINATION AFTER COMPLETED TREATMENT FOR CONDITIONS OTHER THAN MALIGNANT NEOPLASM: ICD-10-CM | Status: RESOLVED

## 2021-03-10 DIAGNOSIS — Z85.828 PERSONAL HISTORY OF OTHER MALIGNANT NEOPLASM OF SKIN: ICD-10-CM | Status: RESOLVED

## 2021-03-10 PROBLEM — L20.84 INTRINSIC (ALLERGIC) ECZEMA: Status: ACTIVE | Noted: 2021-03-10

## 2021-03-10 PROCEDURE — 99213 OFFICE O/P EST LOW 20 MIN: CPT

## 2021-03-10 PROCEDURE — ? ADDITIONAL NOTES

## 2021-03-10 PROCEDURE — ? OBSERVATION

## 2021-03-10 PROCEDURE — ? COUNSELING

## 2021-03-10 ASSESSMENT — LOCATION ZONE DERM
LOCATION ZONE: NECK
LOCATION ZONE: FACE
LOCATION ZONE: LEG
LOCATION ZONE: TRUNK
LOCATION ZONE: ARM

## 2021-03-10 ASSESSMENT — LOCATION DETAILED DESCRIPTION DERM
LOCATION DETAILED: LEFT MEDIAL DISTAL PRETIBIAL REGION
LOCATION DETAILED: LEFT DISTAL PRETIBIAL REGION
LOCATION DETAILED: RIGHT DISTAL DORSAL FOREARM
LOCATION DETAILED: LEFT POSTERIOR NECK
LOCATION DETAILED: RIGHT INFERIOR FOREHEAD
LOCATION DETAILED: SUPERIOR THORACIC SPINE

## 2021-03-10 ASSESSMENT — LOCATION SIMPLE DESCRIPTION DERM
LOCATION SIMPLE: RIGHT FOREHEAD
LOCATION SIMPLE: LEFT PRETIBIAL REGION
LOCATION SIMPLE: UPPER BACK
LOCATION SIMPLE: RIGHT FOREARM
LOCATION SIMPLE: POSTERIOR NECK

## 2021-03-10 NOTE — PROCEDURE: ADDITIONAL NOTES
Additional Notes: He denies any discomfort at this time. Will monitor
Render Risk Assessment In Note?: no
Detail Level: Zone
2.3

## 2021-03-19 NOTE — PATIENT COMMUNICATION
Anesthesia Pre Eval Note    Anesthesia ROS/Med Hx    Overall Review:  EKG was reviewed     Anesthetic Complication History:  Patient does not have a history of anesthetic complications      Pulmonary Review:  Patient does not have a pulmonary history      Neuro/Psych Review:  Patient does not have a neuro/psych history       Cardiovascular Review:  Exercise tolerance: good (>4 METS)  Positive for hypertension  Positive for hyperlipidemia    GI/HEPATIC/RENAL Review:  Patient does not have a GI/hepatic/renalhistory       End/Other Review:    Positive for arthritis    Overall Review of Systems Comments:  Functional status limited by hip pain, currently using walker, and used cane for past few months. Within last 6 months, patient was able to walk > 1 mile and up and down multiple flights of stairs without getting short of breath. > 4 METS. Denies any history of chest pain, SOB, GUILLAUME.    Recent fall history determined to be due to acute hip pain. No syncope. No LOC.    Recent hyponatremia thought to be due to HCTZ. Sodium now improving since HCTZ held.  Additional Results:  EKG:  No results found for this or any previous visit (from the past 4464 hour(s)).       Relevant Problems   No relevant active problems       Physical Exam     Airway   Mallampati: II  TM Distance: >3 FB  Neck ROM: Full    Cardiovascular  Cardiovascular exam normal  Cardio Rhythm: Regular  Cardio Rate: Normal    Head Assessment  Head assessment: Atraumatic and Normocephalic    General Assessment  General Assessment: Alert and oriented and No acute distress    Dental Exam  Dental exam normal    Pulmonary Exam  Pulmonary exam normal  Breath sounds clear to auscultation:  Yes    Abdominal Exam  Abdominal exam normal      Anesthesia Plan:  Anesthesia Plan    ASA Status: 3    Anesthesia Type: General    Induction: Intravenous  Preferred Airway Type: ETT  Maintenance: Inhalational  Premedication: None      Post-op Pain Management: Per  Received request via: Patient    Was the patient seen in the last year in this department? Yes    Does the patient have an active prescription (recently filled or refills available) for medication(s) requested? No     Surgeon      Checklist  Reviewed: Lab Results, NPO Status, Allergies, Medications, Problem list, Past Med History, EKG, Nursing Notes, Consultations, Patient Summary, Beta Blocker Status and Care Everywhere  Monitors  Discussed risks of the following Invasive monitors with patient: Arterial Line    Consent/Risks Discussed Statement:  The proposed anesthetic plan, including its risks and benefits, have been discussed with the Patient along with the risks and benefits of alternatives. Questions were encouraged and answered and the patient and/or representative understands and agrees to proceed.    I have discussed elements of the patient's history or examination, as noted above and/or as follows, that place the patient at higher risk of complications; age, BMI> 30 (obesity) and heart disease.    I discussed with the patient (and/or patient's legal representative) the risks and benefits of the proposed anesthesia plan, General, which may include services performed by other anesthesia providers.    Alternative anesthesia plans, if available, were reviewed with the patient (and/or patient's legal representative). Discussion has been held with the patient (and/or patient's legal representative) regarding risks of anesthesia, which include allergic reaction, anxiety, aspiration, back pain, dental injury, depressed breathing, hypotension, intra-operative awareness, nausea, vomiting, sore throat, oral injury, organ damage and post-op intubation and emergent situations that may require change in anesthesia plan.    The patient (and/or patient's legal representative) has indicated understanding, his/her questions have been answered, and he/she wishes to proceed with the planned anesthetic.  Blood Consent    Informed Consent for Blood: Consented

## 2021-03-20 DIAGNOSIS — I15.2 HYPERTENSION ASSOCIATED WITH DIABETES (HCC): ICD-10-CM

## 2021-03-20 DIAGNOSIS — E11.59 HYPERTENSION ASSOCIATED WITH DIABETES (HCC): ICD-10-CM

## 2021-03-22 RX ORDER — LOSARTAN POTASSIUM 100 MG/1
TABLET ORAL
Qty: 90 TABLET | Refills: 3 | Status: SHIPPED | OUTPATIENT
Start: 2021-03-22 | End: 2022-04-04 | Stop reason: SDUPTHER

## 2021-04-07 DIAGNOSIS — E11.69 HYPERLIPIDEMIA DUE TO TYPE 2 DIABETES MELLITUS (HCC): ICD-10-CM

## 2021-04-07 DIAGNOSIS — E78.5 HYPERLIPIDEMIA DUE TO TYPE 2 DIABETES MELLITUS (HCC): ICD-10-CM

## 2021-04-07 DIAGNOSIS — E11.9 TYPE 2 DIABETES MELLITUS WITHOUT COMPLICATION, WITHOUT LONG-TERM CURRENT USE OF INSULIN (HCC): ICD-10-CM

## 2021-04-08 RX ORDER — GLIPIZIDE 10 MG/1
10 TABLET ORAL 2 TIMES DAILY
Qty: 180 TABLET | Refills: 1 | Status: SHIPPED | OUTPATIENT
Start: 2021-04-08 | End: 2021-09-16 | Stop reason: SDUPTHER

## 2021-04-08 RX ORDER — ATORVASTATIN CALCIUM 10 MG/1
10 TABLET, FILM COATED ORAL DAILY
Qty: 90 TABLET | Refills: 3 | Status: SHIPPED | OUTPATIENT
Start: 2021-04-08 | End: 2022-04-04 | Stop reason: SDUPTHER

## 2021-04-08 RX ORDER — LEVOTHYROXINE SODIUM 88 UG/1
88 TABLET ORAL
Qty: 90 TABLET | Refills: 3 | Status: SHIPPED | OUTPATIENT
Start: 2021-04-08 | End: 2022-04-04 | Stop reason: SDUPTHER

## 2021-05-03 ENCOUNTER — HOSPITAL ENCOUNTER (OUTPATIENT)
Dept: LAB | Facility: MEDICAL CENTER | Age: 76
End: 2021-05-03
Attending: NURSE PRACTITIONER
Payer: MEDICARE

## 2021-05-03 DIAGNOSIS — E78.5 HYPERLIPIDEMIA DUE TO TYPE 2 DIABETES MELLITUS (HCC): ICD-10-CM

## 2021-05-03 DIAGNOSIS — E11.69 HYPERLIPIDEMIA DUE TO TYPE 2 DIABETES MELLITUS (HCC): ICD-10-CM

## 2021-05-03 DIAGNOSIS — E11.9 TYPE 2 DIABETES MELLITUS WITHOUT COMPLICATION, WITHOUT LONG-TERM CURRENT USE OF INSULIN (HCC): ICD-10-CM

## 2021-05-03 DIAGNOSIS — E03.9 ACQUIRED HYPOTHYROIDISM: ICD-10-CM

## 2021-05-03 LAB
ALBUMIN SERPL BCP-MCNC: 4.2 G/DL (ref 3.2–4.9)
ALBUMIN/GLOB SERPL: 1.4 G/DL
ALP SERPL-CCNC: 72 U/L (ref 30–99)
ALT SERPL-CCNC: 30 U/L (ref 2–50)
ANION GAP SERPL CALC-SCNC: 11 MMOL/L (ref 7–16)
AST SERPL-CCNC: 24 U/L (ref 12–45)
BILIRUB SERPL-MCNC: 0.6 MG/DL (ref 0.1–1.5)
BUN SERPL-MCNC: 25 MG/DL (ref 8–22)
CALCIUM SERPL-MCNC: 9.1 MG/DL (ref 8.4–10.2)
CHLORIDE SERPL-SCNC: 104 MMOL/L (ref 96–112)
CHOLEST SERPL-MCNC: 106 MG/DL (ref 100–199)
CO2 SERPL-SCNC: 25 MMOL/L (ref 20–33)
CREAT SERPL-MCNC: 1.12 MG/DL (ref 0.5–1.4)
EST. AVERAGE GLUCOSE BLD GHB EST-MCNC: 183 MG/DL
FASTING STATUS PATIENT QL REPORTED: NORMAL
GLOBULIN SER CALC-MCNC: 3 G/DL (ref 1.9–3.5)
GLUCOSE SERPL-MCNC: 206 MG/DL (ref 65–99)
HBA1C MFR BLD: 8 % (ref 4–5.6)
HDLC SERPL-MCNC: 37 MG/DL
LDLC SERPL CALC-MCNC: 40 MG/DL
POTASSIUM SERPL-SCNC: 4.4 MMOL/L (ref 3.6–5.5)
PROT SERPL-MCNC: 7.2 G/DL (ref 6–8.2)
SODIUM SERPL-SCNC: 140 MMOL/L (ref 135–145)
TRIGL SERPL-MCNC: 143 MG/DL (ref 0–149)
TSH SERPL DL<=0.005 MIU/L-ACNC: 3.64 UIU/ML (ref 0.38–5.33)

## 2021-05-03 PROCEDURE — 36415 COLL VENOUS BLD VENIPUNCTURE: CPT

## 2021-05-03 PROCEDURE — 84443 ASSAY THYROID STIM HORMONE: CPT

## 2021-05-03 PROCEDURE — 80061 LIPID PANEL: CPT

## 2021-05-03 PROCEDURE — 80053 COMPREHEN METABOLIC PANEL: CPT

## 2021-05-03 PROCEDURE — 83036 HEMOGLOBIN GLYCOSYLATED A1C: CPT | Mod: GA

## 2021-05-12 ENCOUNTER — OFFICE VISIT (OUTPATIENT)
Dept: MEDICAL GROUP | Facility: MEDICAL CENTER | Age: 76
End: 2021-05-12
Payer: MEDICARE

## 2021-05-12 VITALS
HEIGHT: 72 IN | OXYGEN SATURATION: 96 % | TEMPERATURE: 96.9 F | BODY MASS INDEX: 29.62 KG/M2 | DIASTOLIC BLOOD PRESSURE: 70 MMHG | SYSTOLIC BLOOD PRESSURE: 124 MMHG | WEIGHT: 218.7 LBS | HEART RATE: 63 BPM

## 2021-05-12 DIAGNOSIS — Z00.00 MEDICARE ANNUAL WELLNESS VISIT, SUBSEQUENT: ICD-10-CM

## 2021-05-12 DIAGNOSIS — E03.9 ACQUIRED HYPOTHYROIDISM: ICD-10-CM

## 2021-05-12 DIAGNOSIS — E11.59 HYPERTENSION ASSOCIATED WITH DIABETES (HCC): ICD-10-CM

## 2021-05-12 DIAGNOSIS — E11.9 TYPE 2 DIABETES MELLITUS WITHOUT COMPLICATION, WITHOUT LONG-TERM CURRENT USE OF INSULIN (HCC): ICD-10-CM

## 2021-05-12 DIAGNOSIS — E78.5 HYPERLIPIDEMIA DUE TO TYPE 2 DIABETES MELLITUS (HCC): ICD-10-CM

## 2021-05-12 DIAGNOSIS — E11.69 HYPERLIPIDEMIA DUE TO TYPE 2 DIABETES MELLITUS (HCC): ICD-10-CM

## 2021-05-12 DIAGNOSIS — I73.9 PVD (PERIPHERAL VASCULAR DISEASE) (HCC): ICD-10-CM

## 2021-05-12 DIAGNOSIS — I15.2 HYPERTENSION ASSOCIATED WITH DIABETES (HCC): ICD-10-CM

## 2021-05-12 DIAGNOSIS — Z12.11 COLON CANCER SCREENING: ICD-10-CM

## 2021-05-12 DIAGNOSIS — E55.9 VITAMIN D DEFICIENCY: ICD-10-CM

## 2021-05-12 PROCEDURE — G0439 PPPS, SUBSEQ VISIT: HCPCS | Performed by: NURSE PRACTITIONER

## 2021-05-12 RX ORDER — EMPAGLIFLOZIN 10 MG/1
TABLET, FILM COATED ORAL
COMMUNITY
End: 2021-05-12

## 2021-05-12 ASSESSMENT — ENCOUNTER SYMPTOMS: GENERAL WELL-BEING: GOOD

## 2021-05-12 ASSESSMENT — PATIENT HEALTH QUESTIONNAIRE - PHQ9: CLINICAL INTERPRETATION OF PHQ2 SCORE: 0

## 2021-05-12 ASSESSMENT — ACTIVITIES OF DAILY LIVING (ADL): BATHING_REQUIRES_ASSISTANCE: 0

## 2021-05-12 NOTE — ASSESSMENT & PLAN NOTE
Recent A1c has increased to 8.0.  States that he has been eating more sweets recently, has not been getting as much physical activity.  He recently got 2 dogs and is planning to increase his physical activity.  He drinks 1 to 2 glasses of wine daily  Consistent with Metformin 1000 mg twice daily, Tradjenta 5 mg daily, glipizide 10 mg twice daily  No polyuria, polydipsia.  Reportedly up-to-date on eye exam, we will request this record

## 2021-05-12 NOTE — ASSESSMENT & PLAN NOTE
Stable and controlled on atorvastatin 10 mg daily, recent LDL 40.  Tolerating medication without difficulty

## 2021-05-12 NOTE — LETTER
[facility]  MACHO Ulloa.  [address]  [place] [address]  Fax: [phone]   Authorization for Release/Disclosure of   Protected Health Information   Name: SALEEM ARREAGA : 1945 SSN: [id number]   Address: [address] [place] [address]   [place] [address] Phone:    [phone] (home)    I authorize the entity listed below to release/disclose the PHI below to:   [facility]/SCOOBY Ulloa and SCOOBY Ulloa   Provider or Entity Name:  [facility]   Address   City, State, [facility]   Phone:      Fax:     Reason for request: continuity of care   Information to be released:    [  ] LAST COLONOSCOPY,  including any PATH REPORT and follow-up  [  ] LAST FIT/COLOGUARD RESULT [  ] LAST DEXA  [  ] LAST MAMMOGRAM  [  ] LAST PAP  [  ] LAST LABS [  ] RETINA EXAM REPORT  [  ] IMMUNIZATION RECORDS  [ x ] Release all info      [  ] Check here and initial the line next to each item to release ALL health information INCLUDING  _____ Care and treatment for drug and / or alcohol abuse  _____ HIV testing, infection status, or AIDS  _____ Genetic Testing    DATES OF SERVICE OR TIME PERIOD TO BE DISCLOSED: _____________  I understand and acknowledge that:  * This Authorization may be revoked at any time by you in writing, except if your health information has already been used or disclosed.  * Your health information that will be used or disclosed as a result of you signing this authorization could be re-disclosed by the recipient. If this occurs, your re-disclosed health information may no longer be protected by State or Federal laws.  * You may refuse to sign this Authorization. Your refusal will not affect your ability to obtain treatment.  * This Authorization becomes effective upon signing and will  on (date) __________.      If no date is indicated, this Authorization will  one (1) year from the signature date.    Name: Saleem Arreaga    Signature:    Date:     5/12/2021       PLEASE FAX REQUESTED RECORDS BACK TO: [phone]

## 2021-05-12 NOTE — PROGRESS NOTES
CC:  Wellness exam and follow up medical problems.    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  Tolerating medication without difficulty    Acquired hypothyroidism  Recent labs reviewed, TSH in good range.  Stable with levothyroxine 88 mcg daily    PVD (peripheral vascular disease) ([facility])  Stable with no recent change    Vitamin D deficiency  Consistent with vitamin D supplement    ([facility]) Hypertension associated with diabetes  Blood pressure is well controlled on amlodipine 10 mg daily, losartan 100 mg daily.  No chest pain, dizziness, palpitation    (HCC) Type 2 diabetes mellitus without complication, without long-term current use of insulin  Recent A1c has increased to 8.0.  States that he has been eating more sweets recently, has not been getting as much physical activity.  He recently got 2 dogs and is planning to increase his physical activity.  He drinks 1 to 2 glasses of wine daily  Consistent with Metformin 1000 mg twice daily, Tradjenta 5 mg daily, glipizide 10 mg twice daily  No polyuria, polydipsia.  Reportedly up-to-date on eye exam, we will request this record    Depression Screening    Little interest or pleasure in doing things?  0 - not at all  Feeling down, depressed , or hopeless? 0 - not at all  Patient Health Questionnaire Score: 0     If depressive symptoms identified deferred to follow up visit unless specifically addressed in assessment and plan.    Interpretation of PHQ-9 Total Score   Score Severity   1-4 No Depression   5-9 Mild Depression   10-14 Moderate Depression   15-19 Moderately Severe Depression   20-27 Severe Depression    Screening for Cognitive Impairment    Three Minute Recall (captain, garden, picture) 1/3    Rodrick clock face with all 12 numbers and set the hands to show 5 past 8.  Yes    Cognitive concerns identified deferred for  follow up unless specifically addressed in assessment and plan.    Fall Risk Assessment    Has the patient had two or more falls in the last year or any fall with injury in the last year?  No    Safety Assessment    Throw rugs on floor.  Yes  Handrails on all stairs.  Yes  Good lighting in all hallways.  Yes  Difficulty hearing.  No  Patient counseled about all safety risks that were identified.    Functional Assessment ADLs    Are there any barriers preventing you from cooking for yourself or meeting nutritional needs?  No.    Are there any barriers preventing you from driving safely or obtaining transportation?  No.    Are there any barriers preventing you from using a telephone or calling for help?  No.    Are there any barriers preventing you from shopping?  No.    Are there any barriers preventing you from taking care of your own finances?  No.    Are there any barriers preventing you from managing your medications?  No.    Are there any barriers preventing you from showering, bathing or dressing yourself?  No.    Are you currently engaging in any exercise or physical activity?  No.     What is your perception of your health?  Good.      Health Maintenance Summary                DIABETES MONOFILAMENT / LE EXAM Overdue 6/22/2018      Done 6/22/2017 AMB DIABETIC MONOFILAMENT LOWER EXTREMITY EXAM    RETINAL SCREENING Overdue 5/2/2019      Done 5/2/2018 REFERRAL FOR RETINAL SCREENING EXAM    COLON CANCER SCREENING ANNUAL FIT Overdue 1/9/2020      Done 1/9/2019 OCCULT BLOOD FECES IMMUNOASSAY     Patient has more history with this topic...    URINE ACR / MICROALBUMIN Next Due 6/10/2021      Done 6/10/2020 MICROALBUMIN CREAT RATIO URINE     Patient has more history with this topic...    A1C SCREENING Next Due 11/3/2021      Done 5/3/2021 HEMOGLOBIN A1C     Patient has more history with this topic...    FASTING LIPID PROFILE Next Due 5/3/2022      Done 5/3/2021 LIPID PROFILE     Patient has more history with this  topic...    SERUM CREATININE Next Due 5/3/2022      Done 5/3/2021 COMP METABOLIC PANEL     Patient has more history with this topic...    Annual Wellness Visit Next Due 5/13/2022      Done 5/12/2021 Visit Dx: Medicare annual wellness visit, subsequent     Patient has more history with this topic...    IMM DTaP/Tdap/Td Vaccine Next Due 8/8/2027      Done 8/8/2017 Imm Admin: Tdap Vaccine          Patient Care Team:  SCOOBY Ulloa as PCP - General (Family Medicine)  Saleem Vital O.D. as Consulting Physician (Optometry)    See chart problem list or referrals section for names of specialist.    Patient Active Problem List    Diagnosis Date Noted   • Itching of ear 01/13/2021   • PVD (peripheral vascular disease) (HCC) 12/26/2020   • Degenerative arthritis of knee, bilateral 12/22/2020   • Acute right-sided low back pain 08/06/2020   • Posterior knee pain, right 06/16/2020   • Motion sickness 10/02/2019   • Altitude sickness prophylaxis 10/02/2019   • Leg mass, right 07/12/2019   • Vitamin D deficiency 06/10/2019   • Leg cramping 06/10/2019   • Pain and swelling of right knee 04/16/2019   • Erythematous rash 04/16/2019   • Bilateral leg edema 01/08/2019   • Nocturia more than twice per night 11/05/2018   • Pitting edema 08/07/2018   • Idiopathic chronic gout, left ankle and foot, without tophus (tophi) 06/25/2018   • Memory changes 05/24/2018   • Chronic pain of left knee 09/25/2017   • Diastasis of rectus abdominis 06/22/2017   • Increased frequency of urination 06/07/2017   • (HCC) Type 2 diabetes mellitus without complication, without long-term current use of insulin 11/09/2016   • (HCC) Hypertension associated with diabetes 11/09/2016   • Acquired hypothyroidism 11/09/2016   • (HCC) Hyperlipidemia due to type 2 diabetes mellitus 11/09/2016   • Primary insomnia 11/09/2016       Current Outpatient Medications on File Prior to Visit   Medication Sig Dispense Refill   • atorvastatin (LIPITOR) 10 MG Tab Take 1  tablet by mouth every day. 90 tablet 3   • metformin (GLUCOPHAGE) 1000 MG tablet Take 1 tablet by mouth 2 times a day with meals. 180 tablet 1   • glipiZIDE (GLUCOTROL) 10 MG Tab Take 1 tablet by mouth 2 times a day. 180 tablet 1   • levothyroxine (SYNTHROID) 88 MCG Tab Take 1 tablet by mouth every morning on an empty stomach. 90 tablet 3   • losartan (COZAAR) 100 MG Tab TAKE 1 TABLET EVERY DAY 90 tablet 3   • amLODIPine (NORVASC) 10 MG Tab TAKE 1 TABLET EVERY DAY 90 Tab 3   • linagliptin (TRADJENTA) 5 MG Tab tablet Take 1 Tab by mouth every day. 90 Tab 3   • Blood Glucose Test Strips Test strips for OneTouch Ultra meter. Sig: use fasting first thing in the morning, and prn ssx high or low sugar 100 Each 3   • Lancets Lancets for OneTouch Ultra meter. Sig: use fasting first thing in the morning, and prn ssx high or low sugar 100 Each 3   • aspirin (ASA) 81 MG Chew Tab chewable tablet Take 1 Tab by mouth every day. 90 Tab 3   • vitamin D (CHOLECALCIFEROL) 1000 UNIT Tab Take 1 Tab by mouth every day. 90 Tab 3   • Magnesium 500 MG Tab Take 1,000 mg by mouth every day. 90 Tab 3     No current facility-administered medications on file prior to visit.       Patient has no known allergies.    Social History     Socioeconomic History   • Marital status:      Spouse name: Not on file   • Number of children: Not on file   • Years of education: Not on file   • Highest education level: Not on file   Occupational History   • Not on file   Tobacco Use   • Smoking status: Former Smoker     Packs/day: 1.00     Years: 5.00     Pack years: 5.00     Types: Cigarettes     Quit date: 1971     Years since quittin.5   • Smokeless tobacco: Never Used   Vaping Use   • Vaping Use: Never used   Substance and Sexual Activity   • Alcohol use: Yes     Alcohol/week: 4.2 oz     Types: 7 Glasses of wine per week     Comment: daily   • Drug use: No   • Sexual activity: Yes     Partners: Female   Other Topics Concern   •   Service No   • Blood Transfusions No   • Caffeine Concern No   • Occupational Exposure No   • Hobby Hazards No   • Sleep Concern No   • Stress Concern No   • Weight Concern No   • Special Diet No   • Back Care No   • Exercise Yes   • Bike Helmet No   • Seat Belt Yes   • Self-Exams No   Social History Narrative   • Not on file     Social Determinants of Health     Financial Resource Strain:    • Difficulty of Paying Living Expenses:    Food Insecurity:    • Worried About Running Out of Food in the Last Year:    • Ran Out of Food in the Last Year:    Transportation Needs:    • Lack of Transportation (Medical):    • Lack of Transportation (Non-Medical):    Physical Activity:    • Days of Exercise per Week:    • Minutes of Exercise per Session:    Stress:    • Feeling of Stress :    Social Connections:    • Frequency of Communication with Friends and Family:    • Frequency of Social Gatherings with Friends and Family:    • Attends [employer] Services:    • Active Member of Clubs or Organizations:    • Attends Club or Organization Meetings:    • Marital Status:    Intimate Partner Violence:    • Fear of Current or Ex-Partner:    • Emotionally Abused:    • Physically Abused:    • Sexually Abused:        Family History   Problem Relation Age of Onset   • Diabetes Mother    • Stroke Father         45   • Diabetes Sister    • Diabetes Maternal Uncle        Past Surgical History:   Procedure Laterality Date   • HAND SURGERY         ROS:  Denies any Headache, Blurred Vision, Confusion Chest pain,  Shortness of breath,  Abdominal pain, Changes of bowel or bladder, Lower ext edema, Fevers, Nights sweats, Weight Changes, Focal weakness or numbness.  All other systems are negative.    PHYSICAL:    /70 (BP Location: Left arm, Patient Position: Sitting, BP Cuff Size: Large adult)   Pulse 63   Temp 36.1 °C (96.9 °F) (Temporal)   Ht 1.829 m (6')   Wt 99.2 kg (218 lb 11.1 oz)   SpO2 96%     Gen:         Alert and oriented, No  apparent distress..  Lungs:     Clear to auscultation bilaterally  CV:          Regular rate and rhythm. No murmurs, rubs or gallops.                      Skin:        No concerning lesions or rashes.    Health Maintenance Due   Topic Date Due   • DIABETES MONOFILAMENT / LE EXAM  06/22/2018   • RETINAL SCREENING  05/02/2019   • COLON CANCER SCREENING ANNUAL FIT  01/09/2020   • URINE ACR / MICROALBUMIN  06/10/2021         ASSESSMENT AND PLAN:  Screening test reviewed with patient today and updated within health-care maintenance record. Discussion today about general wellness and lifestyle habits.      1. Medicare annual wellness visit, subsequent  Problem list and medications reviewed and updated, concerns addressed as detailed below.  Preventative health measures reviewed    2. 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 Acquired hypothyroidism  Stable on current dose of levothyroxine    4. PVD (peripheral vascular disease) ([facility])  Stable with no recent change    5. Vitamin D deficiency  Continue supplement    6. (HCC) Hypertension associated with diabetes  Stable and well controlled, continue current prescriptions    7. (HCC) Type 2 diabetes mellitus without complication, without long-term current use of insulin  A1c has increased to 8.0 which he attributes to lack of exercise and more sweets in his diet recently.  Dietary recommendations reviewed, encouraged at least 30 minutes daily of physical activity.  No medication changes for today, we will recheck A1c in the office in September.  Records requested for retinal exam    8. Colon cancer screening  - COLOGUARD (FIT DNA)

## 2021-05-12 NOTE — ASSESSMENT & PLAN NOTE
Blood pressure is well controlled on amlodipine 10 mg daily, losartan 100 mg daily.  No chest pain, dizziness, palpitation

## 2021-06-01 DIAGNOSIS — R19.5 POSITIVE COLORECTAL CANCER SCREENING USING COLOGUARD TEST: ICD-10-CM

## 2021-06-16 NOTE — HPI: FULL BODY SKIN EXAMINATION
Pt comes in for a wound care follow up and weekly dressing change.     Grafted skin to Left lateral head has had good take.  The remainder of the grafted skin is sporadic throughout the remainder of the scalp wound.  Lat flap is pink.      Dressing was malodorous when removed, but no s/s of infection or pus present on the scalp.     Wound bed was covered with copious amounts of Aquaphor.  Xeroform and Telfa applied and secured with a Stockinette.       Pt sent home with a couple more bottles of Microklenz and told to continue spraying the dressing 1-2 times daily prn to keep the dressing moist.      Left lateral back incision is c/d/i and continues to have a small collection of fluid present near the distal end.  The collection has been stable and pt feels that it has improved since last week.  Will continue to monitor.      Pt will follow up next week for another wound care check.    Plan of care discussed with Dr. Machado.    Rozina Ascencio RN  6/16/2021 3:34 PM      
What Is The Reason For Today's Visit?: Full Body Skin Examination
What Is The Reason For Today's Visit? (Being Monitored For X): concerning skin lesions on an annual basis

## 2021-09-08 ENCOUNTER — APPOINTMENT (RX ONLY)
Dept: URBAN - METROPOLITAN AREA CLINIC 4 | Facility: CLINIC | Age: 76
Setting detail: DERMATOLOGY
End: 2021-09-08

## 2021-09-08 DIAGNOSIS — L82.1 OTHER SEBORRHEIC KERATOSIS: ICD-10-CM

## 2021-09-08 DIAGNOSIS — Z85.828 PERSONAL HISTORY OF OTHER MALIGNANT NEOPLASM OF SKIN: ICD-10-CM

## 2021-09-08 DIAGNOSIS — L81.4 OTHER MELANIN HYPERPIGMENTATION: ICD-10-CM

## 2021-09-08 DIAGNOSIS — L20.89 OTHER ATOPIC DERMATITIS: ICD-10-CM

## 2021-09-08 DIAGNOSIS — L57.0 ACTINIC KERATOSIS: ICD-10-CM

## 2021-09-08 DIAGNOSIS — D18.0 HEMANGIOMA: ICD-10-CM

## 2021-09-08 DIAGNOSIS — L72.8 OTHER FOLLICULAR CYSTS OF THE SKIN AND SUBCUTANEOUS TISSUE: ICD-10-CM

## 2021-09-08 PROBLEM — D18.01 HEMANGIOMA OF SKIN AND SUBCUTANEOUS TISSUE: Status: ACTIVE | Noted: 2021-09-08

## 2021-09-08 PROBLEM — L20.84 INTRINSIC (ALLERGIC) ECZEMA: Status: ACTIVE | Noted: 2021-09-08

## 2021-09-08 PROCEDURE — ? COUNSELING

## 2021-09-08 PROCEDURE — ? LIQUID NITROGEN

## 2021-09-08 PROCEDURE — ? ADDITIONAL NOTES

## 2021-09-08 PROCEDURE — 17003 DESTRUCT PREMALG LES 2-14: CPT

## 2021-09-08 PROCEDURE — ? DEFER

## 2021-09-08 PROCEDURE — 99213 OFFICE O/P EST LOW 20 MIN: CPT | Mod: 25

## 2021-09-08 PROCEDURE — ? OBSERVATION

## 2021-09-08 PROCEDURE — 17000 DESTRUCT PREMALG LESION: CPT

## 2021-09-08 ASSESSMENT — LOCATION ZONE DERM
LOCATION ZONE: LEG
LOCATION ZONE: ARM
LOCATION ZONE: FACE
LOCATION ZONE: NECK
LOCATION ZONE: EAR
LOCATION ZONE: TRUNK

## 2021-09-08 ASSESSMENT — LOCATION DETAILED DESCRIPTION DERM
LOCATION DETAILED: RIGHT CENTRAL ZYGOMA
LOCATION DETAILED: INFERIOR THORACIC SPINE
LOCATION DETAILED: RIGHT SUPERIOR LATERAL MALAR CHEEK
LOCATION DETAILED: RIGHT INFERIOR FOREHEAD
LOCATION DETAILED: RIGHT MEDIAL UPPER BACK
LOCATION DETAILED: LEFT POSTERIOR NECK
LOCATION DETAILED: LEFT ANTITRAGUS
LOCATION DETAILED: LEFT MEDIAL DISTAL PRETIBIAL REGION
LOCATION DETAILED: SUPERIOR THORACIC SPINE
LOCATION DETAILED: LEFT DISTAL PRETIBIAL REGION
LOCATION DETAILED: RIGHT DISTAL DORSAL FOREARM

## 2021-09-08 ASSESSMENT — LOCATION SIMPLE DESCRIPTION DERM
LOCATION SIMPLE: POSTERIOR NECK
LOCATION SIMPLE: UPPER BACK
LOCATION SIMPLE: RIGHT ZYGOMA
LOCATION SIMPLE: LEFT PRETIBIAL REGION
LOCATION SIMPLE: LEFT EAR
LOCATION SIMPLE: RIGHT FOREHEAD
LOCATION SIMPLE: RIGHT CHEEK
LOCATION SIMPLE: RIGHT FOREARM
LOCATION SIMPLE: RIGHT UPPER BACK

## 2021-09-08 NOTE — PROCEDURE: LIQUID NITROGEN

## 2021-09-08 NOTE — PROCEDURE: DEFER
Detail Level: Detailed
Introduction Text (Please End With A Colon): The following procedure was deferred:
Procedure To Be Performed At Next Visit: Excision
Instructions (Optional): Left post neck lesion has become bothersome and itchy.

## 2021-09-08 NOTE — PROCEDURE: ADDITIONAL NOTES
Additional Notes: He denies any discomfort at this time. Will monitor
Render Risk Assessment In Note?: no
Detail Level: Zone

## 2021-09-13 ENCOUNTER — APPOINTMENT (RX ONLY)
Dept: URBAN - METROPOLITAN AREA CLINIC 4 | Facility: CLINIC | Age: 76
Setting detail: DERMATOLOGY
End: 2021-09-13

## 2021-09-13 DIAGNOSIS — L72.8 OTHER FOLLICULAR CYSTS OF THE SKIN AND SUBCUTANEOUS TISSUE: ICD-10-CM

## 2021-09-13 PROBLEM — D48.5 NEOPLASM OF UNCERTAIN BEHAVIOR OF SKIN: Status: ACTIVE | Noted: 2021-09-13

## 2021-09-13 PROCEDURE — ? EXCISION

## 2021-09-13 PROCEDURE — 11422 EXC H-F-NK-SP B9+MARG 1.1-2: CPT | Mod: 79

## 2021-09-13 ASSESSMENT — LOCATION DETAILED DESCRIPTION DERM: LOCATION DETAILED: LEFT POSTERIOR NECK

## 2021-09-13 ASSESSMENT — LOCATION ZONE DERM: LOCATION ZONE: NECK

## 2021-09-13 ASSESSMENT — LOCATION SIMPLE DESCRIPTION DERM: LOCATION SIMPLE: POSTERIOR NECK

## 2021-09-13 NOTE — PROCEDURE: EXCISION
Medical Necessity Information: It is in your best interest to select a reason for this procedure from the list below. All of these items fulfill various CMS LCD requirements except lesion extends to a margin.
Include Z78.9 (Other Specified Conditions Influencing Health Status) As An Associated Diagnosis?: Yes
Add Nub Associated Diagnoses If Applicable When Selecting Medical Necessity: No
Medical Necessity Clause: This procedure was medically necessary because the lesion that was treated was:
Lab: [id number]
Lab Facility: 
Surgeon Performing Repair (Optional): Corona
Size Of Lesion In Cm: 1.3
X Size Of Lesion In Cm (Optional): 0
Size Of Margin In Cm: 0.1
Anesthesia Volume In Cc: 6
Eye Clamp Note Details: An eye clamp was used during the procedure.
Excision Method: Fusiform
Repair Type: None (Simple)
Suturegard Retention Suture: 2-0 Nylon
Retention Suture Bite Size: 3 mm
Length To Time In Minutes Device Was In Place: 10
Number Of Hemigard Strips Per Side: 1
Undermining Type: Entire Wound
Debridement Text: The wound edges were debrided prior to proceeding with the closure to facilitate wound healing.
Helical Rim Text: The closure involved the helical rim.
Vermilion Border Text: The closure involved the vermilion border.
Nostril Rim Text: The closure involved the nostril rim.
Retention Suture Text: Retention sutures were placed to support the closure and prevent dehiscence.
Suture Removal: 10 days
Epidermal Closure Graft Donor Site (Optional): simple interrupted
Graft Donor Site Bandage (Optional-Leave Blank If You Don't Want In Note): Steri-strips and a pressure bandage were applied to the donor site.
Detail Level: Detailed
Excision Depth: adipose tissue
Scalpel Size: 15 blade
Anesthesia Type: 1% lidocaine with epinephrine and a 1:10 solution of 8.4% sodium bicarbonate
Hemostasis: Electrocautery
Estimated Blood Loss (Cc): minimal
Epidermal Sutures: 4-0 Nylon
Epidermal Closure: running cuticular
Wound Care: Vaseline
Dressing: pressure dressing
Suturegard Intro: Intraoperative tissue expansion was performed, utilizing the SUTUREGARD device, in order to reduce wound tension.
Suturegard Body: The suture ends were repeatedly re-tightened and re-clamped to achieve the desired tissue expansion.
Hemigard Intro: Due to skin fragility and wound tension, it was decided to use HEMIGARD adhesive retention suture devices to permit a linear closure. The skin was cleaned and dried for a 6cm distance away from the wound. Excessive hair, if present, was removed to allow for adhesion.
Hemigard Postcare Instructions: The HEMIGARD strips are to remain completely dry for at least 5-7 days.
Positioning (Leave Blank If You Do Not Want): The patient was placed in a comfortable position exposing the surgical site.
Complex Repair Preamble Text (Leave Blank If You Do Not Want): Extensive wide undermining was performed.
Intermediate Repair Preamble Text (Leave Blank If You Do Not Want): Undermining was performed with blunt dissection.
Fusiform Excision Additional Text (Leave Blank If You Do Not Want): The margin was drawn around the clinically apparent lesion.  A fusiform shape was then drawn on the skin incorporating the lesion and margins.  Incisions were then made along these lines to the appropriate tissue plane and the lesion was extirpated.
Eliptical Excision Additional Text (Leave Blank If You Do Not Want): The margin was drawn around the clinically apparent lesion.  An elliptical shape was then drawn on the skin incorporating the lesion and margins.  Incisions were then made along these lines to the appropriate tissue plane and the lesion was extirpated.
Saucerization Excision Additional Text (Leave Blank If You Do Not Want): The margin was drawn around the clinically apparent lesion.  Incisions were then made along these lines, in a tangential fashion, to the appropriate tissue plane and the lesion was extirpated.
Slit Excision Additional Text (Leave Blank If You Do Not Want): A linear line was drawn on the skin overlying the lesion. An incision was made slowly until the lesion was visualized.  Once visualized, the lesion was removed with blunt dissection.
Excisional Biopsy Additional Text (Leave Blank If You Do Not Want): The margin was drawn around the clinically apparent lesion. An elliptical shape was then drawn on the skin incorporating the lesion and margins.  Incisions were then made along these lines to the appropriate tissue plane and the lesion was extirpated.
Perilesional Excision Additional Text (Leave Blank If You Do Not Want): The margin was drawn around the clinically apparent lesion. Incisions were then made along these lines to the appropriate tissue plane and the lesion was extirpated.
Repair Performed By Another Provider Text (Leave Blank If You Do Not Want): After the tissue was excised the defect was repaired by another provider.
No Repair - Repaired With Adjacent Surgical Defect Text (Leave Blank If You Do Not Want): After the excision the defect was repaired concurrently with another surgical defect which was in close approximation.
Advancement Flap (Single) Text: The defect edges were debeveled with a #15 scalpel blade.  Given the location of the defect and the proximity to free margins a single advancement flap was deemed most appropriate.  Using a sterile surgical marker, an appropriate advancement flap was drawn incorporating the defect and placing the expected incisions within the relaxed skin tension lines where possible.    The area thus outlined was incised deep to adipose tissue with a #15 scalpel blade.  The skin margins were undermined to an appropriate distance in all directions utilizing iris scissors.
Advancement Flap (Double) Text: The defect edges were debeveled with a #15 scalpel blade.  Given the location of the defect and the proximity to free margins a double advancement flap was deemed most appropriate.  Using a sterile surgical marker, the appropriate advancement flaps were drawn incorporating the defect and placing the expected incisions within the relaxed skin tension lines where possible.    The area thus outlined was incised deep to adipose tissue with a #15 scalpel blade.  The skin margins were undermined to an appropriate distance in all directions utilizing iris scissors.
Burow's Advancement Flap Text: The defect edges were debeveled with a #15 scalpel blade.  Given the location of the defect and the proximity to free margins a Burow's advancement flap was deemed most appropriate.  Using a sterile surgical marker, the appropriate advancement flap was drawn incorporating the defect and placing the expected incisions within the relaxed skin tension lines where possible.    The area thus outlined was incised deep to adipose tissue with a #15 scalpel blade.  The skin margins were undermined to an appropriate distance in all directions utilizing iris scissors.
Chonodrocutaneous Helical Advancement Flap Text: The defect edges were debeveled with a #15 scalpel blade.  Given the location of the defect and the proximity to free margins a chondrocutaneous helical advancement flap was deemed most appropriate.  Using a sterile surgical marker, the appropriate advancement flap was drawn incorporating the defect and placing the expected incisions within the relaxed skin tension lines where possible.    The area thus outlined was incised deep to adipose tissue with a #15 scalpel blade.  The skin margins were undermined to an appropriate distance in all directions utilizing iris scissors.
Crescentic Advancement Flap Text: The defect edges were debeveled with a #15 scalpel blade.  Given the location of the defect and the proximity to free margins a crescentic advancement flap was deemed most appropriate.  Using a sterile surgical marker, the appropriate advancement flap was drawn incorporating the defect and placing the expected incisions within the relaxed skin tension lines where possible.    The area thus outlined was incised deep to adipose tissue with a #15 scalpel blade.  The skin margins were undermined to an appropriate distance in all directions utilizing iris scissors.
A-T Advancement Flap Text: The defect edges were debeveled with a #15 scalpel blade.  Given the location of the defect, shape of the defect and the proximity to free margins an A-T advancement flap was deemed most appropriate.  Using a sterile surgical marker, an appropriate advancement flap was drawn incorporating the defect and placing the expected incisions within the relaxed skin tension lines where possible.    The area thus outlined was incised deep to adipose tissue with a #15 scalpel blade.  The skin margins were undermined to an appropriate distance in all directions utilizing iris scissors.
O-T Advancement Flap Text: The defect edges were debeveled with a #15 scalpel blade.  Given the location of the defect, shape of the defect and the proximity to free margins an O-T advancement flap was deemed most appropriate.  Using a sterile surgical marker, an appropriate advancement flap was drawn incorporating the defect and placing the expected incisions within the relaxed skin tension lines where possible.    The area thus outlined was incised deep to adipose tissue with a #15 scalpel blade.  The skin margins were undermined to an appropriate distance in all directions utilizing iris scissors.
O-L Flap Text: The defect edges were debeveled with a #15 scalpel blade.  Given the location of the defect, shape of the defect and the proximity to free margins an O-L flap was deemed most appropriate.  Using a sterile surgical marker, an appropriate advancement flap was drawn incorporating the defect and placing the expected incisions within the relaxed skin tension lines where possible.    The area thus outlined was incised deep to adipose tissue with a #15 scalpel blade.  The skin margins were undermined to an appropriate distance in all directions utilizing iris scissors.
O-Z Flap Text: The defect edges were debeveled with a #15 scalpel blade.  Given the location of the defect, shape of the defect and the proximity to free margins an O-Z flap was deemed most appropriate.  Using a sterile surgical marker, an appropriate transposition flap was drawn incorporating the defect and placing the expected incisions within the relaxed skin tension lines where possible. The area thus outlined was incised deep to adipose tissue with a #15 scalpel blade.  The skin margins were undermined to an appropriate distance in all directions utilizing iris scissors.
Double O-Z Flap Text: The defect edges were debeveled with a #15 scalpel blade.  Given the location of the defect, shape of the defect and the proximity to free margins a Double O-Z flap was deemed most appropriate.  Using a sterile surgical marker, an appropriate transposition flap was drawn incorporating the defect and placing the expected incisions within the relaxed skin tension lines where possible. The area thus outlined was incised deep to adipose tissue with a #15 scalpel blade.  The skin margins were undermined to an appropriate distance in all directions utilizing iris scissors.
V-Y Flap Text: The defect edges were debeveled with a #15 scalpel blade.  Given the location of the defect, shape of the defect and the proximity to free margins a V-Y flap was deemed most appropriate.  Using a sterile surgical marker, an appropriate advancement flap was drawn incorporating the defect and placing the expected incisions within the relaxed skin tension lines where possible.    The area thus outlined was incised deep to adipose tissue with a #15 scalpel blade.  The skin margins were undermined to an appropriate distance in all directions utilizing iris scissors.
Advancement-Rotation Flap Text: The defect edges were debeveled with a #15 scalpel blade.  Given the location of the defect, shape of the defect and the proximity to free margins an advancement-rotation flap was deemed most appropriate.  Using a sterile surgical marker, an appropriate flap was drawn incorporating the defect and placing the expected incisions within the relaxed skin tension lines where possible. The area thus outlined was incised deep to adipose tissue with a #15 scalpel blade.  The skin margins were undermined to an appropriate distance in all directions utilizing iris scissors.
Mercedes Flap Text: The defect edges were debeveled with a #15 scalpel blade.  Given the location of the defect, shape of the defect and the proximity to free margins a Mercedes flap was deemed most appropriate.  Using a sterile surgical marker, an appropriate advancement flap was drawn incorporating the defect and placing the expected incisions within the relaxed skin tension lines where possible. The area thus outlined was incised deep to adipose tissue with a #15 scalpel blade.  The skin margins were undermined to an appropriate distance in all directions utilizing iris scissors.
Modified Advancement Flap Text: The defect edges were debeveled with a #15 scalpel blade.  Given the location of the defect, shape of the defect and the proximity to free margins a modified advancement flap was deemed most appropriate.  Using a sterile surgical marker, an appropriate advancement flap was drawn incorporating the defect and placing the expected incisions within the relaxed skin tension lines where possible.    The area thus outlined was incised deep to adipose tissue with a #15 scalpel blade.  The skin margins were undermined to an appropriate distance in all directions utilizing iris scissors.
Mucosal Advancement Flap Text: Given the location of the defect, shape of the defect and the proximity to free margins a mucosal advancement flap was deemed most appropriate. Incisions were made with a 15 blade scalpel in the appropriate fashion along the cutaneous vermillion border and the mucosal lip. The remaining actinically damaged mucosal tissue was excised.  The mucosal advancement flap was then elevated to the gingival sulcus with care taken to preserve the neurovascular structures and advanced into the primary defect. Care was taken to ensure that precise realignment of the vermilion border was achieved.
Peng Advancement Flap Text: The defect edges were debeveled with a #15 scalpel blade.  Given the location of the defect, shape of the defect and the proximity to free margins a Peng advancement flap was deemed most appropriate.  Using a sterile surgical marker, an appropriate advancement flap was drawn incorporating the defect and placing the expected incisions within the relaxed skin tension lines where possible. The area thus outlined was incised deep to adipose tissue with a #15 scalpel blade.  The skin margins were undermined to an appropriate distance in all directions utilizing iris scissors.
Hatchet Flap Text: The defect edges were debeveled with a #15 scalpel blade.  Given the location of the defect, shape of the defect and the proximity to free margins a hatchet flap was deemed most appropriate.  Using a sterile surgical marker, an appropriate hatchet flap was drawn incorporating the defect and placing the expected incisions within the relaxed skin tension lines where possible.    The area thus outlined was incised deep to adipose tissue with a #15 scalpel blade.  The skin margins were undermined to an appropriate distance in all directions utilizing iris scissors.
Rotation Flap Text: The defect edges were debeveled with a #15 scalpel blade.  Given the location of the defect, shape of the defect and the proximity to free margins a rotation flap was deemed most appropriate.  Using a sterile surgical marker, an appropriate rotation flap was drawn incorporating the defect and placing the expected incisions within the relaxed skin tension lines where possible.    The area thus outlined was incised deep to adipose tissue with a #15 scalpel blade.  The skin margins were undermined to an appropriate distance in all directions utilizing iris scissors.
Spiral Flap Text: The defect edges were debeveled with a #15 scalpel blade.  Given the location of the defect, shape of the defect and the proximity to free margins a spiral flap was deemed most appropriate.  Using a sterile surgical marker, an appropriate rotation flap was drawn incorporating the defect and placing the expected incisions within the relaxed skin tension lines where possible. The area thus outlined was incised deep to adipose tissue with a #15 scalpel blade.  The skin margins were undermined to an appropriate distance in all directions utilizing iris scissors.
Staged Advancement Flap Text: The defect edges were debeveled with a #15 scalpel blade.  Given the location of the defect, shape of the defect and the proximity to free margins a staged advancement flap was deemed most appropriate.  Using a sterile surgical marker, an appropriate advancement flap was drawn incorporating the defect and placing the expected incisions within the relaxed skin tension lines where possible. The area thus outlined was incised deep to adipose tissue with a #15 scalpel blade.  The skin margins were undermined to an appropriate distance in all directions utilizing iris scissors.
Star Wedge Flap Text: The defect edges were debeveled with a #15 scalpel blade.  Given the location of the defect, shape of the defect and the proximity to free margins a star wedge flap was deemed most appropriate.  Using a sterile surgical marker, an appropriate rotation flap was drawn incorporating the defect and placing the expected incisions within the relaxed skin tension lines where possible. The area thus outlined was incised deep to adipose tissue with a #15 scalpel blade.  The skin margins were undermined to an appropriate distance in all directions utilizing iris scissors.
Transposition Flap Text: The defect edges were debeveled with a #15 scalpel blade.  Given the location of the defect and the proximity to free margins a transposition flap was deemed most appropriate.  Using a sterile surgical marker, an appropriate transposition flap was drawn incorporating the defect.    The area thus outlined was incised deep to adipose tissue with a #15 scalpel blade.  The skin margins were undermined to an appropriate distance in all directions utilizing iris scissors.
Muscle Hinge Flap Text: The defect edges were debeveled with a #15 scalpel blade.  Given the size, depth and location of the defect and the proximity to free margins a muscle hinge flap was deemed most appropriate.  Using a sterile surgical marker, an appropriate hinge flap was drawn incorporating the defect. The area thus outlined was incised with a #15 scalpel blade.  The skin margins were undermined to an appropriate distance in all directions utilizing iris scissors.
Mustarde Flap Text: The defect edges were debeveled with a #15 scalpel blade.  Given the size, depth and location of the defect and the proximity to free margins a Mustarde flap was deemed most appropriate.  Using a sterile surgical marker, an appropriate flap was drawn incorporating the defect. The area thus outlined was incised with a #15 scalpel blade.  The skin margins were undermined to an appropriate distance in all directions utilizing iris scissors.
Nasal Turnover Hinge Flap Text: The defect edges were debeveled with a #15 scalpel blade.  Given the size, depth, location of the defect and the defect being full thickness a nasal turnover hinge flap was deemed most appropriate.  Using a sterile surgical marker, an appropriate hinge flap was drawn incorporating the defect. The area thus outlined was incised with a #15 scalpel blade. The flap was designed to recreate the nasal mucosal lining and the alar rim. The skin margins were undermined to an appropriate distance in all directions utilizing iris scissors.
Nasalis-Muscle-Based Myocutaneous Island Pedicle Flap Text: Using a #15 blade, an incision was made around the donor flap to the level of the nasalis muscle. Wide lateral undermining was then performed in both the subcutaneous plane above the nasalis muscle, and in a submuscular plane just above periosteum. This allowed the formation of a free nasalis muscle axial pedicle (based on the angular artery) which was still attached to the actual cutaneous flap, increasing its mobility and vascular viability. Hemostasis was obtained with pinpoint electrocoagulation. The flap was mobilized into position and the pivotal anchor points positioned and stabilized with buried interrupted sutures. Subcutaneous and dermal tissues were closed in a multilayered fashion with sutures. Tissue redundancies were excised, and the epidermal edges were apposed without significant tension and sutured with sutures.
Orbicularis Oris Muscle Flap Text: The defect edges were debeveled with a #15 scalpel blade.  Given that the defect affected the competency of the oral sphincter an orbicularis oris muscle flap was deemed most appropriate to restore this competency and normal muscle function.  Using a sterile surgical marker, an appropriate flap was drawn incorporating the defect. The area thus outlined was incised with a #15 scalpel blade.
Melolabial Transposition Flap Text: The defect edges were debeveled with a #15 scalpel blade.  Given the location of the defect and the proximity to free margins a melolabial flap was deemed most appropriate.  Using a sterile surgical marker, an appropriate melolabial transposition flap was drawn incorporating the defect.    The area thus outlined was incised deep to adipose tissue with a #15 scalpel blade.  The skin margins were undermined to an appropriate distance in all directions utilizing iris scissors.
Rhombic Flap Text: The defect edges were debeveled with a #15 scalpel blade.  Given the location of the defect and the proximity to free margins a rhombic flap was deemed most appropriate.  Using a sterile surgical marker, an appropriate rhombic flap was drawn incorporating the defect.    The area thus outlined was incised deep to adipose tissue with a #15 scalpel blade.  The skin margins were undermined to an appropriate distance in all directions utilizing iris scissors.
Rhomboid Transposition Flap Text: The defect edges were debeveled with a #15 scalpel blade.  Given the location of the defect and the proximity to free margins a rhomboid transposition flap was deemed most appropriate.  Using a sterile surgical marker, an appropriate rhomboid flap was drawn incorporating the defect.    The area thus outlined was incised deep to adipose tissue with a #15 scalpel blade.  The skin margins were undermined to an appropriate distance in all directions utilizing iris scissors.
Bi-Rhombic Flap Text: The defect edges were debeveled with a #15 scalpel blade.  Given the location of the defect and the proximity to free margins a bi-rhombic flap was deemed most appropriate.  Using a sterile surgical marker, an appropriate rhombic flap was drawn incorporating the defect. The area thus outlined was incised deep to adipose tissue with a #15 scalpel blade.  The skin margins were undermined to an appropriate distance in all directions utilizing iris scissors.
Helical Rim Advancement Flap Text: The defect edges were debeveled with a #15 blade scalpel.  Given the location of the defect and the proximity to free margins (helical rim) a double helical rim advancement flap was deemed most appropriate.  Using a sterile surgical marker, the appropriate advancement flaps were drawn incorporating the defect and placing the expected incisions between the helical rim and antihelix where possible.  The area thus outlined was incised through and through with a #15 scalpel blade.  With a skin hook and iris scissors, the flaps were gently and sharply undermined and freed up.
Bilateral Helical Rim Advancement Flap Text: The defect edges were debeveled with a #15 blade scalpel.  Given the location of the defect and the proximity to free margins (helical rim) a bilateral helical rim advancement flap was deemed most appropriate.  Using a sterile surgical marker, the appropriate advancement flaps were drawn incorporating the defect and placing the expected incisions between the helical rim and antihelix where possible.  The area thus outlined was incised through and through with a #15 scalpel blade.  With a skin hook and iris scissors, the flaps were gently and sharply undermined and freed up.
Ear Star Wedge Flap Text: The defect edges were debeveled with a #15 blade scalpel.  Given the location of the defect and the proximity to free margins (helical rim) an ear star wedge flap was deemed most appropriate.  Using a sterile surgical marker, the appropriate flap was drawn incorporating the defect and placing the expected incisions between the helical rim and antihelix where possible.  The area thus outlined was incised through and through with a #15 scalpel blade.
Banner Transposition Flap Text: The defect edges were debeveled with a #15 scalpel blade.  Given the location of the defect and the proximity to free margins a Banner transposition flap was deemed most appropriate.  Using a sterile surgical marker, an appropriate flap drawn around the defect. The area thus outlined was incised deep to adipose tissue with a #15 scalpel blade.  The skin margins were undermined to an appropriate distance in all directions utilizing iris scissors.
Bilobed Flap Text: The defect edges were debeveled with a #15 scalpel blade.  Given the location of the defect and the proximity to free margins a bilobe flap was deemed most appropriate.  Using a sterile surgical marker, an appropriate bilobe flap drawn around the defect.    The area thus outlined was incised deep to adipose tissue with a #15 scalpel blade.  The skin margins were undermined to an appropriate distance in all directions utilizing iris scissors.
Bilobed Transposition Flap Text: The defect edges were debeveled with a #15 scalpel blade.  Given the location of the defect and the proximity to free margins a bilobed transposition flap was deemed most appropriate.  Using a sterile surgical marker, an appropriate bilobe flap drawn around the defect.    The area thus outlined was incised deep to adipose tissue with a #15 scalpel blade.  The skin margins were undermined to an appropriate distance in all directions utilizing iris scissors.
Trilobed Flap Text: The defect edges were debeveled with a #15 scalpel blade.  Given the location of the defect and the proximity to free margins a trilobed flap was deemed most appropriate.  Using a sterile surgical marker, an appropriate trilobed flap drawn around the defect.    The area thus outlined was incised deep to adipose tissue with a #15 scalpel blade.  The skin margins were undermined to an appropriate distance in all directions utilizing iris scissors.
Dorsal Nasal Flap Text: The defect edges were debeveled with a #15 scalpel blade.  Given the location of the defect and the proximity to free margins a dorsal nasal flap was deemed most appropriate.  Using a sterile surgical marker, an appropriate dorsal nasal flap was drawn around the defect.    The area thus outlined was incised deep to adipose tissue with a #15 scalpel blade.  The skin margins were undermined to an appropriate distance in all directions utilizing iris scissors.
Island Pedicle Flap Text: The defect edges were debeveled with a #15 scalpel blade.  Given the location of the defect, shape of the defect and the proximity to free margins an island pedicle advancement flap was deemed most appropriate.  Using a sterile surgical marker, an appropriate advancement flap was drawn incorporating the defect, outlining the appropriate donor tissue and placing the expected incisions within the relaxed skin tension lines where possible.    The area thus outlined was incised deep to adipose tissue with a #15 scalpel blade.  The skin margins were undermined to an appropriate distance in all directions around the primary defect and laterally outward around the island pedicle utilizing iris scissors.  There was minimal undermining beneath the pedicle flap.
Island Pedicle Flap With Canthal Suspension Text: The defect edges were debeveled with a #15 scalpel blade.  Given the location of the defect, shape of the defect and the proximity to free margins an island pedicle advancement flap was deemed most appropriate.  Using a sterile surgical marker, an appropriate advancement flap was drawn incorporating the defect, outlining the appropriate donor tissue and placing the expected incisions within the relaxed skin tension lines where possible. The area thus outlined was incised deep to adipose tissue with a #15 scalpel blade.  The skin margins were undermined to an appropriate distance in all directions around the primary defect and laterally outward around the island pedicle utilizing iris scissors.  There was minimal undermining beneath the pedicle flap. A suspension suture was placed in the canthal tendon to prevent tension and prevent ectropion.
Alar Island Pedicle Flap Text: The defect edges were debeveled with a #15 scalpel blade.  Given the location of the defect, shape of the defect and the proximity to the alar rim an island pedicle advancement flap was deemed most appropriate.  Using a sterile surgical marker, an appropriate advancement flap was drawn incorporating the defect, outlining the appropriate donor tissue and placing the expected incisions within the nasal ala running parallel to the alar rim. The area thus outlined was incised with a #15 scalpel blade.  The skin margins were undermined minimally to an appropriate distance in all directions around the primary defect and laterally outward around the island pedicle utilizing iris scissors.  There was minimal undermining beneath the pedicle flap.
Double Island Pedicle Flap Text: The defect edges were debeveled with a #15 scalpel blade.  Given the location of the defect, shape of the defect and the proximity to free margins a double island pedicle advancement flap was deemed most appropriate.  Using a sterile surgical marker, an appropriate advancement flap was drawn incorporating the defect, outlining the appropriate donor tissue and placing the expected incisions within the relaxed skin tension lines where possible.    The area thus outlined was incised deep to adipose tissue with a #15 scalpel blade.  The skin margins were undermined to an appropriate distance in all directions around the primary defect and laterally outward around the island pedicle utilizing iris scissors.  There was minimal undermining beneath the pedicle flap.
Island Pedicle Flap-Requiring Vessel Identification Text: The defect edges were debeveled with a #15 scalpel blade.  Given the location of the defect, shape of the defect and the proximity to free margins an island pedicle advancement flap was deemed most appropriate.  Using a sterile surgical marker, an appropriate advancement flap was drawn, based on the axial vessel mentioned above, incorporating the defect, outlining the appropriate donor tissue and placing the expected incisions within the relaxed skin tension lines where possible.    The area thus outlined was incised deep to adipose tissue with a #15 scalpel blade.  The skin margins were undermined to an appropriate distance in all directions around the primary defect and laterally outward around the island pedicle utilizing iris scissors.  There was minimal undermining beneath the pedicle flap.
Keystone Flap Text: The defect edges were debeveled with a #15 scalpel blade.  Given the location of the defect, shape of the defect a keystone flap was deemed most appropriate.  Using a sterile surgical marker, an appropriate keystone flap was drawn incorporating the defect, outlining the appropriate donor tissue and placing the expected incisions within the relaxed skin tension lines where possible. The area thus outlined was incised deep to adipose tissue with a #15 scalpel blade.  The skin margins were undermined to an appropriate distance in all directions around the primary defect and laterally outward around the flap utilizing iris scissors.
O-T Plasty Text: The defect edges were debeveled with a #15 scalpel blade.  Given the location of the defect, shape of the defect and the proximity to free margins an O-T plasty was deemed most appropriate.  Using a sterile surgical marker, an appropriate O-T plasty was drawn incorporating the defect and placing the expected incisions within the relaxed skin tension lines where possible.    The area thus outlined was incised deep to adipose tissue with a #15 scalpel blade.  The skin margins were undermined to an appropriate distance in all directions utilizing iris scissors.
O-Z Plasty Text: The defect edges were debeveled with a #15 scalpel blade.  Given the location of the defect, shape of the defect and the proximity to free margins an O-Z plasty (double transposition flap) was deemed most appropriate.  Using a sterile surgical marker, the appropriate transposition flaps were drawn incorporating the defect and placing the expected incisions within the relaxed skin tension lines where possible.    The area thus outlined was incised deep to adipose tissue with a #15 scalpel blade.  The skin margins were undermined to an appropriate distance in all directions utilizing iris scissors.  Hemostasis was achieved with electrocautery.  The flaps were then transposed into place, one clockwise and the other counterclockwise, and anchored with interrupted buried subcutaneous sutures.
Double O-Z Plasty Text: The defect edges were debeveled with a #15 scalpel blade.  Given the location of the defect, shape of the defect and the proximity to free margins a Double O-Z plasty (double transposition flap) was deemed most appropriate.  Using a sterile surgical marker, the appropriate transposition flaps were drawn incorporating the defect and placing the expected incisions within the relaxed skin tension lines where possible. The area thus outlined was incised deep to adipose tissue with a #15 scalpel blade.  The skin margins were undermined to an appropriate distance in all directions utilizing iris scissors.  Hemostasis was achieved with electrocautery.  The flaps were then transposed into place, one clockwise and the other counterclockwise, and anchored with interrupted buried subcutaneous sutures.
V-Y Plasty Text: The defect edges were debeveled with a #15 scalpel blade.  Given the location of the defect, shape of the defect and the proximity to free margins an V-Y advancement flap was deemed most appropriate.  Using a sterile surgical marker, an appropriate advancement flap was drawn incorporating the defect and placing the expected incisions within the relaxed skin tension lines where possible.    The area thus outlined was incised deep to adipose tissue with a #15 scalpel blade.  The skin margins were undermined to an appropriate distance in all directions utilizing iris scissors.
H Plasty Text: Given the location of the defect, shape of the defect and the proximity to free margins a H-plasty was deemed most appropriate for repair.  Using a sterile surgical marker, the appropriate advancement arms of the H-plasty were drawn incorporating the defect and placing the expected incisions within the relaxed skin tension lines where possible. The area thus outlined was incised deep to adipose tissue with a #15 scalpel blade. The skin margins were undermined to an appropriate distance in all directions utilizing iris scissors.  The opposing advancement arms were then advanced into place in opposite direction and anchored with interrupted buried subcutaneous sutures.
W Plasty Text: The lesion was extirpated to the level of the fat with a #15 scalpel blade.  Given the location of the defect, shape of the defect and the proximity to free margins a W-plasty was deemed most appropriate for repair.  Using a sterile surgical marker, the appropriate transposition arms of the W-plasty were drawn incorporating the defect and placing the expected incisions within the relaxed skin tension lines where possible.    The area thus outlined was incised deep to adipose tissue with a #15 scalpel blade.  The skin margins were undermined to an appropriate distance in all directions utilizing iris scissors.  The opposing transposition arms were then transposed into place in opposite direction and anchored with interrupted buried subcutaneous sutures.
Z Plasty Text: The lesion was extirpated to the level of the fat with a #15 scalpel blade.  Given the location of the defect, shape of the defect and the proximity to free margins a Z-plasty was deemed most appropriate for repair.  Using a sterile surgical marker, the appropriate transposition arms of the Z-plasty were drawn incorporating the defect and placing the expected incisions within the relaxed skin tension lines where possible.    The area thus outlined was incised deep to adipose tissue with a #15 scalpel blade.  The skin margins were undermined to an appropriate distance in all directions utilizing iris scissors.  The opposing transposition arms were then transposed into place in opposite direction and anchored with interrupted buried subcutaneous sutures.
Zygomaticofacial Flap Text: Given the location of the defect, shape of the defect and the proximity to free margins a zygomaticofacial flap was deemed most appropriate for repair.  Using a sterile surgical marker, the appropriate flap was drawn incorporating the defect and placing the expected incisions within the relaxed skin tension lines where possible. The area thus outlined was incised deep to adipose tissue with a #15 scalpel blade with preservation of a vascular pedicle.  The skin margins were undermined to an appropriate distance in all directions utilizing iris scissors.  The flap was then placed into the defect and anchored with interrupted buried subcutaneous sutures.
Cheek Interpolation Flap Text: A decision was made to reconstruct the defect utilizing an interpolation axial flap and a staged reconstruction.  A telfa template was made of the defect.  This telfa template was then used to outline the Cheek Interpolation flap.  The donor area for the pedicle flap was then injected with anesthesia.  The flap was excised through the skin and subcutaneous tissue down to the layer of the underlying musculature.  The interpolation flap was carefully excised within this deep plane to maintain its blood supply.  The edges of the donor site were undermined.   The donor site was closed in a primary fashion.  The pedicle was then rotated into position and sutured.  Once the tube was sutured into place, adequate blood supply was confirmed with blanching and refill.  The pedicle was then wrapped with xeroform gauze and dressed appropriately with a telfa and gauze bandage to ensure continued blood supply and protect the attached pedicle.
Cheek-To-Nose Interpolation Flap Text: A decision was made to reconstruct the defect utilizing an interpolation axial flap and a staged reconstruction.  A telfa template was made of the defect.  This telfa template was then used to outline the Cheek-To-Nose Interpolation flap.  The donor area for the pedicle flap was then injected with anesthesia.  The flap was excised through the skin and subcutaneous tissue down to the layer of the underlying musculature.  The interpolation flap was carefully excised within this deep plane to maintain its blood supply.  The edges of the donor site were undermined.   The donor site was closed in a primary fashion.  The pedicle was then rotated into position and sutured.  Once the tube was sutured into place, adequate blood supply was confirmed with blanching and refill.  The pedicle was then wrapped with xeroform gauze and dressed appropriately with a telfa and gauze bandage to ensure continued blood supply and protect the attached pedicle.
Interpolation Flap Text: A decision was made to reconstruct the defect utilizing an interpolation axial flap and a staged reconstruction.  A telfa template was made of the defect.  This telfa template was then used to outline the interpolation flap.  The donor area for the pedicle flap was then injected with anesthesia.  The flap was excised through the skin and subcutaneous tissue down to the layer of the underlying musculature.  The interpolation flap was carefully excised within this deep plane to maintain its blood supply.  The edges of the donor site were undermined.   The donor site was closed in a primary fashion.  The pedicle was then rotated into position and sutured.  Once the tube was sutured into place, adequate blood supply was confirmed with blanching and refill.  The pedicle was then wrapped with xeroform gauze and dressed appropriately with a telfa and gauze bandage to ensure continued blood supply and protect the attached pedicle.
Melolabial Interpolation Flap Text: A decision was made to reconstruct the defect utilizing an interpolation axial flap and a staged reconstruction.  A telfa template was made of the defect.  This telfa template was then used to outline the melolabial interpolation flap.  The donor area for the pedicle flap was then injected with anesthesia.  The flap was excised through the skin and subcutaneous tissue down to the layer of the underlying musculature.  The pedicle flap was carefully excised within this deep plane to maintain its blood supply.  The edges of the donor site were undermined.   The donor site was closed in a primary fashion.  The pedicle was then rotated into position and sutured.  Once the tube was sutured into place, adequate blood supply was confirmed with blanching and refill.  The pedicle was then wrapped with xeroform gauze and dressed appropriately with a telfa and gauze bandage to ensure continued blood supply and protect the attached pedicle.
Mastoid Interpolation Flap Text: A decision was made to reconstruct the defect utilizing an interpolation axial flap and a staged reconstruction.  A telfa template was made of the defect.  This telfa template was then used to outline the mastoid interpolation flap.  The donor area for the pedicle flap was then injected with anesthesia.  The flap was excised through the skin and subcutaneous tissue down to the layer of the underlying musculature.  The pedicle flap was carefully excised within this deep plane to maintain its blood supply.  The edges of the donor site were undermined.   The donor site was closed in a primary fashion.  The pedicle was then rotated into position and sutured.  Once the tube was sutured into place, adequate blood supply was confirmed with blanching and refill.  The pedicle was then wrapped with xeroform gauze and dressed appropriately with a telfa and gauze bandage to ensure continued blood supply and protect the attached pedicle.
Posterior Auricular Interpolation Flap Text: A decision was made to reconstruct the defect utilizing an interpolation axial flap and a staged reconstruction.  A telfa template was made of the defect.  This telfa template was then used to outline the posterior auricular interpolation flap.  The donor area for the pedicle flap was then injected with anesthesia.  The flap was excised through the skin and subcutaneous tissue down to the layer of the underlying musculature.  The pedicle flap was carefully excised within this deep plane to maintain its blood supply.  The edges of the donor site were undermined.   The donor site was closed in a primary fashion.  The pedicle was then rotated into position and sutured.  Once the tube was sutured into place, adequate blood supply was confirmed with blanching and refill.  The pedicle was then wrapped with xeroform gauze and dressed appropriately with a telfa and gauze bandage to ensure continued blood supply and protect the attached pedicle.
Paramedian Forehead Flap Text: A decision was made to reconstruct the defect utilizing an interpolation axial flap and a staged reconstruction.  A telfa template was made of the defect.  This telfa template was then used to outline the paramedian forehead pedicle flap.  The donor area for the pedicle flap was then injected with anesthesia.  The flap was excised through the skin and subcutaneous tissue down to the layer of the underlying musculature.  The pedicle flap was carefully excised within this deep plane to maintain its blood supply.  The edges of the donor site were undermined.   The donor site was closed in a primary fashion.  The pedicle was then rotated into position and sutured.  Once the tube was sutured into place, adequate blood supply was confirmed with blanching and refill.  The pedicle was then wrapped with xeroform gauze and dressed appropriately with a telfa and gauze bandage to ensure continued blood supply and protect the attached pedicle.
Lip Wedge Excision Repair Text: Given the location of the defect and the proximity to free margins a full thickness wedge repair was deemed most appropriate.  Using a sterile surgical marker, the appropriate repair was drawn incorporating the defect and placing the expected incisions perpendicular to the vermilion border.  The vermilion border was also meticulously outlined to ensure appropriate reapproximation during the repair.  The area thus outlined was incised through and through with a #15 scalpel blade.  The muscularis and dermis were reaproximated with deep sutures following hemostasis. Care was taken to realign the vermilion border before proceeding with the superficial closure.  Once the vermilion was realigned the superfical and mucosal closure was finished.
Ftsg Text: The defect edges were debeveled with a #15 scalpel blade.  Given the location of the defect, shape of the defect and the proximity to free margins a full thickness skin graft was deemed most appropriate.  Using a sterile surgical marker, the primary defect shape was transferred to the donor site. The area thus outlined was incised deep to adipose tissue with a #15 scalpel blade.  The harvested graft was then trimmed of adipose tissue until only dermis and epidermis was left.  The skin margins of the secondary defect were undermined to an appropriate distance in all directions utilizing iris scissors.  The secondary defect was closed with interrupted buried subcutaneous sutures.  The skin edges were then re-apposed with running  sutures.  The skin graft was then placed in the primary defect and oriented appropriately.
Split-Thickness Skin Graft Text: The defect edges were debeveled with a #15 scalpel blade.  Given the location of the defect, shape of the defect and the proximity to free margins a split thickness skin graft was deemed most appropriate.  Using a sterile surgical marker, the primary defect shape was transferred to the donor site. The split thickness graft was then harvested.  The skin graft was then placed in the primary defect and oriented appropriately.
Burow's Graft Text: The defect edges were debeveled with a #15 scalpel blade.  Given the location of the defect, shape of the defect, the proximity to free margins and the presence of a standing cone deformity a Burow's skin graft was deemed most appropriate. The standing cone was removed and this tissue was then trimmed to the shape of the primary defect. The adipose tissue was also removed until only dermis and epidermis were left.  The skin margins of the secondary defect were undermined to an appropriate distance in all directions utilizing iris scissors.  The secondary defect was closed with interrupted buried subcutaneous sutures.  The skin edges were then re-apposed with running  sutures.  The skin graft was then placed in the primary defect and oriented appropriately.
Cartilage Graft Text: The defect edges were debeveled with a #15 scalpel blade.  Given the location of the defect, shape of the defect, the fact the defect involved a full thickness cartilage defect a cartilage graft was deemed most appropriate.  An appropriate donor site was identified, cleansed, and anesthetized. The cartilage graft was then harvested and transferred to the recipient site, oriented appropriately and then sutured into place.  The secondary defect was then repaired using a primary closure.
Composite Graft Text: The defect edges were debeveled with a #15 scalpel blade.  Given the location of the defect, shape of the defect, the proximity to free margins and the fact the defect was full thickness a composite graft was deemed most appropriate.  The defect was outline and then transferred to the donor site.  A full thickness graft was then excised from the donor site. The graft was then placed in the primary defect, oriented appropriately and then sutured into place.  The secondary defect was then repaired using a primary closure.
Epidermal Autograft Text: The defect edges were debeveled with a #15 scalpel blade.  Given the location of the defect, shape of the defect and the proximity to free margins an epidermal autograft was deemed most appropriate.  Using a sterile surgical marker, the primary defect shape was transferred to the donor site. The epidermal graft was then harvested.  The skin graft was then placed in the primary defect and oriented appropriately.
Dermal Autograft Text: The defect edges were debeveled with a #15 scalpel blade.  Given the location of the defect, shape of the defect and the proximity to free margins a dermal autograft was deemed most appropriate.  Using a sterile surgical marker, the primary defect shape was transferred to the donor site. The area thus outlined was incised deep to adipose tissue with a #15 scalpel blade.  The harvested graft was then trimmed of adipose and epidermal tissue until only dermis was left.  The skin graft was then placed in the primary defect and oriented appropriately.
Skin Substitute Text: The defect edges were debeveled with a #15 scalpel blade.  Given the location of the defect, shape of the defect and the proximity to free margins a skin substitute graft was deemed most appropriate.  The graft material was trimmed to fit the size of the defect. The graft was then placed in the primary defect and oriented appropriately.
Tissue Cultured Epidermal Autograft Text: The defect edges were debeveled with a #15 scalpel blade.  Given the location of the defect, shape of the defect and the proximity to free margins a tissue cultured epidermal autograft was deemed most appropriate.  The graft was then trimmed to fit the size of the defect.  The graft was then placed in the primary defect and oriented appropriately.
Xenograft Text: The defect edges were debeveled with a #15 scalpel blade.  Given the location of the defect, shape of the defect and the proximity to free margins a xenograft was deemed most appropriate.  The graft was then trimmed to fit the size of the defect.  The graft was then placed in the primary defect and oriented appropriately.
Purse String (Intermediate) Text: Given the location of the defect and the characteristics of the surrounding skin a purse string intermediate closure was deemed most appropriate.  Undermining was performed circumferentially around the surgical defect.  A purse string suture was then placed and tightened.
Purse String (Simple) Text: Given the location of the defect and the characteristics of the surrounding skin a purse string simple closure was deemed most appropriate.  Undermining was performed circumferentially around the surgical defect.  A purse string suture was then placed and tightened.
Complex Repair And Single Advancement Flap Text: The defect edges were debeveled with a #15 scalpel blade.  The primary defect was closed partially with a complex linear closure.  Given the location of the remaining defect, shape of the defect and the proximity to free margins a single advancement flap was deemed most appropriate for complete closure of the defect.  Using a sterile surgical marker, an appropriate advancement flap was drawn incorporating the defect and placing the expected incisions within the relaxed skin tension lines where possible.    The area thus outlined was incised deep to adipose tissue with a #15 scalpel blade.  The skin margins were undermined to an appropriate distance in all directions utilizing iris scissors.
Complex Repair And Double Advancement Flap Text: The defect edges were debeveled with a #15 scalpel blade.  The primary defect was closed partially with a complex linear closure.  Given the location of the remaining defect, shape of the defect and the proximity to free margins a double advancement flap was deemed most appropriate for complete closure of the defect.  Using a sterile surgical marker, an appropriate advancement flap was drawn incorporating the defect and placing the expected incisions within the relaxed skin tension lines where possible.    The area thus outlined was incised deep to adipose tissue with a #15 scalpel blade.  The skin margins were undermined to an appropriate distance in all directions utilizing iris scissors.
Complex Repair And Modified Advancement Flap Text: The defect edges were debeveled with a #15 scalpel blade.  The primary defect was closed partially with a complex linear closure.  Given the location of the remaining defect, shape of the defect and the proximity to free margins a modified advancement flap was deemed most appropriate for complete closure of the defect.  Using a sterile surgical marker, an appropriate advancement flap was drawn incorporating the defect and placing the expected incisions within the relaxed skin tension lines where possible.    The area thus outlined was incised deep to adipose tissue with a #15 scalpel blade.  The skin margins were undermined to an appropriate distance in all directions utilizing iris scissors.
Complex Repair And A-T Advancement Flap Text: The defect edges were debeveled with a #15 scalpel blade.  The primary defect was closed partially with a complex linear closure.  Given the location of the remaining defect, shape of the defect and the proximity to free margins an A-T advancement flap was deemed most appropriate for complete closure of the defect.  Using a sterile surgical marker, an appropriate advancement flap was drawn incorporating the defect and placing the expected incisions within the relaxed skin tension lines where possible.    The area thus outlined was incised deep to adipose tissue with a #15 scalpel blade.  The skin margins were undermined to an appropriate distance in all directions utilizing iris scissors.
Complex Repair And O-T Advancement Flap Text: The defect edges were debeveled with a #15 scalpel blade.  The primary defect was closed partially with a complex linear closure.  Given the location of the remaining defect, shape of the defect and the proximity to free margins an O-T advancement flap was deemed most appropriate for complete closure of the defect.  Using a sterile surgical marker, an appropriate advancement flap was drawn incorporating the defect and placing the expected incisions within the relaxed skin tension lines where possible.    The area thus outlined was incised deep to adipose tissue with a #15 scalpel blade.  The skin margins were undermined to an appropriate distance in all directions utilizing iris scissors.
Complex Repair And O-L Flap Text: The defect edges were debeveled with a #15 scalpel blade.  The primary defect was closed partially with a complex linear closure.  Given the location of the remaining defect, shape of the defect and the proximity to free margins an O-L flap was deemed most appropriate for complete closure of the defect.  Using a sterile surgical marker, an appropriate flap was drawn incorporating the defect and placing the expected incisions within the relaxed skin tension lines where possible.    The area thus outlined was incised deep to adipose tissue with a #15 scalpel blade.  The skin margins were undermined to an appropriate distance in all directions utilizing iris scissors.
Complex Repair And Bilobe Flap Text: The defect edges were debeveled with a #15 scalpel blade.  The primary defect was closed partially with a complex linear closure.  Given the location of the remaining defect, shape of the defect and the proximity to free margins a bilobe flap was deemed most appropriate for complete closure of the defect.  Using a sterile surgical marker, an appropriate advancement flap was drawn incorporating the defect and placing the expected incisions within the relaxed skin tension lines where possible.    The area thus outlined was incised deep to adipose tissue with a #15 scalpel blade.  The skin margins were undermined to an appropriate distance in all directions utilizing iris scissors.
Complex Repair And Melolabial Flap Text: The defect edges were debeveled with a #15 scalpel blade.  The primary defect was closed partially with a complex linear closure.  Given the location of the remaining defect, shape of the defect and the proximity to free margins a melolabial flap was deemed most appropriate for complete closure of the defect.  Using a sterile surgical marker, an appropriate advancement flap was drawn incorporating the defect and placing the expected incisions within the relaxed skin tension lines where possible.    The area thus outlined was incised deep to adipose tissue with a #15 scalpel blade.  The skin margins were undermined to an appropriate distance in all directions utilizing iris scissors.
Complex Repair And Rotation Flap Text: The defect edges were debeveled with a #15 scalpel blade.  The primary defect was closed partially with a complex linear closure.  Given the location of the remaining defect, shape of the defect and the proximity to free margins a rotation flap was deemed most appropriate for complete closure of the defect.  Using a sterile surgical marker, an appropriate advancement flap was drawn incorporating the defect and placing the expected incisions within the relaxed skin tension lines where possible.    The area thus outlined was incised deep to adipose tissue with a #15 scalpel blade.  The skin margins were undermined to an appropriate distance in all directions utilizing iris scissors.
Complex Repair And Rhombic Flap Text: The defect edges were debeveled with a #15 scalpel blade.  The primary defect was closed partially with a complex linear closure.  Given the location of the remaining defect, shape of the defect and the proximity to free margins a rhombic flap was deemed most appropriate for complete closure of the defect.  Using a sterile surgical marker, an appropriate advancement flap was drawn incorporating the defect and placing the expected incisions within the relaxed skin tension lines where possible.    The area thus outlined was incised deep to adipose tissue with a #15 scalpel blade.  The skin margins were undermined to an appropriate distance in all directions utilizing iris scissors.
Complex Repair And Transposition Flap Text: The defect edges were debeveled with a #15 scalpel blade.  The primary defect was closed partially with a complex linear closure.  Given the location of the remaining defect, shape of the defect and the proximity to free margins a transposition flap was deemed most appropriate for complete closure of the defect.  Using a sterile surgical marker, an appropriate advancement flap was drawn incorporating the defect and placing the expected incisions within the relaxed skin tension lines where possible.    The area thus outlined was incised deep to adipose tissue with a #15 scalpel blade.  The skin margins were undermined to an appropriate distance in all directions utilizing iris scissors.
Complex Repair And V-Y Plasty Text: The defect edges were debeveled with a #15 scalpel blade.  The primary defect was closed partially with a complex linear closure.  Given the location of the remaining defect, shape of the defect and the proximity to free margins a V-Y plasty was deemed most appropriate for complete closure of the defect.  Using a sterile surgical marker, an appropriate advancement flap was drawn incorporating the defect and placing the expected incisions within the relaxed skin tension lines where possible.    The area thus outlined was incised deep to adipose tissue with a #15 scalpel blade.  The skin margins were undermined to an appropriate distance in all directions utilizing iris scissors.
Complex Repair And M Plasty Text: The defect edges were debeveled with a #15 scalpel blade.  The primary defect was closed partially with a complex linear closure.  Given the location of the remaining defect, shape of the defect and the proximity to free margins an M plasty was deemed most appropriate for complete closure of the defect.  Using a sterile surgical marker, an appropriate advancement flap was drawn incorporating the defect and placing the expected incisions within the relaxed skin tension lines where possible.    The area thus outlined was incised deep to adipose tissue with a #15 scalpel blade.  The skin margins were undermined to an appropriate distance in all directions utilizing iris scissors.
Complex Repair And Double M Plasty Text: The defect edges were debeveled with a #15 scalpel blade.  The primary defect was closed partially with a complex linear closure.  Given the location of the remaining defect, shape of the defect and the proximity to free margins a double M plasty was deemed most appropriate for complete closure of the defect.  Using a sterile surgical marker, an appropriate advancement flap was drawn incorporating the defect and placing the expected incisions within the relaxed skin tension lines where possible.    The area thus outlined was incised deep to adipose tissue with a #15 scalpel blade.  The skin margins were undermined to an appropriate distance in all directions utilizing iris scissors.
Complex Repair And W Plasty Text: The defect edges were debeveled with a #15 scalpel blade.  The primary defect was closed partially with a complex linear closure.  Given the location of the remaining defect, shape of the defect and the proximity to free margins a W plasty was deemed most appropriate for complete closure of the defect.  Using a sterile surgical marker, an appropriate advancement flap was drawn incorporating the defect and placing the expected incisions within the relaxed skin tension lines where possible.    The area thus outlined was incised deep to adipose tissue with a #15 scalpel blade.  The skin margins were undermined to an appropriate distance in all directions utilizing iris scissors.
Complex Repair And Z Plasty Text: The defect edges were debeveled with a #15 scalpel blade.  The primary defect was closed partially with a complex linear closure.  Given the location of the remaining defect, shape of the defect and the proximity to free margins a Z plasty was deemed most appropriate for complete closure of the defect.  Using a sterile surgical marker, an appropriate advancement flap was drawn incorporating the defect and placing the expected incisions within the relaxed skin tension lines where possible.    The area thus outlined was incised deep to adipose tissue with a #15 scalpel blade.  The skin margins were undermined to an appropriate distance in all directions utilizing iris scissors.
Complex Repair And Dorsal Nasal Flap Text: The defect edges were debeveled with a #15 scalpel blade.  The primary defect was closed partially with a complex linear closure.  Given the location of the remaining defect, shape of the defect and the proximity to free margins a dorsal nasal flap was deemed most appropriate for complete closure of the defect.  Using a sterile surgical marker, an appropriate flap was drawn incorporating the defect and placing the expected incisions within the relaxed skin tension lines where possible.    The area thus outlined was incised deep to adipose tissue with a #15 scalpel blade.  The skin margins were undermined to an appropriate distance in all directions utilizing iris scissors.
Complex Repair And Ftsg Text: The defect edges were debeveled with a #15 scalpel blade.  The primary defect was closed partially with a complex linear closure.  Given the location of the defect, shape of the defect and the proximity to free margins a full thickness skin graft was deemed most appropriate to repair the remaining defect.  The graft was trimmed to fit the size of the remaining defect.  The graft was then placed in the primary defect, oriented appropriately, and sutured into place.
Complex Repair And Burow's Graft Text: The defect edges were debeveled with a #15 scalpel blade.  The primary defect was closed partially with a complex linear closure.  Given the location of the defect, shape of the defect, the proximity to free margins and the presence of a standing cone deformity a Burow's graft was deemed most appropriate to repair the remaining defect.  The graft was trimmed to fit the size of the remaining defect.  The graft was then placed in the primary defect, oriented appropriately, and sutured into place.
Complex Repair And Split-Thickness Skin Graft Text: The defect edges were debeveled with a #15 scalpel blade.  The primary defect was closed partially with a complex linear closure.  Given the location of the defect, shape of the defect and the proximity to free margins a split thickness skin graft was deemed most appropriate to repair the remaining defect.  The graft was trimmed to fit the size of the remaining defect.  The graft was then placed in the primary defect, oriented appropriately, and sutured into place.
Complex Repair And Epidermal Autograft Text: The defect edges were debeveled with a #15 scalpel blade.  The primary defect was closed partially with a complex linear closure.  Given the location of the defect, shape of the defect and the proximity to free margins an epidermal autograft was deemed most appropriate to repair the remaining defect.  The graft was trimmed to fit the size of the remaining defect.  The graft was then placed in the primary defect, oriented appropriately, and sutured into place.
Complex Repair And Dermal Autograft Text: The defect edges were debeveled with a #15 scalpel blade.  The primary defect was closed partially with a complex linear closure.  Given the location of the defect, shape of the defect and the proximity to free margins an dermal autograft was deemed most appropriate to repair the remaining defect.  The graft was trimmed to fit the size of the remaining defect.  The graft was then placed in the primary defect, oriented appropriately, and sutured into place.
Complex Repair And Tissue Cultured Epidermal Autograft Text: The defect edges were debeveled with a #15 scalpel blade.  The primary defect was closed partially with a complex linear closure.  Given the location of the defect, shape of the defect and the proximity to free margins an tissue cultured epidermal autograft was deemed most appropriate to repair the remaining defect.  The graft was trimmed to fit the size of the remaining defect.  The graft was then placed in the primary defect, oriented appropriately, and sutured into place.
Complex Repair And Xenograft Text: The defect edges were debeveled with a #15 scalpel blade.  The primary defect was closed partially with a complex linear closure.  Given the location of the defect, shape of the defect and the proximity to free margins a xenograft was deemed most appropriate to repair the remaining defect.  The graft was trimmed to fit the size of the remaining defect.  The graft was then placed in the primary defect, oriented appropriately, and sutured into place.
Complex Repair And Skin Substitute Graft Text: The defect edges were debeveled with a #15 scalpel blade.  The primary defect was closed partially with a complex linear closure.  Given the location of the remaining defect, shape of the defect and the proximity to free margins a skin substitute graft was deemed most appropriate to repair the remaining defect.  The graft was trimmed to fit the size of the remaining defect.  The graft was then placed in the primary defect, oriented appropriately, and sutured into place.
Path Notes (To The Dermatopathologist): Please check margins.
Consent: Verbal consent was obtained from the patient. The risks and benefits to therapy were discussed in detail. Specifically, the risks of infection, scarring, bleeding, prolonged wound healing, incomplete removal, allergy to anesthesia, nerve injury and recurrence were addressed. Prior to the procedure, the treatment site was clearly identified and confirmed by the patient. All components of Universal Protocol/PAUSE Rule completed.
Post-Care Instructions: I reviewed with the patient in detail post-care instructions. Patient is not to engage in any heavy lifting, exercise, or swimming for the next 14 days. Should the patient develop any fevers, chills, bleeding, severe pain patient will contact the office immediately.
Where Do You Want The Question To Include Opioid Counseling Located?: Case Summary Tab
Billing Type: Third-Party Bill
Information: Selecting Yes will display possible errors in your note based on the variables you have selected. This validation is only offered as a suggestion for you. PLEASE NOTE THAT THE VALIDATION TEXT WILL BE REMOVED WHEN YOU FINALIZE YOUR NOTE. IF YOU WANT TO FAX A PRELIMINARY NOTE YOU WILL NEED TO TOGGLE THIS TO 'NO' IF YOU DO NOT WANT IT IN YOUR FAXED NOTE.

## 2021-09-16 ENCOUNTER — OFFICE VISIT (OUTPATIENT)
Dept: MEDICAL GROUP | Facility: MEDICAL CENTER | Age: 76
End: 2021-09-16
Payer: MEDICARE

## 2021-09-16 VITALS
OXYGEN SATURATION: 96 % | RESPIRATION RATE: 18 BRPM | BODY MASS INDEX: 29.23 KG/M2 | WEIGHT: 215.83 LBS | TEMPERATURE: 97.6 F | SYSTOLIC BLOOD PRESSURE: 128 MMHG | DIASTOLIC BLOOD PRESSURE: 58 MMHG | HEIGHT: 72 IN | HEART RATE: 76 BPM

## 2021-09-16 DIAGNOSIS — Z23 NEED FOR VACCINATION: ICD-10-CM

## 2021-09-16 DIAGNOSIS — E11.69 HYPERLIPIDEMIA DUE TO TYPE 2 DIABETES MELLITUS (HCC): ICD-10-CM

## 2021-09-16 DIAGNOSIS — E11.9 TYPE 2 DIABETES MELLITUS WITHOUT COMPLICATION, WITHOUT LONG-TERM CURRENT USE OF INSULIN (HCC): ICD-10-CM

## 2021-09-16 DIAGNOSIS — I73.9 PVD (PERIPHERAL VASCULAR DISEASE) (HCC): ICD-10-CM

## 2021-09-16 DIAGNOSIS — E78.5 HYPERLIPIDEMIA DUE TO TYPE 2 DIABETES MELLITUS (HCC): ICD-10-CM

## 2021-09-16 DIAGNOSIS — E03.9 ACQUIRED HYPOTHYROIDISM: ICD-10-CM

## 2021-09-16 DIAGNOSIS — E11.59 HYPERTENSION ASSOCIATED WITH DIABETES (HCC): ICD-10-CM

## 2021-09-16 DIAGNOSIS — I15.2 HYPERTENSION ASSOCIATED WITH DIABETES (HCC): ICD-10-CM

## 2021-09-16 LAB
HBA1C MFR BLD: 8 % (ref 0–5.6)
INT CON NEG: NEGATIVE
INT CON POS: POSITIVE

## 2021-09-16 PROCEDURE — 90662 IIV NO PRSV INCREASED AG IM: CPT | Performed by: NURSE PRACTITIONER

## 2021-09-16 PROCEDURE — 83036 HEMOGLOBIN GLYCOSYLATED A1C: CPT | Performed by: NURSE PRACTITIONER

## 2021-09-16 PROCEDURE — G0008 ADMIN INFLUENZA VIRUS VAC: HCPCS | Performed by: NURSE PRACTITIONER

## 2021-09-16 PROCEDURE — 99214 OFFICE O/P EST MOD 30 MIN: CPT | Mod: 25 | Performed by: NURSE PRACTITIONER

## 2021-09-16 RX ORDER — GLIPIZIDE 10 MG/1
10 TABLET ORAL 2 TIMES DAILY
Qty: 180 TABLET | Refills: 1 | Status: SHIPPED | OUTPATIENT
Start: 2021-09-16 | End: 2022-04-25
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
## 2021-09-16 NOTE — LETTER
[facility]  MACHO Ulloa.  [address]  [place] [address]  Fax: [phone]   Authorization for Release/Disclosure of   Protected Health Information   Name: SALEEM ARREAGA : 1945 SSN: [id number]   Address: [address] [place] [address]   [place] [address] Phone:    [phone] (home) [phone] (work)   I authorize the entity listed below to release/disclose the PHI below to:   [facility]/SCOOBY Ulloa and SCOOBY U[place]oa   Provider or Entity Name:  [facility]   Address   City, State, [facility]   Phone:      Fax:     Reason for request: continuity of care   Information to be released:    [  ] LAST COLONOSCOPY,  including any PATH REPORT and follow-up  [  ] LAST FIT/COLOGUARD RESULT [  ] LAST DEXA  [  ] LAST MAMMOGRAM  [  ] LAST PAP  [  ] LAST LABS [  ] RETINA EXAM REPORT  [  ] IMMUNIZATION RECORDS  [  ] Release all info      [  ] Check here and initial the line next to each item to release ALL health information INCLUDING  _____ Care and treatment for drug and / or alcohol abuse  _____ HIV testing, infection status, or AIDS  _____ Genetic Testing    DATES OF SERVICE OR TIME PERIOD TO BE DISCLOSED: _____________  I understand and acknowledge that:  * This Authorization may be revoked at any time by you in writing, except if your health information has already been used or disclosed.  * Your health information that will be used or disclosed as a result of you signing this authorization could be re-disclosed by the recipient. If this occurs, your re-disclosed health information may no longer be protected by State or Federal laws.  * You may refuse to sign this Authorization. Your refusal will not affect your ability to obtain treatment.  * This Authorization becomes effective upon signing and will  on (date) __________.      If no date is indicated, this Authorization will  one (1) year from the signature date.    Name: Saleem  Miky Whitney    Signature:   Date:     9/16/2021       PLEASE FAX REQUESTED RECORDS BACK TO: [phone]

## 2021-09-16 NOTE — ASSESSMENT & PLAN NOTE
Chronic issue affecting bilateral lower extremities.  He has history of bilateral vein cauterization, still with ongoing pain in the right posterior leg.  Uses compression stockings.
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
## 2021-09-23 ENCOUNTER — APPOINTMENT (RX ONLY)
Dept: URBAN - METROPOLITAN AREA CLINIC 4 | Facility: CLINIC | Age: 76
Setting detail: DERMATOLOGY
End: 2021-09-23

## 2021-09-23 DIAGNOSIS — Z48.02 ENCOUNTER FOR REMOVAL OF SUTURES: ICD-10-CM

## 2021-09-23 PROCEDURE — ? SUTURE REMOVAL (GLOBAL PERIOD)

## 2021-09-23 ASSESSMENT — LOCATION DETAILED DESCRIPTION DERM: LOCATION DETAILED: LEFT INFERIOR POSTERIOR NECK

## 2021-09-23 ASSESSMENT — LOCATION ZONE DERM: LOCATION ZONE: NECK

## 2021-09-23 ASSESSMENT — LOCATION SIMPLE DESCRIPTION DERM: LOCATION SIMPLE: POSTERIOR NECK

## 2021-09-23 NOTE — PROCEDURE: SUTURE REMOVAL (GLOBAL PERIOD)
Body Location Override (Optional - Billing Will Still Be Based On Selected Body Map Location If Applicable): left posterior neck
Detail Level: Detailed
Add 97275 Cpt? (Important Note: In 2017 The Use Of 05402 Is Being Tracked By Cms To Determine Future Global Period Reimbursement For Global Periods): no

## 2021-11-19 DIAGNOSIS — I15.2 HYPERTENSION ASSOCIATED WITH DIABETES (HCC): ICD-10-CM

## 2021-11-19 DIAGNOSIS — E11.59 HYPERTENSION ASSOCIATED WITH DIABETES (HCC): ICD-10-CM

## 2021-11-19 RX ORDER — AMLODIPINE BESYLATE 10 MG/1
TABLET ORAL
Qty: 90 TABLET | Refills: 3 | Status: SHIPPED | OUTPATIENT
Start: 2021-11-19 | End: 2022-07-22 | Stop reason: SDUPTHER

## 2021-11-19 NOTE — TELEPHONE ENCOUNTER
Received request via: Pharmacy    Was the patient seen in the last year in this department? Yes  9/16/2021  Does the patient have an active prescription (recently filled or refills available) for medication(s) requested? No

## 2021-12-29 DIAGNOSIS — E11.9 TYPE 2 DIABETES MELLITUS WITHOUT COMPLICATION, WITHOUT LONG-TERM CURRENT USE OF INSULIN (HCC): ICD-10-CM

## 2022-01-03 ENCOUNTER — OFFICE VISIT (OUTPATIENT)
Dept: ENDOCRINOLOGY | Facility: MEDICAL CENTER | Age: 77
End: 2022-01-03
Attending: NURSE PRACTITIONER
Payer: MEDICARE

## 2022-01-03 ENCOUNTER — TELEPHONE (OUTPATIENT)
Dept: ENDOCRINOLOGY | Facility: MEDICAL CENTER | Age: 77
End: 2022-01-03

## 2022-01-03 VITALS
HEART RATE: 72 BPM | SYSTOLIC BLOOD PRESSURE: 122 MMHG | OXYGEN SATURATION: 97 % | WEIGHT: 217 LBS | DIASTOLIC BLOOD PRESSURE: 78 MMHG | BODY MASS INDEX: 29.39 KG/M2 | HEIGHT: 72 IN

## 2022-01-03 DIAGNOSIS — E11.69 HYPERLIPIDEMIA ASSOCIATED WITH TYPE 2 DIABETES MELLITUS (HCC): ICD-10-CM

## 2022-01-03 DIAGNOSIS — E03.9 HYPOTHYROIDISM (ACQUIRED): ICD-10-CM

## 2022-01-03 DIAGNOSIS — E78.5 HYPERLIPIDEMIA ASSOCIATED WITH TYPE 2 DIABETES MELLITUS (HCC): ICD-10-CM

## 2022-01-03 DIAGNOSIS — E55.9 VITAMIN D DEFICIENCY: ICD-10-CM

## 2022-01-03 DIAGNOSIS — E11.65 UNCONTROLLED TYPE 2 DIABETES MELLITUS WITH HYPERGLYCEMIA (HCC): ICD-10-CM

## 2022-01-03 LAB
HBA1C MFR BLD: 8.5 % (ref 0–5.6)
INT CON NEG: ABNORMAL
INT CON POS: ABNORMAL

## 2022-01-03 PROCEDURE — 99214 OFFICE O/P EST MOD 30 MIN: CPT | Performed by: NURSE PRACTITIONER

## 2022-01-03 PROCEDURE — 83036 HEMOGLOBIN GLYCOSYLATED A1C: CPT | Performed by: NURSE PRACTITIONER

## 2022-01-03 PROCEDURE — 99212 OFFICE O/P EST SF 10 MIN: CPT | Performed by: NURSE PRACTITIONER

## 2022-01-03 ASSESSMENT — PATIENT HEALTH QUESTIONNAIRE - PHQ9: CLINICAL INTERPRETATION OF PHQ2 SCORE: 0

## 2022-01-03 NOTE — PROGRESS NOTES
Chief Complaint:  Consult requested by PCP initial evaluation of Type 2 Diabetes Mellitus    HPI:   Saleem Whitney is a 76 y.o. male with Type 2 Diabetes Mellitus diagnosed over 10 years ago.  He denies hospitalizations for DKA in the past.    He monitors blood glucose 4 times per week. Blood glucose values range:Fasting Glucose 180-220.    He denies hypoglycemic episodes occurring.  He denies hypoglycemic unawareness. He denies episodes of severe hypoglycemia requiring third party assistance.  He isn't wearing a medical alert bracelet or necklace.  He denies a glucagon emergency kit.    He denies attending diabetes education classes.  Diet: Bagel/Cream Cheese, Lunch is the largest meal, Dinner is the smallest meal.     Current diabetes regimen:  Tradjenta 5 mg daily  Metformin 1000 mg twice daily   Glipizide 10 mg twice daily    Patient states he has tolerated all medications he is trialed in relation to diabetes.    Point-of-care A1c 01/03/2022: 8.5%  POC A1c 09/16/2021: 8%    Diabetes Complications   He denies history of retinopathy.  He denies laser eye surgery. Last eye exam: 02/10/2021 Dr. Coello-sees him every 6 months to monitor edema; preglaucoma, hx of retinal detachment.  He denies history of peripheral sensory neuropathy. He denies history of foot sores.   He denies history of kidney damage.  He is on ACE inhibitor or ARB.  Current /78.  Currently taking losartan 100mg daily.   No current microalbumin level to evaluate.     Ref. Range 6/10/2020 08:45   Micro Alb Creat Ratio Latest Ref Range: 0 - 30 mg/g 20   Creatinine, Urine Latest Units: mg/dL 88.08   Microalbumin, Urine Random Latest Units: mg/dL 1.8       He denies history of coronary artery disease.  He denies history of stroke and denies TIA.  He denies history of PAD.    He denies history of hyperlipidemia. Currently taking lipitor 10mg daily.   Patient denies myalgias on muscle cramps.  Patient has tolerated statin therapy well for  over 2 years.     Ref. Range 5/3/2021 07:39   Cholesterol,Tot Latest Ref Range: 100 - 199 mg/dL 106   Triglycerides Latest Ref Range: 0 - 149 mg/dL 143   HDL Latest Ref Range: >=40 mg/dL 37 (A)   LDL Latest Ref Range: <100 mg/dL 40       History of vitamin D deficiency.  Patient currently taking vitamin D 1000 IU daily. No current vitamin D level assay to review.    History of hypothyroidism for over 10 years now.  Patient currently taking levothyroxine 88 mcg daily.  Patient is taking this dosage for over a year now.  Patient takes thyroid hormone on an empty stomach 1 hour prior to breakfast.  Patient denies taking calcium, antacids and/or iron supplementation.  Patient denies cold intolerance, constipation and/or mental fogginess.     Ref. Range 5/3/2021 07:39   TSH Latest Ref Range: 0.380 - 5.330 uIU/mL 3.640     TPO antibodies are not available at this time.    ROS:     CONS:     No fever, no chills, no weight loss, no fatigue   EYES:      No diplopia, no blurry vision, no redness of eyes, no swelling of eyelids   ENT:    No hearing loss, No ear pain, No sore throat, no dysphagia, no neck swelling   CV:     No chest pain, no palpitations, no claudication, no orthopnea, no PND   PULM:    No SOB, no cough, no hemoptysis, no wheezing    GI:   No nausea, no vomiting, no diarrhea, no constipation, no bloody stools   :  Passing urine well, no dysuria, no hematuria   ENDO:   No polyuria, no polydipsia, no heat intolerance, no cold intolerance   NEURO: No headaches, no dizziness, no convulsions, no tremors   MUSC:  No joint swellings, no arthralgias, no myalgias, no weakness   SKIN:   No rash, no ulcers, no dry skin   PSYCH:   No depression, no anxiety, no difficulty sleeping       Past Medical History:  Patient Active Problem List    Diagnosis Date Noted   • Itching of ear 01/13/2021   • PVD (peripheral vascular disease) (HCC) 12/26/2020   • Degenerative arthritis of knee, bilateral 12/22/2020   • Acute right-sided  low back pain 08/06/2020   • Posterior knee pain, right 06/16/2020   • Motion sickness 10/02/2019   • Altitude sickness prophylaxis 10/02/2019   • Leg mass, right 07/12/2019   • Vitamin D deficiency 06/10/2019   • Leg cramping 06/10/2019   • Pain and swelling of right knee 04/16/2019   • Erythematous rash 04/16/2019   • Bilateral leg edema 01/08/2019   • Nocturia more than twice per night 11/05/2018   • Pitting edema 08/07/2018   • Idiopathic chronic gout, left ankle and foot, without tophus (tophi) 06/25/2018   • Memory changes 05/24/2018   • Chronic pain of left knee 09/25/2017   • Diastasis of rectus abdominis 06/22/2017   • Increased frequency of urination 06/07/2017   • (HCC) Type 2 diabetes mellitus without complication, without long-term current use of insulin 11/09/2016   • (HCC) Hypertension associated with diabetes 11/09/2016   • Acquired hypothyroidism 11/09/2016   • (HCC) Hyperlipidemia due to type 2 diabetes mellitus 11/09/2016   • Primary insomnia 11/09/2016       Past Surgical History:  Past Surgical History:   Procedure Laterality Date   • HAND SURGERY          Allergies:  Patient has no known allergies.     Current Medications:    Current Outpatient Medications:   •  metformin (GLUCOPHAGE) 1000 MG tablet, TAKE 1 TABLET BY MOUTH 2 TIMES A DAY WITH MEALS., Disp: 180 Tablet, Rfl: 0  •  amLODIPine (NORVASC) 10 MG Tab, TAKE 1 TABLET EVERY DAY, Disp: 90 Tablet, Rfl: 3  •  glipiZIDE (GLUCOTROL) 10 MG Tab, Take 1 Tablet by mouth 2 times a day., Disp: 180 Tablet, Rfl: 1  •  atorvastatin (LIPITOR) 10 MG Tab, Take 1 tablet by mouth every day., Disp: 90 tablet, Rfl: 3  •  levothyroxine (SYNTHROID) 88 MCG Tab, Take 1 tablet by mouth every morning on an empty stomach., Disp: 90 tablet, Rfl: 3  •  losartan (COZAAR) 100 MG Tab, TAKE 1 TABLET EVERY DAY, Disp: 90 tablet, Rfl: 3  •  linagliptin (TRADJENTA) 5 MG Tab tablet, Take 1 Tab by mouth every day., Disp: 90 Tab, Rfl: 3  •  Blood Glucose Test Strips, Test strips  for OneTouch Ultra meter. Sig: use fasting first thing in the morning, and prn ssx high or low sugar, Disp: 100 Each, Rfl: 3  •  Lancets, Lancets for OneTouch Ultra meter. Sig: use fasting first thing in the morning, and prn ssx high or low sugar, Disp: 100 Each, Rfl: 3  •  vitamin D (CHOLECALCIFEROL) 1000 UNIT Tab, Take 1 Tab by mouth every day. (Patient taking differently: Take 1,000 Units by mouth 2 times a day.), Disp: 90 Tab, Rfl: 3  •  Magnesium 500 MG Tab, Take 1,000 mg by mouth every day., Disp: 90 Tab, Rfl: 3    Social History:  Social History     Socioeconomic History   • Marital status:      Spouse name: Not on file   • Number of children: Not on file   • Years of education: Not on file   • Highest education level: Not on file   Occupational History   • Not on file   Tobacco Use   • Smoking status: Former Smoker     Packs/day: 1.00     Years: 5.00     Pack years: 5.00     Types: Cigarettes     Quit date: 1971     Years since quittin.1   • Smokeless tobacco: Never Used   Vaping Use   • Vaping Use: Never used   Substance and Sexual Activity   • Alcohol use: Yes     Alcohol/week: 4.2 oz     Types: 7 Glasses of wine per week     Comment: daily   • Drug use: No   • Sexual activity: Yes     Partners: Female   Other Topics Concern   •  Service No   • Blood Transfusions No   • Caffeine Concern No   • Occupational Exposure No   • Hobby Hazards No   • Sleep Concern No   • Stress Concern No   • Weight Concern No   • Special Diet No   • Back Care No   • Exercise Yes   • Bike Helmet No   • Seat Belt Yes   • Self-Exams No   Social History Narrative   • Not on file     Social Determinants of Health     Financial Resource Strain:    • Difficulty of Paying Living Expenses: Not on file   Food Insecurity:    • Worried About Running Out of Food in the Last Year: Not on file   • Ran Out of Food in the Last Year: Not on file   Transportation Needs:    • Lack of Transportation (Medical): Not on file   •  Lack of Transportation (Non-Medical): Not on file   Physical Activity:    • Days of Exercise per Week: Not on file   • Minutes of Exercise per Session: Not on file   Stress:    • Feeling of Stress : Not on file   Social Connections:    • Frequency of Communication with Friends and Family: Not on file   • Frequency of Social Gatherings with Friends and Family: Not on file   • Attends Buddhist Services: Not on file   • Active Member of Clubs or Organizations: Not on file   • Attends Club or Organization Meetings: Not on file   • Marital Status: Not on file   Intimate Partner Violence:    • Fear of Current or Ex-Partner: Not on file   • Emotionally Abused: Not on file   • Physically Abused: Not on file   • Sexually Abused: Not on file   Housing Stability:    • Unable to Pay for Housing in the Last Year: Not on file   • Number of Places Lived in the Last Year: Not on file   • Unstable Housing in the Last Year: Not on file        Family History:   Family History   Problem Relation Age of Onset   • Diabetes Mother    • Stroke Father         45   • Diabetes Sister    • Diabetes Maternal Uncle        PHYSICAL EXAM:   Vital signs: /70   Pulse 72   GENERAL: Well-developed, well-nourished  in no apparent distress.   EYE: No ocular and eyelid asymmetry, Anicteric sclerae,  PERRL, No exophthalmos or lidlag  HENT: Hearing grossly intact, Normocephalic, atraumatic. Pink, moist mucous membranes, No exudate  NECK: Supple. Trachea midline.   CARDIOVASCULAR: Regular rate and rhythm. No murmurs, rubs, or gallops.   LUNGS: Clear to auscultation bilaterally   ABDOMEN: Soft, nontender with positive bowel sounds.   EXTREMITIES: No clubbing, cyanosis, or edema.   NEUROLOGICAL: Cranial nerves II-XII are grossly intact   Symmetric reflexes at the patella no proximal muscle weakness, No visible tremor with both outstretched hands  LYMPH: No cervical, supraclavicular,  adenopathy palpated.   SKIN: No rashes, lesions. Turgor is  normal.  FOOT: Normal sensation to monofilament testing, normal pulses, no ulcers.  Normal Vibration quantitative sensation test.    ASSESSMENT/PLAN:     1. Uncontrolled type 2 diabetes mellitus with hyperglycemia (HCC)  Unstable.  Continue Diabetes Regimen:   Finish taking Tradjenta until the prescription is complete; patient pays over $300 for the medication every 3 months and just recently received a refill  Continue Metformin 1000 mg twice daily  Decrease glipizide to 10 mg daily.  Recommend starting Trulicity 0.75 mg weekly.    Recommend balanced meal planning such as my plate.gov.  Recommend exercise 3 times a week as patient can tolerate.  Dilated eye exam is up-to-date and should be completed again in February 2022.  Daily foot inspection is always encouraged.  Recommend 2 L to 3 L of water each day.    Future lab orders:  - Lipid Profile; Future  - MICROALBUMIN CREAT RATIO URINE; Future  - Comp Metabolic Panel; Future  - FREE THYROXINE; Future  - T3 FREE; Future  - TSH; Future  - VITAMIN D,25 HYDROXY; Future  - VITAMIN B12; Future      2. Hyperlipidemia associated with type 2 diabetes mellitus (HCC)  Unstable.  Continue taking Lipitor 10 mg daily.    3. Vitamin D deficiency  Unstable.  Continue taking vitamin D 1000 IU daily until vitamin D level assay has been tested.    4. Hypothyroidism (acquired)  Unstable.  Continue taking levothyroxine 88 mcg daily.  Require current thyroid function to further assess and evaluate if increasing dosage would be beneficial.  At this time patient is asymptomatic.      Complete labs 1 to 2 weeks prior to next appointment.  Complete point-of-care A1c at next appointment.  Next appointment in 3 months.      Thank you kindly for allowing me to participate in the diabetes care plan for this patient.    Florecita Young, APRN  01/03/22    CC:   No primary care provider on file.

## 2022-01-03 NOTE — PROGRESS NOTES
RN-CDE Note    Subjective:   Endocrinology Clinic Progress Note  PCP: No primary care provider on file.    HPI:  Saleem Whitney is a 76 y.o. old patient who is seen today for review of Type 2 Diabetes and Hypothyroidism.  Recent changes in health: Having problems with pain his legs.  DM:   Last A1c:   Lab Results   Component Value Date/Time    HBA1C 8.5 (A) 01/03/2022 03:31 PM      Previous A1c was 8.0 on 9/16/21  A1C GOAL: < 7    Diabetes Medications:   Tradjenta 5 mg daily  Metformin 1000 mg BID  Glipizide 10 mg BID      Exercise: no regular exercise, sedentary.  Painful sitting or standing for any length of time.  Diet: Eats breakfast and main meal in the late afternoon.  Patient's body mass index is unknown because there is no height or weight on file. Exercise and nutrition counseling were performed at this visit.    Glucose monitoring frequency: Testing daily  200's  Hypoglycemic episodes: no  Last Retinal Exam: on file and up-to-date  Daily Foot Exam: Yes   Foot Exam:  Monofilament: done  Monofilament testing with a 10 gram force: sensation intact: decreased on right  Visual Inspection: Feet without maceration, ulcers, fissures.  Legs with edema.  Pedal pulses: intact bilaterally   Lab Results   Component Value Date/Time    MALBCRT 20 06/10/2020 08:45 AM    MICROALBUR 1.8 06/10/2020 08:45 AM     He  reports that he quit smoking about 50 years ago. His smoking use included cigarettes. He has a 5.00 pack-year smoking history. He has never used smokeless tobacco.      Plan:     Discussed and educated on:   - All medications, side effects and compliance (discussed carefully)  - Annual eye examinations at Ophthalmology  - Home glucose monitoring emphasized  - Weight control and daily exercise    Recommended medication changes: He is interested a GLP-1 and SGLT2- Inhibitor.

## 2022-01-19 DIAGNOSIS — E11.9 TYPE 2 DIABETES MELLITUS WITHOUT COMPLICATION, WITHOUT LONG-TERM CURRENT USE OF INSULIN (HCC): ICD-10-CM

## 2022-01-19 RX ORDER — DULAGLUTIDE 0.75 MG/.5ML
1 INJECTION, SOLUTION SUBCUTANEOUS
Qty: 2 ML | Refills: 6 | Status: SHIPPED | OUTPATIENT
Start: 2022-01-19 | End: 2022-07-22 | Stop reason: SDUPTHER

## 2022-01-20 SDOH — HEALTH STABILITY: PHYSICAL HEALTH: ON AVERAGE, HOW MANY DAYS PER WEEK DO YOU ENGAGE IN MODERATE TO STRENUOUS EXERCISE (LIKE A BRISK WALK)?: 0 DAYS

## 2022-01-20 SDOH — ECONOMIC STABILITY: INCOME INSECURITY: IN THE LAST 12 MONTHS, WAS THERE A TIME WHEN YOU WERE NOT ABLE TO PAY THE MORTGAGE OR RENT ON TIME?: NO

## 2022-01-20 SDOH — ECONOMIC STABILITY: INCOME INSECURITY: HOW HARD IS IT FOR YOU TO PAY FOR THE VERY BASICS LIKE FOOD, HOUSING, MEDICAL CARE, AND HEATING?: NOT HARD AT ALL

## 2022-01-20 SDOH — ECONOMIC STABILITY: FOOD INSECURITY: WITHIN THE PAST 12 MONTHS, THE FOOD YOU BOUGHT JUST DIDN'T LAST AND YOU DIDN'T HAVE MONEY TO GET MORE.: NEVER TRUE

## 2022-01-20 SDOH — ECONOMIC STABILITY: HOUSING INSECURITY
IN THE LAST 12 MONTHS, WAS THERE A TIME WHEN YOU DID NOT HAVE A STEADY PLACE TO SLEEP OR SLEPT IN A SHELTER (INCLUDING NOW)?: NO

## 2022-01-20 SDOH — ECONOMIC STABILITY: TRANSPORTATION INSECURITY
IN THE PAST 12 MONTHS, HAS THE LACK OF TRANSPORTATION KEPT YOU FROM MEDICAL APPOINTMENTS OR FROM GETTING MEDICATIONS?: NO

## 2022-01-20 SDOH — HEALTH STABILITY: PHYSICAL HEALTH: ON AVERAGE, HOW MANY MINUTES DO YOU ENGAGE IN EXERCISE AT THIS LEVEL?: 0 MIN

## 2022-01-20 SDOH — ECONOMIC STABILITY: TRANSPORTATION INSECURITY
IN THE PAST 12 MONTHS, HAS LACK OF RELIABLE TRANSPORTATION KEPT YOU FROM MEDICAL APPOINTMENTS, MEETINGS, WORK OR FROM GETTING THINGS NEEDED FOR DAILY LIVING?: NO

## 2022-01-20 SDOH — ECONOMIC STABILITY: HOUSING INSECURITY: IN THE LAST 12 MONTHS, HOW MANY PLACES HAVE YOU LIVED?: 1

## 2022-01-20 SDOH — HEALTH STABILITY: MENTAL HEALTH
STRESS IS WHEN SOMEONE FEELS TENSE, NERVOUS, ANXIOUS, OR CAN'T SLEEP AT NIGHT BECAUSE THEIR MIND IS TROUBLED. HOW STRESSED ARE YOU?: NOT AT ALL

## 2022-01-20 SDOH — ECONOMIC STABILITY: FOOD INSECURITY: WITHIN THE PAST 12 MONTHS, YOU WORRIED THAT YOUR FOOD WOULD RUN OUT BEFORE YOU GOT MONEY TO BUY MORE.: NEVER TRUE

## 2022-01-20 SDOH — ECONOMIC STABILITY: TRANSPORTATION INSECURITY
IN THE PAST 12 MONTHS, HAS LACK OF TRANSPORTATION KEPT YOU FROM MEETINGS, WORK, OR FROM GETTING THINGS NEEDED FOR DAILY LIVING?: NO

## 2022-01-20 ASSESSMENT — SOCIAL DETERMINANTS OF HEALTH (SDOH)
HOW OFTEN DO YOU HAVE SIX OR MORE DRINKS ON ONE OCCASION: NEVER
HOW MANY DRINKS CONTAINING ALCOHOL DO YOU HAVE ON A TYPICAL DAY WHEN YOU ARE DRINKING: 1 OR 2
IN A TYPICAL WEEK, HOW MANY TIMES DO YOU TALK ON THE PHONE WITH FAMILY, FRIENDS, OR NEIGHBORS?: MORE THAN THREE TIMES A WEEK
HOW OFTEN DO YOU ATTENT MEETINGS OF THE CLUB OR ORGANIZATION YOU BELONG TO?: MORE THAN 4 TIMES PER YEAR
DO YOU BELONG TO ANY CLUBS OR ORGANIZATIONS SUCH AS CHURCH GROUPS UNIONS, FRATERNAL OR ATHLETIC GROUPS, OR SCHOOL GROUPS?: YES
HOW OFTEN DO YOU GET TOGETHER WITH FRIENDS OR RELATIVES?: ONCE A WEEK
HOW OFTEN DO YOU ATTEND CHURCH OR RELIGIOUS SERVICES?: NEVER
DO YOU BELONG TO ANY CLUBS OR ORGANIZATIONS SUCH AS CHURCH GROUPS UNIONS, FRATERNAL OR ATHLETIC GROUPS, OR SCHOOL GROUPS?: YES
WITHIN THE PAST 12 MONTHS, YOU WORRIED THAT YOUR FOOD WOULD RUN OUT BEFORE YOU GOT THE MONEY TO BUY MORE: NEVER TRUE
HOW HARD IS IT FOR YOU TO PAY FOR THE VERY BASICS LIKE FOOD, HOUSING, MEDICAL CARE, AND HEATING?: NOT HARD AT ALL
IN A TYPICAL WEEK, HOW MANY TIMES DO YOU TALK ON THE PHONE WITH FAMILY, FRIENDS, OR NEIGHBORS?: MORE THAN THREE TIMES A WEEK
HOW OFTEN DO YOU ATTEND CHURCH OR RELIGIOUS SERVICES?: NEVER
HOW OFTEN DO YOU GET TOGETHER WITH FRIENDS OR RELATIVES?: ONCE A WEEK
HOW OFTEN DO YOU ATTENT MEETINGS OF THE CLUB OR ORGANIZATION YOU BELONG TO?: MORE THAN 4 TIMES PER YEAR
HOW OFTEN DO YOU HAVE A DRINK CONTAINING ALCOHOL: 4 OR MORE TIMES A WEEK

## 2022-01-20 ASSESSMENT — LIFESTYLE VARIABLES
HOW OFTEN DO YOU HAVE A DRINK CONTAINING ALCOHOL: 4 OR MORE TIMES A WEEK
HOW MANY STANDARD DRINKS CONTAINING ALCOHOL DO YOU HAVE ON A TYPICAL DAY: 1 OR 2
HOW OFTEN DO YOU HAVE SIX OR MORE DRINKS ON ONE OCCASION: NEVER

## 2022-01-21 ENCOUNTER — OFFICE VISIT (OUTPATIENT)
Dept: MEDICAL GROUP | Facility: MEDICAL CENTER | Age: 77
End: 2022-01-21
Payer: MEDICARE

## 2022-01-21 VITALS
HEART RATE: 62 BPM | OXYGEN SATURATION: 98 % | HEIGHT: 72 IN | BODY MASS INDEX: 28.88 KG/M2 | WEIGHT: 213.19 LBS | TEMPERATURE: 98.3 F | DIASTOLIC BLOOD PRESSURE: 68 MMHG | SYSTOLIC BLOOD PRESSURE: 132 MMHG | RESPIRATION RATE: 16 BRPM

## 2022-01-21 DIAGNOSIS — E78.5 HYPERLIPIDEMIA DUE TO TYPE 2 DIABETES MELLITUS (HCC): ICD-10-CM

## 2022-01-21 DIAGNOSIS — E03.9 ACQUIRED HYPOTHYROIDISM: ICD-10-CM

## 2022-01-21 DIAGNOSIS — Z76.89 ENCOUNTER TO ESTABLISH CARE WITH NEW DOCTOR: ICD-10-CM

## 2022-01-21 DIAGNOSIS — I73.9 PVD (PERIPHERAL VASCULAR DISEASE) (HCC): ICD-10-CM

## 2022-01-21 DIAGNOSIS — G89.29 CHRONIC PAIN OF RIGHT KNEE: ICD-10-CM

## 2022-01-21 DIAGNOSIS — M25.561 CHRONIC PAIN OF RIGHT KNEE: ICD-10-CM

## 2022-01-21 DIAGNOSIS — E11.59 HYPERTENSION ASSOCIATED WITH DIABETES (HCC): ICD-10-CM

## 2022-01-21 DIAGNOSIS — I15.2 HYPERTENSION ASSOCIATED WITH DIABETES (HCC): ICD-10-CM

## 2022-01-21 DIAGNOSIS — E11.9 TYPE 2 DIABETES MELLITUS WITHOUT COMPLICATION, WITHOUT LONG-TERM CURRENT USE OF INSULIN (HCC): ICD-10-CM

## 2022-01-21 DIAGNOSIS — E11.69 HYPERLIPIDEMIA DUE TO TYPE 2 DIABETES MELLITUS (HCC): ICD-10-CM

## 2022-01-21 PROCEDURE — 99214 OFFICE O/P EST MOD 30 MIN: CPT | Performed by: STUDENT IN AN ORGANIZED HEALTH CARE EDUCATION/TRAINING PROGRAM

## 2022-01-21 NOTE — PROGRESS NOTES
Subjective:     CC:  Diagnoses of Chronic pain of right knee, PVD (peripheral vascular disease) (HCC), (HCC) Type 2 diabetes mellitus without complication, without long-term current use of insulin, (HCC) Hypertension associated with diabetes, (HCC) Hyperlipidemia due to type 2 diabetes mellitus, Acquired hypothyroidism, and Encounter to establish care with new doctor were pertinent to this visit.    HISTORY OF THE PRESENT ILLNESS: Patient is a 76 y.o. male. This pleasant patient is here today to establish care and discuss chronic conditions. His prior PCP was SERENA Ji.    Problem   Pvd (Peripheral Vascular Disease) (Hcc)    Chronic condition affecting bilateral lower extremities. He has history of bilateral vein cauterization, still with ongoing pain in the right posterior leg. Uses compression stockings.    He reports persistent aching pain in right posterior thigh with prolonged standing. He had some type of vein procedure done last year which initially provided some relief but has been worse since.     Chronic Pain of Right Knee    Chronic condition. He has been feeling intermittent aching pain of right knee top part which has been more noticeable in recent months. Usually worse with prolonged standing. Denies recent injuries.      (HCC) Type 2 diabetes mellitus without complication, without long-term current use of insulin    Chronic condition. Followed by endocrinology. Onset around age 71. Fasting blood sugar 130s.  Current medication regimen: metformin 1000mg BID, glipizide 10mg daily, Trulicity 0.75mg weekly  Patient tolerating and reports compliant with medications. Does not need refill of medications today. Denies vision changes, dry mouth, chest pain, nausea/vomiting/diarrhea, polydipsia, polyphagia, polyuria, hypoglycemia, numbness/tingling. He checks his feet at home.  Last eye exam: 02/2021  Last foot exam: 01/2022  Diet: tries to eat healthy in general  Exercise: not so much now due to leg  pain  Vaccines: flu received 09/2021, PPSV23 completed 11/2016, Tdap received 08/2017    He has been told to drink more water now so he increased his fluid intake daily and has to urinate more frequently now due to increased fluid intake. He otherwise denies dribbling, nocturia, weak urinary stream.     (HCC) Hypertension associated with diabetes    Chronic condition.  Current medication regimen: amlodipine 10mg daily, losartan 100mg daily  Patient tolerating and reports compliant with medications. He does not regularly monitor blood pressure at home. Does not need refill of medications today. Denies headache, dizziness, vision changes, chest pain, dyspnea, edema.     Acquired Hypothyroidism    Chronic condition.  Current medication regimen: levothyroxine 88mcg daily  Patient tolerating and reports compliant with medications. He feels euthyroid with current medication. Does not need refill of medications today. No acute concerns at this time.     (HCC) Hyperlipidemia due to type 2 diabetes mellitus    Chronic condition.   Current regimen: atorvastatin 10mg daily.   He reports compliance and tolerating medication well. Denies musculoskeletal pain, headache, diarrhea. He does not need medication refill today. No acute concerns at this time.         Current Outpatient Medications Ordered in Epic   Medication Sig Dispense Refill   • Dulaglutide (TRULICITY) 0.75 MG/0.5ML Solution Pen-injector Inject 1 PEN under the skin every 7 days. 2 mL 6   • metformin (GLUCOPHAGE) 1000 MG tablet TAKE 1 TABLET BY MOUTH 2 TIMES A DAY WITH MEALS. 180 Tablet 0   • amLODIPine (NORVASC) 10 MG Tab TAKE 1 TABLET EVERY DAY 90 Tablet 3   • glipiZIDE (GLUCOTROL) 10 MG Tab Take 1 Tablet by mouth 2 times a day. 180 Tablet 1   • atorvastatin (LIPITOR) 10 MG Tab Take 1 tablet by mouth every day. 90 tablet 3   • levothyroxine (SYNTHROID) 88 MCG Tab Take 1 tablet by mouth every morning on an empty stomach. 90 tablet 3   • losartan (COZAAR) 100 MG Tab  TAKE 1 TABLET EVERY DAY 90 tablet 3   • Blood Glucose Test Strips Test strips for OneTouch Ultra meter. Sig: use fasting first thing in the morning, and prn ssx high or low sugar 100 Each 3   • Lancets Lancets for OneTouch Ultra meter. Sig: use fasting first thing in the morning, and prn ssx high or low sugar 100 Each 3   • vitamin D (CHOLECALCIFEROL) 1000 UNIT Tab Take 1 Tab by mouth every day. (Patient taking differently: Take 1,000 Units by mouth 2 times a day.) 90 Tab 3   • Magnesium 500 MG Tab Take 1,000 mg by mouth every day. 90 Tab 3     No current Epic-ordered facility-administered medications on file.     Social history  Living situation: lives with wife at home  Occupation: retired since 2017, used to run a construction company, and sales work  Marital status:   Alcohol/tobacco/illicit drugs: former smoker x 5yrs quit 1971, drink 1 glass of wine daily, denies illicit drugs  Baseline functional status: independent with ADLs    Health Maintenance: reviewed and discussed with patient  Vaccines: flu received 09/2021, PPSV23 completed 11/2016, Tdap received 08/2017, Shingrix completed 11/2018, Pfizer COVID #3 received 10/2021,     ROS:   Gen: no fevers/chills  Eyes: no changes in vision  ENT: no sore throat  Pulm: no sob, no cough  CV: no chest pain, no palpitations  GI: no nausea/vomiting, no diarrhea  : no dysuria  MSk: + right knee and posterior thigh pain  Skin: no rash  Neuro: no headaches, no numbness/tingling      Objective:     Exam: /68 (BP Location: Left arm, Patient Position: Sitting, BP Cuff Size: Adult)   Pulse 62   Temp 36.8 °C (98.3 °F) (Temporal)   Resp 16   Ht 1.829 m (6')   Wt 96.7 kg (213 lb 3 oz)   SpO2 98%  Body mass index is 28.91 kg/m².    General: Normal appearing. No distress.  HEENT: Normocephalic. Eyes conjunctiva clear lids without ptosis, ears normal shape and contour, canals are clear bilaterally, tympanic membranes are benign, nasal mucosa benign, oropharynx  is without erythema, edema or exudates.   Neck: Supple without JVD or bruit.  Pulmonary: Clear to ausculation.  Normal effort. No rales, ronchi, or wheezing.  Cardiovascular: Regular rate and rhythm without murmur. Carotid and radial pulses are intact and equal bilaterally.  Abdomen: Soft, nontender, nondistended. Normal bowel sounds.  Neurologic: Grossly nonfocal  Skin: Warm and dry.  No obvious lesions.  Musculoskeletal: Normal gait. No extremity cyanosis, clubbing, or edema. + TTP to superior aspect of right knee, mild TTP to posterior thigh, 5/5 strength and sensation to light touch intact to BLE  Psych: Normal mood and affect. Alert and oriented x3. Judgment and insight is normal.    Labs:   Lab Results   Component Value Date/Time    SODIUM 140 05/03/2021 07:39 AM    POTASSIUM 4.4 05/03/2021 07:39 AM    CHLORIDE 104 05/03/2021 07:39 AM    CO2 25 05/03/2021 07:39 AM    ANION 11.0 05/03/2021 07:39 AM    GLUCOSE 206 (H) 05/03/2021 07:39 AM    BUN 25 (H) 05/03/2021 07:39 AM    CREATININE 1.12 05/03/2021 07:39 AM    CALCIUM 9.1 05/03/2021 07:39 AM    ASTSGOT 24 05/03/2021 07:39 AM    ALTSGPT 30 05/03/2021 07:39 AM    TBILIRUBIN 0.6 05/03/2021 07:39 AM    ALBUMIN 4.2 05/03/2021 07:39 AM    TOTPROTEIN 7.2 05/03/2021 07:39 AM    GLOBULIN 3.0 05/03/2021 07:39 AM    AGRATIO 1.4 05/03/2021 07:39 AM     Lab Results   Component Value Date/Time    HBA1C 8.5 (A) 01/03/2022 03:31 PM      Lab Results   Component Value Date/Time    CHOLSTRLTOT 106 05/03/2021 0739    TRIGLYCERIDE 143 05/03/2021 0739    HDL 37 (A) 05/03/2021 0739    LDL 40 05/03/2021 0739     Lab Results   Component Value Date/Time    TSHULTRASEN 3.640 05/03/2021 0739       Assessment & Plan:   76 y.o. male with the following -    1. Chronic pain of right knee  - DX-KNEE COMPLETE 4+ RIGHT; Future    2. PVD (peripheral vascular disease) (HCC)  - Referral to Vascular Surgery  - US-EXTREMITY VENOUS LOWER UNILAT RIGHT; Future    3. (HCC) Type 2 diabetes mellitus  without complication, without long-term current use of insulin  Chronic condition, uncontrolled. Most recent A1C 8.5. Followed by endocrinology.  - advised to complete lab orders placed by endocrinology  - cont current medications per endocrinology: metformin 1000mg BID, glipizide 10mg daily, Trulicity 0.75mg weekly  - Discussed daily self foot exam, eye care, and regular exercise with patient  - Patient instructed to follow a low carbohydrate diet  - Follow up in 6 months or earlier as needed    4. (HCC) Hypertension associated with diabetes  Chronic condition, controlled with medications.  - cont current medications: amlodipine 10mg daily, losartan 100mg daily  - encouraged home blood pressure monitoring using an appropriately fitting upper-arm cuff on a bare arm, emptying the bladder, avoiding caffeinated beverages for 30 minutes before taking the measurement, resting for five minutes before taking the measurement, keeping the feet on the floor uncrossed and the arm supported with the cuff at heart level, and not talking during the reading, bring in home blood pressure monitoring device with readings to next follow up visit  - encouraged dietary changes include salt reduction, moderation of alcohol consumption, and a diet high in vegetables and fruit that is low in added sugars and saturated fats (e.g., DASH diet); aerobic and resistance exercises for at least 30 minutes or more at least 3-5 days per week; smoking cessation and stress reduction    5. (HCC) Hyperlipidemia due to type 2 diabetes mellitus  Chronic condition, controlled with medications.  - advised to maintain a healthy weight, regular aerobic exercise, and eating diets lower in saturated fats  - cont current regimen: atorvastatin 10mg daily    6. Acquired hypothyroidism  Chronic condition, stable. Most recent TSH within normal limits.  - cont current regimen: levothyroxine 88mcg daily  - pt advised to take levothyroxine in the morning empty stomach  1 hour  from food and 4 hours from other medications    7. Encounter to establish care with new doctor        Return in about 6 months (around 7/21/2022) for chronic medical conditions.    Please note that this dictation was created using voice recognition software. I have made every reasonable attempt to correct obvious errors, but I expect that there are errors of grammar and possibly content that I did not discover before finalizing the note.

## 2022-01-28 ENCOUNTER — HOSPITAL ENCOUNTER (OUTPATIENT)
Dept: RADIOLOGY | Facility: MEDICAL CENTER | Age: 77
End: 2022-01-28
Attending: STUDENT IN AN ORGANIZED HEALTH CARE EDUCATION/TRAINING PROGRAM
Payer: MEDICARE

## 2022-01-28 DIAGNOSIS — M25.561 CHRONIC PAIN OF RIGHT KNEE: ICD-10-CM

## 2022-01-28 DIAGNOSIS — G89.29 CHRONIC PAIN OF RIGHT KNEE: ICD-10-CM

## 2022-01-28 DIAGNOSIS — I73.9 PVD (PERIPHERAL VASCULAR DISEASE) (HCC): ICD-10-CM

## 2022-01-28 PROCEDURE — 73564 X-RAY EXAM KNEE 4 OR MORE: CPT | Mod: RT

## 2022-01-28 PROCEDURE — 93971 EXTREMITY STUDY: CPT | Mod: RT

## 2022-02-02 ENCOUNTER — HOSPITAL ENCOUNTER (OUTPATIENT)
Dept: LAB | Facility: MEDICAL CENTER | Age: 77
End: 2022-02-02
Attending: NURSE PRACTITIONER
Payer: MEDICARE

## 2022-02-02 DIAGNOSIS — E03.9 HYPOTHYROIDISM (ACQUIRED): ICD-10-CM

## 2022-02-02 DIAGNOSIS — E11.65 UNCONTROLLED TYPE 2 DIABETES MELLITUS WITH HYPERGLYCEMIA (HCC): ICD-10-CM

## 2022-02-02 PROBLEM — M71.21 BAKER CYST, RIGHT: Status: ACTIVE | Noted: 2019-04-16

## 2022-02-02 LAB
25(OH)D3 SERPL-MCNC: 62 NG/ML (ref 30–100)
ALBUMIN SERPL BCP-MCNC: 4.1 G/DL (ref 3.2–4.9)
ALBUMIN/GLOB SERPL: 1.3 G/DL
ALP SERPL-CCNC: 83 U/L (ref 30–99)
ALT SERPL-CCNC: 21 U/L (ref 2–50)
ANION GAP SERPL CALC-SCNC: 10 MMOL/L (ref 7–16)
AST SERPL-CCNC: 18 U/L (ref 12–45)
BILIRUB SERPL-MCNC: 0.3 MG/DL (ref 0.1–1.5)
BUN SERPL-MCNC: 18 MG/DL (ref 8–22)
CALCIUM SERPL-MCNC: 8.9 MG/DL (ref 8.4–10.2)
CHLORIDE SERPL-SCNC: 104 MMOL/L (ref 96–112)
CHOLEST SERPL-MCNC: 103 MG/DL (ref 100–199)
CO2 SERPL-SCNC: 26 MMOL/L (ref 20–33)
CREAT SERPL-MCNC: 1.12 MG/DL (ref 0.5–1.4)
CREAT UR-MCNC: 152.12 MG/DL
FASTING STATUS PATIENT QL REPORTED: NORMAL
GLOBULIN SER CALC-MCNC: 3.1 G/DL (ref 1.9–3.5)
GLUCOSE SERPL-MCNC: 167 MG/DL (ref 65–99)
HDLC SERPL-MCNC: 32 MG/DL
LDLC SERPL CALC-MCNC: 47 MG/DL
MICROALBUMIN UR-MCNC: 2.8 MG/DL
MICROALBUMIN/CREAT UR: 18 MG/G (ref 0–30)
POTASSIUM SERPL-SCNC: 4.3 MMOL/L (ref 3.6–5.5)
PROT SERPL-MCNC: 7.2 G/DL (ref 6–8.2)
SODIUM SERPL-SCNC: 140 MMOL/L (ref 135–145)
T3FREE SERPL-MCNC: 2.71 PG/ML (ref 2–4.4)
T4 FREE SERPL-MCNC: 1.57 NG/DL (ref 0.93–1.7)
THYROPEROXIDASE AB SERPL-ACNC: <9 IU/ML (ref 0–9)
TRIGL SERPL-MCNC: 121 MG/DL (ref 0–149)
TSH SERPL DL<=0.005 MIU/L-ACNC: 3.04 UIU/ML (ref 0.38–5.33)
VIT B12 SERPL-MCNC: 244 PG/ML (ref 211–911)

## 2022-02-02 PROCEDURE — 80053 COMPREHEN METABOLIC PANEL: CPT

## 2022-02-02 PROCEDURE — 82306 VITAMIN D 25 HYDROXY: CPT

## 2022-02-02 PROCEDURE — 84443 ASSAY THYROID STIM HORMONE: CPT

## 2022-02-02 PROCEDURE — 82607 VITAMIN B-12: CPT

## 2022-02-02 PROCEDURE — 82570 ASSAY OF URINE CREATININE: CPT

## 2022-02-02 PROCEDURE — 84439 ASSAY OF FREE THYROXINE: CPT

## 2022-02-02 PROCEDURE — 80061 LIPID PANEL: CPT

## 2022-02-02 PROCEDURE — 82043 UR ALBUMIN QUANTITATIVE: CPT

## 2022-02-02 PROCEDURE — 36415 COLL VENOUS BLD VENIPUNCTURE: CPT

## 2022-02-02 PROCEDURE — 86376 MICROSOMAL ANTIBODY EACH: CPT

## 2022-02-02 PROCEDURE — 84481 FREE ASSAY (FT-3): CPT

## 2022-02-02 NOTE — PROGRESS NOTES
Subjective:     CC: follow up    HPI:   Saleem presents today for follow up.    Problem   Chronic Pain of Right Knee    Chronic condition. XR 01/2022 showed moderate/marked tricompartment degenerative changes of the right knee. His right knee is relatively less painful than left knee so he would like to trial injection on left knee today first.    He has been feeling intermittent aching pain of right knee top part which has been more noticeable in recent months. Usually worse with prolonged standing. Denies recent injuries.      Rodriguez Cyst, Right    Chronic condition. US 01/2022 showed 2.1 x 5.9 cm complicated fluid collection right popliteal soft tissues consistent with Baker's cyst. Infected fluid not excluded.    Patient reports constant posterior knee aching pain worse with walking, associated with swelling, rated as 5/10 pain.     Chronic Pain of Left Knee    Chronic condition. XR 12/2020 showed Tricompartment degenerative change. Patient would like to trial steroid injection today.    He has been feeling intermittent aching pain of left knee worse on inner side more noticeable in recent months. Usually worse with walking or prolonged standing. Denies recent injuries.          Current Outpatient Medications Ordered in Epic   Medication Sig Dispense Refill   • Dulaglutide (TRULICITY) 0.75 MG/0.5ML Solution Pen-injector Inject 1 PEN under the skin every 7 days. 2 mL 6   • metformin (GLUCOPHAGE) 1000 MG tablet TAKE 1 TABLET BY MOUTH 2 TIMES A DAY WITH MEALS. 180 Tablet 0   • amLODIPine (NORVASC) 10 MG Tab TAKE 1 TABLET EVERY DAY 90 Tablet 3   • glipiZIDE (GLUCOTROL) 10 MG Tab Take 1 Tablet by mouth 2 times a day. 180 Tablet 1   • atorvastatin (LIPITOR) 10 MG Tab Take 1 tablet by mouth every day. 90 tablet 3   • levothyroxine (SYNTHROID) 88 MCG Tab Take 1 tablet by mouth every morning on an empty stomach. 90 tablet 3   • losartan (COZAAR) 100 MG Tab TAKE 1 TABLET EVERY DAY 90 tablet 3   • Blood Glucose Test Strips  Test strips for OneTouch Ultra meter. Sig: use fasting first thing in the morning, and prn ssx high or low sugar 100 Each 3   • Lancets Lancets for OneTouch Ultra meter. Sig: use fasting first thing in the morning, and prn ssx high or low sugar 100 Each 3   • vitamin D (CHOLECALCIFEROL) 1000 UNIT Tab Take 1 Tab by mouth every day. (Patient taking differently: Take 1,000 Units by mouth 2 times a day.) 90 Tab 3   • Magnesium 500 MG Tab Take 1,000 mg by mouth every day. 90 Tab 3     No current Epic-ordered facility-administered medications on file.     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, and sales work  Marital status:   Alcohol/tobacco/illicit drugs: former smoker x 5yrs quit 1971, drink 1 glass of wine daily, denies illicit drugs  Baseline functional status: independent with ADLs     Health Maintenance: reviewed and discussed with patient  Vaccines: flu received 09/2021, PPSV23 completed 11/2016, Tdap received 08/2017, Shingrix completed 11/2018, Pfizer COVID #3 received 10/2021    ROS:  Gen: no fevers/chills  MSk: + left knee and right posterior knee pain  Skin: no rash  Neuro: no headaches, no numbness/tingling    Objective:     Exam:  /68 (BP Location: Right arm, Patient Position: Sitting, BP Cuff Size: Adult)   Pulse 70   Temp 36.9 °C (98.4 °F) (Temporal)   Resp 16   Ht 1.829 m (6')   Wt 96.7 kg (213 lb 3 oz)   SpO2 96%   BMI 28.91 kg/m²  Body mass index is 28.91 kg/m².    General: Normal appearing. No distress.  Musculoskeletal: Normal gait. No extremity cyanosis, clubbing, or edema. + TTP to medial aspect of left knee, superior aspect of right knee, mild TTP to posterior distal thigh above posterior knee joint, 5/5 strength and sensation to light touch intact to BLE    PRE-OP DIAGNOSIS: left knee arthritis  POST-OP DIAGNOSIS: Same   PROCEDURE: joint injection    Dose:        X  1%        _  2%    Lidocaine      2  mL    X  Steroid 40mg/mL Triamcinolone acetonide              Procedure: The area was prepped in the usual sterile manner. The needle  was inserted into the affected area and the steroid was injected. There were no complications during this procedure.    Followup: The patient tolerated the procedure well without complications.  Standard post-procedure care is explained and return  precautions are given.    Assessment & Plan:     76 y.o. male with the following -     1. Baker cyst, right  Chronic condition, persistent. US 01/2022 showed 2.1 x 5.9 cm complicated fluid collection right popliteal soft tissues consistent with Baker's cyst. Infected fluid not excluded. Plan to refer to orthopedics for further management. Patient requested Dr. Thomas at [facility].  - Referral to Orthopedics    2. Chronic pain of left knee  Chronic condition, persistent. This is secondary to knee arthritis.  - steroid injection to left knee performed today, tolerated procedure well without complications  - triamcinolone acetonide (KENALOG-40) injection 40 mg  - lidocaine (XYLOCAINE) 1 % injection 2 mL    3. Chronic pain of right knee  Chronic condition, stable. This is secondary to knee arthritis.  - continue symptomatic management for now    Return if symptoms worsen or fail to improve.    Please note that this dictation was created using voice recognition software. I have made every reasonable attempt to correct obvious errors, but I expect that there are errors of grammar and possibly content that I did not discover before finalizing the note.

## 2022-02-03 ENCOUNTER — OFFICE VISIT (OUTPATIENT)
Dept: MEDICAL GROUP | Facility: MEDICAL CENTER | Age: 77
End: 2022-02-03
Payer: MEDICARE

## 2022-02-03 VITALS
WEIGHT: 213.19 LBS | RESPIRATION RATE: 16 BRPM | SYSTOLIC BLOOD PRESSURE: 122 MMHG | HEIGHT: 72 IN | BODY MASS INDEX: 28.88 KG/M2 | HEART RATE: 70 BPM | TEMPERATURE: 98.4 F | DIASTOLIC BLOOD PRESSURE: 68 MMHG | OXYGEN SATURATION: 96 %

## 2022-02-03 DIAGNOSIS — M71.21 BAKER CYST, RIGHT: ICD-10-CM

## 2022-02-03 DIAGNOSIS — M25.561 CHRONIC PAIN OF RIGHT KNEE: ICD-10-CM

## 2022-02-03 DIAGNOSIS — G89.29 CHRONIC PAIN OF RIGHT KNEE: ICD-10-CM

## 2022-02-03 DIAGNOSIS — M25.562 CHRONIC PAIN OF LEFT KNEE: ICD-10-CM

## 2022-02-03 DIAGNOSIS — G89.29 CHRONIC PAIN OF LEFT KNEE: ICD-10-CM

## 2022-02-03 PROCEDURE — 99213 OFFICE O/P EST LOW 20 MIN: CPT | Performed by: STUDENT IN AN ORGANIZED HEALTH CARE EDUCATION/TRAINING PROGRAM

## 2022-02-03 PROCEDURE — 96372 THER/PROPH/DIAG INJ SC/IM: CPT | Performed by: STUDENT IN AN ORGANIZED HEALTH CARE EDUCATION/TRAINING PROGRAM

## 2022-02-03 RX ORDER — TRIAMCINOLONE ACETONIDE 40 MG/ML
40 INJECTION, SUSPENSION INTRA-ARTICULAR; INTRAMUSCULAR ONCE
Status: COMPLETED | OUTPATIENT
Start: 2022-02-03 | End: 2022-02-03

## 2022-02-03 RX ADMIN — TRIAMCINOLONE ACETONIDE 40 MG: 40 INJECTION, SUSPENSION INTRA-ARTICULAR; INTRAMUSCULAR at 08:22

## 2022-02-03 RX ADMIN — Medication 2 ML: at 08:22

## 2022-02-25 DIAGNOSIS — M71.21 BAKER CYST, RIGHT: ICD-10-CM

## 2022-02-25 NOTE — PROGRESS NOTES
New referral order to orthopedics placed due to patient unable to schedule appointment with Dr. Thomas.

## 2022-02-27 ENCOUNTER — PATIENT MESSAGE (OUTPATIENT)
Dept: MEDICAL GROUP | Facility: MEDICAL CENTER | Age: 77
End: 2022-02-27
Payer: MEDICARE

## 2022-02-27 DIAGNOSIS — E11.9 TYPE 2 DIABETES MELLITUS WITHOUT COMPLICATION, WITHOUT LONG-TERM CURRENT USE OF INSULIN (HCC): ICD-10-CM

## 2022-02-28 RX ORDER — LANCETS 30 GAUGE
EACH MISCELLANEOUS
Qty: 100 EACH | Refills: 3 | Status: SHIPPED | OUTPATIENT
Start: 2022-02-28 | End: 2022-03-09

## 2022-03-09 DIAGNOSIS — E11.9 TYPE 2 DIABETES MELLITUS WITHOUT COMPLICATION, WITHOUT LONG-TERM CURRENT USE OF INSULIN (HCC): ICD-10-CM

## 2022-03-09 RX ORDER — LANCETS 30 GAUGE
EACH MISCELLANEOUS
Qty: 100 EACH | Refills: 3 | Status: SHIPPED | OUTPATIENT
Start: 2022-03-09 | End: 2022-03-10 | Stop reason: SDUPTHER

## 2022-03-10 DIAGNOSIS — E11.9 TYPE 2 DIABETES MELLITUS WITHOUT COMPLICATION, WITHOUT LONG-TERM CURRENT USE OF INSULIN (HCC): ICD-10-CM

## 2022-03-10 RX ORDER — LANCETS 30 GAUGE
EACH MISCELLANEOUS
Qty: 100 EACH | Refills: 3 | Status: SHIPPED | OUTPATIENT
Start: 2022-03-10 | End: 2022-03-18

## 2022-03-29 DIAGNOSIS — E11.9 TYPE 2 DIABETES MELLITUS WITHOUT COMPLICATION, WITHOUT LONG-TERM CURRENT USE OF INSULIN (HCC): ICD-10-CM

## 2022-04-04 ENCOUNTER — PATIENT MESSAGE (OUTPATIENT)
Dept: MEDICAL GROUP | Facility: MEDICAL CENTER | Age: 77
End: 2022-04-04
Payer: MEDICARE

## 2022-04-04 DIAGNOSIS — E11.59 HYPERTENSION ASSOCIATED WITH DIABETES (HCC): ICD-10-CM

## 2022-04-04 DIAGNOSIS — E78.5 HYPERLIPIDEMIA DUE TO TYPE 2 DIABETES MELLITUS (HCC): ICD-10-CM

## 2022-04-04 DIAGNOSIS — E11.69 HYPERLIPIDEMIA DUE TO TYPE 2 DIABETES MELLITUS (HCC): ICD-10-CM

## 2022-04-04 DIAGNOSIS — I15.2 HYPERTENSION ASSOCIATED WITH DIABETES (HCC): ICD-10-CM

## 2022-04-04 RX ORDER — ATORVASTATIN CALCIUM 10 MG/1
10 TABLET, FILM COATED ORAL DAILY
Qty: 90 TABLET | Refills: 3 | Status: SHIPPED | OUTPATIENT
Start: 2022-04-04 | End: 2022-07-22 | Stop reason: SDUPTHER

## 2022-04-04 RX ORDER — LOSARTAN POTASSIUM 100 MG/1
100 TABLET ORAL
Qty: 90 TABLET | Refills: 3 | Status: SHIPPED | OUTPATIENT
Start: 2022-04-04 | End: 2022-07-22 | Stop reason: SDUPTHER

## 2022-04-04 RX ORDER — LEVOTHYROXINE SODIUM 88 UG/1
88 TABLET ORAL
Qty: 90 TABLET | Refills: 3 | Status: SHIPPED | OUTPATIENT
Start: 2022-04-04 | End: 2022-07-22 | Stop reason: SDUPTHER

## 2022-04-04 NOTE — TELEPHONE ENCOUNTER
From: Saleem Whitney  To: Physician Jose Flores  Sent: 4/4/2022 9:20 AM PDT  Subject: Rx Refills    I sent for Rx refills to [facility] for Losartan 100mg, Levothroxine 88 mcg, and Atorvastatin 10 mg, amongst others. These had no refills left, and your office was contacted but would not authorize refills. Can you please issue new Rx's?

## 2022-04-25 ENCOUNTER — HOSPITAL ENCOUNTER (EMERGENCY)
Facility: MEDICAL CENTER | Age: 77
End: 2022-04-25
Attending: EMERGENCY MEDICINE
Payer: MEDICARE

## 2022-04-25 ENCOUNTER — OFFICE VISIT (OUTPATIENT)
Dept: ENDOCRINOLOGY | Facility: MEDICAL CENTER | Age: 77
End: 2022-04-25
Attending: NURSE PRACTITIONER
Payer: MEDICARE

## 2022-04-25 ENCOUNTER — APPOINTMENT (OUTPATIENT)
Dept: RADIOLOGY | Facility: MEDICAL CENTER | Age: 77
End: 2022-04-25
Attending: EMERGENCY MEDICINE
Payer: MEDICARE

## 2022-04-25 VITALS
HEART RATE: 92 BPM | OXYGEN SATURATION: 98 % | HEIGHT: 72 IN | WEIGHT: 206 LBS | BODY MASS INDEX: 27.9 KG/M2 | SYSTOLIC BLOOD PRESSURE: 148 MMHG | DIASTOLIC BLOOD PRESSURE: 80 MMHG

## 2022-04-25 VITALS
SYSTOLIC BLOOD PRESSURE: 148 MMHG | TEMPERATURE: 98.1 F | DIASTOLIC BLOOD PRESSURE: 79 MMHG | RESPIRATION RATE: 16 BRPM | BODY MASS INDEX: 27.18 KG/M2 | HEART RATE: 72 BPM | OXYGEN SATURATION: 95 % | HEIGHT: 73 IN

## 2022-04-25 DIAGNOSIS — E55.9 VITAMIN D DEFICIENCY: ICD-10-CM

## 2022-04-25 DIAGNOSIS — R10.9 FLANK PAIN: ICD-10-CM

## 2022-04-25 DIAGNOSIS — N20.0 KIDNEY STONE: ICD-10-CM

## 2022-04-25 DIAGNOSIS — E11.9 TYPE 2 DIABETES MELLITUS WITHOUT COMPLICATION, WITHOUT LONG-TERM CURRENT USE OF INSULIN (HCC): ICD-10-CM

## 2022-04-25 DIAGNOSIS — E78.5 HYPERLIPIDEMIA ASSOCIATED WITH TYPE 2 DIABETES MELLITUS (HCC): ICD-10-CM

## 2022-04-25 DIAGNOSIS — E11.69 HYPERLIPIDEMIA ASSOCIATED WITH TYPE 2 DIABETES MELLITUS (HCC): ICD-10-CM

## 2022-04-25 DIAGNOSIS — E03.9 HYPOTHYROIDISM (ACQUIRED): ICD-10-CM

## 2022-04-25 DIAGNOSIS — E53.8 VITAMIN B12 DEFICIENCY: ICD-10-CM

## 2022-04-25 LAB
ALBUMIN SERPL BCP-MCNC: 4.7 G/DL (ref 3.2–4.9)
ALBUMIN/GLOB SERPL: 1.4 G/DL
ALP SERPL-CCNC: 92 U/L (ref 30–99)
ALT SERPL-CCNC: 19 U/L (ref 2–50)
ANION GAP SERPL CALC-SCNC: 15 MMOL/L (ref 7–16)
APPEARANCE UR: CLEAR
AST SERPL-CCNC: 15 U/L (ref 12–45)
BASOPHILS # BLD AUTO: 0 % (ref 0–1.8)
BASOPHILS # BLD: 0 K/UL (ref 0–0.12)
BILIRUB SERPL-MCNC: 0.8 MG/DL (ref 0.1–1.5)
BILIRUB UR QL STRIP.AUTO: NEGATIVE
BUN SERPL-MCNC: 26 MG/DL (ref 8–22)
CALCIUM SERPL-MCNC: 9.6 MG/DL (ref 8.4–10.2)
CHLORIDE SERPL-SCNC: 101 MMOL/L (ref 96–112)
CO2 SERPL-SCNC: 22 MMOL/L (ref 20–33)
COLOR UR: YELLOW
CREAT SERPL-MCNC: 1.64 MG/DL (ref 0.5–1.4)
EOSINOPHIL # BLD AUTO: 0 K/UL (ref 0–0.51)
EOSINOPHIL NFR BLD: 0 % (ref 0–6.9)
EPI CELLS #/AREA URNS HPF: ABNORMAL /HPF
ERYTHROCYTE [DISTWIDTH] IN BLOOD BY AUTOMATED COUNT: 39.7 FL (ref 35.9–50)
GFR SERPLBLD CREATININE-BSD FMLA CKD-EPI: 42 ML/MIN/1.73 M 2
GLOBULIN SER CALC-MCNC: 3.3 G/DL (ref 1.9–3.5)
GLUCOSE SERPL-MCNC: 218 MG/DL (ref 65–99)
GLUCOSE UR STRIP.AUTO-MCNC: 250 MG/DL
HBA1C MFR BLD: 7.3 % (ref 0–5.6)
HCT VFR BLD AUTO: 47.3 % (ref 42–52)
HGB BLD-MCNC: 16 G/DL (ref 14–18)
INT CON NEG: ABNORMAL
INT CON POS: ABNORMAL
KETONES UR STRIP.AUTO-MCNC: 15 MG/DL
LEUKOCYTE ESTERASE UR QL STRIP.AUTO: NEGATIVE
LYMPHOCYTES # BLD AUTO: 1.1 K/UL (ref 1–4.8)
LYMPHOCYTES NFR BLD: 6 % (ref 22–41)
MANUAL DIFF BLD: NORMAL
MCH RBC QN AUTO: 29.4 PG (ref 27–33)
MCHC RBC AUTO-ENTMCNC: 33.8 G/DL (ref 33.7–35.3)
MCV RBC AUTO: 86.9 FL (ref 81.4–97.8)
MICRO URNS: ABNORMAL
MONOCYTES # BLD AUTO: 0.55 K/UL (ref 0–0.85)
MONOCYTES NFR BLD AUTO: 3 % (ref 0–13.4)
MUCOUS THREADS #/AREA URNS HPF: ABNORMAL /HPF
NEUTROPHILS # BLD AUTO: 16.74 K/UL (ref 1.82–7.42)
NEUTROPHILS NFR BLD: 91 % (ref 44–72)
NITRITE UR QL STRIP.AUTO: NEGATIVE
NRBC # BLD AUTO: 0 K/UL
NRBC BLD-RTO: 0 /100 WBC
PH UR STRIP.AUTO: 6 [PH] (ref 5–8)
PLATELET # BLD AUTO: 275 K/UL (ref 164–446)
PLATELET BLD QL SMEAR: NORMAL
PMV BLD AUTO: 9.2 FL (ref 9–12.9)
POTASSIUM SERPL-SCNC: 4.3 MMOL/L (ref 3.6–5.5)
PROT SERPL-MCNC: 8 G/DL (ref 6–8.2)
PROT UR QL STRIP: NEGATIVE MG/DL
RBC # BLD AUTO: 5.44 M/UL (ref 4.7–6.1)
RBC # URNS HPF: ABNORMAL /HPF
RBC BLD AUTO: NORMAL
RBC UR QL AUTO: ABNORMAL
SODIUM SERPL-SCNC: 138 MMOL/L (ref 135–145)
SP GR UR STRIP.AUTO: 1.02
WBC # BLD AUTO: 18.4 K/UL (ref 4.8–10.8)
WBC #/AREA URNS HPF: ABNORMAL /HPF

## 2022-04-25 PROCEDURE — 99213 OFFICE O/P EST LOW 20 MIN: CPT | Performed by: NURSE PRACTITIONER

## 2022-04-25 PROCEDURE — 74176 CT ABD & PELVIS W/O CONTRAST: CPT | Mod: MG

## 2022-04-25 PROCEDURE — 96375 TX/PRO/DX INJ NEW DRUG ADDON: CPT

## 2022-04-25 PROCEDURE — 96374 THER/PROPH/DIAG INJ IV PUSH: CPT

## 2022-04-25 PROCEDURE — 85025 COMPLETE CBC W/AUTO DIFF WBC: CPT

## 2022-04-25 PROCEDURE — 36415 COLL VENOUS BLD VENIPUNCTURE: CPT

## 2022-04-25 PROCEDURE — 85007 BL SMEAR W/DIFF WBC COUNT: CPT

## 2022-04-25 PROCEDURE — 99214 OFFICE O/P EST MOD 30 MIN: CPT | Performed by: NURSE PRACTITIONER

## 2022-04-25 PROCEDURE — 83036 HEMOGLOBIN GLYCOSYLATED A1C: CPT | Performed by: NURSE PRACTITIONER

## 2022-04-25 PROCEDURE — 700111 HCHG RX REV CODE 636 W/ 250 OVERRIDE (IP): Performed by: EMERGENCY MEDICINE

## 2022-04-25 PROCEDURE — 80053 COMPREHEN METABOLIC PANEL: CPT

## 2022-04-25 PROCEDURE — 99284 EMERGENCY DEPT VISIT MOD MDM: CPT

## 2022-04-25 PROCEDURE — 81001 URINALYSIS AUTO W/SCOPE: CPT

## 2022-04-25 RX ORDER — GLIPIZIDE 10 MG/1
10 TABLET ORAL DAILY
Status: SHIPPED | COMMUNITY
End: 2022-07-22 | Stop reason: SDUPTHER

## 2022-04-25 RX ORDER — ONDANSETRON 2 MG/ML
4 INJECTION INTRAMUSCULAR; INTRAVENOUS ONCE
Status: COMPLETED | OUTPATIENT
Start: 2022-04-25 | End: 2022-04-25

## 2022-04-25 RX ORDER — HYDROCODONE BITARTRATE AND ACETAMINOPHEN 5; 325 MG/1; MG/1
1 TABLET ORAL EVERY 4 HOURS PRN
Qty: 10 TABLET | Refills: 0 | Status: SHIPPED | OUTPATIENT
Start: 2022-04-25 | End: 2022-04-27

## 2022-04-25 RX ORDER — KETOROLAC TROMETHAMINE 30 MG/ML
15 INJECTION, SOLUTION INTRAMUSCULAR; INTRAVENOUS ONCE
Status: COMPLETED | OUTPATIENT
Start: 2022-04-25 | End: 2022-04-25

## 2022-04-25 RX ORDER — THIAMINE HCL 100 MG
2500 TABLET ORAL DAILY
Qty: 100 TABLET | Refills: 3 | Status: SHIPPED | OUTPATIENT
Start: 2022-04-25 | End: 2023-07-07 | Stop reason: SDUPTHER

## 2022-04-25 RX ORDER — BLOOD SUGAR DIAGNOSTIC
STRIP MISCELLANEOUS
COMMUNITY
Start: 2022-04-07 | End: 2022-04-25

## 2022-04-25 RX ORDER — TAMSULOSIN HYDROCHLORIDE 0.4 MG/1
0.4 CAPSULE ORAL DAILY
Qty: 30 CAPSULE | Refills: 0 | Status: SHIPPED | OUTPATIENT
Start: 2022-04-25 | End: 2022-07-22

## 2022-04-25 RX ORDER — ONDANSETRON 4 MG/1
4 TABLET, ORALLY DISINTEGRATING ORAL EVERY 8 HOURS PRN
Qty: 10 TABLET | Refills: 0 | Status: SHIPPED | OUTPATIENT
Start: 2022-04-25 | End: 2022-04-25 | Stop reason: SDUPTHER

## 2022-04-25 RX ORDER — ONDANSETRON 4 MG/1
4 TABLET, ORALLY DISINTEGRATING ORAL EVERY 8 HOURS PRN
Qty: 10 TABLET | Refills: 0 | Status: SHIPPED | OUTPATIENT
Start: 2022-04-25 | End: 2022-07-22

## 2022-04-25 RX ORDER — LINAGLIPTIN 5 MG/1
TABLET, FILM COATED ORAL
COMMUNITY
End: 2022-04-25

## 2022-04-25 RX ADMIN — KETOROLAC TROMETHAMINE 15 MG: 30 INJECTION, SOLUTION INTRAMUSCULAR at 11:50

## 2022-04-25 RX ADMIN — ONDANSETRON 4 MG: 2 INJECTION INTRAMUSCULAR; INTRAVENOUS at 11:50

## 2022-04-25 NOTE — PROGRESS NOTES
"RN-CDE Note    Subjective:   Endocrinology Clinic Progress Note  PCP: Jose Flores D.O.    HPI:  Saleem Whitney is a 76 y.o. old patient who is seen today for review of Type 2 diabetes and Hypothyroidism.  Recent changes in health: He has had lower left back pain, fever on and off and vomited twice today.  He thinks he may have a kidney stone.  DM:   Last A1c:   Lab Results   Component Value Date/Time    HBA1C 7.3 (A) 04/25/2022 09:43 AM      Previous A1c was 8.5 on 1/3/22  A1C GOAL: < 7    Diabetes Medications:   Metformin 1000 mg BID  Glipizide 10 daily  Trulicity .75 mg weekly      Exercise: no regular exercise, sedentary  Diet: \"healthy\" diet  in general  Patient's body mass index is 27.94 kg/m². Exercise and nutrition counseling were performed at this visit.    Glucose monitoring frequency: Not testing blood sugars    Hypoglycemic episodes: no  Last Retinal Exam: Scheduled for next month with Dr. Coello  Daily Foot Exam: Yes   Foot Exam:  Monofilament: done  Monofilament testing with a 10 gram force: sensation intact: intact bilaterally  Visual Inspection: Feet without maceration, ulcers, fissures.  Pedal pulses: intact bilaterally   Lab Results   Component Value Date/Time    MALBCRT 18 02/02/2022 08:49 AM    MICROALBUR 2.8 02/02/2022 08:49 AM     He  reports that he quit smoking about 50 years ago. His smoking use included cigarettes. He has a 5.00 pack-year smoking history. He has never used smokeless tobacco.      Plan:     Discussed and educated on:   - All medications, side effects and compliance (discussed carefully)  - Annual eye examinations at Ophthalmology  - Home glucose monitoring emphasized  - Weight control and daily exercise    Recommended medication changes: Consider increasing Trulicity to 1.5 mg weekly when feeling better.   "

## 2022-04-25 NOTE — PROGRESS NOTES
Chief Complaint: Follow-up evaluation of Type 2 Diabetes Mellitus.    HPI:   Saleem Whitney is a 76 y.o. male with Type 2 Diabetes Mellitus diagnosed over 10 years ago.      Patient reports he has had left-sided low back pain for the last week.  He also complains of nauseousness and vomiting.  At this time pain is 7 out of 10 with a constant sharp pain to the left side.  Patient denies history of kidney stone or low back pain previously.    He monitors blood glucose 4 times per week. Blood glucose values range:Fasting Glucose 180-220.    Patient denies any hypoglycemic events.  Patient states he is hypoglycemic aware.    Current diabetes regimen:  Trulicity 0.75 mg weekly- Tradjenta was discontinued at the previous appointment.  Metformin 1000 mg twice daily   Glipizide 10 mg twice daily    Patient states he has tolerated all medications he is trialed in relation to diabetes.    Point-of-care A1c 04/25/2022: 7.3%  Point-of-care A1c 01/03/2022: 8.5%  POC A1c 09/16/2021: 8%    Diabetes Complications   He denies history of retinopathy.  He denies laser eye surgery. Last eye exam: Overdue 02/10/2021 Dr. Coello-sees him every 6 months to monitor edema; preglaucoma, hx of retinal detachment.  He denies peripheral sensory neuropathy. He denies history of foot sores.       Current /80.  Currently taking losartan 100mg daily.   No current microalbumin level to evaluate.     Ref. Range 2/2/2022 08:49   Micro Alb Creat Ratio Latest Ref Range: 0 - 30 mg/g 18   Creatinine, Urine Latest Units: mg/dL 152.12   Microalbumin, Urine Random Latest Units: mg/dL 2.8         Hyperlipidemia. Currently taking lipitor 10mg daily.   Patient denies myalgias on muscle cramps.  Patient has tolerated statin therapy well for over 2 years.     Ref. Range 2/2/2022 08:47   Cholesterol,Tot Latest Ref Range: 100 - 199 mg/dL 103   Triglycerides Latest Ref Range: 0 - 149 mg/dL 121   HDL Latest Ref Range: >=40 mg/dL 32 (A)   LDL Latest Ref  Range: <100 mg/dL 47         Vitamin D deficiency.  Patient currently taking vitamin D 1000 IU daily. No current vitamin D level assay to review.     Ref. Range 2/2/2022 08:47   25-Hydroxy   Vitamin D 25 Latest Ref Range: 30 - 100 ng/mL 62       Hypothyroidism for over 10 years now.  Patient currently taking levothyroxine 88 mcg daily.  Patient is taking this dosage for over a year now.  Patient takes thyroid hormone on an empty stomach 1 hour prior to breakfast.  Patient denies taking calcium, antacids and/or iron supplementation.  Patient denies cold intolerance, constipation and/or mental fogginess.       Ref. Range 2/2/2022 08:47   TSH Latest Ref Range: 0.380 - 5.330 uIU/mL 3.040   Free T-4 Latest Ref Range: 0.93 - 1.70 ng/dL 1.57   T3,Free Latest Ref Range: 2.00 - 4.40 pg/mL 2.71   Microsomal -Tpo- Abs Latest Ref Range: 0.0 - 9.0 IU/mL <9.0     Vitamin B12 deficiency.  Patient is currently not taking any vitamin B12 supplementation.     Ref. Range 2/2/2022 08:47   Vitamin B12 -True Cobalamin Latest Ref Range: 211 - 911 pg/mL 244       ROS:     CONS:     No fever, no chills, no weight loss, no fatigue   EYES:      No diplopia, no blurry vision, no redness of eyes, no swelling of eyelids   ENT:    No hearing loss, No ear pain, No sore throat, no dysphagia, no neck swelling   CV:     No chest pain, no palpitations, no claudication, no orthopnea, no PND   PULM:    No SOB, no cough, no hemoptysis, no wheezing    GI:   No nausea, no vomiting, no diarrhea, no constipation, no bloody stools   :  Passing urine well, no dysuria, no hematuria   ENDO:   No polyuria, no polydipsia, no heat intolerance, no cold intolerance   NEURO: No headaches, no dizziness, no convulsions, no tremors   MUSC:  No joint swellings, no arthralgias, no myalgias, no weakness   SKIN:   No rash, no ulcers, no dry skin   PSYCH:   No depression, no anxiety, no difficulty sleeping       Past Medical History:  Patient Active Problem List     Diagnosis Date Noted   • Itching of ear 01/13/2021   • PVD (peripheral vascular disease) (HCC) 12/26/2020   • Degenerative arthritis of knee, bilateral 12/22/2020   • Acute right-sided low back pain 08/06/2020   • Chronic pain of right knee 06/16/2020   • Motion sickness 10/02/2019   • Altitude sickness prophylaxis 10/02/2019   • Leg mass, right 07/12/2019   • Vitamin D deficiency 06/10/2019   • Leg cramping 06/10/2019   • Baker cyst, right 04/16/2019   • Erythematous rash 04/16/2019   • Bilateral leg edema 01/08/2019   • Nocturia more than twice per night 11/05/2018   • Pitting edema 08/07/2018   • Idiopathic chronic gout, left ankle and foot, without tophus (tophi) 06/25/2018   • Memory changes 05/24/2018   • Chronic pain of left knee 09/25/2017   • Diastasis of rectus abdominis 06/22/2017   • Increased frequency of urination 06/07/2017   • (HCC) Type 2 diabetes mellitus without complication, without long-term current use of insulin 11/09/2016   • (HCC) Hypertension associated with diabetes 11/09/2016   • Acquired hypothyroidism 11/09/2016   • (HCC) Hyperlipidemia due to type 2 diabetes mellitus 11/09/2016   • Primary insomnia 11/09/2016       Past Surgical History:  Past Surgical History:   Procedure Laterality Date   • HAND SURGERY          Allergies:  Patient has no known allergies.     Current Medications:    Current Outpatient Medications:   •  losartan (COZAAR) 100 MG Tab, Take 1 Tablet by mouth every day., Disp: 90 Tablet, Rfl: 3  •  levothyroxine (SYNTHROID) 88 MCG Tab, Take 1 Tablet by mouth every morning on an empty stomach., Disp: 90 Tablet, Rfl: 3  •  atorvastatin (LIPITOR) 10 MG Tab, Take 1 Tablet by mouth every day., Disp: 90 Tablet, Rfl: 3  •  metformin (GLUCOPHAGE) 1000 MG tablet, TAKE 1 TABLET BY MOUTH 2 TIMES A DAY WITH MEALS., Disp: 180 Tablet, Rfl: 3  •  Blood Glucose Test Strips, Use one onetouch delica  test strip to test blood sugar twice daily., Disp: 100 Strip, Rfl: 3  •  Lancets (ONETOUCH  DELICA PLUS [id number]) Misc, USE 1  TO CHECK GLUCOSE TWICE DAILY, Disp: 100 Each, Rfl: 3  •  Dulaglutide (TRULICITY) 0.75 MG/0.5ML Solution Pen-injector, Inject 1 PEN under the skin every 7 days., Disp: 2 mL, Rfl: 6  •  amLODIPine (NORVASC) 10 MG Tab, TAKE 1 TABLET EVERY DAY, Disp: 90 Tablet, Rfl: 3  •  glipiZIDE (GLUCOTROL) 10 MG Tab, Take 1 Tablet by mouth 2 times a day., Disp: 180 Tablet, Rfl: 1  •  vitamin D (CHOLECALCIFEROL) 1000 UNIT Tab, Take 1 Tab by mouth every day. (Patient taking differently: Take 1,000 Units by mouth 2 times a day.), Disp: 90 Tab, Rfl: 3  •  Magnesium 500 MG Tab, Take 1,000 mg by mouth every day., Disp: 90 Tab, Rfl: 3    Social History:  Social History     Socioeconomic History   • Marital status:      Spouse name: Not on file   • Number of children: Not on file   • Years of education: Not on file   • Highest education level: Bachelor's degree (e.g., BA, AB, BS)   Occupational History   • Not on file   Tobacco Use   • Smoking status: Former Smoker     Packs/day: 1.00     Years: 5.00     Pack years: 5.00     Types: Cigarettes     Quit date: 1971     Years since quittin.4   • Smokeless tobacco: Never Used   Vaping Use   • Vaping Use: Never used   Substance and Sexual Activity   • Alcohol use: Yes     Alcohol/week: 4.2 oz     Types: 7 Glasses of wine per week     Comment: daily   • Drug use: No   • Sexual activity: Yes     Partners: Female   Other Topics Concern   •  Service No   • Blood Transfusions No   • Caffeine Concern No   • Occupational Exposure No   • Hobby Hazards No   • Sleep Concern No   • Stress Concern No   • Weight Concern No   • Special Diet No   • Back Care No   • Exercise Yes   • Bike Helmet No   • Seat Belt Yes   • Self-Exams No   Social History Narrative   • Not on file     Social Determinants of Health     Financial Resource Strain: Low Risk    • Difficulty of Paying Living Expenses: Not hard at all   Food Insecurity: No Food Insecurity   •  Worried About Running Out of Food in the Last Year: Never true   • Ran Out of Food in the Last Year: Never true   Transportation Needs: No Transportation Needs   • Lack of Transportation (Medical): No   • Lack of Transportation (Non-Medical): No   Physical Activity: Inactive   • Days of Exercise per Week: 0 days   • Minutes of Exercise per Session: 0 min   Stress: No Stress Concern Present   • Feeling of Stress : Not at all   Social Connections: Moderately Integrated   • Frequency of Communication with Friends and Family: More than three times a week   • Frequency of Social Gatherings with Friends and Family: Once a week   • Attends Sabianism Services: Never   • Active Member of Clubs or Organizations: Yes   • Attends Club or Organization Meetings: More than 4 times per year   • Marital Status:    Intimate Partner Violence: Not on file   Housing Stability: Low Risk    • Unable to Pay for Housing in the Last Year: No   • Number of Places Lived in the Last Year: 1   • Unstable Housing in the Last Year: No        Family History:   Family History   Problem Relation Age of Onset   • Diabetes Mother    • Stroke Father         45   • Diabetes Sister    • Diabetes Maternal Uncle        PHYSICAL EXAM:   Vital signs: Ht 1.829 m (6')   Wt 93.4 kg (206 lb)   SpO2 98%   BMI 27.94 kg/m²   GENERAL: Well-developed, well-nourished  in no apparent distress.   EYE: No ocular and eyelid asymmetry, Anicteric sclerae,  PERRL, No exophthalmos or lidlag  HENT: Hearing grossly intact, Normocephalic, atraumatic. Pink, moist mucous membranes, No exudate  NECK: Supple. Trachea midline.   CARDIOVASCULAR: Regular rate and rhythm. No murmurs, rubs, or gallops.   LUNGS: Clear to auscultation bilaterally   ABDOMEN: Soft, nontender with positive bowel sounds.   EXTREMITIES: No clubbing, cyanosis, or edema.   NEUROLOGICAL: Cranial nerves II-XII are grossly intact   Symmetric reflexes at the patella no proximal muscle weakness, No visible  tremor with both outstretched hands  LYMPH: No cervical, supraclavicular,  adenopathy palpated.   SKIN: No rashes, lesions. Turgor is normal.  FOOT: Normal sensation to monofilament testing, normal pulses, no ulcers.  Normal Vibration quantitative sensation test.    ASSESSMENT/PLAN:     1. Uncontrolled type 2 diabetes mellitus with hyperglycemia (HCC)  Unstable; improved significantly  Continue Diabetes Regimen:   Continue Metformin 1000 mg twice daily  Continue glipizide to 10 mg daily.  Continue Trulicity 0.75 mg weekly.    Changing from Trujenta to GLP-1 therapy decreased glycohemoglobin significantly.  Patient is tolerating therapy well.  At this time do not want to increase the dosing of the GLP-1 secondary to ongoing abdominal pain.  Recommend patient go directly to [facility] emergency room for further evaluation.    Recommend balanced meal planning such as [email or web address].  Recommend exercise 3 times a week as patient can tolerate.  Dilated eye exam is up-to-date and should be completed again in February 2022.  Daily foot inspection is always encouraged.  Recommend 2 L to 3 L of water each day.      2. Hyperlipidemia associated with type 2 diabetes mellitus (HCC)  Unstable.  Continue taking Lipitor 10 mg daily.    3. Vitamin D deficiency  Unstable.  Continue taking vitamin D 1000 IU daily until vitamin D level assay has been tested.    4. Hypothyroidism (acquired)  Unstable.  Continue taking levothyroxine 88 mcg daily.  Require current thyroid function to further assess and evaluate if increasing dosage would be beneficial.  At this time patient is asymptomatic.      5.  Vitamin B12 deficiency  Unstable.  Recommend starting Vitamin B12 2500mg daily.    Patient was escorted to [facility] ER for further evaluation of acute low back pain.       Complete point-of-care A1c at next appointment.  Next appointment in 4 months.      Thank you kindly for allowing me to participate in the diabetes care plan for this  patient.    Florecita Young, APRN  04/25/2022    CC:   No primary care provider on file.

## 2022-04-25 NOTE — ED PROVIDER NOTES
"ED Provider Note    Scribed for Georgia Hilario M.D. by Lona Desir. 2022, 11:26 AM.    Primary care provider: Jose Flores D.O.  Means of arrival: Walk-In  History obtained from: Patient  History limited by: None    CHIEF COMPLAINT  Chief Complaint   Patient presents with   • Flank Pain     Reports intermittent L flank pain x2 weeks, with N/V. Denies fever, diarrhea, abd pain, or dysuria.        HPI  Saleem Whitney is a 76 y.o. male who presents to the Emergency Department for intermittent left flank pain onset last week. He describes it as \"electric shocks when he moves\" and notes the pain does not radiate. He has associated mild nausea, vomiting (3 episodes today), and bloating, but denies dysuria or hematuria. He has not experienced these symptoms before. He recently had a wellness check and his PCP had concerns for kidney stones. No alleviating factors were reported.    REVIEW OF SYSTEMS  Pertinent positives include flank pain, nausea, vomiting, and bloatng. Pertinent negatives include no dysuria or hematuria.  All other systems reviewed and negative.    PAST MEDICAL HISTORY   has a past medical history of Hyperlipidemia, Hypertension, Hypothyroid, and Type II or unspecified type diabetes mellitus without mention of complication, not stated as uncontrolled.    SURGICAL HISTORY   has a past surgical history that includes hand surgery.    SOCIAL HISTORY  Social History     Tobacco Use   • Smoking status: Former Smoker     Packs/day: 1.00     Years: 5.00     Pack years: 5.00     Types: Cigarettes     Quit date: 1971     Years since quittin.5   • Smokeless tobacco: Never Used   Vaping Use   • Vaping Use: Never used   Substance Use Topics   • Alcohol use: Yes     Alcohol/week: 4.2 oz     Types: 7 Glasses of wine per week     Comment: daily   • Drug use: No      Social History     Substance and Sexual Activity   Drug Use No       FAMILY HISTORY  Family History   Problem Relation Age of " "Onset   • Diabetes Mother    • Stroke Father         45   • Diabetes Sister    • Diabetes Maternal Uncle        CURRENT MEDICATIONS  Current Outpatient Medications   Medication Instructions   • amLODIPine (NORVASC) 10 MG Tab TAKE 1 TABLET EVERY DAY   • atorvastatin (LIPITOR) 10 mg, Oral, DAILY   • Blood Glucose Test Strips Use one onetouch delica  test strip to test blood sugar twice daily.   • Dulaglutide (TRULICITY) 0.75 MG/0.5ML Solution Pen-injector 1 PEN, Subcutaneous, EVERY 7 DAYS   • glipiZIDE (GLUCOTROL) 10 mg, Oral, 2 TIMES DAILY   • Lancets (ONETOUCH DELICA PLUS [id number]) Misc USE 1  TO CHECK GLUCOSE TWICE DAILY   • levothyroxine (SYNTHROID) 88 mcg, Oral, EACH MORNING ON EMPTY STOMACH   • linagliptin (TRADJENTA) 5 MG Tab tablet Tradjenta 5 mg tablet   • losartan (COZAAR) 100 mg, Oral, EVERY DAY   • Magnesium 1,000 mg, Oral, DAILY   • metformin (GLUCOPHAGE) 1,000 mg, Oral, 2 TIMES DAILY WITH MEALS   • ONETOUCH ULTRA strip USE STRIP TO CHECK GLUCOSE TWICE DAILY (E11.9)   • Vitamin B-12 2,500 mcg, Sublingual, DAILY   • vitamin D (CHOLECALCIFEROL) 1,000 Units, Oral, DAILY        ALLERGIES  No Known Allergies    PHYSICAL EXAM  VITAL SIGNS: BP (!) 168/88   Pulse 97   Temp 36.7 °C (98 °F) (Temporal)   Resp 16   Ht 1.854 m (6' 1\")   SpO2 99%   BMI 27.18 kg/m²   Constitutional: Alert in no apparent distress. Slightly uncomfortable appearing.  HENT: No signs of trauma, Bilateral external ears normal, Nose normal.   Eyes: Pupils are equal and reactive, Conjunctiva normal, Non-icteric.   Neck: Normal range of motion, No tenderness, Supple, No stridor.   Cardiovascular: Regular rate and rhythm, no murmurs.   Thorax & Lungs: Normal breath sounds, No respiratory distress, No wheezing, No chest tenderness.   Abdomen: Bowel sounds normal, Soft, Left lower quadrant tenderness. No masses, No peritoneal signs.  Skin: Warm, Dry, No erythema, No rash.   Musculoskeletal:  No major deformities noted.   Neurologic: Alert, " moving all extremities without difficulty, no focal deficits.    LABS  Labs Reviewed   CBC WITH DIFFERENTIAL - Abnormal; Notable for the following components:       Result Value    WBC 18.4 (*)     Neutrophils-Polys 91.00 (*)     Lymphocytes 6.00 (*)     Neutrophils (Absolute) 16.74 (*)     All other components within normal limits   COMP METABOLIC PANEL - Abnormal; Notable for the following components:    Glucose 218 (*)     Bun 26 (*)     Creatinine 1.64 (*)     All other components within normal limits   URINALYSIS,CULTURE IF INDICATED - Abnormal; Notable for the following components:    Glucose 250 (*)     Ketones 15 (*)     Occult Blood Small (*)     All other components within normal limits    Narrative:     Indication for culture:->Patient WITHOUT an indwelling Robertson  catheter in place with new onset of Dysuria, Frequency,  Urgency, and/or Suprapubic pain   URINE MICROSCOPIC (W/UA) - Abnormal; Notable for the following components:    WBC 0-2 (*)     RBC 0-2 (*)     All other components within normal limits    Narrative:     Indication for culture:->Patient WITHOUT an indwelling Robertson  catheter in place with new onset of Dysuria, Frequency,  Urgency, and/or Suprapubic pain   ESTIMATED GFR - Abnormal; Notable for the following components:    GFR (CKD-EPI) 42 (*)     All other components within normal limits   DIFFERENTIAL MANUAL   PLATELET ESTIMATE   MORPHOLOGY     All labs reviewed by me.    RADIOLOGY    CT-RENAL COLIC EVALUATION(A/P W/O)   Final Result      1.  2 mm calculus just distal to the left UVJ resulting in mild left hydroureteronephrosis and perinephric stranding.      2.  Left nephrolithiasis.      3.  Diverticulosis.      4.  Atherosclerosis.      5.  Bilateral fat-containing hernias, right greater than left         All imaging interpretations reviewed by me.     COURSE & MEDICAL DECISION MAKING  Pertinent Labs & Imaging studies reviewed. (See chart for details)    Differential diagnoses include  but are not limited to: Diverticulitis vs. kidney stones.     11:26 AM - Patient seen and examined at bedside. Patient will be treated with ondansetron 4 mg and ketorolac 15 mg. Ordered CBC w/ diff, CMP, and UA to evaluate his symptoms.     12:21 PM - Ordered CT-Renal Colic for further evaluation.    1:07 PM - Patient was reevaluated at bedside.     Decision Making:  This is a 76 y.o. year old male who presents with left-sided flank pain.  Patient was uncomfortable.  He is afebrile.  Work-up reveals a left 2 mm calculus likely responsible for his symptoms.  He has a mild left hydroureteronephrosis and perinephric stranding.  However his urinalysis did not show any evidence of infection.  He will be treated symptomatically and discharged with outpatient follow-up.  He is agreeable to this plan.    The patient will not drink alcohol nor drive with prescribed medications. The patient will return for new or worsening symptoms and is stable at the time of discharge. Patient was given return precautions. Patient and/or family member verbalizes understanding and will comply.    DISPOSITION:  Patient will be discharged home in stable condition.    FOLLOW UP:  Bradly Velasco M.D.  [address]  [facility] [id number] [phone]      Urology follow up    [facility], Emergency Dept  [address]  [place] [id number] [phone]    Return for worsening pain, vomiting, fever or other concerns    [facility] [place] [facility]  [address] [facility] [address]  [phone]          OUTPATIENT MEDICATIONS:  Discharge Medication List as of 4/25/2022  1:25 PM      START taking these medications    Details   tamsulosin (FLOMAX) 0.4 MG capsule Take 1 Capsule by mouth every day., Disp-30 Capsule, R-0, Normal      HYDROcodone-acetaminophen (NORCO) 5-325 MG Tab per tablet Take 1 Tablet by mouth every four hours as needed (moderate pain) for up to 2 days., Disp-10 Tablet, R-0, Normal      Cyanocobalamin  (VITAMIN B-12) 2500 MCG SL Tab Place 1 Tablet under the tongue every day., Disp-100 Tablet, R-3, Normal               FINAL IMPRESSION  1. Kidney stone    2. Flank pain               This dictation has been created using voice recognition software and/or scribes. The accuracy of the dictation is limited by the abilities of the software and the expertise of the scribes. I expect there may be some errors of grammar and possibly content. I made every attempt to manually correct the errors within my dictation. However, errors related to voice recognition software and/or scribes may still exist and should be interpreted within the appropriate context.     I, Lona Desir (Scribe), am scribing for, and in the presence of, Georgia Hilario M.D..    Electronically signed by: Lona Desir (Scribe), 4/25/2022    IGeorgia M.D. personally performed the services described in this documentation, as scribed by Lona Desir in my presence, and it is both accurate and complete.    The note accurately reflects work and decisions made by me.  Georgia Hilario M.D.  5/2/2022  7:56 PM

## 2022-04-25 NOTE — ED NOTES
Pt back to room in wheelchair, changing into gown.   Chief Complaint   Patient presents with   • Flank Pain     Reports intermittent L flank pain x2 weeks, with N/V. Denies fever, diarrhea, abd pain, or dysuria.

## 2022-06-21 ENCOUNTER — HOSPITAL ENCOUNTER (OUTPATIENT)
Facility: MEDICAL CENTER | Age: 77
End: 2022-06-21
Attending: PHYSICIAN ASSISTANT
Payer: MEDICARE

## 2022-06-21 PROCEDURE — 82365 CALCULUS SPECTROSCOPY: CPT

## 2022-06-25 LAB
APPEARANCE STONE: NORMAL
COMPN STONE: NORMAL
SPECIMEN WT: 5 MG

## 2022-07-22 ENCOUNTER — OFFICE VISIT (OUTPATIENT)
Dept: MEDICAL GROUP | Facility: MEDICAL CENTER | Age: 77
End: 2022-07-22
Payer: MEDICARE

## 2022-07-22 VITALS
HEART RATE: 75 BPM | DIASTOLIC BLOOD PRESSURE: 60 MMHG | TEMPERATURE: 98.5 F | WEIGHT: 208.2 LBS | OXYGEN SATURATION: 96 % | BODY MASS INDEX: 27.59 KG/M2 | HEIGHT: 73 IN | SYSTOLIC BLOOD PRESSURE: 138 MMHG | RESPIRATION RATE: 16 BRPM

## 2022-07-22 DIAGNOSIS — I15.2 HYPERTENSION ASSOCIATED WITH DIABETES (HCC): ICD-10-CM

## 2022-07-22 DIAGNOSIS — R35.1 NOCTURIA: ICD-10-CM

## 2022-07-22 DIAGNOSIS — E11.59 HYPERTENSION ASSOCIATED WITH DIABETES (HCC): ICD-10-CM

## 2022-07-22 DIAGNOSIS — E78.5 HYPERLIPIDEMIA DUE TO TYPE 2 DIABETES MELLITUS (HCC): ICD-10-CM

## 2022-07-22 DIAGNOSIS — E03.9 ACQUIRED HYPOTHYROIDISM: ICD-10-CM

## 2022-07-22 DIAGNOSIS — R79.89 ELEVATED SERUM CREATININE: ICD-10-CM

## 2022-07-22 DIAGNOSIS — E11.69 HYPERLIPIDEMIA DUE TO TYPE 2 DIABETES MELLITUS (HCC): ICD-10-CM

## 2022-07-22 DIAGNOSIS — E55.9 VITAMIN D DEFICIENCY: ICD-10-CM

## 2022-07-22 DIAGNOSIS — E11.9 TYPE 2 DIABETES MELLITUS WITHOUT COMPLICATION, WITHOUT LONG-TERM CURRENT USE OF INSULIN (HCC): ICD-10-CM

## 2022-07-22 PROCEDURE — 99214 OFFICE O/P EST MOD 30 MIN: CPT | Performed by: STUDENT IN AN ORGANIZED HEALTH CARE EDUCATION/TRAINING PROGRAM

## 2022-07-22 RX ORDER — LOSARTAN POTASSIUM 100 MG/1
100 TABLET ORAL
Qty: 90 TABLET | Refills: 3 | Status: SHIPPED | OUTPATIENT
Start: 2022-07-22 | End: 2023-01-05 | Stop reason: SDUPTHER

## 2022-07-22 RX ORDER — LEVOTHYROXINE SODIUM 88 UG/1
88 TABLET ORAL
Qty: 90 TABLET | Refills: 3 | Status: SHIPPED | OUTPATIENT
Start: 2022-07-22 | End: 2023-01-05 | Stop reason: SDUPTHER

## 2022-07-22 RX ORDER — DULAGLUTIDE 0.75 MG/.5ML
1 INJECTION, SOLUTION SUBCUTANEOUS
Qty: 6 ML | Refills: 3 | Status: SHIPPED | OUTPATIENT
Start: 2022-07-22 | End: 2022-11-03

## 2022-07-22 RX ORDER — TAMSULOSIN HYDROCHLORIDE 0.4 MG/1
0.4 CAPSULE ORAL
Qty: 90 CAPSULE | Refills: 3 | Status: SHIPPED | OUTPATIENT
Start: 2022-07-22 | End: 2023-01-05 | Stop reason: SDUPTHER

## 2022-07-22 RX ORDER — GLIPIZIDE 10 MG/1
10 TABLET ORAL DAILY
Qty: 90 TABLET | Refills: 3 | Status: SHIPPED | OUTPATIENT
Start: 2022-07-22 | End: 2023-01-05 | Stop reason: SDUPTHER

## 2022-07-22 RX ORDER — ATORVASTATIN CALCIUM 10 MG/1
10 TABLET, FILM COATED ORAL DAILY
Qty: 90 TABLET | Refills: 3 | Status: SHIPPED | OUTPATIENT
Start: 2022-07-22 | End: 2023-01-05 | Stop reason: SDUPTHER

## 2022-07-22 RX ORDER — AMLODIPINE BESYLATE 10 MG/1
10 TABLET ORAL
Qty: 90 TABLET | Refills: 3 | Status: SHIPPED | OUTPATIENT
Start: 2022-07-22 | End: 2023-01-05 | Stop reason: SDUPTHER

## 2022-07-22 ASSESSMENT — FIBROSIS 4 INDEX: FIB4 SCORE: 0.98

## 2022-07-22 NOTE — LETTER
[facility]  Jose Flores D.O.  [address]  [place] [address]  Fax: [phone]   Authorization for Release/Disclosure of   Protected Health Information   Name: SALEEM ARREAGA : 1945 SSN: [id number]   Address: [address] [place] [address]   [place] [address] Phone:    [phone] (home)    I authorize the entity listed below to release/disclose the PHI below to:   [facility]/Jose Flores D.O. and Jose Flores D.O.   Provider or Entity Name:  Dr. Peter Coello [facility]   Address   City, State, Zip   Phone: [phone]      Fax:     Reason for request: continuity of care   Information to be released:    [  ] LAST COLONOSCOPY,  including any PATH REPORT and follow-up  [  ] LAST FIT/COLOGUARD RESULT [  ] LAST DEXA  [  ] LAST MAMMOGRAM  [  ] LAST PAP  [  ] LAST LABS [X] RETINA EXAM REPORT  [  ] IMMUNIZATION RECORDS  [  ] Release all info      [  ] Check here and initial the line next to each item to release ALL health information INCLUDING  _____ Care and treatment for drug and / or alcohol abuse  _____ HIV testing, infection status, or AIDS  _____ Genetic Testing    DATES OF SERVICE OR TIME PERIOD TO BE DISCLOSED: _____________  I understand and acknowledge that:  * This Authorization may be revoked at any time by you in writing, except if your health information has already been used or disclosed.  * Your health information that will be used or disclosed as a result of you signing this authorization could be re-disclosed by the recipient. If this occurs, your re-disclosed health information may no longer be protected by State or Federal laws.  * You may refuse to sign this Authorization. Your refusal will not affect your ability to obtain treatment.  * This Authorization becomes effective upon signing and will  on (date) __________.      If no date is indicated, this Authorization will  one (1) year from the signature date.    Name: Saleem Bolaños  Chelo    Signature:   Date:     7/22/2022       PLEASE FAX REQUESTED RECORDS BACK TO: [phone]

## 2022-07-22 NOTE — PROGRESS NOTES
Subjective:     CC: follow up    HPI:   Saleem presents today for follow up    Problem   Elevated Serum Creatinine    Acute condition. GFR 42 back in April 2022 when he was having kidney stones which later passed.     Vitamin D Deficiency    Chronic condition.  Current regimen: vitamin D 1000IU BID     Nocturia    Chronic condition. He has been having nocturia once nightly.     (HCC) Type 2 diabetes mellitus without complication, without long-term current use of insulin    Chronic condition. Followed by endocrinology. Onset around age 71. Fasting blood sugar 130s.  Current medication regimen: metformin 1000mg BID, glipizide 10mg daily, Trulicity 0.75mg weekly  Patient tolerating and reports compliant with medications. Does not need refill of medications today. Denies vision changes, dry mouth, chest pain, nausea/vomiting/diarrhea, polydipsia, polyphagia, polyuria, hypoglycemia, numbness/tingling. He checks his feet at home.  Last eye exam: 02/2021  Last foot exam: 01/2022  Diet: tries to eat healthy in general  Exercise: not so much now due to leg pain  Vaccines: flu received 09/2021, PPSV23 completed 11/2016, Tdap received 08/2017    He has been told to drink more water now so he increased his fluid intake daily and has to urinate more frequently now due to increased fluid intake. He otherwise denies dribbling, nocturia, weak urinary stream.     (HCC) Hypertension associated with diabetes    Chronic condition.  Current medication regimen: amlodipine 10mg daily, losartan 100mg daily  Patient tolerating and reports compliant with medications. He does not regularly monitor blood pressure at home. Does not need refill of medications today. Denies headache, dizziness, vision changes, chest pain, dyspnea, edema.     Acquired Hypothyroidism    Chronic condition.  Current medication regimen: levothyroxine 88mcg daily  Patient tolerating and reports compliant with medications. He feels euthyroid with current medication.  Does not need refill of medications today. No acute concerns at this time.     (HCC) Hyperlipidemia due to type 2 diabetes mellitus    Chronic condition.  Current regimen: atorvastatin 10mg daily.   He reports compliance and tolerating medication well. Denies musculoskeletal pain, headache, diarrhea. He does not need medication refill today. No acute concerns at this time.         Current Outpatient Medications Ordered in Epic   Medication Sig Dispense Refill   • metformin (GLUCOPHAGE) 1000 MG tablet Take 1 Tablet by mouth 2 times a day with meals. 180 Tablet 3   • glipiZIDE (GLUCOTROL) 10 MG Tab Take 1 Tablet by mouth every day. 90 Tablet 3   • Dulaglutide (TRULICITY) 0.75 MG/0.5ML Solution Pen-injector Inject 1 PEN under the skin every 7 days. 6 mL 3   • losartan (COZAAR) 100 MG Tab Take 1 Tablet by mouth every day. 90 Tablet 3   • levothyroxine (SYNTHROID) 88 MCG Tab Take 1 Tablet by mouth every morning on an empty stomach. 90 Tablet 3   • amLODIPine (NORVASC) 10 MG Tab Take 1 Tablet by mouth every day. 90 Tablet 3   • atorvastatin (LIPITOR) 10 MG Tab Take 1 Tablet by mouth every day. 90 Tablet 3   • tamsulosin (FLOMAX) 0.4 MG capsule Take 1 Capsule by mouth 1/2 hour after breakfast. 90 Capsule 3   • Cyanocobalamin (VITAMIN B-12) 2500 MCG SL Tab Place 1 Tablet under the tongue every day. 100 Tablet 3   • vitamin D (CHOLECALCIFEROL) 1000 UNIT Tab Take 1 Tab by mouth every day. (Patient taking differently: Take 1,000 Units by mouth 2 times a day.) 90 Tab 3   • Magnesium 500 MG Tab Take 1,000 mg by mouth every day. 90 Tab 3     No current Epic-ordered facility-administered medications on file.     Social history  Living situation: lives with wife at home  Occupation: retired since 2017, used to run a construction company, and sales work  Marital status:   Alcohol/tobacco/illicit drugs: former smoker x 5yrs quit 1971, drink 1 glass of wine daily, denies illicit drugs  Baseline functional status: independent  "with ADLs     Health Maintenance: reviewed and discussed with patient  Vaccines: flu received 09/2021, PPSV23 completed 11/2016, Tdap received 08/2017, Shingrix completed 11/2018, Pfizer COVID #3 received 10/2021     ROS:  Gen: no fevers/chills  Eyes: no changes in vision  ENT: no sore throat  Pulm: no sob, no cough  CV: no chest pain, no palpitations  GI: no nausea/vomiting, no diarrhea  : no dysuria  MSk: + right knee and posterior thigh pain  Skin: no rash  Neuro: no headaches, no numbness/tingling    Objective:     Exam:  /60 (BP Location: Left arm, Patient Position: Sitting, BP Cuff Size: Adult)   Pulse 75   Temp 36.9 °C (98.5 °F) (Temporal)   Resp 16   Ht 1.854 m (6' 1\")   Wt 94.4 kg (208 lb 3.2 oz)   SpO2 96%   BMI 27.47 kg/m²  Body mass index is 27.47 kg/m².    General: Normal appearing. No distress.  HEENT: Normocephalic. Nasal mucosa benign, oropharynx is without erythema, edema or exudates.   Neck: Supple without JVD or bruit.  Pulmonary: Clear to ausculation.  Normal effort. No rales, ronchi, or wheezing.  Cardiovascular: Regular rate and rhythm without murmur. Carotid and radial pulses are intact and equal bilaterally.  Abdomen: Soft, nontender, nondistended. Normal bowel sounds.  Neurologic: Grossly nonfocal  Skin: Warm and dry. No obvious lesions.  Musculoskeletal: Normal gait. No extremity cyanosis, clubbing, or edema. + TTP to superior aspect of right knee, mild TTP to posterior thigh, 5/5 strength and sensation to light touch intact to BLE  Psych: Normal mood and affect. Alert and oriented x3. Judgment and insight is normal.    Labs:   Lab Results   Component Value Date/Time    WBC 18.4 (H) 04/25/2022 11:43 AM    RBC 5.44 04/25/2022 11:43 AM    HEMOGLOBIN 16.0 04/25/2022 11:43 AM    HEMATOCRIT 47.3 04/25/2022 11:43 AM    MCV 86.9 04/25/2022 11:43 AM    MCH 29.4 04/25/2022 11:43 AM    MCHC 33.8 04/25/2022 11:43 AM    RDW 39.7 04/25/2022 11:43 AM    PLATELETCT 275 04/25/2022 11:43 AM "    MPV 9.2 04/25/2022 11:43 AM      Lab Results   Component Value Date/Time    SODIUM 138 04/25/2022 11:43 AM    POTASSIUM 4.3 04/25/2022 11:43 AM    CHLORIDE 101 04/25/2022 11:43 AM    CO2 22 04/25/2022 11:43 AM    ANION 15.0 04/25/2022 11:43 AM    GLUCOSE 218 (H) 04/25/2022 11:43 AM    BUN 26 (H) 04/25/2022 11:43 AM    CREATININE 1.64 (H) 04/25/2022 11:43 AM    CALCIUM 9.6 04/25/2022 11:43 AM    ASTSGOT 15 04/25/2022 11:43 AM    ALTSGPT 19 04/25/2022 11:43 AM    TBILIRUBIN 0.8 04/25/2022 11:43 AM    ALBUMIN 4.7 04/25/2022 11:43 AM    TOTPROTEIN 8.0 04/25/2022 11:43 AM    GLOBULIN 3.3 04/25/2022 11:43 AM    AGRATIO 1.4 04/25/2022 11:43 AM     Lab Results   Component Value Date/Time    HBA1C 7.3 (A) 04/25/2022 09:43 AM      Lab Results   Component Value Date/Time    CHOLSTRLTOT 103 02/02/2022 0847    TRIGLYCERIDE 121 02/02/2022 0847    HDL 32 (A) 02/02/2022 0847    LDL 47 02/02/2022 0847     Lab Results   Component Value Date/Time    TSHULTRASEN 3.040 02/02/2022 0847     25-Hydroxy   Vitamin D 25 (ng/mL)   Date Value   02/02/2022 62     Lab Results   Component Value Date/Time    MALBCRT 18 02/02/2022 08:49 AM    MICROALBUR 2.8 02/02/2022 08:49 AM        Assessment & Plan:     76 y.o. male with the following -     1. (HCC) Type 2 diabetes mellitus without complication, without long-term current use of insulin  Chronic condition, controlled with medications. Most recent A1C 7.3. Well controlled for his age.  - cont current medications: metformin 1000mg BID, glipizide 10mg daily, Trulicity 0.75mg weekly  - Discussed daily self foot exam, eye care, and regular exercise with patient  - Patient instructed to follow a low carbohydrate diet  - Follow up in 6 months or earlier as needed  - metformin (GLUCOPHAGE) 1000 MG tablet; Take 1 Tablet by mouth 2 times a day with meals.  Dispense: 180 Tablet; Refill: 3  - glipiZIDE (GLUCOTROL) 10 MG Tab; Take 1 Tablet by mouth every day.  Dispense: 90 Tablet; Refill: 3  - Dulaglutide  (TRULICITY) 0.75 MG/0.5ML Solution Pen-injector; Inject 1 PEN under the skin every 7 days.  Dispense: 6 mL; Refill: 3    2. (HCC) Hypertension associated with diabetes  Chronic condition, stable.  - cont current regimen: losartan 100mg daily, amlodipine 10mg daily  - losartan (COZAAR) 100 MG Tab; Take 1 Tablet by mouth every day.  Dispense: 90 Tablet; Refill: 3  - amLODIPine (NORVASC) 10 MG Tab; Take 1 Tablet by mouth every day.  Dispense: 90 Tablet; Refill: 3  - Basic Metabolic Panel; Future    3. Hypertension associated with diabetes (HCC)  Chronic condition, stable.  - cont current regimen: losartan 100mg daily, amlodipine 10mg daily  - losartan (COZAAR) 100 MG Tab; Take 1 Tablet by mouth every day.  Dispense: 90 Tablet; Refill: 3  - amLODIPine (NORVASC) 10 MG Tab; Take 1 Tablet by mouth every day.  Dispense: 90 Tablet; Refill: 3  - Basic Metabolic Panel; Future    4. (HCC) Hyperlipidemia due to type 2 diabetes mellitus  Chronic condition, stable.  - cont current regimen: atorvastatin 10mg qhs  - atorvastatin (LIPITOR) 10 MG Tab; Take 1 Tablet by mouth every day.  Dispense: 90 Tablet; Refill: 3    5. Elevated serum creatinine  Subacute condition back in 04/2022 when he was having kidney stones which later passed. Plan to recheck kidney function.    6. Nocturia  Chronic condition, persistent.  - plan to trial tamsulosin per order  - PROSTATE SPECIFIC AG SCREENING; Future  - tamsulosin (FLOMAX) 0.4 MG capsule; Take 1 Capsule by mouth 1/2 hour after breakfast.  Dispense: 90 Capsule; Refill: 3    7. Acquired hypothyroidism  Chronic condition, stable.  - cont current regimen: levothyroxine 88mcg qAM  - levothyroxine (SYNTHROID) 88 MCG Tab; Take 1 Tablet by mouth every morning on an empty stomach.  Dispense: 90 Tablet; Refill: 3    8. Vitamin D deficiency  Chronic condition, stable.  - cont current regimen: vitamin D supplement    Return in about 6 months (around 1/22/2023) for chronic medical conditions.    Please  note that this dictation was created using voice recognition software. I have made every reasonable attempt to correct obvious errors, but I expect that there are errors of grammar and possibly content that I did not discover before finalizing the note.

## 2022-08-23 ENCOUNTER — NON-PROVIDER VISIT (OUTPATIENT)
Dept: ENDOCRINOLOGY | Facility: MEDICAL CENTER | Age: 77
End: 2022-08-23
Attending: INTERNAL MEDICINE
Payer: MEDICARE

## 2022-08-23 VITALS
WEIGHT: 210 LBS | HEART RATE: 64 BPM | SYSTOLIC BLOOD PRESSURE: 120 MMHG | HEIGHT: 72 IN | OXYGEN SATURATION: 96 % | DIASTOLIC BLOOD PRESSURE: 64 MMHG | BODY MASS INDEX: 28.44 KG/M2

## 2022-08-23 DIAGNOSIS — E11.9 TYPE 2 DIABETES MELLITUS WITHOUT COMPLICATION, WITHOUT LONG-TERM CURRENT USE OF INSULIN (HCC): ICD-10-CM

## 2022-08-23 LAB
HBA1C MFR BLD: 7 % (ref 0–5.6)
INT CON NEG: ABNORMAL
INT CON POS: ABNORMAL

## 2022-08-23 PROCEDURE — 99212 OFFICE O/P EST SF 10 MIN: CPT | Performed by: INTERNAL MEDICINE

## 2022-08-23 PROCEDURE — 83036 HEMOGLOBIN GLYCOSYLATED A1C: CPT

## 2022-08-23 RX ORDER — DULAGLUTIDE 1.5 MG/.5ML
0.5 INJECTION, SOLUTION SUBCUTANEOUS
Qty: 6 ML | Refills: 3 | Status: SHIPPED | OUTPATIENT
Start: 2022-08-23 | End: 2023-01-05 | Stop reason: SDUPTHER

## 2022-08-23 ASSESSMENT — FIBROSIS 4 INDEX: FIB4 SCORE: 0.98

## 2022-08-23 NOTE — PROGRESS NOTES
RN-CDE Note    Subjective:   Endocrinology Clinic Progress Note  PCP: Jose Flores D.O.    HPI:  Saleem Whitney is a 76 y.o. old patient who is seen today for review of   Recent changes in health: Health good.  He passed the kidney stone and has been feeling good.  DM:   Last A1c:   Lab Results   Component Value Date/Time    HBA1C 7.0 (A) 08/23/2022 08:38 AM      Previous A1c was 7.3 on 4/25/22  A1C GOAL: < 7    Diabetes Medications:   Metformin 1000 mg BID  Glipizide 10 mg daily  Trulicity .75 mg weekly    Exercise: Not as active this summer  Diet: One meal daily and snacks in the morning and evening.  Patient's body mass index is 28.48 kg/m². Exercise and nutrition counseling were performed at this visit.    Glucose monitoring frequency: Randomly  140-150  Hypoglycemic episodes: no  Last Retinal Exam:  scheduled next month with Dr. Coello  Daily Foot Exam: No   Foot Exam:  Monofilament: done  Monofilament testing with a 10 gram force: sensation: intact bilaterally  Visual Inspection: Feet with maceration, ulcers, or fissures.  Feet dry and states getting electrical shocks.  Pedal pulses: intact bilaterally   Lab Results   Component Value Date/Time    MALBCRT 18 02/02/2022 08:49 AM    MICROALBUR 2.8 02/02/2022 08:49 AM        ACR Albumin/Creatinine Ratio goal <30     HTN:   Blood pressure goal <140/<80 .   Currently Rx ACE/ARB: Yes     Dyslipidemia:    Lab Results   Component Value Date/Time    CHOLSTRLTOT 103 02/02/2022 08:47 AM    LDL 47 02/02/2022 08:47 AM    HDL 32 (A) 02/02/2022 08:47 AM    TRIGLYCERIDE 121 02/02/2022 08:47 AM         Currently Rx Statin: Yes     He  reports that he quit smoking about 50 years ago. His smoking use included cigarettes. He has a 5.00 pack-year smoking history. He has never used smokeless tobacco.      Plan:     Discussed and educated on:   - All medications, side effects and compliance (discussed carefully)  - Annual eye examinations at Ophthalmology  - Home glucose  monitoring emphasized  - Weight control and daily exercise    Recommended medication changes: Increase Trulicity 1.5 mg weekly.  He will let us know how he is tolerating it.  He will follow up with Dr. Cardoso in 6 months.  He was sampled Trulicity 1.5 mg x2 ( [id number] expir. 4/5/24).

## 2022-09-08 ENCOUNTER — APPOINTMENT (OUTPATIENT)
Dept: RADIOLOGY | Facility: MEDICAL CENTER | Age: 77
DRG: 389 | End: 2022-09-08
Attending: EMERGENCY MEDICINE
Payer: MEDICARE

## 2022-09-08 ENCOUNTER — APPOINTMENT (OUTPATIENT)
Dept: RADIOLOGY | Facility: MEDICAL CENTER | Age: 77
DRG: 389 | End: 2022-09-08
Attending: STUDENT IN AN ORGANIZED HEALTH CARE EDUCATION/TRAINING PROGRAM
Payer: MEDICARE

## 2022-09-08 ENCOUNTER — HOSPITAL ENCOUNTER (INPATIENT)
Facility: MEDICAL CENTER | Age: 77
LOS: 1 days | DRG: 389 | End: 2022-09-09
Attending: EMERGENCY MEDICINE | Admitting: STUDENT IN AN ORGANIZED HEALTH CARE EDUCATION/TRAINING PROGRAM
Payer: MEDICARE

## 2022-09-08 DIAGNOSIS — K56.609 SMALL BOWEL OBSTRUCTION (HCC): ICD-10-CM

## 2022-09-08 PROBLEM — D72.829 LEUKOCYTOSIS: Status: ACTIVE | Noted: 2022-09-08

## 2022-09-08 PROBLEM — E87.20 LACTIC ACIDOSIS: Status: ACTIVE | Noted: 2022-09-08

## 2022-09-08 LAB
ALBUMIN SERPL BCP-MCNC: 4.6 G/DL (ref 3.2–4.9)
ALBUMIN/GLOB SERPL: 1.4 G/DL
ALP SERPL-CCNC: 86 U/L (ref 30–99)
ALT SERPL-CCNC: 14 U/L (ref 2–50)
ANION GAP SERPL CALC-SCNC: 16 MMOL/L (ref 7–16)
APPEARANCE UR: CLEAR
AST SERPL-CCNC: 16 U/L (ref 12–45)
BASOPHILS # BLD AUTO: 0 % (ref 0–1.8)
BASOPHILS # BLD: 0 K/UL (ref 0–0.12)
BILIRUB SERPL-MCNC: 0.6 MG/DL (ref 0.1–1.5)
BILIRUB UR QL STRIP.AUTO: NEGATIVE
BUN SERPL-MCNC: 18 MG/DL (ref 8–22)
CALCIUM SERPL-MCNC: 10.1 MG/DL (ref 8.4–10.2)
CHLORIDE SERPL-SCNC: 101 MMOL/L (ref 96–112)
CO2 SERPL-SCNC: 24 MMOL/L (ref 20–33)
COLOR UR: YELLOW
CREAT SERPL-MCNC: 1.07 MG/DL (ref 0.5–1.4)
EOSINOPHIL # BLD AUTO: 0.1 K/UL (ref 0–0.51)
EOSINOPHIL NFR BLD: 0.7 % (ref 0–6.9)
EPI CELLS #/AREA URNS HPF: ABNORMAL /HPF
ERYTHROCYTE [DISTWIDTH] IN BLOOD BY AUTOMATED COUNT: 39.3 FL (ref 35.9–50)
GFR SERPLBLD CREATININE-BSD FMLA CKD-EPI: 71 ML/MIN/1.73 M 2
GLOBULIN SER CALC-MCNC: 3.2 G/DL (ref 1.9–3.5)
GLUCOSE BLD STRIP.AUTO-MCNC: 187 MG/DL (ref 65–99)
GLUCOSE SERPL-MCNC: 189 MG/DL (ref 65–99)
GLUCOSE UR STRIP.AUTO-MCNC: NEGATIVE MG/DL
HCT VFR BLD AUTO: 48.5 % (ref 42–52)
HGB BLD-MCNC: 16.5 G/DL (ref 14–18)
IMM GRANULOCYTES # BLD AUTO: 0.06 K/UL (ref 0–0.11)
IMM GRANULOCYTES NFR BLD AUTO: 0.4 % (ref 0–0.9)
KETONES UR STRIP.AUTO-MCNC: ABNORMAL MG/DL
LACTATE SERPL-SCNC: 3 MMOL/L (ref 0.5–2)
LACTATE SERPL-SCNC: 3.1 MMOL/L (ref 0.5–2)
LEUKOCYTE ESTERASE UR QL STRIP.AUTO: NEGATIVE
LIPASE SERPL-CCNC: 21 U/L (ref 7–58)
LYMPHOCYTES # BLD AUTO: 1.65 K/UL (ref 1–4.8)
LYMPHOCYTES NFR BLD: 10.9 % (ref 22–41)
MCH RBC QN AUTO: 29.4 PG (ref 27–33)
MCHC RBC AUTO-ENTMCNC: 34 G/DL (ref 33.7–35.3)
MCV RBC AUTO: 86.5 FL (ref 81.4–97.8)
MICRO URNS: ABNORMAL
MONOCYTES # BLD AUTO: 0.79 K/UL (ref 0–0.85)
MONOCYTES NFR BLD AUTO: 5.2 % (ref 0–13.4)
MUCOUS THREADS #/AREA URNS HPF: ABNORMAL /HPF
NEUTROPHILS # BLD AUTO: 12.56 K/UL (ref 1.82–7.42)
NEUTROPHILS NFR BLD: 82.8 % (ref 44–72)
NITRITE UR QL STRIP.AUTO: NEGATIVE
NRBC # BLD AUTO: 0 K/UL
NRBC BLD-RTO: 0 /100 WBC
PH UR STRIP.AUTO: 5.5 [PH] (ref 5–8)
PLATELET # BLD AUTO: 305 K/UL (ref 164–446)
PMV BLD AUTO: 9.9 FL (ref 9–12.9)
POTASSIUM SERPL-SCNC: 4.1 MMOL/L (ref 3.6–5.5)
PROT SERPL-MCNC: 7.8 G/DL (ref 6–8.2)
PROT UR QL STRIP: NEGATIVE MG/DL
RBC # BLD AUTO: 5.61 M/UL (ref 4.7–6.1)
RBC # URNS HPF: ABNORMAL /HPF
RBC UR QL AUTO: ABNORMAL
SODIUM SERPL-SCNC: 141 MMOL/L (ref 135–145)
SP GR UR STRIP.AUTO: 1.02
WBC # BLD AUTO: 15.2 K/UL (ref 4.8–10.8)

## 2022-09-08 PROCEDURE — 85025 COMPLETE CBC W/AUTO DIFF WBC: CPT

## 2022-09-08 PROCEDURE — 83605 ASSAY OF LACTIC ACID: CPT

## 2022-09-08 PROCEDURE — 36415 COLL VENOUS BLD VENIPUNCTURE: CPT

## 2022-09-08 PROCEDURE — 82962 GLUCOSE BLOOD TEST: CPT

## 2022-09-08 PROCEDURE — 700105 HCHG RX REV CODE 258: Performed by: STUDENT IN AN ORGANIZED HEALTH CARE EDUCATION/TRAINING PROGRAM

## 2022-09-08 PROCEDURE — 80053 COMPREHEN METABOLIC PANEL: CPT

## 2022-09-08 PROCEDURE — 99233 SBSQ HOSP IP/OBS HIGH 50: CPT | Performed by: STUDENT IN AN ORGANIZED HEALTH CARE EDUCATION/TRAINING PROGRAM

## 2022-09-08 PROCEDURE — 770006 HCHG ROOM/CARE - MED/SURG/GYN SEMI*

## 2022-09-08 PROCEDURE — 83690 ASSAY OF LIPASE: CPT

## 2022-09-08 PROCEDURE — 304538 HCHG NG TUBE

## 2022-09-08 PROCEDURE — 81001 URINALYSIS AUTO W/SCOPE: CPT

## 2022-09-08 PROCEDURE — 700117 HCHG RX CONTRAST REV CODE 255: Performed by: EMERGENCY MEDICINE

## 2022-09-08 PROCEDURE — 99285 EMERGENCY DEPT VISIT HI MDM: CPT

## 2022-09-08 PROCEDURE — 74177 CT ABD & PELVIS W/CONTRAST: CPT

## 2022-09-08 RX ORDER — LOSARTAN POTASSIUM 25 MG/1
100 TABLET ORAL DAILY
Refills: 3 | Status: DISCONTINUED | OUTPATIENT
Start: 2022-09-09 | End: 2022-09-09 | Stop reason: HOSPADM

## 2022-09-08 RX ORDER — ATORVASTATIN CALCIUM 10 MG/1
10 TABLET, FILM COATED ORAL DAILY
Status: DISCONTINUED | OUTPATIENT
Start: 2022-09-09 | End: 2022-09-09 | Stop reason: HOSPADM

## 2022-09-08 RX ORDER — POLYETHYLENE GLYCOL 3350 17 G/17G
1 POWDER, FOR SOLUTION ORAL
Status: DISCONTINUED | OUTPATIENT
Start: 2022-09-08 | End: 2022-09-09 | Stop reason: HOSPADM

## 2022-09-08 RX ORDER — LEVOTHYROXINE SODIUM 88 UG/1
88 TABLET ORAL
Status: DISCONTINUED | OUTPATIENT
Start: 2022-09-09 | End: 2022-09-09 | Stop reason: HOSPADM

## 2022-09-08 RX ORDER — TAMSULOSIN HYDROCHLORIDE 0.4 MG/1
0.4 CAPSULE ORAL
Status: DISCONTINUED | OUTPATIENT
Start: 2022-09-09 | End: 2022-09-08

## 2022-09-08 RX ORDER — HEPARIN SODIUM 5000 [USP'U]/ML
5000 INJECTION, SOLUTION INTRAVENOUS; SUBCUTANEOUS EVERY 8 HOURS
Status: DISCONTINUED | OUTPATIENT
Start: 2022-09-09 | End: 2022-09-09 | Stop reason: HOSPADM

## 2022-09-08 RX ORDER — ONDANSETRON 2 MG/ML
4 INJECTION INTRAMUSCULAR; INTRAVENOUS EVERY 4 HOURS PRN
Status: DISCONTINUED | OUTPATIENT
Start: 2022-09-08 | End: 2022-09-09 | Stop reason: HOSPADM

## 2022-09-08 RX ORDER — AMLODIPINE BESYLATE 5 MG/1
10 TABLET ORAL DAILY
Status: DISCONTINUED | OUTPATIENT
Start: 2022-09-09 | End: 2022-09-09 | Stop reason: HOSPADM

## 2022-09-08 RX ORDER — BISACODYL 10 MG
10 SUPPOSITORY, RECTAL RECTAL
Status: DISCONTINUED | OUTPATIENT
Start: 2022-09-08 | End: 2022-09-09 | Stop reason: HOSPADM

## 2022-09-08 RX ORDER — SODIUM CHLORIDE 9 MG/ML
INJECTION, SOLUTION INTRAVENOUS CONTINUOUS
Status: DISCONTINUED | OUTPATIENT
Start: 2022-09-08 | End: 2022-09-09 | Stop reason: HOSPADM

## 2022-09-08 RX ORDER — ONDANSETRON 4 MG/1
4 TABLET, ORALLY DISINTEGRATING ORAL EVERY 4 HOURS PRN
Status: DISCONTINUED | OUTPATIENT
Start: 2022-09-08 | End: 2022-09-09 | Stop reason: HOSPADM

## 2022-09-08 RX ORDER — AMOXICILLIN 250 MG
2 CAPSULE ORAL 2 TIMES DAILY
Status: DISCONTINUED | OUTPATIENT
Start: 2022-09-09 | End: 2022-09-09 | Stop reason: HOSPADM

## 2022-09-08 RX ADMIN — IOHEXOL 25 ML: 240 INJECTION, SOLUTION INTRATHECAL; INTRAVASCULAR; INTRAVENOUS; ORAL at 19:45

## 2022-09-08 RX ADMIN — SODIUM CHLORIDE: 9 INJECTION, SOLUTION INTRAVENOUS at 22:02

## 2022-09-08 RX ADMIN — IOHEXOL 100 ML: 350 INJECTION, SOLUTION INTRAVENOUS at 19:25

## 2022-09-08 ASSESSMENT — LIFESTYLE VARIABLES
HOW MANY TIMES IN THE PAST YEAR HAVE YOU HAD 5 OR MORE DRINKS IN A DAY: 0
TOTAL SCORE: 0
HAVE YOU EVER FELT YOU SHOULD CUT DOWN ON YOUR DRINKING: NO
TOTAL SCORE: 0
HAVE PEOPLE ANNOYED YOU BY CRITICIZING YOUR DRINKING: NO
ON A TYPICAL DAY WHEN YOU DRINK ALCOHOL HOW MANY DRINKS DO YOU HAVE: 1
CONSUMPTION TOTAL: NEGATIVE
AVERAGE NUMBER OF DAYS PER WEEK YOU HAVE A DRINK CONTAINING ALCOHOL: 7
TOTAL SCORE: 0
EVER FELT BAD OR GUILTY ABOUT YOUR DRINKING: NO
EVER HAD A DRINK FIRST THING IN THE MORNING TO STEADY YOUR NERVES TO GET RID OF A HANGOVER: NO
ALCOHOL_USE: YES

## 2022-09-08 ASSESSMENT — COGNITIVE AND FUNCTIONAL STATUS - GENERAL
MOBILITY SCORE: 24
DAILY ACTIVITIY SCORE: 24
SUGGESTED CMS G CODE MODIFIER MOBILITY: CH
SUGGESTED CMS G CODE MODIFIER DAILY ACTIVITY: CH

## 2022-09-08 ASSESSMENT — FIBROSIS 4 INDEX: FIB4 SCORE: 0.98

## 2022-09-08 ASSESSMENT — PAIN DESCRIPTION - PAIN TYPE: TYPE: ACUTE PAIN

## 2022-09-08 ASSESSMENT — PATIENT HEALTH QUESTIONNAIRE - PHQ9
2. FEELING DOWN, DEPRESSED, IRRITABLE, OR HOPELESS: NOT AT ALL
1. LITTLE INTEREST OR PLEASURE IN DOING THINGS: NOT AT ALL
SUM OF ALL RESPONSES TO PHQ9 QUESTIONS 1 AND 2: 0

## 2022-09-08 NOTE — ED TRIAGE NOTES
"Chief Complaint   Patient presents with    N/V    Abdominal Pain     77 yo male presents to triage with reports of all over abdominal pain that started at 0100am.  + N/V/D.  Denies fever, chest pain or shortness of breathe.  Concerned it could be something he ate but his wife had the same food and is fine     BP (!) 140/83   Pulse 86   Temp 37.4 °C (99.3 °F) (Temporal)   Resp 18   Ht 1.854 m (6' 1\")   Wt 93.3 kg (205 lb 11 oz)   SpO2 94%   BMI 27.14 kg/m²      Patient is Covid Vaccinated   "

## 2022-09-09 ENCOUNTER — APPOINTMENT (OUTPATIENT)
Dept: RADIOLOGY | Facility: MEDICAL CENTER | Age: 77
DRG: 389 | End: 2022-09-09
Attending: HOSPITALIST
Payer: MEDICARE

## 2022-09-09 ENCOUNTER — APPOINTMENT (OUTPATIENT)
Dept: RADIOLOGY | Facility: MEDICAL CENTER | Age: 77
DRG: 389 | End: 2022-09-09
Attending: STUDENT IN AN ORGANIZED HEALTH CARE EDUCATION/TRAINING PROGRAM
Payer: MEDICARE

## 2022-09-09 VITALS
RESPIRATION RATE: 18 BRPM | TEMPERATURE: 98.4 F | HEART RATE: 92 BPM | HEIGHT: 73 IN | OXYGEN SATURATION: 94 % | WEIGHT: 205.69 LBS | SYSTOLIC BLOOD PRESSURE: 159 MMHG | DIASTOLIC BLOOD PRESSURE: 86 MMHG | BODY MASS INDEX: 27.26 KG/M2

## 2022-09-09 LAB
ALBUMIN SERPL BCP-MCNC: 3.9 G/DL (ref 3.2–4.9)
ALBUMIN/GLOB SERPL: 1.6 G/DL
ALP SERPL-CCNC: 73 U/L (ref 30–99)
ALT SERPL-CCNC: 10 U/L (ref 2–50)
ANION GAP SERPL CALC-SCNC: 14 MMOL/L (ref 7–16)
AST SERPL-CCNC: 9 U/L (ref 12–45)
BASOPHILS # BLD AUTO: 0.1 % (ref 0–1.8)
BASOPHILS # BLD: 0.02 K/UL (ref 0–0.12)
BILIRUB SERPL-MCNC: 0.5 MG/DL (ref 0.1–1.5)
BUN SERPL-MCNC: 17 MG/DL (ref 8–22)
CALCIUM SERPL-MCNC: 8.8 MG/DL (ref 8.4–10.2)
CHLORIDE SERPL-SCNC: 104 MMOL/L (ref 96–112)
CO2 SERPL-SCNC: 23 MMOL/L (ref 20–33)
CREAT SERPL-MCNC: 0.98 MG/DL (ref 0.5–1.4)
EOSINOPHIL # BLD AUTO: 0.09 K/UL (ref 0–0.51)
EOSINOPHIL NFR BLD: 0.6 % (ref 0–6.9)
ERYTHROCYTE [DISTWIDTH] IN BLOOD BY AUTOMATED COUNT: 38.9 FL (ref 35.9–50)
GFR SERPLBLD CREATININE-BSD FMLA CKD-EPI: 78 ML/MIN/1.73 M 2
GLOBULIN SER CALC-MCNC: 2.5 G/DL (ref 1.9–3.5)
GLUCOSE BLD STRIP.AUTO-MCNC: 128 MG/DL (ref 65–99)
GLUCOSE BLD STRIP.AUTO-MCNC: 150 MG/DL (ref 65–99)
GLUCOSE SERPL-MCNC: 182 MG/DL (ref 65–99)
HCT VFR BLD AUTO: 42.4 % (ref 42–52)
HGB BLD-MCNC: 14.2 G/DL (ref 14–18)
IMM GRANULOCYTES # BLD AUTO: 0.06 K/UL (ref 0–0.11)
IMM GRANULOCYTES NFR BLD AUTO: 0.4 % (ref 0–0.9)
LACTATE SERPL-SCNC: 2.3 MMOL/L (ref 0.5–2)
LYMPHOCYTES # BLD AUTO: 1.56 K/UL (ref 1–4.8)
LYMPHOCYTES NFR BLD: 10.6 % (ref 22–41)
MAGNESIUM SERPL-MCNC: 1.7 MG/DL (ref 1.5–2.5)
MCH RBC QN AUTO: 29.2 PG (ref 27–33)
MCHC RBC AUTO-ENTMCNC: 33.5 G/DL (ref 33.7–35.3)
MCV RBC AUTO: 87.1 FL (ref 81.4–97.8)
MONOCYTES # BLD AUTO: 0.72 K/UL (ref 0–0.85)
MONOCYTES NFR BLD AUTO: 4.9 % (ref 0–13.4)
NEUTROPHILS # BLD AUTO: 12.26 K/UL (ref 1.82–7.42)
NEUTROPHILS NFR BLD: 83.4 % (ref 44–72)
NRBC # BLD AUTO: 0 K/UL
NRBC BLD-RTO: 0 /100 WBC
PLATELET # BLD AUTO: 258 K/UL (ref 164–446)
PMV BLD AUTO: 9.2 FL (ref 9–12.9)
POTASSIUM SERPL-SCNC: 3.8 MMOL/L (ref 3.6–5.5)
PROT SERPL-MCNC: 6.4 G/DL (ref 6–8.2)
RBC # BLD AUTO: 4.87 M/UL (ref 4.7–6.1)
SODIUM SERPL-SCNC: 141 MMOL/L (ref 135–145)
WBC # BLD AUTO: 14.7 K/UL (ref 4.8–10.8)

## 2022-09-09 PROCEDURE — 700105 HCHG RX REV CODE 258: Performed by: STUDENT IN AN ORGANIZED HEALTH CARE EDUCATION/TRAINING PROGRAM

## 2022-09-09 PROCEDURE — 36415 COLL VENOUS BLD VENIPUNCTURE: CPT

## 2022-09-09 PROCEDURE — 700102 HCHG RX REV CODE 250 W/ 637 OVERRIDE(OP): Performed by: STUDENT IN AN ORGANIZED HEALTH CARE EDUCATION/TRAINING PROGRAM

## 2022-09-09 PROCEDURE — A9270 NON-COVERED ITEM OR SERVICE: HCPCS | Performed by: HOSPITALIST

## 2022-09-09 PROCEDURE — 80053 COMPREHEN METABOLIC PANEL: CPT

## 2022-09-09 PROCEDURE — 83605 ASSAY OF LACTIC ACID: CPT

## 2022-09-09 PROCEDURE — A9270 NON-COVERED ITEM OR SERVICE: HCPCS | Performed by: STUDENT IN AN ORGANIZED HEALTH CARE EDUCATION/TRAINING PROGRAM

## 2022-09-09 PROCEDURE — 74018 RADEX ABDOMEN 1 VIEW: CPT

## 2022-09-09 PROCEDURE — 700111 HCHG RX REV CODE 636 W/ 250 OVERRIDE (IP): Performed by: STUDENT IN AN ORGANIZED HEALTH CARE EDUCATION/TRAINING PROGRAM

## 2022-09-09 PROCEDURE — 700102 HCHG RX REV CODE 250 W/ 637 OVERRIDE(OP): Performed by: HOSPITALIST

## 2022-09-09 PROCEDURE — 99239 HOSP IP/OBS DSCHRG MGMT >30: CPT | Performed by: HOSPITALIST

## 2022-09-09 PROCEDURE — 700105 HCHG RX REV CODE 258: Performed by: HOSPITALIST

## 2022-09-09 PROCEDURE — 83735 ASSAY OF MAGNESIUM: CPT

## 2022-09-09 PROCEDURE — 94760 N-INVAS EAR/PLS OXIMETRY 1: CPT

## 2022-09-09 PROCEDURE — 82962 GLUCOSE BLOOD TEST: CPT

## 2022-09-09 PROCEDURE — 85025 COMPLETE CBC W/AUTO DIFF WBC: CPT

## 2022-09-09 RX ORDER — SODIUM CHLORIDE 9 MG/ML
500 INJECTION, SOLUTION INTRAVENOUS ONCE
Status: COMPLETED | OUTPATIENT
Start: 2022-09-09 | End: 2022-09-09

## 2022-09-09 RX ORDER — IBUPROFEN 400 MG/1
400 TABLET ORAL EVERY 6 HOURS PRN
Status: DISCONTINUED | OUTPATIENT
Start: 2022-09-09 | End: 2022-09-09 | Stop reason: HOSPADM

## 2022-09-09 RX ADMIN — PHENOL 1 SPRAY: 1.5 LIQUID ORAL at 03:55

## 2022-09-09 RX ADMIN — PHENOL 1 SPRAY: 1.5 LIQUID ORAL at 00:31

## 2022-09-09 RX ADMIN — SODIUM CHLORIDE: 9 INJECTION, SOLUTION INTRAVENOUS at 08:03

## 2022-09-09 RX ADMIN — IBUPROFEN 400 MG: 400 TABLET, FILM COATED ORAL at 14:02

## 2022-09-09 RX ADMIN — HEPARIN SODIUM 5000 UNITS: 5000 INJECTION, SOLUTION INTRAVENOUS; SUBCUTANEOUS at 14:01

## 2022-09-09 RX ADMIN — SODIUM CHLORIDE 500 ML: 9 INJECTION, SOLUTION INTRAVENOUS at 00:31

## 2022-09-09 NOTE — ASSESSMENT & PLAN NOTE
-accus with sliding scale coverage  -diabetic diet  -diabetic education  -follow glycohemoglobin levels long term, most recent hemoglobin A1c 7.0  -continue with oral antihyperglycemics  -monitor for hypoglycemic episodes and adjust control if he should get low

## 2022-09-09 NOTE — ASSESSMENT & PLAN NOTE
Patient was initially diagnosed with small bowel obstruction.  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  Contrast has gone through the entire intestinal tract and then into the bladder.  Speaking with surgery and urology at this point they recommend antibiotic management.  Outpatient evaluation for colonoscopy and then possible resection of the colon where the problem exists.  NG tube has been removed  Await final recommendations from surgery.

## 2022-09-09 NOTE — DISCHARGE SUMMARY
Discharge Summary    CHIEF COMPLAINT ON ADMISSION  Chief Complaint   Patient presents with    N/V    Abdominal Pain     75 yo male presents to triage with reports of all over abdominal pain that started at 0100am.  + N/V/D.  Denies fever, chest pain or shortness of breathe.  Concerned it could be something he ate but his wife had the same food and is fine        Reason for Admission  N/V, Abd Pain     Admission Date  9/8/2022    CODE STATUS  DNAR/DNI    HPI & HOSPITAL COURSE  Mr. Saleem Cummings is a 76-year-old gentleman who comes into the hospital with nausea vomiting and diarrhea.  The patient is initially evaluated and found to have a possible small bowel obstruction.  Patient was placed with an NG tube to decompress.  Fluid resuscitation was also provided simultaneously.  Patient's condition improved.  The patient started to have bowel movements as well as passing gas.  Bowel sounds this morning are adequate and ongoing.  NG tube has been removed.  At this point patient's diet is being advanced.  Imaging studies were reviewed by myself as well as surgery this morning initially the concept was that the patient possibly had a fistula to the bladder as there was contrast in the bladder however after having urology and surgery reviewed the films it appears that the contrast is mostly from the IV contrast and is a residual contrast in the bladder.  After the patient is peds we do not anticipate any contrast to be left in the bladder.  The patient has been peeing well.  At this point Robertson catheter does not need to be placed.  The patient at this point has stabilized.  He has resolved with his current complaints and at this point can discharge home with outpatient follow-ups and monitoring.  Patient most likely had viral gastroenteritis that caused the nausea vomiting and diarrhea.    Therefore, he is discharged in good and stable condition to home with close outpatient follow-up.    The patient recovered much  more quickly than anticipated on admission.    Discharge Date  9/9/2022    FOLLOW UP ITEMS POST DISCHARGE  Follow-up with the primary care physician in 2 to 3 days    DISCHARGE DIAGNOSES  Principal Problem:    Small bowel obstruction (HCC) POA: Yes  Active Problems:    (HCC) Type 2 diabetes mellitus without complication, without long-term current use of insulin POA: Yes      Overview: Chronic condition. Followed by endocrinology. Onset around age 71. Fasting       blood sugar 130s.      Current medication regimen: metformin 1000mg BID, glipizide 10mg daily,       Trulicity 0.75mg weekly      Patient tolerating and reports compliant with medications. Does not need       refill of medications today. Denies vision changes, dry mouth, chest pain,       nausea/vomiting/diarrhea, polydipsia, polyphagia, polyuria, hypoglycemia,       numbness/tingling. He checks his feet at home.      Last eye exam: 02/2021      Last foot exam: 01/2022      Diet: tries to eat healthy in general      Exercise: not so much now due to leg pain      Vaccines: flu received 09/2021, PPSV23 completed 11/2016, Tdap received       08/2017            He has been told to drink more water now so he increased his fluid intake       daily and has to urinate more frequently now due to increased fluid       intake. He otherwise denies dribbling, nocturia, weak urinary stream.    (HCC) Hypertension associated with diabetes POA: Yes      Overview: Chronic condition.      Current medication regimen: amlodipine 10mg daily, losartan 100mg daily      Patient tolerating and reports compliant with medications. He does not       regularly monitor blood pressure at home. Does not need refill of       medications today. Denies headache, dizziness, vision changes, chest pain,       dyspnea, edema.    Leukocytosis POA: Yes    Lactic acidosis POA: Yes    Acquired hypothyroidism POA: Yes      Overview: Chronic condition.      Current medication regimen: levothyroxine  88mcg daily      Patient tolerating and reports compliant with medications. He feels       euthyroid with current medication. Does not need refill of medications       today. No acute concerns at this time.    (HCC) Hyperlipidemia due to type 2 diabetes mellitus POA: Yes      Overview: Chronic condition.      Current regimen: atorvastatin 10mg daily.       He reports compliance and tolerating medication well. Denies       musculoskeletal pain, headache, diarrhea. He does not need medication       refill today. No acute concerns at this time.  Resolved Problems:    * No resolved hospital problems. *      FOLLOW UP  Future Appointments   Date Time Provider Department Center   1/24/2023  7:20 AM [facility]   3/3/2023  9:10 AM [facility]IL[facility]ined[facility]     No follow-up provider specified.    MEDICATIONS ON DISCHARGE     Medication List        CONTINUE taking these medications        Instructions   amLODIPine 10 MG Tabs  Commonly known as: NORVASC   Take 1 Tablet by mouth every day.  Dose: 10 mg     atorvastatin 10 MG Tabs  Commonly known as: LIPITOR   Take 1 Tablet by mouth every day.  Dose: 10 mg     glipiZIDE 10 MG Tabs  Commonly known as: GLUCOTROL   Take 1 Tablet by mouth every day.  Dose: 10 mg     levothyroxine 88 MCG Tabs  Commonly known as: SYNTHROID   Take 1 Tablet by mouth every morning on an empty stomach.  Dose: 88 mcg     losartan 100 MG Tabs  Commonly known as: COZAAR   Take 1 Tablet by mouth every day.  Dose: 100 mg     Magnesium 500 MG Tabs   Take 1,000 mg by mouth every day.  Dose: 1,000 mg     metformin 1000 MG tablet  Commonly known as: GLUCOPHAGE   Take 1 Tablet by mouth 2 times a day with meals.  Dose: 1,000 mg     tamsulosin 0.4 MG capsule  Commonly known as: FLOMAX   Take 1 Capsule by mouth 1/2 hour after breakfast.  Dose: 0.4 mg     * Trulicity 0.75 MG/0.5ML Sopn  Generic drug: Dulaglutide   Doctor's comments: Patient has savings card. Please use if  applicable.  Inject 1 PEN under the skin every 7 days.  Dose: 1 PEN     * Trulicity 1.5 MG/0.5ML Sopn  Generic drug: Dulaglutide   Inject 0.5 mL under the skin every 7 days.  Dose: 0.5 mL     Vitamin B-12 2500 MCG Subl   Place 1 Tablet under the tongue every day.  Dose: 2,500 mcg     vitamin D 1000 Unit (25 mcg) Tabs  Commonly known as: cholecalciferol   Take 1 Tab by mouth every day.  Dose: 1,000 Units           * This list has 2 medication(s) that are the same as other medications prescribed for you. Read the directions carefully, and ask your doctor or other care provider to review them with you.                  Allergies  No Known Allergies    DIET  Orders Placed This Encounter   Procedures    Diet Order Diet: Full Liquid     Standing Status:   Standing     Number of Occurrences:   1     Order Specific Question:   Diet:     Answer:   Full Liquid [11]       ACTIVITY  As tolerated.  Weight bearing as tolerated    CONSULTATIONS  General surgery  Urology    PROCEDURES  NG tube to low intermittent suctioning    LABORATORY  Lab Results   Component Value Date    SODIUM 141 09/09/2022    POTASSIUM 3.8 09/09/2022    CHLORIDE 104 09/09/2022    CO2 23 09/09/2022    GLUCOSE 182 (H) 09/09/2022    BUN 17 09/09/2022    CREATININE 0.98 09/09/2022        Lab Results   Component Value Date    WBC 14.7 (H) 09/09/2022    HEMOGLOBIN 14.2 09/09/2022    HEMATOCRIT 42.4 09/09/2022    PLATELETCT 258 09/09/2022        Total time of the discharge process exceeds 36 minutes.

## 2022-09-09 NOTE — DISCHARGE PLANNING
Care Transition Team Assessment    RNCM completed assessment with information obtained in chart review. No ACP documents on file. Patient has spouse, Dmoinique ([phone]) listed as emergency contact. Patient was thought to have small bowel obstruction but it is more likely he has a fistula. Discharge disposition pending medical clearance. RNCM will continue to follow for any discharge planning needs.     Information Source  Orientation Level: Oriented X4  Information Given By:  (EMR)  Who is responsible for making decisions for patient? : Patient    Readmission Evaluation  Is this a readmission?: No    Elopement Risk  Legal Hold: No  Ambulatory or Self Mobile in Wheelchair: Yes  Disoriented: No  Psychiatric Symptoms: None  History of Wandering: No  Elopement this Admit: No  Vocalizing Wanting to Leave: No  Displays Behaviors, Body Language Wanting to Leave: No-Not at Risk for Elopement  Elopement Risk: Not at Risk for Elopement    Interdisciplinary Discharge Planning  Lives with - Patient's Self Care Capacity: Spouse  Patient or legal guardian wants to designate a caregiver: No  Support Systems: Family Member(s)  Housing / Facility: [address]  Durable Medical Equipment: Not Applicable    Discharge Preparedness  What is your plan after discharge?: Uncertain - pending medical team collaboration, Home with help, Home health care  What are your discharge supports?: Spouse, Child  Prior Functional Level: Ambulatory, Independent with Activities of Daily Living, Independent with Medication Management    Functional Assesment  Prior Functional Level: Ambulatory, Independent with Activities of Daily Living, Independent with Medication Management    Finances  Financial Barriers to Discharge: No  Prescription Coverage: Yes    Vision / Hearing Impairment  Vision Impairment : Yes  Right Eye Vision: Impaired, Wears Glasses  Left Eye Vision: Impaired, Wears Glasses  Hearing Impairment : No         Advance Directive  Advance  Directive?:  (no ACP documents on file)    Domestic Abuse  Have you ever been the victim of abuse or violence?: No  Physical Abuse or Sexual Abuse: No  Verbal Abuse or Emotional Abuse: No  Possible Abuse/Neglect Reported to:: Not Applicable         Discharge Risks or Barriers  Discharge risks or barriers?: No  Patient risk factors: Vulnerable adult    Anticipated Discharge Information  Discharge Disposition: Still a Patient (30)

## 2022-09-09 NOTE — ASSESSMENT & PLAN NOTE
Elevated lactic of 3.1 suspect this is secondary to underlying small bowel obstruction and poor intake with emesis over the last 24 hours  -Continue with IV fluids  -Upper

## 2022-09-09 NOTE — ASSESSMENT & PLAN NOTE
Most likely stress response with possible bladder infection  Patient has no known allergies  Patient should be on Bactrim for about 10 days and then will most likely need colonoscopy

## 2022-09-09 NOTE — PROGRESS NOTES
Md contacted about medication administraion while pt is get intermittent suction through his NG tube. MD advised to hold all oral medications at this time.

## 2022-09-09 NOTE — PROGRESS NOTES
4 Eyes Skin Assessment Completed by ESPERANZA Sousa and ESPERANZA Jameson.    Head WDL  Ears WDL  Nose WDL  Mouth WDL  Neck WDL  Breast/Chest WDL  Shoulder Blades WDL  Spine WDL  (R) Arm/Elbow/Hand WDL  (L) Arm/Elbow/Hand WDL  Abdomen WDL  Groin WDL  Scrotum/Coccyx/Buttocks WDL  (R) Leg Scar  (L) Leg WDL  (R) Heel/Foot/Toe WDL  (L) Heel/Foot/Toe Edema          Devices In Places Pulse Ox      Interventions In Place Pressure Redistribution Mattress    Possible Skin Injury No    Pictures Uploaded Into Epic No, needs to be completed  Wound Consult Placed N/A  RN Wound Prevention Protocol Ordered No

## 2022-09-09 NOTE — CONSULTS
9/9:  ATSP by Dr Alvarez for SBO    HPI: 76y M admitted overnight with abdominal pain and nausea and emesis at home.  He was diagnosed with SBO and admitted overnight for further care.  NGT was placed with minimal output and he has since had resumption of bowel function.  He had a plain film this morning which demonstrated contrast in the colon suggested against SBO as well.  Since NGT has been removed he has been able to ambulate and tolerate PO without difficulty.  There was a question of contrast in the bladder, but this is a normal finding after IV contrast administration.  Pt has been voiding without difficulty.      Currently the pt is sitting up in bed, ambulating, and pain is resolved.  He is able to tolerate PO liquids and feels ready for discharge home when feasible.    PMHx: HLD, HTN, Hypothyroid, T2DM, Obesity    PSHx: Hand Surgery    Meds: see Med Rec, no anticoagulation    NKDA    FamHx: no colon/rectal cancers, no other pertinent family history    SocHx: No Tob/Drugs, occasional EtOH, former but not current smoker      ROS: negative except as above    Consitutional- above  HEENT- no visual changes, no sneezing or runny nose  Skin- no rashes or itching  Cardiovascular- no chest pain or palpatations  Respiratory- no SOB or cough  GI- above  - no dysurea  Neuro- no weakness or syncope  Musculoskeletal- no muscle or joint pain  Heme- no bleeding or bruising  Lymphatic- no enlarged nodes or previous splenectomy  Endocrine- No sweating or heat/cold intolerance  Allergy- No asthma or hives  Psychiatric- no depression or anxiety        Physical Exam:   AFVSS  A@O x3, NAD  NCAT, no scleral icterus  Neck nontender, no lymphadenopathy  Normal respiratory effort, no chest wall masses  RRR, 2+ pulses  Abdomen soft, no peritonitis, no masses  Extremities warm and well perfused  No skin rashes or lesions    Labs: WBC 14.7, Cht 42, Plt 258  Lytes wnl    Radiology: CT A/P 9/8: 1.  Mid to distal moderate grade  small bowel obstruction     2.  Hepatic steatosis     3.  Incidental hepatic cyst     4.  Left colon diverticulosis    KUB 9/9: Contrast is present in the colon, excluding small bowel obstruction      A/P: 76y M here with SBO vs Gastroenteritis.  Favor the later given prompt resolution for symtpoms.  He is doing much better now with adequate hydration.  No surgical needs at this time.  Ok for discharge home from surgical standpoint.  Follow up with PCP prn.

## 2022-09-09 NOTE — CARE PLAN
Problem: Pain - Standard  Goal: Alleviation of pain or a reduction in pain to the patient’s comfort goal  Outcome: Progressing     Problem: Fall Risk  Goal: Patient will remain free from falls  Outcome: Progressing     Problem: Knowledge Deficit - Standard  Goal: Patient and family/care givers will demonstrate understanding of plan of care, disease process/condition, diagnostic tests and medications  Outcome: Progressing   The patient is Stable - Low risk of patient condition declining or worsening    Shift Goals  Clinical Goals: Pt abdominal pain will decrease by end of shift  Patient Goals: rest comfortably    Progress made toward(s) clinical / shift goals:  all goals     Patient is not progressing towards the following goals:

## 2022-09-09 NOTE — H&P
Hospital Medicine History & Physical Note    Date of Service  9/8/2022    Primary Care Physician  Jose Flores D.O.    Consultants  general surgery    Specialist Names: Jp    Code Status  DNAR/DNI    Chief Complaint  Chief Complaint   Patient presents with    N/V    Abdominal Pain     75 yo male presents to triage with reports of all over abdominal pain that started at 0100am.  + N/V/D.  Denies fever, chest pain or shortness of breathe.  Concerned it could be something he ate but his wife had the same food and is fine        History of Presenting Illness  Saleem Whitney is a 76 y.o. male past medical history of type 2 diabetes, hypertension, hypothyroidism who presented 9/8/2022 with recent abdominal discomfort, bloating and belching several episodes of large-volume emesis that started yesterday and has progressively gotten worse.  He denies having similar symptoms in the past.  He denies any history of abdominal surgeries, he states that he is up-to-date on his colonoscopies.  He denies any history of hematemesis or melena.  He denies any fever, chills new medications.  He notes good appetite and regular bowel movements prior to this episode.  He has had some small loose stools over the last 24 hours.    In the ER patient is afebrile with temperature of 99.3, HR 86, RR 18, /83 saturating 94% on room air.  His labs are remarkable for WBC of 15.2 left shift, electrolytes are stable his glucose is 189 lactic acid is elevated at 3.1  CT abdomen pelvis was done which showed mid to distal moderate grade small bowel obstruction, hepatic steatosis and left colon diverticulosis.     G-tube was placed in the ER patient will be admitted for small bowel obstruction.  ERP did speak with general surgery Dr. Sage.    I discussed the plan of care with patient.    Review of Systems  ROS    Past Medical History   has a past medical history of Hyperlipidemia, Hypertension, Hypothyroid, and Type II or  unspecified type diabetes mellitus without mention of complication, not stated as uncontrolled.    Surgical History   has a past surgical history that includes hand surgery.     Family History  family history includes Diabetes in his maternal uncle, mother, and sister; Stroke in his father.   Family history reviewed with patient. There is no family history that is pertinent to the chief complaint.     Social History   reports that he quit smoking about 50 years ago. His smoking use included cigarettes. He has a 5.00 pack-year smoking history. He has never used smokeless tobacco. He reports current alcohol use of about 4.2 oz per week. He reports that he does not use drugs.    Allergies  No Known Allergies    Medications  Prior to Admission Medications   Prescriptions Last Dose Informant Patient Reported? Taking?   Cyanocobalamin (VITAMIN B-12) 2500 MCG SL Tab UNK at UNK Patient No No   Sig: Place 1 Tablet under the tongue every day.   Dulaglutide (TRULICITY) 0.75 MG/0.5ML Solution Pen-injector OLD RX  No No   Sig: Inject 1 PEN under the skin every 7 days.   Dulaglutide (TRULICITY) 1.5 MG/0.5ML Solution Pen-injector 9/4/2022 at NEW RX  No No   Sig: Inject 0.5 mL under the skin every 7 days.   Magnesium 500 MG Tab UNK at UNK Patient No No   Sig: Take 1,000 mg by mouth every day.   amLODIPine (NORVASC) 10 MG Tab UNK at UNK  No No   Sig: Take 1 Tablet by mouth every day.   atorvastatin (LIPITOR) 10 MG Tab UNK at UNK  No No   Sig: Take 1 Tablet by mouth every day.   glipiZIDE (GLUCOTROL) 10 MG Tab UNK at UNK  No No   Sig: Take 1 Tablet by mouth every day.   levothyroxine (SYNTHROID) 88 MCG Tab UNK at UNK  No No   Sig: Take 1 Tablet by mouth every morning on an empty stomach.   losartan (COZAAR) 100 MG Tab UNK at UNK  No No   Sig: Take 1 Tablet by mouth every day.   metformin (GLUCOPHAGE) 1000 MG tablet UNK at UNK  No No   Sig: Take 1 Tablet by mouth 2 times a day with meals.   tamsulosin (FLOMAX) 0.4 MG capsule NOT  TAKING  No No   Sig: Take 1 Capsule by mouth 1/2 hour after breakfast.   vitamin D (CHOLECALCIFEROL) 1000 UNIT Tab UNK at UNK  No No   Sig: Take 1 Tab by mouth every day.      Facility-Administered Medications: None       Physical Exam  Temp:  [36.9 °C (98.4 °F)-37.4 °C (99.3 °F)] 36.9 °C (98.4 °F)  Pulse:  [81-86] 86  Resp:  [18] 18  BP: (137-168)/(74-90) 168/79  SpO2:  [94 %-96 %] 96 %  Blood Pressure : (!) 151/79   Temperature: 37.4 °C (99.3 °F)   Pulse: 86   Respiration: 18   Pulse Oximetry: 94 %       Physical Exam    Laboratory:  Recent Labs     09/08/22  1730   WBC 15.2*   RBC 5.61   HEMOGLOBIN 16.5   HEMATOCRIT 48.5   MCV 86.5   MCH 29.4   MCHC 34.0   RDW 39.3   PLATELETCT 305   MPV 9.9     Recent Labs     09/08/22  1730   SODIUM 141   POTASSIUM 4.1   CHLORIDE 101   CO2 24   GLUCOSE 189*   BUN 18   CREATININE 1.07   CALCIUM 10.1     Recent Labs     09/08/22  1730   ALTSGPT 14   ASTSGOT 16   ALKPHOSPHAT 86   TBILIRUBIN 0.6   LIPASE 21   GLUCOSE 189*         No results for input(s): NTPROBNP in the last 72 hours.      No results for input(s): TROPONINT in the last 72 hours.    Imaging:  DX-ABDOMEN FOR TUBE PLACEMENT   Final Result      NG tube tip projects over the stomach. Side-port projects at the gastroesophageal junction.      CT-ABDOMEN-PELVIS WITH   Final Result      1.  Mid to distal moderate grade small bowel obstruction      2.  Hepatic steatosis      3.  Incidental hepatic cyst      4.  Left colon diverticulosis      [id number] VIEW    (Results Pending)       X-Ray:  I have personally reviewed the images and compared with prior images.    Assessment/Plan:  Justification for Admission Status  I anticipate this patient will require at least two midnights for appropriate medical management, necessitating inpatient admission because patient has acute small bowel obstruction which will require close monitoring, replacement of electrolytes and a slow advancement of diet as it resolves.  Patient will need  to be monitored by general surgery for possible surgical intervention if his obstruction fails to improve on its own.    Patient will need a Med/Surg bed on MEDICAL service .  The need is secondary to treatment of small bowel obstruction..    * Small bowel obstruction (HCC)- (present on admission)  Assessment & Plan  Patient with acute small bowel obstruction unclear etiology  -CT showing mid to distal moderate grade small bowel obstruction, no obvious transition point noted  -NG tube in place, low intermittent suction  -Supportive care with pain control and antiemetics, should his symptoms worsen okay to change to continuous low suction  -N.p.o..  Sips with meds okay for now  -General surgery is following, greatly appreciate their help with management and recommendations moving forward  -Encourage mobility  -Monitor closely    Lactic acidosis- (present on admission)  Assessment & Plan  Elevated lactic of 3.1 suspect this is secondary to underlying small bowel obstruction and poor intake with emesis over the last 24 hours  -Continue with IV fluids  -Upper    Leukocytosis- (present on admission)  Assessment & Plan  Leukocytosis on admission with a WBC of 15.2 I suspect that this is reactive secondary to small bowel obstruction  -Hold on any antibiotic therapy for now and monitor closely    (HCC) Hyperlipidemia due to type 2 diabetes mellitus- (present on admission)  Assessment & Plan  Continue home statin    Acquired hypothyroidism- (present on admission)  Assessment & Plan  Continue home Synthroid  -Previous levels noted to be within normal range 7 months ago    (HCC) Hypertension associated with diabetes- (present on admission)  Assessment & Plan  History of hypertension, currently hypertensive likely related to underlying medical condition and discomfort  Continue home medications, patient is on amlodipine we will continue for now however if his bowel obstruction should fail to resolve may consider discontinuing  or changing to an alternative therapy as this can cause her increased risk for ileus and constipation  As needed antihypertensives    (HCC) Type 2 diabetes mellitus without complication, without long-term current use of insulin- (present on admission)  Assessment & Plan  History of type 2 diabetes,  well controlled with hemoglobin A1c of 7%  Insulin sliding scale with hypoglycemic protocol for now  -Holding home medications metformin and the injectables as we do not have them on formulary  -Patient is able to eat will order diabetic diet      VTE prophylaxis: heparin ppx

## 2022-09-09 NOTE — ASSESSMENT & PLAN NOTE
Optimize blood pressure management keep systolic blood pressure less than 140 diastolic under 90.  Continue with medication management using Norvasc 10 mg daily, losartan 100 mg daily

## 2022-09-09 NOTE — ED PROVIDER NOTES
ED Provider Note    CHIEF COMPLAINT  Chief Complaint   Patient presents with    N/V    Abdominal Pain     77 yo male presents to triage with reports of all over abdominal pain that started at 0100am.  + N/V/D.  Denies fever, chest pain or shortness of breathe.  Concerned it could be something he ate but his wife had the same food and is fine        HPI  Saleem Whitney is a 76 y.o. male who presents for evaluation of abdominal pain which started around 1 AM.  Patient notes that he was having difficulty sleeping for no particular reason and got up to read.  Shortly after doing so he noted sudden onset of vomiting which was pretty much everything he ate.  He notes that the vomiting came without any nausea when and only happened once.  He is currently not nauseous and notes a dull ache but feels he is very bloated and is belching more than usual.  He notes a small amount of diarrhea earlier today but no profuse watery diarrhea, melena or hematochezia.  He notes no recent illnesses and has not ever had any surgeries on his belly.    REVIEW OF SYSTEMS  Constitutional: No fevers or chills  Skin: No rashes  HEENT: No sore throat, runny nose  Neck: No neck pain  Chest: No pain or rashes  Pulm: No shortness of breath, cough, wheezing, stridor, or pain with inspiration/expiration  Gastrointestinal: No nausea, diarrhea, constipation, bloating, melena, or hematochezia   Genitourinary: No dysuria or hematuria  Musculoskeletal: No pain, swelling, weakness  Neurologic: No sensory or motor changes to extremities  Heme: No bleeding or bruising problems.   Immuno: No hx of recurrent infections    PAST FAM HISTORY  Family History   Problem Relation Age of Onset    Diabetes Mother     Stroke Father         45    Diabetes Sister     Diabetes Maternal Uncle        PAST MEDICAL HISTORY   has a past medical history of Hyperlipidemia, Hypertension, Hypothyroid, and Type II or unspecified type diabetes mellitus without mention of  "complication, not stated as uncontrolled.    SOCIAL HISTORY  Social History     Tobacco Use    Smoking status: Former     Packs/day: 1.00     Years: 5.00     Pack years: 5.00     Types: Cigarettes     Quit date: 1971     Years since quittin.8    Smokeless tobacco: Never   Vaping Use    Vaping Use: Never used   Substance and Sexual Activity    Alcohol use: Yes     Alcohol/week: 4.2 oz     Types: 7 Glasses of wine per week     Comment: daily    Drug use: No    Sexual activity: Yes     Partners: Female       SURGICAL HISTORY   has a past surgical history that includes hand surgery.    CURRENT MEDICATIONS  Home Medications       Reviewed by Wilda France, PharmD (Pharmacist) on 22 at 2017  Med List Status: Complete     Medication Last Dose Status   amLODIPine (NORVASC) 10 MG Tab UNK Active   atorvastatin (LIPITOR) 10 MG Tab UNK Active   Cyanocobalamin (VITAMIN B-12) 2500 MCG SL Tab UNK Active   Dulaglutide (TRULICITY) 0.75 MG/0.5ML Solution Pen-injector OLD RX Active   Dulaglutide (TRULICITY) 1.5 MG/0.5ML Solution Pen-injector 2022 Active   glipiZIDE (GLUCOTROL) 10 MG Tab  Active   levothyroxine (SYNTHROID) 88 MCG Tab  Active   losartan (COZAAR) 100 MG Tab UNK Active   Magnesium 500 MG Tab UNK Active   metformin (GLUCOPHAGE) 1000 MG tablet UNK Active   tamsulosin (FLOMAX) 0.4 MG capsule NOT TAKING Active   vitamin D (CHOLECALCIFEROL) 1000 UNIT Tab UNK Active                    ALLERGIES  No Known Allergies    PHYSICAL EXAM  VITAL SIGNS: BP (!) 150/79 Comment: rn notified  Pulse 89   Temp 37.1 °C (98.8 °F) (Oral)   Resp 18   Ht 1.854 m (6' 1\")   Wt 93.3 kg (205 lb 11 oz)   SpO2 93%   BMI 27.14 kg/m²    Gen: Alert in no apparent distress.  HEENT: No signs of trauma, Bilateral external ears normal, Nose normal. Conjunctiva normal, Non-icteric.   Neck:  No tenderness, Supple, No masses  Lymphatic: No cervical lymphadenopathy noted.   Cardiovascular: Regular rate and rhythm, no murmurs.  Capillary " refill less than 3 seconds to all extremities, 2+ distal pulses.  Thorax & Lungs: Normal breath sounds, No respiratory distress, No wheezing bilateral chest rise  Abdomen: Bowel sounds normal, Soft, very mild diffuse tenderness, No masses, No pulsatile masses. No Guarding or rebound.  Somewhat distended.  Skin: Warm, Dry  Extremities: Intact distal pulses, No edema  Neurologic: Alert , no facial droop, grossly normal coordination and strength  Psychiatric: Affect pleasant      LABS  Results for orders placed or performed during the hospital encounter of 09/08/22   CBC WITH DIFFERENTIAL   Result Value Ref Range    WBC 15.2 (H) 4.8 - 10.8 K/uL    RBC 5.61 4.70 - 6.10 M/uL    Hemoglobin 16.5 14.0 - 18.0 g/dL    Hematocrit 48.5 42.0 - 52.0 %    MCV 86.5 81.4 - 97.8 fL    MCH 29.4 27.0 - 33.0 pg    MCHC 34.0 33.7 - 35.3 g/dL    RDW 39.3 35.9 - 50.0 fL    Platelet Count 305 164 - 446 K/uL    MPV 9.9 9.0 - 12.9 fL    Neutrophils-Polys 82.80 (H) 44.00 - 72.00 %    Lymphocytes 10.90 (L) 22.00 - 41.00 %    Monocytes 5.20 0.00 - 13.40 %    Eosinophils 0.70 0.00 - 6.90 %    Basophils 0.00 0.00 - 1.80 %    Immature Granulocytes 0.40 0.00 - 0.90 %    Nucleated RBC 0.00 /100 WBC    Neutrophils (Absolute) 12.56 (H) 1.82 - 7.42 K/uL    Lymphs (Absolute) 1.65 1.00 - 4.80 K/uL    Monos (Absolute) 0.79 0.00 - 0.85 K/uL    Eos (Absolute) 0.10 0.00 - 0.51 K/uL    Baso (Absolute) 0.00 0.00 - 0.12 K/uL    Immature Granulocytes (abs) 0.06 0.00 - 0.11 K/uL    NRBC (Absolute) 0.00 K/uL   COMP METABOLIC PANEL   Result Value Ref Range    Sodium 141 135 - 145 mmol/L    Potassium 4.1 3.6 - 5.5 mmol/L    Chloride 101 96 - 112 mmol/L    Co2 24 20 - 33 mmol/L    Anion Gap 16.0 7.0 - 16.0    Glucose 189 (H) 65 - 99 mg/dL    Bun 18 8 - 22 mg/dL    Creatinine 1.07 0.50 - 1.40 mg/dL    Calcium 10.1 8.4 - 10.2 mg/dL    AST(SGOT) 16 12 - 45 U/L    ALT(SGPT) 14 2 - 50 U/L    Alkaline Phosphatase 86 30 - 99 U/L    Total Bilirubin 0.6 0.1 - 1.5 mg/dL     Albumin 4.6 3.2 - 4.9 g/dL    Total Protein 7.8 6.0 - 8.2 g/dL    Globulin 3.2 1.9 - 3.5 g/dL    A-G Ratio 1.4 g/dL   LIPASE   Result Value Ref Range    Lipase 21 7 - 58 U/L   URINALYSIS    Specimen: Urine   Result Value Ref Range    Color Yellow     Character Clear     Specific Gravity 1.020 <1.035    Ph 5.5 5.0 - 8.0    Glucose Negative Negative mg/dL    Ketones Trace (A) Negative mg/dL    Protein Negative Negative mg/dL    Bilirubin Negative Negative    Nitrite Negative Negative    Leukocyte Esterase Negative Negative    Occult Blood Trace (A) Negative    Micro Urine Req Microscopic    ESTIMATED GFR   Result Value Ref Range    GFR (CKD-EPI) 71 >60 mL/min/1.73 m 2   LACTIC ACID   Result Value Ref Range    Lactic Acid 3.1 (H) 0.5 - 2.0 mmol/L   URINE MICROSCOPIC (W/UA)   Result Value Ref Range    RBC 0-2 (A) /hpf    Epithelial Cells Rare Few /hpf    Mucous Threads Few /hpf   LACTIC ACID   Result Value Ref Range    Lactic Acid 3.0 (H) 0.5 - 2.0 mmol/L   POCT glucose device results   Result Value Ref Range    POC Glucose, Blood 187 (H) 65 - 99 mg/dL       RADIOLOGY  DX-ABDOMEN FOR TUBE PLACEMENT   Final Result      NG tube tip projects over the stomach. Side-port projects at the gastroesophageal junction.      CT-ABDOMEN-PELVIS WITH   Final Result      1.  Mid to distal moderate grade small bowel obstruction      2.  Hepatic steatosis      3.  Incidental hepatic cyst      4.  Left colon diverticulosis      [id number] VIEW    (Results Pending)       COURSE & MEDICAL DECISION MAKING  Patient arrives for evaluation of bloating, mild abdominal pain, and episode of vomiting early this morning.  He has not had any abdominal surgeries and does not appear septic or toxic.  His symptoms are persistent and I feel CT imaging will be necessary to rule out ileus or obstruction.    Patient appears to have an obstruction of some sort although the transition point is not particularly clear.  Likewise, the source of the obstruction  is also unclear as he has no previous surgeries and there are no other alternative findings to explain what is happening.  Patient is remaining nontoxic but is feeling quite bloated.  He states understanding the need for an NG tube to help decrease the distention and facilitate medical treatment.  He is comfortable with this.  Patient was discussed with the general surgeon, Dr. Sage who will see the patient in the morning.    FINAL IMPRESSION  1. Small bowel obstruction (HCC)        Electronically signed by: Alvarez Yarbrough M.D., 9/8/2022 5:47 PM

## 2022-09-09 NOTE — ED NOTES
Med Rec completed per patient and discussion on patient's wife    Trulicity dose was recently increase and last dose on Sunday 9/4/22    Patient stated he was not taking the flomax (tamsulosin)

## 2022-09-12 ENCOUNTER — PATIENT OUTREACH (OUTPATIENT)
Dept: HEALTH INFORMATION MANAGEMENT | Facility: OTHER | Age: 77
End: 2022-09-12
Payer: MEDICARE

## 2022-09-12 ENCOUNTER — PATIENT OUTREACH (OUTPATIENT)
Dept: MEDICAL GROUP | Facility: MEDICAL CENTER | Age: 77
End: 2022-09-12
Payer: MEDICARE

## 2022-09-12 SDOH — ECONOMIC STABILITY: INCOME INSECURITY: HOW HARD IS IT FOR YOU TO PAY FOR THE VERY BASICS LIKE FOOD, HOUSING, MEDICAL CARE, AND HEATING?: NOT HARD AT ALL

## 2022-09-12 SDOH — ECONOMIC STABILITY: FOOD INSECURITY: WITHIN THE PAST 12 MONTHS, YOU WORRIED THAT YOUR FOOD WOULD RUN OUT BEFORE YOU GOT MONEY TO BUY MORE.: NEVER TRUE

## 2022-09-12 SDOH — ECONOMIC STABILITY: FOOD INSECURITY: WITHIN THE PAST 12 MONTHS, THE FOOD YOU BOUGHT JUST DIDN'T LAST AND YOU DIDN'T HAVE MONEY TO GET MORE.: NEVER TRUE

## 2022-09-12 NOTE — PROGRESS NOTES
BENTLEY Pastor contacted pt via TC post d/c to introduce CCM services. Completed SDOH screening and outpatient assessment. Pt mentions established PCP and scheduled follow up in a couple weeks. Pt not sure of exact date as he was driving. Pt mentions good support from family and friends. No medical equipment used at home. Pt is confident in ability to manage care post d/c. No issues keeping appointments or financial barriers to care. Completed AVS review and states he did have need for medications post discharge. CCM contact info left with pt. Encouraged pt to contact if needed.     Community Health Worker Intake  Social determinates of health intake completed.   Identified barriers to none.  Contact information provided to Saleem Whitney. Yes   Has PCP appointment scheduled for. Pt states he made follow up but does not have exact date as he was driving.   Scheduled Food Delivery/Home Visit/Outpatient Visit: No  Accepted/Declined Meds-To-Beds. Not needed.   Inpatient/Outpatient assessment completed. Outpatient   Did the patient receive medications post discharge: none needed.     Plan: Additional 30 day outreach follow up.

## 2022-09-13 ENCOUNTER — PATIENT OUTREACH (OUTPATIENT)
Dept: MEDICAL GROUP | Facility: MEDICAL CENTER | Age: 77
End: 2022-09-13
Payer: MEDICARE

## 2022-09-15 ENCOUNTER — PATIENT OUTREACH (OUTPATIENT)
Dept: HEALTH INFORMATION MANAGEMENT | Facility: OTHER | Age: 77
End: 2022-09-15
Payer: MEDICARE

## 2022-11-03 ENCOUNTER — OFFICE VISIT (OUTPATIENT)
Dept: MEDICAL GROUP | Facility: MEDICAL CENTER | Age: 77
End: 2022-11-03
Payer: MEDICARE

## 2022-11-03 VITALS
HEART RATE: 67 BPM | BODY MASS INDEX: 28.43 KG/M2 | HEIGHT: 73 IN | SYSTOLIC BLOOD PRESSURE: 124 MMHG | WEIGHT: 214.51 LBS | TEMPERATURE: 97.9 F | DIASTOLIC BLOOD PRESSURE: 58 MMHG | OXYGEN SATURATION: 97 %

## 2022-11-03 DIAGNOSIS — E03.9 ACQUIRED HYPOTHYROIDISM: ICD-10-CM

## 2022-11-03 DIAGNOSIS — E11.9 TYPE 2 DIABETES MELLITUS WITHOUT COMPLICATION, WITHOUT LONG-TERM CURRENT USE OF INSULIN (HCC): ICD-10-CM

## 2022-11-03 DIAGNOSIS — N40.1 BPH ASSOCIATED WITH NOCTURIA: ICD-10-CM

## 2022-11-03 DIAGNOSIS — R35.1 BPH ASSOCIATED WITH NOCTURIA: ICD-10-CM

## 2022-11-03 DIAGNOSIS — E78.5 HYPERLIPIDEMIA DUE TO TYPE 2 DIABETES MELLITUS (HCC): ICD-10-CM

## 2022-11-03 DIAGNOSIS — E11.59 HYPERTENSION ASSOCIATED WITH DIABETES (HCC): ICD-10-CM

## 2022-11-03 DIAGNOSIS — I15.2 HYPERTENSION ASSOCIATED WITH DIABETES (HCC): ICD-10-CM

## 2022-11-03 DIAGNOSIS — E11.69 HYPERLIPIDEMIA DUE TO TYPE 2 DIABETES MELLITUS (HCC): ICD-10-CM

## 2022-11-03 PROCEDURE — 99214 OFFICE O/P EST MOD 30 MIN: CPT | Performed by: STUDENT IN AN ORGANIZED HEALTH CARE EDUCATION/TRAINING PROGRAM

## 2022-11-03 ASSESSMENT — ENCOUNTER SYMPTOMS: GENERAL WELL-BEING: GOOD

## 2022-11-03 ASSESSMENT — PATIENT HEALTH QUESTIONNAIRE - PHQ9: CLINICAL INTERPRETATION OF PHQ2 SCORE: 0

## 2022-11-03 ASSESSMENT — FIBROSIS 4 INDEX: FIB4 SCORE: 0.87

## 2022-11-03 ASSESSMENT — ACTIVITIES OF DAILY LIVING (ADL): BATHING_REQUIRES_ASSISTANCE: 0

## 2022-11-03 NOTE — PROGRESS NOTES
Subjective:     CC: hospital follow up    HPI:   Saleem presents today for hospital follow up    Problem   Bph Associated With Nocturia    Chronic condition. He has been having nocturia once nightly. He has been taking tamsulosin 0.4mg daily which he did not find helpful and would like to stop taking the medication for now. He reports his symptoms are mild at this time.     (HCC) Type 2 diabetes mellitus without complication, without long-term current use of insulin    Chronic condition. Followed by endocrinology. Onset around age 71. Fasting blood sugar 130s.  Current medication regimen: metformin 1000mg BID, glipizide 10mg daily, Trulicity 0.75mg weekly  Patient tolerating and reports compliant with medications. Does not need refill of medications today. Denies vision changes, dry mouth, chest pain, nausea/vomiting/diarrhea, polydipsia, polyphagia, polyuria, hypoglycemia, numbness/tingling. He checks his feet at home.  Last eye exam: 02/2021  Last foot exam: 01/2022  Diet: tries to eat healthy in general  Exercise: not so much now due to leg pain  Vaccines: flu received 09/2021, PPSV23 completed 11/2016, Tdap received 08/2017    He has been told to drink more water now so he increased his fluid intake daily and has to urinate more frequently now due to increased fluid intake. He otherwise denies dribbling, nocturia, weak urinary stream.     (HCC) Hypertension associated with diabetes    Chronic condition.  Current medication regimen: amlodipine 10mg daily, losartan 100mg daily  Patient tolerating and reports compliant with medications. He does not regularly monitor blood pressure at home. Does not need refill of medications today. Denies headache, dizziness, vision changes, chest pain, dyspnea, edema.     Acquired Hypothyroidism    Chronic condition.  Current medication regimen: levothyroxine 88mcg daily  Patient tolerating and reports compliant with medications. He feels euthyroid with current medication. Does  not need refill of medications today. No acute concerns at this time.     (HCC) Hyperlipidemia due to type 2 diabetes mellitus    Chronic condition.  Current regimen: atorvastatin 10mg daily.   He reports compliance and tolerating medication well. Denies musculoskeletal pain, headache, diarrhea. He does not need medication refill today. No acute concerns at this time.         Current Outpatient Medications Ordered in Epic   Medication Sig Dispense Refill    Dulaglutide (TRULICITY) 1.5 MG/0.5ML Solution Pen-injector Inject 0.5 mL under the skin every 7 days. 6 mL 3    metformin (GLUCOPHAGE) 1000 MG tablet Take 1 Tablet by mouth 2 times a day with meals. 180 Tablet 3    glipiZIDE (GLUCOTROL) 10 MG Tab Take 1 Tablet by mouth every day. 90 Tablet 3    losartan (COZAAR) 100 MG Tab Take 1 Tablet by mouth every day. 90 Tablet 3    levothyroxine (SYNTHROID) 88 MCG Tab Take 1 Tablet by mouth every morning on an empty stomach. 90 Tablet 3    amLODIPine (NORVASC) 10 MG Tab Take 1 Tablet by mouth every day. 90 Tablet 3    atorvastatin (LIPITOR) 10 MG Tab Take 1 Tablet by mouth every day. 90 Tablet 3    tamsulosin (FLOMAX) 0.4 MG capsule Take 1 Capsule by mouth 1/2 hour after breakfast. 90 Capsule 3    Cyanocobalamin (VITAMIN B-12) 2500 MCG SL Tab Place 1 Tablet under the tongue every day. 100 Tablet 3    vitamin D (CHOLECALCIFEROL) 1000 UNIT Tab Take 1 Tab by mouth every day. 90 Tab 3    Magnesium 500 MG Tab Take 1,000 mg by mouth every day. 90 Tab 3     No current Epic-ordered facility-administered medications on file.     Social history  Living situation: lives with wife at home  Occupation: retired since 2017, used to run a construction company, and sales work  Marital status:   Alcohol/tobacco/illicit drugs: former smoker x 5yrs quit 1971, drink 1 glass of wine daily, denies illicit drugs  Baseline functional status: independent with ADLs     Health Maintenance: reviewed and discussed with patient  Vaccines: flu  "received 09/2021, PPSV23 completed 11/2016, Tdap received 08/2017, Shingrix completed 11/2018, Pfizer COVID #3 received 10/2021     ROS:  Gen: no fevers/chills  Eyes: no changes in vision  Pulm: no sob, no cough  CV: no chest pain, no palpitations  GI: no nausea/vomiting, no diarrhea  : no dysuria  Skin: no rash  Neuro: no headaches, no numbness/tingling    Objective:     Exam:  /58 (BP Location: Left arm, Patient Position: Sitting, BP Cuff Size: Adult)   Pulse 67   Temp 36.6 °C (97.9 °F) (Temporal)   Ht 1.854 m (6' 1\")   Wt 97.3 kg (214 lb 8.1 oz)   SpO2 97%   BMI 28.30 kg/m²  Body mass index is 28.3 kg/m².    General: Normal appearing. No distress.  Pulmonary: Clear to ausculation.  Normal effort. No rales, ronchi, or wheezing.  Cardiovascular: Regular rate and rhythm without murmur.  Abdomen: Soft, nontender, nondistended. Normal bowel sounds.  Neurologic: Grossly nonfocal  Skin: Warm and dry. No obvious lesions.  Musculoskeletal: Normal gait. No extremity cyanosis, clubbing, or edema.  Psych: Normal mood and affect. Alert and oriented x3. Judgment and insight is normal.    Labs:   Lab Results   Component Value Date/Time    WBC 14.7 (H) 09/09/2022 03:53 AM    RBC 4.87 09/09/2022 03:53 AM    HEMOGLOBIN 14.2 09/09/2022 03:53 AM    HEMATOCRIT 42.4 09/09/2022 03:53 AM    MCV 87.1 09/09/2022 03:53 AM    MCH 29.2 09/09/2022 03:53 AM    MCHC 33.5 (L) 09/09/2022 03:53 AM    RDW 38.9 09/09/2022 03:53 AM    PLATELETCT 258 09/09/2022 03:53 AM    MPV 9.2 09/09/2022 03:53 AM      Lab Results   Component Value Date/Time    SODIUM 141 09/09/2022 03:53 AM    POTASSIUM 3.8 09/09/2022 03:53 AM    CHLORIDE 104 09/09/2022 03:53 AM    CO2 23 09/09/2022 03:53 AM    ANION 14.0 09/09/2022 03:53 AM    GLUCOSE 182 (H) 09/09/2022 03:53 AM    BUN 17 09/09/2022 03:53 AM    CREATININE 0.98 09/09/2022 03:53 AM    CALCIUM 8.8 09/09/2022 03:53 AM    ASTSGOT 9 (L) 09/09/2022 03:53 AM    ALTSGPT 10 09/09/2022 03:53 AM    TBILIRUBIN " 0.5 09/09/2022 03:53 AM    ALBUMIN 3.9 09/09/2022 03:53 AM    TOTPROTEIN 6.4 09/09/2022 03:53 AM    GLOBULIN 2.5 09/09/2022 03:53 AM    AGRATIO 1.6 09/09/2022 03:53 AM     Lab Results   Component Value Date/Time    HBA1C 7.0 (A) 08/23/2022 08:38 AM        Assessment & Plan:     77 y.o. male with the following -     1. (HCC) Type 2 diabetes mellitus without complication, without long-term current use of insulin  Chronic condition, controlled with medications. Most recent A1C 7.0.  - cont current medications: metformin 1000mg BID, Trulicity 0.75mg weekly  - decrease glipizide 10 to 5mg daily  - Discussed daily self foot exam, eye care, and regular exercise with patient  - Patient instructed to follow a low carbohydrate diet  - Follow up in 3 months or earlier as needed  - HEMOGLOBIN A1C; Future  - Lipid Profile; Future  - MICROALBUMIN CREAT RATIO URINE; Future    2. (HCC) Hypertension associated with diabetes  Chronic condition, stable.  - cont current regimen: losartan 100mg daily  - decrease amlodipine 10 to 5mg daily due to intermittent left ankle swelling    3. (HCC) Hyperlipidemia due to type 2 diabetes mellitus  Chronic condition, stable.  - cont current regimen: atorvastatin 10mg daily  - Lipid Profile; Future    4. Acquired hypothyroidism  Chronic condition, stable.  - cont current regimen: levothyroxine 88mcg every morning    5. BPH associated with nocturia  Chronic condition, stable. Did not feel improvement with tamsulosin 0.4mg and does not want to trial higher dose at this time as symptoms are mild.  - cont interval monitoring and consider tamsulosin 0.8mg in the future if symptoms become bothersome again  - follow up PSA per order  - PROSTATE SPECIFIC AG SCREENING; Future    Return in about 3 months (around 2/3/2023) for chronic medical conditions.    Please note that this dictation was created using voice recognition software. I have made every reasonable attempt to correct obvious errors, but I expect  that there are errors of grammar and possibly content that I did not discover before finalizing the note.

## 2022-11-08 ENCOUNTER — PATIENT MESSAGE (OUTPATIENT)
Dept: HEALTH INFORMATION MANAGEMENT | Facility: OTHER | Age: 77
End: 2022-11-08

## 2022-12-12 ENCOUNTER — HOSPITAL ENCOUNTER (OUTPATIENT)
Dept: RADIOLOGY | Facility: MEDICAL CENTER | Age: 77
End: 2022-12-12
Attending: PHYSICIAN ASSISTANT
Payer: MEDICARE

## 2022-12-12 ENCOUNTER — HOSPITAL ENCOUNTER (OUTPATIENT)
Dept: LAB | Facility: MEDICAL CENTER | Age: 77
End: 2022-12-12
Attending: STUDENT IN AN ORGANIZED HEALTH CARE EDUCATION/TRAINING PROGRAM
Payer: MEDICARE

## 2022-12-12 DIAGNOSIS — E78.5 HYPERLIPIDEMIA DUE TO TYPE 2 DIABETES MELLITUS (HCC): ICD-10-CM

## 2022-12-12 DIAGNOSIS — N20.0 URIC ACID NEPHROLITHIASIS: ICD-10-CM

## 2022-12-12 DIAGNOSIS — E11.69 HYPERLIPIDEMIA DUE TO TYPE 2 DIABETES MELLITUS (HCC): ICD-10-CM

## 2022-12-12 DIAGNOSIS — E11.59 HYPERTENSION ASSOCIATED WITH DIABETES (HCC): ICD-10-CM

## 2022-12-12 DIAGNOSIS — E11.9 TYPE 2 DIABETES MELLITUS WITHOUT COMPLICATION, WITHOUT LONG-TERM CURRENT USE OF INSULIN (HCC): ICD-10-CM

## 2022-12-12 DIAGNOSIS — N40.1 BPH ASSOCIATED WITH NOCTURIA: ICD-10-CM

## 2022-12-12 DIAGNOSIS — R35.1 BPH ASSOCIATED WITH NOCTURIA: ICD-10-CM

## 2022-12-12 DIAGNOSIS — I15.2 HYPERTENSION ASSOCIATED WITH DIABETES (HCC): ICD-10-CM

## 2022-12-12 LAB
ANION GAP SERPL CALC-SCNC: 11 MMOL/L (ref 7–16)
BUN SERPL-MCNC: 19 MG/DL (ref 8–22)
CALCIUM SERPL-MCNC: 9.5 MG/DL (ref 8.4–10.2)
CHLORIDE SERPL-SCNC: 104 MMOL/L (ref 96–112)
CHOLEST SERPL-MCNC: 116 MG/DL (ref 100–199)
CO2 SERPL-SCNC: 25 MMOL/L (ref 20–33)
CREAT SERPL-MCNC: 1.11 MG/DL (ref 0.5–1.4)
CREAT UR-MCNC: 176.3 MG/DL
EST. AVERAGE GLUCOSE BLD GHB EST-MCNC: 166 MG/DL
FASTING STATUS PATIENT QL REPORTED: NORMAL
GFR SERPLBLD CREATININE-BSD FMLA CKD-EPI: 67 ML/MIN/1.73 M 2
GLUCOSE SERPL-MCNC: 157 MG/DL (ref 65–99)
HBA1C MFR BLD: 7.4 % (ref 4–5.6)
HDLC SERPL-MCNC: 41 MG/DL
LDLC SERPL CALC-MCNC: 43 MG/DL
MICROALBUMIN UR-MCNC: 2.8 MG/DL
MICROALBUMIN/CREAT UR: 16 MG/G (ref 0–30)
POTASSIUM SERPL-SCNC: 4.1 MMOL/L (ref 3.6–5.5)
PSA SERPL-MCNC: 1.23 NG/ML (ref 0–4)
SODIUM SERPL-SCNC: 140 MMOL/L (ref 135–145)
TRIGL SERPL-MCNC: 162 MG/DL (ref 0–149)

## 2022-12-12 PROCEDURE — 83036 HEMOGLOBIN GLYCOSYLATED A1C: CPT | Mod: GA

## 2022-12-12 PROCEDURE — 82570 ASSAY OF URINE CREATININE: CPT

## 2022-12-12 PROCEDURE — 84153 ASSAY OF PSA TOTAL: CPT

## 2022-12-12 PROCEDURE — 74018 RADEX ABDOMEN 1 VIEW: CPT

## 2022-12-12 PROCEDURE — 80048 BASIC METABOLIC PNL TOTAL CA: CPT

## 2022-12-12 PROCEDURE — 82043 UR ALBUMIN QUANTITATIVE: CPT

## 2022-12-12 PROCEDURE — 80061 LIPID PANEL: CPT

## 2022-12-12 PROCEDURE — 36415 COLL VENOUS BLD VENIPUNCTURE: CPT

## 2023-01-05 ENCOUNTER — OFFICE VISIT (OUTPATIENT)
Dept: MEDICAL GROUP | Facility: MEDICAL CENTER | Age: 78
End: 2023-01-05
Payer: MEDICARE

## 2023-01-05 VITALS
TEMPERATURE: 97.8 F | WEIGHT: 212.08 LBS | BODY MASS INDEX: 28.11 KG/M2 | HEIGHT: 73 IN | HEART RATE: 63 BPM | DIASTOLIC BLOOD PRESSURE: 54 MMHG | OXYGEN SATURATION: 98 % | SYSTOLIC BLOOD PRESSURE: 122 MMHG

## 2023-01-05 DIAGNOSIS — E55.9 VITAMIN D DEFICIENCY: ICD-10-CM

## 2023-01-05 DIAGNOSIS — I15.2 HYPERTENSION ASSOCIATED WITH DIABETES (HCC): ICD-10-CM

## 2023-01-05 DIAGNOSIS — R35.1 BPH ASSOCIATED WITH NOCTURIA: ICD-10-CM

## 2023-01-05 DIAGNOSIS — E11.59 HYPERTENSION ASSOCIATED WITH DIABETES (HCC): ICD-10-CM

## 2023-01-05 DIAGNOSIS — E11.9 TYPE 2 DIABETES MELLITUS WITHOUT COMPLICATION, WITHOUT LONG-TERM CURRENT USE OF INSULIN (HCC): ICD-10-CM

## 2023-01-05 DIAGNOSIS — E78.5 HYPERLIPIDEMIA DUE TO TYPE 2 DIABETES MELLITUS (HCC): ICD-10-CM

## 2023-01-05 DIAGNOSIS — I73.9 PVD (PERIPHERAL VASCULAR DISEASE) (HCC): ICD-10-CM

## 2023-01-05 DIAGNOSIS — N40.1 BPH ASSOCIATED WITH NOCTURIA: ICD-10-CM

## 2023-01-05 DIAGNOSIS — E03.9 ACQUIRED HYPOTHYROIDISM: ICD-10-CM

## 2023-01-05 DIAGNOSIS — E11.69 HYPERLIPIDEMIA DUE TO TYPE 2 DIABETES MELLITUS (HCC): ICD-10-CM

## 2023-01-05 PROCEDURE — 99214 OFFICE O/P EST MOD 30 MIN: CPT | Performed by: STUDENT IN AN ORGANIZED HEALTH CARE EDUCATION/TRAINING PROGRAM

## 2023-01-05 RX ORDER — HYDROCODONE BITARTRATE AND ACETAMINOPHEN 5; 325 MG/1; MG/1
1 TABLET ORAL EVERY 6 HOURS PRN
Status: ON HOLD | COMMUNITY
End: 2023-05-19

## 2023-01-05 RX ORDER — GLIPIZIDE 10 MG/1
10 TABLET ORAL DAILY
Qty: 90 TABLET | Refills: 3 | Status: SHIPPED | OUTPATIENT
Start: 2023-01-05 | End: 2023-01-17 | Stop reason: SDUPTHER

## 2023-01-05 RX ORDER — AMLODIPINE BESYLATE 10 MG/1
10 TABLET ORAL
Qty: 90 TABLET | Refills: 3 | Status: SHIPPED | OUTPATIENT
Start: 2023-01-05 | End: 2023-08-16

## 2023-01-05 RX ORDER — POTASSIUM CITRATE 15 MEQ/1
15 TABLET, EXTENDED RELEASE ORAL EVERY EVENING
Status: ON HOLD | COMMUNITY
End: 2023-05-19

## 2023-01-05 RX ORDER — ATORVASTATIN CALCIUM 10 MG/1
10 TABLET, FILM COATED ORAL DAILY
Qty: 90 TABLET | Refills: 3 | Status: SHIPPED | OUTPATIENT
Start: 2023-01-05 | End: 2023-01-17 | Stop reason: SDUPTHER

## 2023-01-05 RX ORDER — TAMSULOSIN HYDROCHLORIDE 0.4 MG/1
0.8 CAPSULE ORAL
Qty: 90 CAPSULE | Refills: 3 | Status: SHIPPED | OUTPATIENT
Start: 2023-01-05 | End: 2023-01-17 | Stop reason: SDUPTHER

## 2023-01-05 RX ORDER — TAMSULOSIN HYDROCHLORIDE 0.4 MG/1
0.4 CAPSULE ORAL
Qty: 90 CAPSULE | Refills: 3 | Status: SHIPPED | OUTPATIENT
Start: 2023-01-05 | End: 2023-01-05 | Stop reason: SDUPTHER

## 2023-01-05 RX ORDER — LOSARTAN POTASSIUM 100 MG/1
100 TABLET ORAL
Qty: 90 TABLET | Refills: 3 | Status: SHIPPED | OUTPATIENT
Start: 2023-01-05 | End: 2023-01-17 | Stop reason: SDUPTHER

## 2023-01-05 RX ORDER — LEVOTHYROXINE SODIUM 88 UG/1
88 TABLET ORAL
Qty: 90 TABLET | Refills: 3 | Status: SHIPPED | OUTPATIENT
Start: 2023-01-05 | End: 2023-01-17 | Stop reason: SDUPTHER

## 2023-01-05 RX ORDER — DULAGLUTIDE 1.5 MG/.5ML
0.5 INJECTION, SOLUTION SUBCUTANEOUS
Qty: 6 ML | Refills: 3 | Status: SHIPPED | OUTPATIENT
Start: 2023-01-05 | End: 2023-01-17 | Stop reason: SDUPTHER

## 2023-01-05 ASSESSMENT — PATIENT HEALTH QUESTIONNAIRE - PHQ9: CLINICAL INTERPRETATION OF PHQ2 SCORE: 0

## 2023-01-05 ASSESSMENT — FIBROSIS 4 INDEX: FIB4 SCORE: 0.87

## 2023-01-05 NOTE — PROGRESS NOTES
Subjective:     CC: chronic conditions follow up    HPI:   Saleem presents today for chronic conditions follow up    Problem   Pvd (Peripheral Vascular Disease) ([facility])    Chronic condition affecting bilateral lower extremities. He has history of bilateral vein cauterization, still with ongoing pain in the right posterior leg. Uses compression stockings.    He reports persistent aching pain in right posterior thigh with prolonged standing. He had some type of vein procedure done last year which initially provided some relief but has been worse since.     Vitamin D Deficiency    Chronic condition.  Current regimen: vitamin D 1000IU BID     Bph Associated With Nocturia    Chronic condition. He has been having nocturia once nightly. He has been taking tamsulosin 0.4mg daily which he did not find helpful and would like to stop taking the medication for now. He reports his symptoms are mild at this time.     ([facility]) Type 2 diabetes mellitus without complication, without long-term current use of insulin    Chronic condition. Followed by endocrinology. Onset around age 71. Fasting blood sugar 130s.  Current medication regimen: metformin 1000mg BID, glipizide 10mg daily, Trulicity 0.75mg weekly  Patient tolerating and reports compliant with medications. Does not need refill of medications today. Denies vision changes, dry mouth, chest pain, nausea/vomiting/diarrhea, polydipsia, polyphagia, polyuria, hypoglycemia, numbness/tingling. He checks his feet at home.  Last eye exam: 02/2021  Last foot exam: 01/2022  Diet: tries to eat healthy in general  Exercise: not so much now due to leg pain  Vaccines: flu received 09/2021, PPSV23 completed 11/2016, Tdap received 08/2017    He has been told to drink more water now so he increased his fluid intake daily and has to urinate more frequently now due to increased fluid intake. He otherwise denies dribbling, nocturia, weak urinary stream.     ([facility]) Hypertension associated with diabetes     Chronic condition.  Current medication regimen: amlodipine 5mg daily, losartan 100mg daily  Patient tolerating and reports compliant with medications. He does not regularly monitor blood pressure at home. Does not need refill of medications today. Denies headache, dizziness, vision changes, chest pain, dyspnea, edema.     Acquired Hypothyroidism    Chronic condition.  Current medication regimen: levothyroxine 88mcg daily  Patient tolerating and reports compliant with medications. He feels euthyroid with current medication. Does not need refill of medications today. No acute concerns at this time.     (HCC) Hyperlipidemia due to type 2 diabetes mellitus    Chronic condition.  Current regimen: atorvastatin 10mg daily.   He reports compliance and tolerating medication well. Denies musculoskeletal pain, headache, diarrhea. He does not need medication refill today. No acute concerns at this time.         Current Outpatient Medications Ordered in Epic   Medication Sig Dispense Refill    HYDROcodone-acetaminophen (NORCO) 5-325 MG Tab per tablet hydrocodone 5 mg-acetaminophen 325 mg tablet   TAKE 1 TABLET BY MOUTH EVERY 6 HOURS AS NEEDED      Potassium Citrate 15 MEQ (1620 MG) Tab CR potassium citrate ER 15 mEq (1,620 mg) tablet,extended release      amLODIPine (NORVASC) 10 MG Tab Take 1 Tablet by mouth every day. 90 Tablet 3    atorvastatin (LIPITOR) 10 MG Tab Take 1 Tablet by mouth every day. 90 Tablet 3    Dulaglutide (TRULICITY) 1.5 MG/0.5ML Solution Pen-injector Inject 0.5 mL under the skin every 7 days. 6 mL 3    glipiZIDE (GLUCOTROL) 10 MG Tab Take 1 Tablet by mouth every day. 90 Tablet 3    metformin (GLUCOPHAGE) 1000 MG tablet Take 1 Tablet by mouth 2 times a day with meals. 180 Tablet 3    levothyroxine (SYNTHROID) 88 MCG Tab Take 1 Tablet by mouth every morning on an empty stomach. 90 Tablet 3    losartan (COZAAR) 100 MG Tab Take 1 Tablet by mouth every day. 90 Tablet 3    tamsulosin (FLOMAX) 0.4 MG capsule  "Take 2 Capsules by mouth 1/2 hour after breakfast. 90 Capsule 3    Cyanocobalamin (VITAMIN B-12) 2500 MCG SL Tab Place 1 Tablet under the tongue every day. 100 Tablet 3    vitamin D (CHOLECALCIFEROL) 1000 UNIT Tab Take 1 Tab by mouth every day. 90 Tab 3    Magnesium 500 MG Tab Take 1,000 mg by mouth every day. 90 Tab 3     No current Epic-ordered facility-administered medications on file.     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, and sales work  Marital status:   Alcohol/tobacco/illicit drugs: former smoker x 5yrs quit 1971, drink 1 glass of wine daily, denies illicit drugs  Baseline functional status: independent with ADLs     Health Maintenance: reviewed and discussed with patient  Vaccines: flu received 09/2021, PPSV23 completed 11/2016, Tdap received 08/2017, Shingrix completed 11/2018, Pfizer COVID #3 received 10/2021     ROS:  Gen: no fevers/chills  Eyes: no changes in vision  Pulm: no sob, no cough  CV: no chest pain, no palpitations  GI: no nausea/vomiting, no diarrhea  : no dysuria, + nocturia  Skin: no rash  Neuro: no headaches, no numbness/tingling    Objective:     Exam:  /54 (BP Location: Left arm, Patient Position: Sitting, BP Cuff Size: Adult)   Pulse 63   Temp 36.6 °C (97.8 °F) (Temporal)   Ht 1.854 m (6' 1\")   Wt 96.2 kg (212 lb 1.3 oz)   SpO2 98%   BMI 27.98 kg/m²  Body mass index is 27.98 kg/m².    General: Normal appearing. No distress.  Pulmonary: Clear to ausculation.  Normal effort. No rales, ronchi, or wheezing.  Cardiovascular: Regular rate and rhythm without murmur.  Abdomen: Soft, nontender, nondistended. Normal bowel sounds.  Neurologic: Grossly nonfocal  Skin: Warm and dry. No obvious lesions.  Musculoskeletal: Normal gait. No extremity cyanosis, clubbing, or edema.  Psych: Normal mood and affect. Alert and oriented x3. Judgment and insight is normal.    Labs:   Lab Results "   Component Value Date/Time    SODIUM 140 12/12/2022 07:42 AM    POTASSIUM 4.1 12/12/2022 07:42 AM    CHLORIDE 104 12/12/2022 07:42 AM    CO2 25 12/12/2022 07:42 AM    ANION 11.0 12/12/2022 07:42 AM    GLUCOSE 157 (H) 12/12/2022 07:42 AM    BUN 19 12/12/2022 07:42 AM    CREATININE 1.11 12/12/2022 07:42 AM    CALCIUM 9.5 12/12/2022 07:42 AM    ASTSGOT 9 (L) 09/09/2022 03:53 AM    ALTSGPT 10 09/09/2022 03:53 AM    TBILIRUBIN 0.5 09/09/2022 03:53 AM    ALBUMIN 3.9 09/09/2022 03:53 AM    TOTPROTEIN 6.4 09/09/2022 03:53 AM    GLOBULIN 2.5 09/09/2022 03:53 AM    AGRATIO 1.6 09/09/2022 03:53 AM     Lab Results   Component Value Date/Time    HBA1C 7.4 (H) 12/12/2022 07:42 AM      Lab Results   Component Value Date/Time    CHOLSTRLTOT 116 12/12/2022 0742    TRIGLYCERIDE 162 (H) 12/12/2022 0742    HDL 41 12/12/2022 0742    LDL 43 12/12/2022 0742     Prostatic Specific Antigen Tot (ng/mL)   Date Value   12/12/2022 1.23   05/21/2019 0.62   08/15/2018 0.69     Lab Results   Component Value Date/Time    MALBCRT 16 12/12/2022 07:42 AM    MICROALBUR 2.8 12/12/2022 07:42 AM        Assessment & Plan:     77 y.o. male with the following -     1. (HCC) Type 2 diabetes mellitus without complication, without long-term current use of insulin  Chronic condition, controlled with medications. Most recent A1C 7.4.  - cont current medications: metformin 1000mg BID, glipizide 10mg daily, Trulicity 1.5mg weekly  - Discussed daily self foot exam, eye care, and regular exercise with patient  - Patient instructed to follow a low carbohydrate diet  - Follow up in 3 months or earlier as needed  - Dulaglutide (TRULICITY) 1.5 MG/0.5ML Solution Pen-injector; Inject 0.5 mL under the skin every 7 days.  Dispense: 6 mL; Refill: 3  - glipiZIDE (GLUCOTROL) 10 MG Tab; Take 1 Tablet by mouth every day.  Dispense: 90 Tablet; Refill: 3  - metformin (GLUCOPHAGE) 1000 MG tablet; Take 1 Tablet by mouth 2 times a day with meals.  Dispense: 180 Tablet; Refill:  3    2. Hypertension associated with diabetes (HCC)  Chronic condition, stable.  - cont current regimen: losartan 100mg daily  - plan to trial discontinue amlodipine due to ankle swelling and low normal blood pressure  - amLODIPine (NORVASC) 10 MG Tab; Take 1 Tablet by mouth every day.  Dispense: 90 Tablet; Refill: 3  - losartan (COZAAR) 100 MG Tab; Take 1 Tablet by mouth every day.  Dispense: 90 Tablet; Refill: 3    3. (HCC) Hyperlipidemia due to type 2 diabetes mellitus  Chronic condition, stable.  - cont current regimen: atorvastatin 10mg daily  - atorvastatin (LIPITOR) 10 MG Tab; Take 1 Tablet by mouth every day.  Dispense: 90 Tablet; Refill: 3    4. PVD (peripheral vascular disease) ([facility])  Chronic condition, stable.  - cont current regimen: atorvastatin 10mg daily    5. Acquired hypothyroidism  Chronic condition, stable.  - cont current regimen: levothyroxine 88mcg every morning  - levothyroxine (SYNTHROID) 88 MCG Tab; Take 1 Tablet by mouth every morning on an empty stomach.  Dispense: 90 Tablet; Refill: 3    6. BPH associated with nocturia  Chronic condition, persistent.  - increase to tamsulosin 0.8mg qAM  - tamsulosin (FLOMAX) 0.4 MG capsule; Take 2 Capsules by mouth 1/2 hour after breakfast.  Dispense: 90 Capsule; Refill: 3    7. Vitamin D deficiency  Chronic condition, stable.  - cont current regimen: over the counter vitamin D3 1000 IU daily    Return in about 6 months (around 7/5/2023) for chronic medical conditions.    Please note that this dictation was created using voice recognition software. I have made every reasonable attempt to correct obvious errors, but I expect that there are errors of grammar and possibly content that I did not discover before finalizing the note.

## 2023-01-17 ENCOUNTER — PATIENT MESSAGE (OUTPATIENT)
Dept: MEDICAL GROUP | Facility: MEDICAL CENTER | Age: 78
End: 2023-01-17
Payer: MEDICARE

## 2023-01-17 DIAGNOSIS — E78.5 HYPERLIPIDEMIA DUE TO TYPE 2 DIABETES MELLITUS (HCC): ICD-10-CM

## 2023-01-17 DIAGNOSIS — N40.1 BPH ASSOCIATED WITH NOCTURIA: ICD-10-CM

## 2023-01-17 DIAGNOSIS — E11.69 HYPERLIPIDEMIA DUE TO TYPE 2 DIABETES MELLITUS (HCC): ICD-10-CM

## 2023-01-17 DIAGNOSIS — I15.2 HYPERTENSION ASSOCIATED WITH DIABETES (HCC): ICD-10-CM

## 2023-01-17 DIAGNOSIS — E03.9 ACQUIRED HYPOTHYROIDISM: ICD-10-CM

## 2023-01-17 DIAGNOSIS — E11.9 TYPE 2 DIABETES MELLITUS WITHOUT COMPLICATION, WITHOUT LONG-TERM CURRENT USE OF INSULIN (HCC): ICD-10-CM

## 2023-01-17 DIAGNOSIS — E11.59 HYPERTENSION ASSOCIATED WITH DIABETES (HCC): ICD-10-CM

## 2023-01-17 DIAGNOSIS — R35.1 BPH ASSOCIATED WITH NOCTURIA: ICD-10-CM

## 2023-01-17 RX ORDER — GLIPIZIDE 10 MG/1
10 TABLET ORAL DAILY
Qty: 90 TABLET | Refills: 3 | Status: SHIPPED | OUTPATIENT
Start: 2023-01-17 | End: 2023-08-16

## 2023-01-17 RX ORDER — DULAGLUTIDE 1.5 MG/.5ML
0.5 INJECTION, SOLUTION SUBCUTANEOUS
Qty: 6 ML | Refills: 3 | Status: SHIPPED | OUTPATIENT
Start: 2023-01-17 | End: 2023-01-24 | Stop reason: SDUPTHER

## 2023-01-17 RX ORDER — TAMSULOSIN HYDROCHLORIDE 0.4 MG/1
0.8 CAPSULE ORAL
Qty: 90 CAPSULE | Refills: 3 | Status: SHIPPED | OUTPATIENT
Start: 2023-01-17 | End: 2023-01-24 | Stop reason: SDUPTHER

## 2023-01-17 RX ORDER — ATORVASTATIN CALCIUM 10 MG/1
10 TABLET, FILM COATED ORAL DAILY
Qty: 90 TABLET | Refills: 3 | Status: SHIPPED | OUTPATIENT
Start: 2023-01-17 | End: 2023-01-24 | Stop reason: SDUPTHER

## 2023-01-17 RX ORDER — LEVOTHYROXINE SODIUM 88 UG/1
88 TABLET ORAL
Qty: 90 TABLET | Refills: 3 | Status: SHIPPED | OUTPATIENT
Start: 2023-01-17 | End: 2023-01-24 | Stop reason: SDUPTHER

## 2023-01-17 RX ORDER — LOSARTAN POTASSIUM 100 MG/1
100 TABLET ORAL
Qty: 90 TABLET | Refills: 3 | Status: SHIPPED | OUTPATIENT
Start: 2023-01-17 | End: 2023-01-24 | Stop reason: SDUPTHER

## 2023-01-23 ENCOUNTER — PATIENT MESSAGE (OUTPATIENT)
Dept: MEDICAL GROUP | Facility: MEDICAL CENTER | Age: 78
End: 2023-01-23
Payer: MEDICARE

## 2023-01-23 DIAGNOSIS — E11.69 HYPERLIPIDEMIA DUE TO TYPE 2 DIABETES MELLITUS (HCC): ICD-10-CM

## 2023-01-23 DIAGNOSIS — R35.1 BPH ASSOCIATED WITH NOCTURIA: ICD-10-CM

## 2023-01-23 DIAGNOSIS — E11.59 HYPERTENSION ASSOCIATED WITH DIABETES (HCC): ICD-10-CM

## 2023-01-23 DIAGNOSIS — E78.5 HYPERLIPIDEMIA DUE TO TYPE 2 DIABETES MELLITUS (HCC): ICD-10-CM

## 2023-01-23 DIAGNOSIS — N40.1 BPH ASSOCIATED WITH NOCTURIA: ICD-10-CM

## 2023-01-23 DIAGNOSIS — E03.9 ACQUIRED HYPOTHYROIDISM: ICD-10-CM

## 2023-01-23 DIAGNOSIS — I15.2 HYPERTENSION ASSOCIATED WITH DIABETES (HCC): ICD-10-CM

## 2023-01-23 DIAGNOSIS — E11.9 TYPE 2 DIABETES MELLITUS WITHOUT COMPLICATION, WITHOUT LONG-TERM CURRENT USE OF INSULIN (HCC): ICD-10-CM

## 2023-01-24 RX ORDER — DULAGLUTIDE 1.5 MG/.5ML
0.5 INJECTION, SOLUTION SUBCUTANEOUS
Qty: 6 ML | Refills: 3 | Status: SHIPPED | OUTPATIENT
Start: 2023-01-24 | End: 2024-02-01 | Stop reason: SDUPTHER

## 2023-01-24 RX ORDER — TAMSULOSIN HYDROCHLORIDE 0.4 MG/1
0.8 CAPSULE ORAL
Qty: 90 CAPSULE | Refills: 3 | Status: SHIPPED | OUTPATIENT
Start: 2023-01-24 | End: 2023-04-18 | Stop reason: SDUPTHER

## 2023-01-24 RX ORDER — LEVOTHYROXINE SODIUM 88 UG/1
88 TABLET ORAL
Qty: 90 TABLET | Refills: 3 | Status: SHIPPED | OUTPATIENT
Start: 2023-01-24 | End: 2023-11-02 | Stop reason: SDUPTHER

## 2023-01-24 RX ORDER — ATORVASTATIN CALCIUM 10 MG/1
10 TABLET, FILM COATED ORAL DAILY
Qty: 90 TABLET | Refills: 3 | Status: SHIPPED | OUTPATIENT
Start: 2023-01-24 | End: 2023-10-07 | Stop reason: SDUPTHER

## 2023-01-24 RX ORDER — LOSARTAN POTASSIUM 100 MG/1
100 TABLET ORAL
Qty: 90 TABLET | Refills: 3 | Status: SHIPPED | OUTPATIENT
Start: 2023-01-24 | End: 2023-10-07 | Stop reason: SDUPTHER

## 2023-01-24 NOTE — PATIENT COMMUNICATION
Received request via: Patient    Was the patient seen in the last year in this department? Yes    Does the patient have an active prescription (recently filled or refills available) for medication(s) requested? No    Does the patient have care home Plus and need 100 day supply (blood pressure, diabetes and cholesterol meds only)? Patient does not have SCP

## 2023-03-02 ENCOUNTER — APPOINTMENT (OUTPATIENT)
Dept: ENDOCRINOLOGY | Facility: MEDICAL CENTER | Age: 78
End: 2023-03-02
Attending: INTERNAL MEDICINE
Payer: MEDICARE

## 2023-03-02 VITALS
HEIGHT: 73 IN | SYSTOLIC BLOOD PRESSURE: 130 MMHG | WEIGHT: 208 LBS | BODY MASS INDEX: 27.57 KG/M2 | OXYGEN SATURATION: 96 % | HEART RATE: 71 BPM | DIASTOLIC BLOOD PRESSURE: 68 MMHG

## 2023-03-02 DIAGNOSIS — E78.5 DYSLIPIDEMIA: ICD-10-CM

## 2023-03-02 DIAGNOSIS — E03.9 HYPOTHYROIDISM (ACQUIRED): ICD-10-CM

## 2023-03-02 DIAGNOSIS — E11.42 CONTROLLED TYPE 2 DIABETES MELLITUS WITH DIABETIC POLYNEUROPATHY, WITHOUT LONG-TERM CURRENT USE OF INSULIN (HCC): ICD-10-CM

## 2023-03-02 DIAGNOSIS — E55.9 VITAMIN D DEFICIENCY: ICD-10-CM

## 2023-03-02 DIAGNOSIS — Z79.84 LONG TERM (CURRENT) USE OF ORAL HYPOGLYCEMIC DRUGS: ICD-10-CM

## 2023-03-02 PROCEDURE — 99214 OFFICE O/P EST MOD 30 MIN: CPT | Performed by: INTERNAL MEDICINE

## 2023-03-02 PROCEDURE — 99211 OFF/OP EST MAY X REQ PHY/QHP: CPT | Performed by: INTERNAL MEDICINE

## 2023-03-02 RX ORDER — DULOXETIN HYDROCHLORIDE 30 MG/1
30 CAPSULE, DELAYED RELEASE ORAL DAILY
Qty: 90 CAPSULE | Refills: 2 | Status: SHIPPED | OUTPATIENT
Start: 2023-03-02 | End: 2023-07-06

## 2023-03-02 ASSESSMENT — FIBROSIS 4 INDEX: FIB4 SCORE: 0.87

## 2023-03-02 NOTE — PROGRESS NOTES
CHIEF COMPLAINT: Patient is here for follow up of Type 2 Diabetes Mellitus    HPI:     Saleem Whitney is a 77 y.o. male with Type 2 Diabetes Mellitus here for follow up.    Labs from 12/12/2023 HbA1c is 7.4%    He was previously seen by the nurse practitioner this is my first time meeting him.  He has a medical history of hyperlipidemia,, hypertension, peripheral vascular disease, type 2 diabetes, primary hypothyroidism.  He also reports diabetic neuropathy      He is on  Metformin at 1000 mg twice a day  Glipizide 10 mg pill 1/2 tablet with breakfast  Trulicity 1.5 mg weekly    He denies side effects with his medications  He admits that he does not check his sugars regularly  He reports that his primary care recently reduced his glipizide from 10 mg to 5 mg daily he takes it with his largest meal    He is on atorvastatin 10 mg daily for hyperlipidemia  LDL cholesterol was 43 on December 2022      He has hypertension is on losartan and amlodipine  Blood pressure is at goal  He does not have albuminuria  U ACR was less than 30 on December 2022      He had an eye exam in August 25, 2022 at [facility] which showed no diabetic retinopathy      For his primary hypothyroidism  He has been on levothyroxine 88 mcg daily which has been his dose for at least 2 years  He denies fatigue, constipation and cold intolerance  His last TSH was normal at 3.0 on February 2022  His free T4 was 1.57 on February 2022  TPO antibodies were negative  We do not have updated labs      His last vitamin D was normal at 62 on February 2022        BG Diary:  Patient is not testing BGs    Weight has been stable    Diabetes Complications   Retinopathy: No known retinopathy.  Last eye exam: 8/25/2023 from [facility]  Neuropathy: He report paresthesias or numbness in both feet. Denies any foot wounds.  Exercise: Minimal.  Diet: Fair.  Patient's medications, allergies, and social histories were reviewed and updated as  appropriate.    ROS:     CONS:     No fever, no chills   EYES:     No diplopia, no blurry vision   CV:           No chest pain, no palpitations   PULM:     No SOB, no cough, no hemoptysis.   GI:            No nausea, no vomiting, no diarrhea, no constipation   ENDO:     No polyuria, no polydipsia, no heat intolerance, no cold intolerance       Past Medical History:  Problem List:  2022-09: Small bowel obstruction ([facility])  2022-09: Leukocytosis  2022-09: Lactic acidosis  2022-07: Elevated serum creatinine  2021-01: Itching of ear  2020-12: PVD (peripheral vascular disease) ([facility])  2020-12: Degenerative arthritis of knee, bilateral  2020-08: Acute right-sided low back pain  2020-06: Chronic pain of right knee  2019-10: Motion sickness  2019-10: Altitude sickness prophylaxis  2019-07: Leg mass, right  2019-06: Vitamin D deficiency  2019-06: Leg cramping  2019-04: Baker cyst, right  2019-04: Erythematous rash  2019-01: Bilateral leg edema  2018-11: Quadriceps muscle strain  2018-11: Nocturia more than twice per night  2018-08: Pitting edema  2018-06: Idiopathic chronic gout, left ankle and foot, without tophus   (tophi)  2018-05: Memory changes  2017-09: Chronic pain of left knee  2017-06: Diastasis of rectus abdominis  2017-06: Left flank pain  2017-06: BPH associated with nocturia  2016-11: Swelling of left hand  2016-11: ([facility]) Obesity, diabetes, and hypertension syndrome  2016-11: ([facility]) Type 2 diabetes mellitus without complication, without   long-term current use of insulin  2016-11: ([facility]) Hypertension associated with diabetes  2016-11: Acquired hypothyroidism  2016-11: ([facility]) Hyperlipidemia due to type 2 diabetes mellitus  2016-11: Primary insomnia  2016-11: Screening for AAA (abdominal aortic aneurysm)      Past Surgical History:  Past Surgical History:   Procedure Laterality Date    HAND SURGERY          Allergies:  Patient has no known allergies.     Social History:  Social History     Tobacco Use    Smoking status:  "Former     Packs/day: 1.00     Years: 5.00     Pack years: 5.00     Types: Cigarettes     Quit date: 1971     Years since quittin.3    Smokeless tobacco: Never   Vaping Use    Vaping Use: Never used   Substance Use Topics    Alcohol use: Yes     Alcohol/week: 4.2 oz     Types: 7 Glasses of wine per week     Comment: daily    Drug use: No        Family History:   family history includes Diabetes in his maternal uncle, mother, and sister; Stroke in his father.      PHYSICAL EXAM:   OBJECTIVE:  Vital signs: /68 (BP Location: Left arm, Patient Position: Sitting, BP Cuff Size: Adult long)   Pulse 71   Ht 1.854 m (6' 1\")   Wt 94.3 kg (208 lb)   SpO2 96%   BMI 27.44 kg/m²   GENERAL: Well-developed, well-nourished in no apparent distress.   EYE:  No ocular asymmetry, PERRLA  HENT: Pink, moist mucous membranes.    NECK: No thyromegaly.   CARDIOVASCULAR:  No murmurs  LUNGS: Clear breath sounds  ABDOMEN: Soft, nontender   EXTREMITIES: No clubbing, cyanosis, or edema.   NEUROLOGICAL: No gross focal motor abnormalities   LYMPH: No cervical adenopathy palpated.   SKIN: No rashes, lesions.   Monofilament testing with a 10 gram force: sensation: intact bilaterally  Visual Inspection: Feet without maceration, ulcers, or fissures.  Pedal pulses: intact bilaterally        Labs:  Lab Results   Component Value Date/Time    HBA1C 7.4 (H) 2022 07:42 AM        Lab Results   Component Value Date/Time    WBC 14.7 (H) 2022 03:53 AM    RBC 4.87 2022 03:53 AM    HEMOGLOBIN 14.2 2022 03:53 AM    MCV 87.1 2022 03:53 AM    MCH 29.2 2022 03:53 AM    MCHC 33.5 (L) 2022 03:53 AM    RDW 38.9 2022 03:53 AM    MPV 9.2 2022 03:53 AM       Lab Results   Component Value Date/Time    SODIUM 140 2022 07:42 AM    POTASSIUM 4.1 2022 07:42 AM    CHLORIDE 104 2022 07:42 AM    CO2 25 2022 07:42 AM    ANION 11.0 2022 07:42 AM    GLUCOSE 157 (H) 2022 07:42 " AM    BUN 19 12/12/2022 07:42 AM    CREATININE 1.11 12/12/2022 07:42 AM    CALCIUM 9.5 12/12/2022 07:42 AM    ASTSGOT 9 (L) 09/09/2022 03:53 AM    ALTSGPT 10 09/09/2022 03:53 AM    TBILIRUBIN 0.5 09/09/2022 03:53 AM    ALBUMIN 3.9 09/09/2022 03:53 AM    TOTPROTEIN 6.4 09/09/2022 03:53 AM    GLOBULIN 2.5 09/09/2022 03:53 AM    AGRATIO 1.6 09/09/2022 03:53 AM       Lab Results   Component Value Date/Time    CHOLSTRLTOT 116 12/12/2022 0742    TRIGLYCERIDE 162 (H) 12/12/2022 0742    HDL 41 12/12/2022 0742    LDL 43 12/12/2022 0742       Lab Results   Component Value Date/Time    MALBCRT 16 12/12/2022 07:42 AM    MICROALBUR 2.8 12/12/2022 07:42 AM        Lab Results   Component Value Date/Time    TSHULTRASEN 3.040 02/02/2022 0847     No results found for: FREEDIR  Lab Results   Component Value Date/Time    FREET3 2.71 02/02/2022 0847     No results found for: THYSTIMIG        ASSESSMENT/PLAN:     1. Controlled type 2 diabetes mellitus with diabetic polyneuropathy, without long-term current use of insulin (HCC)  Fair control  Given his advanced age and A1c of 7.4% is acceptable  We talked about possibly increasing Trulicity however he is acutely aware that there are supply chain issues with this particular medication so he does not want to change the dose at this time which is reasonable  He will continue the current medications of metformin at 1000 mg twice a day  Glipizide 5 mg with breakfast  Trulicity 1.5 mg weekly   he is up-to-date with his labs but he will get updated labs again before his next appoint with me  He is up-to-date with his eye exam  Foot exam was completed today  Finally I am also starting him on duloxetine 30 mg daily for diabetic neuropathy pain  Reviewed side effects alternatives and proper administration  Recommend follow-up in 3 months    2. Hypothyroidism (acquired)  Controlled  Continue levothyroxine 88 mcg daily  Reviewed proper administration of thyroid hormone  Patient should take thyroid  hormone 30-60 minutes before breakfast on an empty stomach plain water and not take it together with food, iron, calcium, and antacids.  Iron, calcium, and antacids should be taken at least 4 hours apart from thyroid hormone.    Repeat TSH in 3 months      3. Dyslipidemia  Stable  Continue Lipitor 10 mg daily  Repeat fasting lipids in 3 months    4. Vitamin D deficiency  Stable   Vitamin D labs were reviewed with patient  Continue current supplements  Continue monitoring levels       5. Long term (current) use of oral hypoglycemic drugs  Patient is on multiple oral agents and a GLP-1 for type 2 diabetes management      Return in about 4 months (around 7/2/2023).      This patient during there office visit today was started on a new medication.  Side effects of the new medication were discussed with the patient today in the office.     Thank you kindly for allowing me to participate in the diabetes care plan for this patient.    Saleem Cardoso MD, JESSIE, [facility]  03/02/23    CC:   Jose Flores D.O.

## 2023-04-10 ENCOUNTER — APPOINTMENT (OUTPATIENT)
Dept: RADIOLOGY | Facility: MEDICAL CENTER | Age: 78
End: 2023-04-10
Attending: EMERGENCY MEDICINE
Payer: MEDICARE

## 2023-04-10 ENCOUNTER — HOSPITAL ENCOUNTER (EMERGENCY)
Facility: MEDICAL CENTER | Age: 78
End: 2023-04-10
Attending: EMERGENCY MEDICINE
Payer: MEDICARE

## 2023-04-10 VITALS
SYSTOLIC BLOOD PRESSURE: 143 MMHG | TEMPERATURE: 98.2 F | BODY MASS INDEX: 26.85 KG/M2 | OXYGEN SATURATION: 96 % | HEART RATE: 78 BPM | WEIGHT: 202.6 LBS | DIASTOLIC BLOOD PRESSURE: 82 MMHG | RESPIRATION RATE: 16 BRPM | HEIGHT: 73 IN

## 2023-04-10 DIAGNOSIS — S01.81XA FACIAL LACERATION, INITIAL ENCOUNTER: ICD-10-CM

## 2023-04-10 DIAGNOSIS — S16.1XXA STRAIN OF NECK MUSCLE, INITIAL ENCOUNTER: ICD-10-CM

## 2023-04-10 DIAGNOSIS — T14.8XXA BLOOD BLISTER: ICD-10-CM

## 2023-04-10 PROCEDURE — 304217 HCHG IRRIGATION SYSTEM

## 2023-04-10 PROCEDURE — 304999 HCHG REPAIR-SIMPLE/INTERMED LEVEL 1

## 2023-04-10 PROCEDURE — 99284 EMERGENCY DEPT VISIT MOD MDM: CPT

## 2023-04-10 PROCEDURE — 73140 X-RAY EXAM OF FINGER(S): CPT | Mod: LT

## 2023-04-10 PROCEDURE — 303747 HCHG EXTRA SUTURE

## 2023-04-10 PROCEDURE — 72125 CT NECK SPINE W/O DYE: CPT

## 2023-04-10 PROCEDURE — 70450 CT HEAD/BRAIN W/O DYE: CPT

## 2023-04-10 PROCEDURE — 700101 HCHG RX REV CODE 250: Performed by: EMERGENCY MEDICINE

## 2023-04-10 RX ORDER — LIDOCAINE HYDROCHLORIDE AND EPINEPHRINE 10; 10 MG/ML; UG/ML
10 INJECTION, SOLUTION INFILTRATION; PERINEURAL ONCE
Status: COMPLETED | OUTPATIENT
Start: 2023-04-10 | End: 2023-04-10

## 2023-04-10 RX ADMIN — LIDOCAINE HYDROCHLORIDE,EPINEPHRINE BITARTRATE 10 ML: 10; .01 INJECTION, SOLUTION INFILTRATION; PERINEURAL at 16:30

## 2023-04-10 ASSESSMENT — FIBROSIS 4 INDEX: FIB4 SCORE: 0.87

## 2023-04-10 NOTE — ED PROVIDER NOTES
ED Provider Note    CHIEF COMPLAINT  Chief Complaint   Patient presents with    Fall    Headache     Tripped on stair landing on ground hitting head and injuring L ring finger   no LOC  no bld thinner   happened about 2 hours ago     Digit Pain     L ring finger  s/p falling injury to finger       EXTERNAL RECORDS REVIEWED  Outpatient Notes outpatient notes from January for 2023 and 2023 for routine medical care, screening and treatment planning    HPI/ROS  LIMITATION TO HISTORY   Select: : None  OUTSIDE HISTORIAN(S):  Family wife at bedside    Saleem Whitney is a 77 y.o. male who presents to the emergency department after mechanical fall.  Past medical history as document below.  He explains he was stepping down an outside step and then lost his balance falling forward hitting his face on the concrete.  Now complaining of laceration to the right side of his face.  Tetanus is not up-to-date.  Also complaining of left ring finger discomfort.  Also mild neck discomfort especially on the left lateral aspect.  Denies any mid or low back pain.  No right upper or bilateral lower extremity injury.  No abdominal pain.    PAST MEDICAL HISTORY   has a past medical history of Hyperlipidemia, Hypertension, Hypothyroid, and Type II or unspecified type diabetes mellitus without mention of complication, not stated as uncontrolled.    SURGICAL HISTORY   has a past surgical history that includes hand surgery.    FAMILY HISTORY  Family History   Problem Relation Age of Onset    Diabetes Mother     Stroke Father         45    Diabetes Sister     Diabetes Maternal Uncle        SOCIAL HISTORY  Social History     Tobacco Use    Smoking status: Former     Packs/day: 1.00     Years: 5.00     Pack years: 5.00     Types: Cigarettes     Quit date: 1971     Years since quittin.4    Smokeless tobacco: Never   Vaping Use    Vaping Use: Never used   Substance and Sexual Activity    Alcohol use: Yes     Alcohol/week:  "4.2 oz     Types: 7 Glasses of wine per week     Comment: daily    Drug use: No    Sexual activity: Yes     Partners: Female       CURRENT MEDICATIONS  Home Medications       Reviewed by Ashley Montgomery R.N. (Registered Nurse) on 04/10/23 at 1441  Med List Status: Partial     Medication Last Dose Status   amLODIPine (NORVASC) 10 MG Tab  Active   atorvastatin (LIPITOR) 10 MG Tab  Active   Cyanocobalamin (VITAMIN B-12) 2500 MCG SL Tab  Active   Dulaglutide (TRULICITY) 1.5 MG/0.5ML Solution Pen-injector  Active   DULoxetine (CYMBALTA) 30 MG Cap DR Particles  Active   glipiZIDE (GLUCOTROL) 10 MG Tab  Active   HYDROcodone-acetaminophen (NORCO) 5-325 MG Tab per tablet  Active   levothyroxine (SYNTHROID) 88 MCG Tab  Active   losartan (COZAAR) 100 MG Tab  Active   Magnesium 500 MG Tab  Active   metformin (GLUCOPHAGE) 1000 MG tablet  Active   Potassium Citrate 15 MEQ (1620 MG) Tab CR  Active   tamsulosin (FLOMAX) 0.4 MG capsule  Active   vitamin D (CHOLECALCIFEROL) 1000 UNIT Tab  Active                    ALLERGIES  No Known Allergies    PHYSICAL EXAM  VITAL SIGNS: BP (!) 143/82   Pulse 78   Temp 36.8 °C (98.2 °F) (Temporal)   Resp 16   Ht 1.854 m (6' 1\")   Wt 91.9 kg (202 lb 9.6 oz)   SpO2 96%   BMI 26.73 kg/m²      Pulse ox interpretation: I interpret this pulse ox as normal.  Constitutional: Alert in no apparent distress.  HENT:  Bilateral external ears normal, Nose normal.  2.5 cm full-thickness linear diagonal laceration to right upper forehead.  Eyes: Pupils are equal and reactive  Neck: Normal range of motion, Supple.  Diffuse mild midline cervical tenderness but greatest left paracervical muscular tenderness.  Cardiovascular: Regular rate and rhythm, no murmurs.   Thorax & Lungs: Normal breath sounds, No respiratory distress, No wheezing, No chest tenderness.   Abdomen: Bowel sounds normal, Soft, No tenderness  Skin: Warm, Dry, No erythema, No rash.   Back: No bony tenderness  Extremities: Intact distal " pulses, No edema, No tenderness, No cyanosis,  Negative Geetha's sign.   Musculoskeletal: Good range of motion in all major joints. No tenderness to palpation or major deformities noted.   Neurologic: Alert , Normal motor function, Normal sensory function, No focal deficits noted.   Psychiatric: Affect normal, Judgment normal, Mood normal.         DIAGNOSTIC STUDIES / PROCEDURES      RADIOLOGY  I have independently interpreted the diagnostic imaging associated with this visit and am waiting the final reading from the radiologist.   My preliminary interpretation is as follows: CT head: No acute intracranial bleed  Radiologist interpretation:   CT-CSPINE WITHOUT PLUS RECONS   Final Result      1.  There is no acute fracture of the cervical spine.   2.  Slight progression of diffuse degenerative disc disease and arthropathy with no canal stenosis.         CT-HEAD W/O   Final Result      1.  Cerebral atrophy and chronic microvascular ischemic type changes.   2.  No acute intracranial abnormality.         DX-FINGER(S) 2+ LEFT   Final Result            No acute osseous abnormality.            Facial laceration: Wound minimally ligated with saline.  3 cc of 1% lidocaine without epinephrine infiltrated into wound edges.  Patient prepped and draped in sterile fashion.  4 simple erupted 6-0 Vicryl sutures placed in right forehead with good wound edge approximation.  Patient tolerated well.  Wound was then bandaged.    COURSE & MEDICAL DECISION MAKING    ED Observation Status? No; Patient does not meet criteria for ED Observation.     INITIAL ASSESSMENT, COURSE AND PLAN  Care Narrative: 77-year-old male presenting to the emergency room after mechanical fall.  We will proceed with wound care and advanced imaging given injuries as described above.      DISPOSITION AND DISCUSSIONS  I have discussed management of the patient with the following physicians and FREDDY's: None    Discussion of management with other [facility] or appropriate  source(s): None     Escalation of care considered, and ultimately not performed:acute inpatient care management, however at this time, the patient is most appropriate for outpatient management    Barriers to care at this time, including but not limited to:  None .     Decision tools and prescription drugs considered including, but not limited to: Antibiotics not indicated, Pain Medications over-the-counter medications were work that need for further escalation, and NIH Stroke Scale 0 .  Tetanus within 10 years and clean wound.    77-year-old male presenting to the emerged part with the above presentation.  Please see laceration repair note as above.  Imaging also dictated as above per radiology fortunately without any acute traumatic injuries to include that of the hand, face or head.  Patient tolerated wound care well.  Understanding of need for suture removal.  Will return to the emergency room with any changes or worsening.    At this point given work-up the patient will not need any further escalation of care to include any additional imaging or inpatient care.  By subjective history does, the mechanism was purely mechanical I do not believe that there is any other indication that there was stroke or other etiology that caused the fall today.    FINAL DIAGNOSIS  1. Facial laceration, initial encounter    2. Strain of neck muscle, initial encounter    3. Blood blister           Electronically signed by: Wilbur Ricci M.D., 4/10/2023 4:03 PM

## 2023-04-10 NOTE — ED TRIAGE NOTES
Pt comes in w/ wife  tripped on stairs and fell landed on head and hut L ring finger    has head lac that has bandage on it   pain to L index finger w/ limited ROM to it   sweling and bruising noted to finger    denies LOC  no bld thinners

## 2023-04-11 NOTE — ED NOTES
MD updated patient and family member regarding his results and disposition.    Discharge home with instructions and follow up care reviewed and given to patient with verbal understanding.    Family member with patient.

## 2023-04-13 ENCOUNTER — HOSPITAL ENCOUNTER (OUTPATIENT)
Dept: LAB | Facility: MEDICAL CENTER | Age: 78
End: 2023-04-13
Attending: INTERNAL MEDICINE
Payer: MEDICARE

## 2023-04-13 DIAGNOSIS — E11.42 CONTROLLED TYPE 2 DIABETES MELLITUS WITH DIABETIC POLYNEUROPATHY, WITHOUT LONG-TERM CURRENT USE OF INSULIN (HCC): ICD-10-CM

## 2023-04-13 DIAGNOSIS — E03.9 HYPOTHYROIDISM (ACQUIRED): ICD-10-CM

## 2023-04-13 DIAGNOSIS — Z79.84 LONG TERM (CURRENT) USE OF ORAL HYPOGLYCEMIC DRUGS: ICD-10-CM

## 2023-04-13 DIAGNOSIS — E78.5 DYSLIPIDEMIA: ICD-10-CM

## 2023-04-13 DIAGNOSIS — E55.9 VITAMIN D DEFICIENCY: ICD-10-CM

## 2023-04-13 LAB
25(OH)D3 SERPL-MCNC: 42 NG/ML (ref 30–100)
ALBUMIN SERPL BCP-MCNC: 4.2 G/DL (ref 3.2–4.9)
ALBUMIN/GLOB SERPL: 1.4 G/DL
ALP SERPL-CCNC: 71 U/L (ref 30–99)
ALT SERPL-CCNC: 14 U/L (ref 2–50)
ANION GAP SERPL CALC-SCNC: 8 MMOL/L (ref 7–16)
AST SERPL-CCNC: 13 U/L (ref 12–45)
BILIRUB SERPL-MCNC: 0.6 MG/DL (ref 0.1–1.5)
BUN SERPL-MCNC: 18 MG/DL (ref 8–22)
CALCIUM ALBUM COR SERPL-MCNC: 9.2 MG/DL (ref 8.5–10.5)
CALCIUM SERPL-MCNC: 9.4 MG/DL (ref 8.4–10.2)
CHLORIDE SERPL-SCNC: 101 MMOL/L (ref 96–112)
CHOLEST SERPL-MCNC: 102 MG/DL (ref 100–199)
CO2 SERPL-SCNC: 27 MMOL/L (ref 20–33)
CREAT SERPL-MCNC: 1.03 MG/DL (ref 0.5–1.4)
CREAT UR-MCNC: 92.46 MG/DL
FASTING STATUS PATIENT QL REPORTED: NORMAL
GFR SERPLBLD CREATININE-BSD FMLA CKD-EPI: 74 ML/MIN/1.73 M 2
GLOBULIN SER CALC-MCNC: 2.9 G/DL (ref 1.9–3.5)
GLUCOSE SERPL-MCNC: 160 MG/DL (ref 65–99)
HDLC SERPL-MCNC: 43 MG/DL
LDLC SERPL CALC-MCNC: 30 MG/DL
MICROALBUMIN UR-MCNC: <1.2 MG/DL
MICROALBUMIN/CREAT UR: NORMAL MG/G (ref 0–30)
POTASSIUM SERPL-SCNC: 4.5 MMOL/L (ref 3.6–5.5)
PROT SERPL-MCNC: 7.1 G/DL (ref 6–8.2)
SODIUM SERPL-SCNC: 136 MMOL/L (ref 135–145)
T4 FREE SERPL-MCNC: 1.38 NG/DL (ref 0.93–1.7)
TRIGL SERPL-MCNC: 147 MG/DL (ref 0–149)
TSH SERPL DL<=0.005 MIU/L-ACNC: 3.17 UIU/ML (ref 0.38–5.33)

## 2023-04-13 PROCEDURE — 82306 VITAMIN D 25 HYDROXY: CPT

## 2023-04-13 PROCEDURE — 80053 COMPREHEN METABOLIC PANEL: CPT

## 2023-04-13 PROCEDURE — 36415 COLL VENOUS BLD VENIPUNCTURE: CPT

## 2023-04-13 PROCEDURE — 82043 UR ALBUMIN QUANTITATIVE: CPT

## 2023-04-13 PROCEDURE — 84439 ASSAY OF FREE THYROXINE: CPT

## 2023-04-13 PROCEDURE — 84443 ASSAY THYROID STIM HORMONE: CPT

## 2023-04-13 PROCEDURE — 82570 ASSAY OF URINE CREATININE: CPT

## 2023-04-13 PROCEDURE — 80061 LIPID PANEL: CPT

## 2023-04-17 PROBLEM — Z48.02 VISIT FOR SUTURE REMOVAL: Status: ACTIVE | Noted: 2023-04-17

## 2023-04-17 PROBLEM — S01.81XA FACIAL LACERATION: Status: ACTIVE | Noted: 2023-04-17

## 2023-04-18 ENCOUNTER — OFFICE VISIT (OUTPATIENT)
Dept: MEDICAL GROUP | Facility: MEDICAL CENTER | Age: 78
End: 2023-04-18
Payer: MEDICARE

## 2023-04-18 VITALS
HEIGHT: 73 IN | OXYGEN SATURATION: 95 % | HEART RATE: 81 BPM | SYSTOLIC BLOOD PRESSURE: 128 MMHG | DIASTOLIC BLOOD PRESSURE: 60 MMHG | BODY MASS INDEX: 26.88 KG/M2 | RESPIRATION RATE: 16 BRPM | TEMPERATURE: 97.6 F | WEIGHT: 202.82 LBS

## 2023-04-18 DIAGNOSIS — R35.1 BPH ASSOCIATED WITH NOCTURIA: ICD-10-CM

## 2023-04-18 DIAGNOSIS — N40.1 BPH ASSOCIATED WITH NOCTURIA: ICD-10-CM

## 2023-04-18 DIAGNOSIS — Z48.02 VISIT FOR SUTURE REMOVAL: ICD-10-CM

## 2023-04-18 DIAGNOSIS — S01.81XD FACIAL LACERATION, SUBSEQUENT ENCOUNTER: ICD-10-CM

## 2023-04-18 PROCEDURE — 99213 OFFICE O/P EST LOW 20 MIN: CPT | Performed by: STUDENT IN AN ORGANIZED HEALTH CARE EDUCATION/TRAINING PROGRAM

## 2023-04-18 RX ORDER — TAMSULOSIN HYDROCHLORIDE 0.4 MG/1
0.8 CAPSULE ORAL
Qty: 180 CAPSULE | Refills: 3 | Status: SHIPPED | OUTPATIENT
Start: 2023-04-18 | End: 2023-07-17

## 2023-04-18 ASSESSMENT — FIBROSIS 4 INDEX: FIB4 SCORE: 1.06

## 2023-04-18 NOTE — PROGRESS NOTES
Subjective:     CC: suture removal    HPI:   Saleem presents today for suture removal    Problem   Facial Laceration    Acute condition. Patient reports he was stepping down an outside step and then lost his balance falling forward hitting his face on the concrete. Patient went to ED on 4/10/23 for facial laceration which was repaired.    Per ED procedure note:  Facial laceration: Wound minimally ligated with saline.  3 cc of 1% lidocaine without epinephrine infiltrated into wound edges.  Patient prepped and draped in sterile fashion.  4 simple erupted 6-0 Vicryl sutures placed in right forehead with good wound edge approximation.  Patient tolerated well.  Wound was then bandaged.     Visit for Suture Removal    Patient is here for suture removal today. He had facial laceration which was repaired in ED on 4/10/23. He would like to have those sutures removed.    He also had a skin biopsy on a right sided back lesion that was done by dermatology about 2-3 weeks ago. He would like to have those sutures removed today as well.     Bph Associated With Nocturia    Chronic condition.     Current regimen: tamsulosin 0.8 mg every morning    He has been having nocturia once nightly. He has been taking tamsulosin 0.4mg daily which he did not find helpful and would like to stop taking the medication for now. He reports his symptoms are mild at this time.         Current Outpatient Medications Ordered in Epic   Medication Sig Dispense Refill    tamsulosin (FLOMAX) 0.4 MG capsule Take 2 Capsules by mouth 1/2 hour after breakfast for 90 days. 180 Capsule 3    DULoxetine (CYMBALTA) 30 MG Cap DR Particles Take 1 Capsule by mouth every day. 90 Capsule 2    Dulaglutide (TRULICITY) 1.5 MG/0.5ML Solution Pen-injector Inject 0.5 mL under the skin every 7 days. 6 mL 3    losartan (COZAAR) 100 MG Tab Take 1 Tablet by mouth every day. 90 Tablet 3    atorvastatin (LIPITOR) 10 MG Tab Take 1 Tablet by mouth every day. 90 Tablet 3     "levothyroxine (SYNTHROID) 88 MCG Tab Take 1 Tablet by mouth every morning on an empty stomach. 90 Tablet 3    metformin (GLUCOPHAGE) 1000 MG tablet Take 1 Tablet by mouth 2 times a day with meals. 180 Tablet 3    glipiZIDE (GLUCOTROL) 10 MG Tab Take 1 Tablet by mouth every day. 90 Tablet 3    HYDROcodone-acetaminophen (NORCO) 5-325 MG Tab per tablet hydrocodone 5 mg-acetaminophen 325 mg tablet   TAKE 1 TABLET BY MOUTH EVERY 6 HOURS AS NEEDED (Patient not taking: Reported on 3/2/2023)      Potassium Citrate 15 MEQ (1620 MG) Tab CR potassium citrate ER 15 mEq (1,620 mg) tablet,extended release      amLODIPine (NORVASC) 10 MG Tab Take 1 Tablet by mouth every day. 90 Tablet 3    Cyanocobalamin (VITAMIN B-12) 2500 MCG SL Tab Place 1 Tablet under the tongue every day. 100 Tablet 3    vitamin D (CHOLECALCIFEROL) 1000 UNIT Tab Take 1 Tab by mouth every day. 90 Tab 3    Magnesium 500 MG Tab Take 1,000 mg by mouth every day. 90 Tab 3     No current Epic-ordered facility-administered medications on file.     Social history  Living situation: lives with wife at home  Occupation: retired since 2017, used to run a construction company, and sales work  Marital status:   Alcohol/tobacco/illicit drugs: former smoker x 5yrs quit 1971, drink 1 glass of wine daily, denies illicit drugs  Baseline functional status: independent with ADLs     Health Maintenance: reviewed and discussed with patient  Vaccines: flu received 09/2021, PPSV23 completed 11/2016, Tdap received 08/2017, Shingrix completed 11/2018, Pfizer COVID #3 received 10/2021     ROS:  Gen: no fevers/chills  Pulm: no sob, no cough  CV: no chest pain, no palpitations  GI: no nausea/vomiting, no diarrhea  : no dysuria, + nocturia  Skin: + sutures on forehead and back  Neuro: no headaches, no numbness/tingling    Objective:     Exam:  /60   Pulse 81   Temp 36.4 °C (97.6 °F) (Temporal)   Resp 16   Ht 1.854 m (6' 1\")   Wt 92 kg (202 lb 13.2 oz)   SpO2 95%   " BMI 26.76 kg/m²  Body mass index is 26.76 kg/m².    General: Normal appearing. No distress.  Pulmonary: Clear to ausculation. Normal effort. No rales, ronchi, or wheezing.  Cardiovascular: Regular rate and rhythm without murmur.  Skin: Warm and dry. Well-healed incision site with sutures x4 on right sided forehead and sutures x4 on right paraspinal muscles at around T12 level.    Assessment & Plan:     77 y.o. male with the following -     1. Facial laceration, subsequent encounter  Recent condition from ground level mechanical fall, healing well. Sutures x4 removed today.  - cont routine skin care, return as needed    2. Visit for suture removal  Sutures x4 removed from right sided forehead and sutures x4 removed from right sided mid back.  - cont routine skin care, return as needed    3. BPH associated with nocturia  Chronic condition, stable.  - cont current regimen: tamsulosin 0.4mg every morning  - tamsulosin (FLOMAX) 0.4 MG capsule; Take 2 Capsules by mouth 1/2 hour after breakfast for 90 days.  Dispense: 180 Capsule; Refill: 3      Return if symptoms worsen or fail to improve.    Please note that this dictation was created using voice recognition software. I have made every reasonable attempt to correct obvious errors, but I expect that there are errors of grammar and possibly content that I did not discover before finalizing the note.

## 2023-04-19 ENCOUNTER — HOSPITAL ENCOUNTER (OUTPATIENT)
Dept: LAB | Facility: MEDICAL CENTER | Age: 78
End: 2023-04-19
Attending: STUDENT IN AN ORGANIZED HEALTH CARE EDUCATION/TRAINING PROGRAM
Payer: MEDICARE

## 2023-04-19 DIAGNOSIS — R35.1 NOCTURIA: ICD-10-CM

## 2023-04-19 LAB — PSA SERPL-MCNC: 1.12 NG/ML (ref 0–4)

## 2023-04-19 PROCEDURE — 36415 COLL VENOUS BLD VENIPUNCTURE: CPT

## 2023-04-19 PROCEDURE — 84153 ASSAY OF PSA TOTAL: CPT

## 2023-05-01 NOTE — PROGRESS NOTES
Subjective:     CC: cough    HPI:   Saleem presents today for cough    Problem   Acute Cough    Acute condition.    Date of symptoms onset: Sunday 4/30/23  Symptoms: dry cough worse at night, slight shortness of breath  Denies chest pain.  Medications tried: OTC cough medicine, Tylenol with mild relief  Home max temperature: no fever  He has been able to tolerate PO. Normal urination.    Potential exposure: convention event over the weekend         Current Outpatient Medications Ordered in Epic   Medication Sig Dispense Refill    promethazine-dextromethorphan (PROMETHAZINE-DM) 6.25-15 MG/5ML syrup Take 5 mL by mouth every four hours as needed for Cough for up to 9 days. 180 mL 0    amoxicillin-clavulanate (AUGMENTIN) 875-125 MG Tab Take 1 Tablet by mouth 2 times a day for 7 days. 14 Tablet 0    azithromycin (ZITHROMAX) 250 MG Tab Take 500mg on first day, then 250mg daily for 4 days 6 Tablet 0    albuterol 108 (90 Base) MCG/ACT Aero Soln inhalation aerosol Inhale 1-2 Puffs every four hours as needed for Shortness of Breath. 1 Each 3    tamsulosin (FLOMAX) 0.4 MG capsule Take 2 Capsules by mouth 1/2 hour after breakfast for 90 days. 180 Capsule 3    DULoxetine (CYMBALTA) 30 MG Cap DR Particles Take 1 Capsule by mouth every day. 90 Capsule 2    Dulaglutide (TRULICITY) 1.5 MG/0.5ML Solution Pen-injector Inject 0.5 mL under the skin every 7 days. 6 mL 3    losartan (COZAAR) 100 MG Tab Take 1 Tablet by mouth every day. 90 Tablet 3    atorvastatin (LIPITOR) 10 MG Tab Take 1 Tablet by mouth every day. 90 Tablet 3    levothyroxine (SYNTHROID) 88 MCG Tab Take 1 Tablet by mouth every morning on an empty stomach. 90 Tablet 3    metformin (GLUCOPHAGE) 1000 MG tablet Take 1 Tablet by mouth 2 times a day with meals. 180 Tablet 3    glipiZIDE (GLUCOTROL) 10 MG Tab Take 1 Tablet by mouth every day. 90 Tablet 3    HYDROcodone-acetaminophen (NORCO) 5-325 MG Tab per tablet hydrocodone 5 mg-acetaminophen 325 mg tablet   TAKE 1 TABLET  "BY MOUTH EVERY 6 HOURS AS NEEDED (Patient not taking: Reported on 3/2/2023)      Potassium Citrate 15 MEQ (1620 MG) Tab CR potassium citrate ER 15 mEq (1,620 mg) tablet,extended release      amLODIPine (NORVASC) 10 MG Tab Take 1 Tablet by mouth every day. 90 Tablet 3    Cyanocobalamin (VITAMIN B-12) 2500 MCG SL Tab Place 1 Tablet under the tongue every day. 100 Tablet 3    vitamin D (CHOLECALCIFEROL) 1000 UNIT Tab Take 1 Tab by mouth every day. 90 Tab 3    Magnesium 500 MG Tab Take 1,000 mg by mouth every day. 90 Tab 3     No current Epic-ordered facility-administered medications on file.     ROS:  See HPI    Objective:     Exam:  /60   Pulse 87   Temp 36.7 °C (98 °F) (Temporal)   Resp 16   Ht 1.854 m (6' 1\")   Wt 90 kg (198 lb 6.6 oz)   SpO2 95%   BMI 26.18 kg/m²  Body mass index is 26.18 kg/m².    Gen: Alert and oriented, No apparent distress.  HEENT: Normocephalic. Ear canals are clear bilaterally, tympanic membranes are benign, nasal mucosa benign, oropharynx is without erythema, edema or exudates.  Neck: Neck is supple without lymphadenopathy.  Lungs: Normal effort, CTA bilaterally, + mild upper wheezing  CV: Regular rate and rhythm. No murmurs, rubs, or gallops.  Ext: No clubbing, cyanosis, edema.    Assessment & Plan:     77 y.o. male with the following -     1. Acute bronchitis, unspecified organism  Acute condition, persistent. No evidence of pneumonia at this time but he is higher risk due to his age and co-morbidities. Rx Augmentin and azithromycin per orders. Rx promethazine-DM as needed for cough and albuterol inhaler as needed for shortness of breath/wheezing.  - cont symptomatic therapy as needed: OTC Tylenol as needed for pain/headache/fever, antihistamine/decongestant as needed for runny nose/congestion. Call or return to clinic prn if these symptoms worsen or fail to improve as anticipated.  - advised to maintain adequate hydration, regular handwashing, facemask, social distancing  - " advised pt that typical course may last 1-3 weeks   - advised to trial hot tea, honey for cough relief   - advised patient to seek medical attention if she develops new symptoms such as new-onset fever, difficulty breathing, symptoms lasting >3 weeks, or bloody sputum  - amoxicillin-clavulanate (AUGMENTIN) 875-125 MG Tab; Take 1 Tablet by mouth 2 times a day for 7 days.  Dispense: 14 Tablet; Refill: 0  - azithromycin (ZITHROMAX) 250 MG Tab; Take 500mg on first day, then 250mg daily for 4 days  Dispense: 6 Tablet; Refill: 0  - albuterol 108 (90 Base) MCG/ACT Aero Soln inhalation aerosol; Inhale 1-2 Puffs every four hours as needed for Shortness of Breath.  Dispense: 1 Each; Refill: 3    2. Acute cough  - promethazine-dextromethorphan (PROMETHAZINE-DM) 6.25-15 MG/5ML syrup; Take 5 mL by mouth every four hours as needed for Cough for up to 9 days.  Dispense: 180 mL; Refill: 0      Return if symptoms worsen or fail to improve.    Please note that this dictation was created using voice recognition software. I have made every reasonable attempt to correct obvious errors, but I expect that there are errors of grammar and possibly content that I did not discover before finalizing the note.

## 2023-05-02 ENCOUNTER — OFFICE VISIT (OUTPATIENT)
Dept: MEDICAL GROUP | Facility: MEDICAL CENTER | Age: 78
End: 2023-05-02
Payer: MEDICARE

## 2023-05-02 VITALS
TEMPERATURE: 98 F | DIASTOLIC BLOOD PRESSURE: 60 MMHG | WEIGHT: 198.41 LBS | OXYGEN SATURATION: 95 % | RESPIRATION RATE: 16 BRPM | SYSTOLIC BLOOD PRESSURE: 128 MMHG | HEART RATE: 87 BPM | HEIGHT: 73 IN | BODY MASS INDEX: 26.3 KG/M2

## 2023-05-02 DIAGNOSIS — J20.9 ACUTE BRONCHITIS, UNSPECIFIED ORGANISM: ICD-10-CM

## 2023-05-02 DIAGNOSIS — R05.1 ACUTE COUGH: ICD-10-CM

## 2023-05-02 PROCEDURE — 99213 OFFICE O/P EST LOW 20 MIN: CPT | Performed by: STUDENT IN AN ORGANIZED HEALTH CARE EDUCATION/TRAINING PROGRAM

## 2023-05-02 RX ORDER — ALBUTEROL SULFATE 90 UG/1
1-2 AEROSOL, METERED RESPIRATORY (INHALATION) EVERY 4 HOURS PRN
Qty: 1 EACH | Refills: 3 | Status: ON HOLD | OUTPATIENT
Start: 2023-05-02 | End: 2023-07-31

## 2023-05-02 RX ORDER — AMOXICILLIN AND CLAVULANATE POTASSIUM 875; 125 MG/1; MG/1
1 TABLET, FILM COATED ORAL 2 TIMES DAILY
Qty: 14 TABLET | Refills: 0 | Status: SHIPPED | OUTPATIENT
Start: 2023-05-02 | End: 2023-05-09

## 2023-05-02 RX ORDER — AZITHROMYCIN 250 MG/1
TABLET, FILM COATED ORAL
Qty: 6 TABLET | Refills: 0 | Status: SHIPPED | OUTPATIENT
Start: 2023-05-02 | End: 2023-05-17

## 2023-05-02 RX ORDER — DEXTROMETHORPHAN HYDROBROMIDE AND PROMETHAZINE HYDROCHLORIDE 15; 6.25 MG/5ML; MG/5ML
5 SYRUP ORAL EVERY 4 HOURS PRN
Qty: 180 ML | Refills: 0 | Status: SHIPPED | OUTPATIENT
Start: 2023-05-02 | End: 2023-05-11

## 2023-05-02 ASSESSMENT — FIBROSIS 4 INDEX: FIB4 SCORE: 1.06

## 2023-05-16 ENCOUNTER — APPOINTMENT (OUTPATIENT)
Dept: RADIOLOGY | Facility: MEDICAL CENTER | Age: 78
DRG: 345 | End: 2023-05-16
Attending: STUDENT IN AN ORGANIZED HEALTH CARE EDUCATION/TRAINING PROGRAM
Payer: MEDICARE

## 2023-05-17 ENCOUNTER — HOSPITAL ENCOUNTER (INPATIENT)
Facility: MEDICAL CENTER | Age: 78
LOS: 2 days | DRG: 345 | End: 2023-05-19
Attending: INTERNAL MEDICINE | Admitting: INTERNAL MEDICINE
Payer: MEDICARE

## 2023-05-17 ENCOUNTER — APPOINTMENT (OUTPATIENT)
Dept: RADIOLOGY | Facility: MEDICAL CENTER | Age: 78
DRG: 345 | End: 2023-05-17
Attending: EMERGENCY MEDICINE
Payer: MEDICARE

## 2023-05-17 ENCOUNTER — APPOINTMENT (OUTPATIENT)
Dept: RADIOLOGY | Facility: MEDICAL CENTER | Age: 78
DRG: 345 | End: 2023-05-17
Attending: STUDENT IN AN ORGANIZED HEALTH CARE EDUCATION/TRAINING PROGRAM
Payer: MEDICARE

## 2023-05-17 ENCOUNTER — ANESTHESIA EVENT (OUTPATIENT)
Dept: SURGERY | Facility: MEDICAL CENTER | Age: 78
DRG: 345 | End: 2023-05-17
Payer: MEDICARE

## 2023-05-17 ENCOUNTER — ANESTHESIA (OUTPATIENT)
Dept: SURGERY | Facility: MEDICAL CENTER | Age: 78
DRG: 345 | End: 2023-05-17
Payer: MEDICARE

## 2023-05-17 DIAGNOSIS — N40.0 ENLARGED PROSTATE: ICD-10-CM

## 2023-05-17 DIAGNOSIS — K56.609 SMALL BOWEL OBSTRUCTION (HCC): ICD-10-CM

## 2023-05-17 DIAGNOSIS — K56.609 SBO (SMALL BOWEL OBSTRUCTION) (HCC): ICD-10-CM

## 2023-05-17 DIAGNOSIS — R10.84 GENERALIZED ABDOMINAL PAIN: ICD-10-CM

## 2023-05-17 DIAGNOSIS — K40.20 BILATERAL INGUINAL HERNIA WITHOUT OBSTRUCTION OR GANGRENE, RECURRENCE NOT SPECIFIED: ICD-10-CM

## 2023-05-17 DIAGNOSIS — K76.9 LIVER LESION: ICD-10-CM

## 2023-05-17 DIAGNOSIS — R11.2 NAUSEA AND VOMITING, UNSPECIFIED VOMITING TYPE: ICD-10-CM

## 2023-05-17 PROBLEM — Z71.89 ADVANCE CARE PLANNING: Status: ACTIVE | Noted: 2023-05-17

## 2023-05-17 PROBLEM — E87.1 HYPONATREMIA: Status: ACTIVE | Noted: 2023-05-17

## 2023-05-17 LAB
ALBUMIN SERPL BCP-MCNC: 4.2 G/DL (ref 3.2–4.9)
ALBUMIN/GLOB SERPL: 1.4 G/DL
ALP SERPL-CCNC: 69 U/L (ref 30–99)
ALT SERPL-CCNC: 9 U/L (ref 2–50)
ANION GAP SERPL CALC-SCNC: 12 MMOL/L (ref 7–16)
APPEARANCE UR: CLEAR
AST SERPL-CCNC: 13 U/L (ref 12–45)
BASOPHILS # BLD AUTO: 0.1 % (ref 0–1.8)
BASOPHILS # BLD: 0.01 K/UL (ref 0–0.12)
BILIRUB SERPL-MCNC: 0.8 MG/DL (ref 0.1–1.5)
BILIRUB UR QL STRIP.AUTO: NEGATIVE
BUN SERPL-MCNC: 20 MG/DL (ref 8–22)
CALCIUM ALBUM COR SERPL-MCNC: 8.8 MG/DL (ref 8.5–10.5)
CALCIUM SERPL-MCNC: 9 MG/DL (ref 8.4–10.2)
CHLORIDE SERPL-SCNC: 96 MMOL/L (ref 96–112)
CO2 SERPL-SCNC: 24 MMOL/L (ref 20–33)
COLOR UR: YELLOW
CREAT SERPL-MCNC: 0.92 MG/DL (ref 0.5–1.4)
EOSINOPHIL # BLD AUTO: 0.22 K/UL (ref 0–0.51)
EOSINOPHIL NFR BLD: 1.7 % (ref 0–6.9)
ERYTHROCYTE [DISTWIDTH] IN BLOOD BY AUTOMATED COUNT: 39.2 FL (ref 35.9–50)
EST. AVERAGE GLUCOSE BLD GHB EST-MCNC: 154 MG/DL
GFR SERPLBLD CREATININE-BSD FMLA CKD-EPI: 84 ML/MIN/1.73 M 2
GLOBULIN SER CALC-MCNC: 2.9 G/DL (ref 1.9–3.5)
GLUCOSE BLD STRIP.AUTO-MCNC: 115 MG/DL (ref 65–99)
GLUCOSE BLD STRIP.AUTO-MCNC: 140 MG/DL (ref 65–99)
GLUCOSE BLD STRIP.AUTO-MCNC: 173 MG/DL (ref 65–99)
GLUCOSE SERPL-MCNC: 181 MG/DL (ref 65–99)
GLUCOSE UR STRIP.AUTO-MCNC: NEGATIVE MG/DL
HBA1C MFR BLD: 7 % (ref 4–5.6)
HCT VFR BLD AUTO: 44 % (ref 42–52)
HGB BLD-MCNC: 14.9 G/DL (ref 14–18)
IMM GRANULOCYTES # BLD AUTO: 0.05 K/UL (ref 0–0.11)
IMM GRANULOCYTES NFR BLD AUTO: 0.4 % (ref 0–0.9)
KETONES UR STRIP.AUTO-MCNC: NEGATIVE MG/DL
LACTATE SERPL-SCNC: 1.5 MMOL/L (ref 0.5–2)
LEUKOCYTE ESTERASE UR QL STRIP.AUTO: NEGATIVE
LIPASE SERPL-CCNC: 22 U/L (ref 7–58)
LYMPHOCYTES # BLD AUTO: 1.12 K/UL (ref 1–4.8)
LYMPHOCYTES NFR BLD: 8.5 % (ref 22–41)
MCH RBC QN AUTO: 29.2 PG (ref 27–33)
MCHC RBC AUTO-ENTMCNC: 33.9 G/DL (ref 33.7–35.3)
MCV RBC AUTO: 86.3 FL (ref 81.4–97.8)
MICRO URNS: NORMAL
MONOCYTES # BLD AUTO: 0.52 K/UL (ref 0–0.85)
MONOCYTES NFR BLD AUTO: 4 % (ref 0–13.4)
NEUTROPHILS # BLD AUTO: 11.19 K/UL (ref 1.82–7.42)
NEUTROPHILS NFR BLD: 85.3 % (ref 44–72)
NITRITE UR QL STRIP.AUTO: NEGATIVE
NRBC # BLD AUTO: 0 K/UL
NRBC BLD-RTO: 0 /100 WBC
PH UR STRIP.AUTO: 5.5 [PH] (ref 5–8)
PLATELET # BLD AUTO: 294 K/UL (ref 164–446)
PMV BLD AUTO: 9.9 FL (ref 9–12.9)
POTASSIUM SERPL-SCNC: 3.8 MMOL/L (ref 3.6–5.5)
PROCALCITONIN SERPL-MCNC: 0.1 NG/ML
PROT SERPL-MCNC: 7.1 G/DL (ref 6–8.2)
PROT UR QL STRIP: NEGATIVE MG/DL
RBC # BLD AUTO: 5.1 M/UL (ref 4.7–6.1)
RBC UR QL AUTO: NEGATIVE
SODIUM SERPL-SCNC: 132 MMOL/L (ref 135–145)
SP GR UR STRIP.AUTO: <=1.005
WBC # BLD AUTO: 13.1 K/UL (ref 4.8–10.8)

## 2023-05-17 PROCEDURE — 160028 HCHG SURGERY MINUTES - 1ST 30 MINS LEVEL 3: Performed by: SURGERY

## 2023-05-17 PROCEDURE — 700101 HCHG RX REV CODE 250: Performed by: SURGERY

## 2023-05-17 PROCEDURE — 700105 HCHG RX REV CODE 258: Performed by: INTERNAL MEDICINE

## 2023-05-17 PROCEDURE — 700105 HCHG RX REV CODE 258: Performed by: STUDENT IN AN ORGANIZED HEALTH CARE EDUCATION/TRAINING PROGRAM

## 2023-05-17 PROCEDURE — 160048 HCHG OR STATISTICAL LEVEL 1-5: Performed by: SURGERY

## 2023-05-17 PROCEDURE — 770001 HCHG ROOM/CARE - MED/SURG/GYN PRIV*

## 2023-05-17 PROCEDURE — 36415 COLL VENOUS BLD VENIPUNCTURE: CPT

## 2023-05-17 PROCEDURE — 160009 HCHG ANES TIME/MIN: Performed by: SURGERY

## 2023-05-17 PROCEDURE — 83036 HEMOGLOBIN GLYCOSYLATED A1C: CPT

## 2023-05-17 PROCEDURE — 700111 HCHG RX REV CODE 636 W/ 250 OVERRIDE (IP): Performed by: STUDENT IN AN ORGANIZED HEALTH CARE EDUCATION/TRAINING PROGRAM

## 2023-05-17 PROCEDURE — 94760 N-INVAS EAR/PLS OXIMETRY 1: CPT

## 2023-05-17 PROCEDURE — 160035 HCHG PACU - 1ST 60 MINS PHASE I: Performed by: SURGERY

## 2023-05-17 PROCEDURE — 84145 PROCALCITONIN (PCT): CPT

## 2023-05-17 PROCEDURE — 85025 COMPLETE CBC W/AUTO DIFF WBC: CPT

## 2023-05-17 PROCEDURE — 700117 HCHG RX CONTRAST REV CODE 255: Performed by: STUDENT IN AN ORGANIZED HEALTH CARE EDUCATION/TRAINING PROGRAM

## 2023-05-17 PROCEDURE — 160039 HCHG SURGERY MINUTES - EA ADDL 1 MIN LEVEL 3: Performed by: SURGERY

## 2023-05-17 PROCEDURE — 74177 CT ABD & PELVIS W/CONTRAST: CPT

## 2023-05-17 PROCEDURE — 99291 CRITICAL CARE FIRST HOUR: CPT

## 2023-05-17 PROCEDURE — 700105 HCHG RX REV CODE 258: Performed by: EMERGENCY MEDICINE

## 2023-05-17 PROCEDURE — 700105 HCHG RX REV CODE 258: Performed by: SURGERY

## 2023-05-17 PROCEDURE — 81003 URINALYSIS AUTO W/O SCOPE: CPT

## 2023-05-17 PROCEDURE — 83690 ASSAY OF LIPASE: CPT

## 2023-05-17 PROCEDURE — 99140 ANES COMP EMERGENCY COND: CPT | Performed by: ANESTHESIOLOGY

## 2023-05-17 PROCEDURE — 82962 GLUCOSE BLOOD TEST: CPT

## 2023-05-17 PROCEDURE — 160002 HCHG RECOVERY MINUTES (STAT): Performed by: SURGERY

## 2023-05-17 PROCEDURE — 700111 HCHG RX REV CODE 636 W/ 250 OVERRIDE (IP): Performed by: ANESTHESIOLOGY

## 2023-05-17 PROCEDURE — 99223 1ST HOSP IP/OBS HIGH 75: CPT | Mod: 25 | Performed by: INTERNAL MEDICINE

## 2023-05-17 PROCEDURE — 83605 ASSAY OF LACTIC ACID: CPT

## 2023-05-17 PROCEDURE — 96374 THER/PROPH/DIAG INJ IV PUSH: CPT

## 2023-05-17 PROCEDURE — 99100 ANES PT EXTEME AGE<1 YR&>70: CPT | Performed by: ANESTHESIOLOGY

## 2023-05-17 PROCEDURE — 0DJD4ZZ INSPECTION OF LOWER INTESTINAL TRACT, PERCUTANEOUS ENDOSCOPIC APPROACH: ICD-10-PCS | Performed by: SURGERY

## 2023-05-17 PROCEDURE — 00790 ANES IPER UPR ABD NOS: CPT | Performed by: ANESTHESIOLOGY

## 2023-05-17 PROCEDURE — 700101 HCHG RX REV CODE 250

## 2023-05-17 PROCEDURE — 700101 HCHG RX REV CODE 250: Performed by: ANESTHESIOLOGY

## 2023-05-17 PROCEDURE — 80053 COMPREHEN METABOLIC PANEL: CPT

## 2023-05-17 RX ORDER — ALBUTEROL SULFATE 90 UG/1
1-2 AEROSOL, METERED RESPIRATORY (INHALATION) EVERY 4 HOURS PRN
Status: DISCONTINUED | OUTPATIENT
Start: 2023-05-17 | End: 2023-05-19 | Stop reason: HOSPADM

## 2023-05-17 RX ORDER — ONDANSETRON 2 MG/ML
4 INJECTION INTRAMUSCULAR; INTRAVENOUS ONCE
Status: DISPENSED | OUTPATIENT
Start: 2023-05-17 | End: 2023-05-18

## 2023-05-17 RX ORDER — BISACODYL 10 MG
10 SUPPOSITORY, RECTAL RECTAL
Status: DISCONTINUED | OUTPATIENT
Start: 2023-05-17 | End: 2023-05-19 | Stop reason: HOSPADM

## 2023-05-17 RX ORDER — SODIUM CHLORIDE 9 MG/ML
1000 INJECTION, SOLUTION INTRAVENOUS ONCE
Status: COMPLETED | OUTPATIENT
Start: 2023-05-17 | End: 2023-05-17

## 2023-05-17 RX ORDER — PHENYLEPHRINE HYDROCHLORIDE 10 MG/ML
INJECTION, SOLUTION INTRAMUSCULAR; INTRAVENOUS; SUBCUTANEOUS PRN
Status: DISCONTINUED | OUTPATIENT
Start: 2023-05-17 | End: 2023-05-17 | Stop reason: SURG

## 2023-05-17 RX ORDER — LABETALOL HYDROCHLORIDE 5 MG/ML
10 INJECTION, SOLUTION INTRAVENOUS EVERY 4 HOURS PRN
Status: DISCONTINUED | OUTPATIENT
Start: 2023-05-17 | End: 2023-05-19 | Stop reason: HOSPADM

## 2023-05-17 RX ORDER — DIPHENHYDRAMINE HYDROCHLORIDE 50 MG/ML
12.5 INJECTION INTRAMUSCULAR; INTRAVENOUS
Status: DISCONTINUED | OUTPATIENT
Start: 2023-05-17 | End: 2023-05-17 | Stop reason: HOSPADM

## 2023-05-17 RX ORDER — SODIUM CHLORIDE, SODIUM LACTATE, POTASSIUM CHLORIDE, CALCIUM CHLORIDE 600; 310; 30; 20 MG/100ML; MG/100ML; MG/100ML; MG/100ML
INJECTION, SOLUTION INTRAVENOUS CONTINUOUS
Status: ACTIVE | OUTPATIENT
Start: 2023-05-17 | End: 2023-05-17

## 2023-05-17 RX ORDER — SODIUM CHLORIDE, SODIUM LACTATE, POTASSIUM CHLORIDE, CALCIUM CHLORIDE 600; 310; 30; 20 MG/100ML; MG/100ML; MG/100ML; MG/100ML
INJECTION, SOLUTION INTRAVENOUS CONTINUOUS
Status: DISCONTINUED | OUTPATIENT
Start: 2023-05-17 | End: 2023-05-19 | Stop reason: HOSPADM

## 2023-05-17 RX ORDER — POLYETHYLENE GLYCOL 3350 17 G/17G
1 POWDER, FOR SOLUTION ORAL
Status: DISCONTINUED | OUTPATIENT
Start: 2023-05-17 | End: 2023-05-19 | Stop reason: HOSPADM

## 2023-05-17 RX ORDER — MIDAZOLAM HYDROCHLORIDE 1 MG/ML
INJECTION INTRAMUSCULAR; INTRAVENOUS PRN
Status: DISCONTINUED | OUTPATIENT
Start: 2023-05-17 | End: 2023-05-17 | Stop reason: SURG

## 2023-05-17 RX ORDER — EPHEDRINE SULFATE 50 MG/ML
5 INJECTION, SOLUTION INTRAVENOUS
Status: DISCONTINUED | OUTPATIENT
Start: 2023-05-17 | End: 2023-05-17 | Stop reason: HOSPADM

## 2023-05-17 RX ORDER — OXYCODONE HCL 5 MG/5 ML
10 SOLUTION, ORAL ORAL
Status: DISCONTINUED | OUTPATIENT
Start: 2023-05-17 | End: 2023-05-17 | Stop reason: HOSPADM

## 2023-05-17 RX ORDER — ONDANSETRON 2 MG/ML
4 INJECTION INTRAMUSCULAR; INTRAVENOUS EVERY 4 HOURS PRN
Status: DISCONTINUED | OUTPATIENT
Start: 2023-05-17 | End: 2023-05-19 | Stop reason: HOSPADM

## 2023-05-17 RX ORDER — CEFOTETAN DISODIUM 2 G/20ML
INJECTION, POWDER, FOR SOLUTION INTRAMUSCULAR; INTRAVENOUS PRN
Status: DISCONTINUED | OUTPATIENT
Start: 2023-05-17 | End: 2023-05-17 | Stop reason: SURG

## 2023-05-17 RX ORDER — ONDANSETRON 4 MG/1
4 TABLET, ORALLY DISINTEGRATING ORAL EVERY 4 HOURS PRN
Status: DISCONTINUED | OUTPATIENT
Start: 2023-05-17 | End: 2023-05-19 | Stop reason: HOSPADM

## 2023-05-17 RX ORDER — ONDANSETRON 2 MG/ML
4 INJECTION INTRAMUSCULAR; INTRAVENOUS
Status: DISCONTINUED | OUTPATIENT
Start: 2023-05-17 | End: 2023-05-17 | Stop reason: HOSPADM

## 2023-05-17 RX ORDER — HYDROMORPHONE HYDROCHLORIDE 1 MG/ML
1 INJECTION, SOLUTION INTRAMUSCULAR; INTRAVENOUS; SUBCUTANEOUS ONCE
Status: COMPLETED | OUTPATIENT
Start: 2023-05-17 | End: 2023-05-17

## 2023-05-17 RX ORDER — IBUPROFEN 200 MG
200 TABLET ORAL
Status: ON HOLD | COMMUNITY
End: 2023-05-19

## 2023-05-17 RX ORDER — AMOXICILLIN 250 MG
2 CAPSULE ORAL 2 TIMES DAILY
Status: DISCONTINUED | OUTPATIENT
Start: 2023-05-17 | End: 2023-05-19 | Stop reason: HOSPADM

## 2023-05-17 RX ORDER — ROCURONIUM BROMIDE 10 MG/ML
INJECTION, SOLUTION INTRAVENOUS PRN
Status: DISCONTINUED | OUTPATIENT
Start: 2023-05-17 | End: 2023-05-17 | Stop reason: SURG

## 2023-05-17 RX ORDER — ACETAMINOPHEN 500 MG
500 TABLET ORAL
Status: ON HOLD | COMMUNITY
End: 2023-07-31

## 2023-05-17 RX ORDER — OXYCODONE HCL 5 MG/5 ML
5 SOLUTION, ORAL ORAL
Status: DISCONTINUED | OUTPATIENT
Start: 2023-05-17 | End: 2023-05-17 | Stop reason: HOSPADM

## 2023-05-17 RX ORDER — BUPIVACAINE HYDROCHLORIDE AND EPINEPHRINE 5; 5 MG/ML; UG/ML
INJECTION, SOLUTION EPIDURAL; INTRACAUDAL; PERINEURAL
Status: DISCONTINUED | OUTPATIENT
Start: 2023-05-17 | End: 2023-05-17 | Stop reason: HOSPADM

## 2023-05-17 RX ORDER — LIDOCAINE HYDROCHLORIDE 20 MG/ML
JELLY TOPICAL ONCE
Status: COMPLETED | OUTPATIENT
Start: 2023-05-17 | End: 2023-05-17

## 2023-05-17 RX ORDER — HALOPERIDOL 5 MG/ML
1 INJECTION INTRAMUSCULAR
Status: DISCONTINUED | OUTPATIENT
Start: 2023-05-17 | End: 2023-05-17 | Stop reason: HOSPADM

## 2023-05-17 RX ORDER — LABETALOL HYDROCHLORIDE 5 MG/ML
5 INJECTION, SOLUTION INTRAVENOUS
Status: DISCONTINUED | OUTPATIENT
Start: 2023-05-17 | End: 2023-05-17 | Stop reason: HOSPADM

## 2023-05-17 RX ADMIN — FENTANYL CITRATE 50 MCG: 50 INJECTION, SOLUTION INTRAMUSCULAR; INTRAVENOUS at 16:12

## 2023-05-17 RX ADMIN — SODIUM CHLORIDE, POTASSIUM CHLORIDE, SODIUM LACTATE AND CALCIUM CHLORIDE: 600; 310; 30; 20 INJECTION, SOLUTION INTRAVENOUS at 08:15

## 2023-05-17 RX ADMIN — ROCURONIUM BROMIDE 80 MG: 50 INJECTION, SOLUTION INTRAVENOUS at 15:59

## 2023-05-17 RX ADMIN — MIDAZOLAM 2 MG: 1 INJECTION, SOLUTION INTRAMUSCULAR; INTRAVENOUS at 15:53

## 2023-05-17 RX ADMIN — IOHEXOL 100 ML: 350 INJECTION, SOLUTION INTRAVENOUS at 06:44

## 2023-05-17 RX ADMIN — PHENYLEPHRINE HYDROCHLORIDE 100 MCG: 10 INJECTION INTRAVENOUS at 16:09

## 2023-05-17 RX ADMIN — SODIUM CHLORIDE, POTASSIUM CHLORIDE, SODIUM LACTATE AND CALCIUM CHLORIDE: 600; 310; 30; 20 INJECTION, SOLUTION INTRAVENOUS at 10:15

## 2023-05-17 RX ADMIN — EPHEDRINE SULFATE 10 MG: 50 INJECTION, SOLUTION INTRAVENOUS at 16:12

## 2023-05-17 RX ADMIN — FENTANYL CITRATE 50 MCG: 50 INJECTION, SOLUTION INTRAMUSCULAR; INTRAVENOUS at 15:59

## 2023-05-17 RX ADMIN — HYDROMORPHONE HYDROCHLORIDE 1 MG: 1 INJECTION, SOLUTION INTRAMUSCULAR; INTRAVENOUS; SUBCUTANEOUS at 05:15

## 2023-05-17 RX ADMIN — SUGAMMADEX 200 MG: 100 INJECTION, SOLUTION INTRAVENOUS at 16:19

## 2023-05-17 RX ADMIN — LIDOCAINE HYDROCHLORIDE 6 ML: 20 JELLY TOPICAL at 08:15

## 2023-05-17 RX ADMIN — CEFOTETAN DISODIUM 2 G: 2 INJECTION, POWDER, FOR SOLUTION INTRAMUSCULAR; INTRAVENOUS at 15:59

## 2023-05-17 RX ADMIN — PROPOFOL 150 MG: 10 INJECTION, EMULSION INTRAVENOUS at 15:59

## 2023-05-17 RX ADMIN — FENTANYL CITRATE 50 MCG: 50 INJECTION, SOLUTION INTRAMUSCULAR; INTRAVENOUS at 16:18

## 2023-05-17 RX ADMIN — SODIUM CHLORIDE, POTASSIUM CHLORIDE, SODIUM LACTATE AND CALCIUM CHLORIDE: 600; 310; 30; 20 INJECTION, SOLUTION INTRAVENOUS at 14:55

## 2023-05-17 RX ADMIN — SODIUM CHLORIDE, POTASSIUM CHLORIDE, SODIUM LACTATE AND CALCIUM CHLORIDE: 600; 310; 30; 20 INJECTION, SOLUTION INTRAVENOUS at 18:18

## 2023-05-17 RX ADMIN — SODIUM CHLORIDE 1000 ML: 9 INJECTION, SOLUTION INTRAVENOUS at 05:15

## 2023-05-17 ASSESSMENT — PAIN SCALES - GENERAL: PAIN_LEVEL: 0

## 2023-05-17 ASSESSMENT — COGNITIVE AND FUNCTIONAL STATUS - GENERAL
WALKING IN HOSPITAL ROOM: A LITTLE
DRESSING REGULAR LOWER BODY CLOTHING: A LITTLE
DAILY ACTIVITIY SCORE: 23
MOVING FROM LYING ON BACK TO SITTING ON SIDE OF FLAT BED: A LITTLE
SUGGESTED CMS G CODE MODIFIER DAILY ACTIVITY: CI
SUGGESTED CMS G CODE MODIFIER MOBILITY: CK
MOBILITY SCORE: 19
STANDING UP FROM CHAIR USING ARMS: A LITTLE
MOVING TO AND FROM BED TO CHAIR: A LITTLE
CLIMB 3 TO 5 STEPS WITH RAILING: A LITTLE

## 2023-05-17 ASSESSMENT — PATIENT HEALTH QUESTIONNAIRE - PHQ9
1. LITTLE INTEREST OR PLEASURE IN DOING THINGS: NOT AT ALL
SUM OF ALL RESPONSES TO PHQ9 QUESTIONS 1 AND 2: 0
2. FEELING DOWN, DEPRESSED, IRRITABLE, OR HOPELESS: NOT AT ALL

## 2023-05-17 ASSESSMENT — LIFESTYLE VARIABLES
TOTAL SCORE: 0
HOW MANY TIMES IN THE PAST YEAR HAVE YOU HAD 5 OR MORE DRINKS IN A DAY: 0
SUBSTANCE_ABUSE: 0
TOTAL SCORE: 0
ON A TYPICAL DAY WHEN YOU DRINK ALCOHOL HOW MANY DRINKS DO YOU HAVE: 1
EVER FELT BAD OR GUILTY ABOUT YOUR DRINKING: NO
CONSUMPTION TOTAL: NEGATIVE
HAVE YOU EVER FELT YOU SHOULD CUT DOWN ON YOUR DRINKING: NO
AVERAGE NUMBER OF DAYS PER WEEK YOU HAVE A DRINK CONTAINING ALCOHOL: 7
ALCOHOL_USE: YES
HAVE PEOPLE ANNOYED YOU BY CRITICIZING YOUR DRINKING: NO
TOTAL SCORE: 0
EVER HAD A DRINK FIRST THING IN THE MORNING TO STEADY YOUR NERVES TO GET RID OF A HANGOVER: NO

## 2023-05-17 ASSESSMENT — ENCOUNTER SYMPTOMS
ABDOMINAL PAIN: 1
CHILLS: 0
BACK PAIN: 0
DIZZINESS: 0
BLURRED VISION: 0
HEADACHES: 0
NERVOUS/ANXIOUS: 0
COUGH: 0
FEVER: 0
DOUBLE VISION: 0
SHORTNESS OF BREATH: 0
PALPITATIONS: 0
NAUSEA: 1
FALLS: 0
HEARTBURN: 0

## 2023-05-17 ASSESSMENT — PAIN DESCRIPTION - PAIN TYPE
TYPE: SURGICAL PAIN

## 2023-05-17 ASSESSMENT — FIBROSIS 4 INDEX
FIB4 SCORE: 1.16
FIB4 SCORE: 1.06

## 2023-05-17 NOTE — ANESTHESIA POSTPROCEDURE EVALUATION
Patient: Saleem Whitney    Procedure Summary     Date: 05/17/23 Room / Location:  OR  / SURGERY [facility]    Anesthesia Start: 1553 Anesthesia Stop: 1633    Procedure: LAPAROSCOPY diagnostic (Abdomen) Diagnosis:     Surgeons: Galo Ashton M.D. Responsible Provider: Inocente Car M.D.    Anesthesia Type: general ASA Status: 2 - Emergent          Final Anesthesia Type: general  Last vitals  BP   Blood Pressure : (!) 169/81    Temp   36.7 °C (98.1 °F)    Pulse   68   Resp   16    SpO2   95 %      Anesthesia Post Evaluation    Patient location during evaluation: PACU  Patient participation: complete - patient participated  Level of consciousness: sleepy but conscious  Pain score: 0    Airway patency: patent  Anesthetic complications: no  Cardiovascular status: hemodynamically stable  Respiratory status: acceptable  Hydration status: euvolemic    PONV: none          No notable events documented.     Nurse Pain Score: 0 (NPRS)

## 2023-05-17 NOTE — ASSESSMENT & PLAN NOTE
"In the setting of small bowel obstruction  No signs of infection at this time  We will hold antibiotics for now  Monitor, repeat CBC    CT scan did report: \"Hazy fat stranding in the mesenteric root with prominent mesenteric root lymph nodes, consider panniculitis or sclerosing mesenteritis in the appropriate clinical setting.\" However patient afebrile and pain is acute, we will monitor closely and if trending upwards or any other signs of infection we will consider starting antibiotics. We will order procalcitonin.    5/18: Procalcitonin within normal limits. WBC today WNL. We will continue to hold antibiotics.  "

## 2023-05-17 NOTE — ED NOTES
Ng placement resulted , Dr Woodward aware  2 cm pulled back, placed to intermittent suction  Fluid return noted  Admitting orders received. Waiting for bed assignment.

## 2023-05-17 NOTE — H&P
"Hospital Medicine History & Physical Note    Date of Service  5/17/2023    Primary Care Physician  Jose Flores D.O.    Consultants  general surgery    Specialist Names: Consulted by ER physician.    Code Status  DNAR/DNI    Chief Complaint  Chief Complaint   Patient presents with    Abdominal Pain     Pt presents with concerns of SBO, reports h/o and feels the same as did previously        History of Presenting Illness  Saleem Whitney is a 77 y.o. male past medical history of hypertension, dyslipidemia, BPH, diabetes, hypothyroidism who presented 5/17/2023 with abdominal pain.    The patient states that yesterday afternoon he developed sudden acute abdominal pain and distention.  Due to the fact that it did not get any better throughout yesterday and today he decided to come to the ER for further assessment and evaluation.  The patient denies fever, sick contacts, chest pain, headache or any other focal neurological deficit.    In the ER the patient had a CT scan which reported: \"Fluid-filled distended proximal small bowel loops with distal small bowel decompression, appearance suggests changes of partial or evolving small bowel obstruction.\"     Of note the patient had a similar episode with small bowel obstruction last September.    The patient has been placed with an NG tube, general surgery has been consulted by the ER physician.  The patient will be admitted, pending surgical evaluation.  For now we will continue with NG tube and try to correct conservatively.    I discussed the plan of care with patient and ER Physician .    Review of Systems  Review of Systems   Constitutional:  Positive for malaise/fatigue. Negative for chills and fever.   HENT:  Negative for hearing loss and nosebleeds.    Eyes:  Negative for blurred vision and double vision.   Respiratory:  Negative for cough and shortness of breath.    Cardiovascular:  Negative for chest pain and palpitations.   Gastrointestinal:  Positive for " abdominal pain and nausea. Negative for heartburn.   Genitourinary:  Negative for dysuria and urgency.   Musculoskeletal:  Negative for back pain and falls.   Skin:  Negative for itching and rash.   Neurological:  Negative for dizziness and headaches.   Psychiatric/Behavioral:  Negative for substance abuse. The patient is not nervous/anxious.    All other systems reviewed and are negative.      Past Medical History   has a past medical history of Hyperlipidemia, Hypertension, Hypothyroid, and Type II or unspecified type diabetes mellitus without mention of complication, not stated as uncontrolled.    Surgical History   has a past surgical history that includes hand surgery.     Family History  family history includes Diabetes in his maternal uncle, mother, and sister; Stroke in his father.       Social History   reports that he quit smoking about 51 years ago. His smoking use included cigarettes. He has a 5.00 pack-year smoking history. He has never used smokeless tobacco. He reports current alcohol use of about 4.2 oz of alcohol per week. He reports that he does not use drugs.    Allergies  No Known Allergies    Medications  Prior to Admission Medications   Prescriptions Last Dose Informant Patient Reported? Taking?   Cyanocobalamin (VITAMIN B-12) 2500 MCG SL Tab 5/16/2023 at 2000 Patient No No   Sig: Place 1 Tablet under the tongue every day.   DULoxetine (CYMBALTA) 30 MG Cap DR Particles 5/16/2023 at 0800 Patient No No   Sig: Take 1 Capsule by mouth every day.   Dulaglutide (TRULICITY) 1.5 MG/0.5ML Solution Pen-injector 5/14/2023 at am varies Patient No No   Sig: Inject 0.5 mL under the skin every 7 days.   HYDROcodone-acetaminophen (NORCO) 5-325 MG Tab per tablet 5/12/2023 at finished Patient Yes No   Sig: Take 1 Tablet by mouth every 6 hours as needed (pain).   Magnesium 500 MG Tab 5/16/2023 at 2000 Patient No No   Sig: Take 1,000 mg by mouth every day.   Potassium Citrate 15 MEQ (1620 MG) Tab CR 5/16/2023 at  2000 Patient Yes No   Sig: Take 15 mEq by mouth every evening.   acetaminophen (TYLENOL) 500 MG Tab 5/11/2023 at un Patient Yes Yes   Sig: Take 500 mg by mouth 1 time a day as needed for Mild Pain. Indications: Pain   albuterol 108 (90 Base) MCG/ACT Aero Soln inhalation aerosol new at un Patient No No   Sig: Inhale 1-2 Puffs every four hours as needed for Shortness of Breath.   amLODIPine (NORVASC) 10 MG Tab 5/16/2023 at 0500 Patient No No   Sig: Take 1 Tablet by mouth every day.   atorvastatin (LIPITOR) 10 MG Tab 5/16/2023 at 0800 Patient No No   Sig: Take 1 Tablet by mouth every day.   glipiZIDE (GLUCOTROL) 10 MG Tab 5/16/2023 at 0800 Patient No No   Sig: Take 1 Tablet by mouth every day.   ibuprofen (MOTRIN) 200 MG Tab 5/11/2023 at [facility] Patient Yes Yes   Sig: Take 200 mg by mouth 1 time a day as needed for Mild Pain.   levothyroxine (SYNTHROID) 88 MCG Tab 5/16/2023 at 0800 Patient No No   Sig: Take 1 Tablet by mouth every morning on an empty stomach.   losartan (COZAAR) 100 MG Tab 5/16/2023 at 0800 Patient No No   Sig: Take 1 Tablet by mouth every day.   metformin (GLUCOPHAGE) 1000 MG tablet 5/16/2023 at 2000 Patient No No   Sig: Take 1 Tablet by mouth 2 times a day with meals.   mupirocin (BACTROBAN) 2 % Ointment 5/15/2023 at [facility] Patient Yes No   Sig: Apply 1 Application. topically 2 times a day.   tamsulosin (FLOMAX) 0.4 MG capsule 5/16/2023 at 0800 Patient No No   Sig: Take 2 Capsules by mouth 1/2 hour after breakfast for 90 days.   vitamin D (CHOLECALCIFEROL) 1000 UNIT Tab 5/16/2023 at 2000 Patient No No   Sig: Take 1 Tab by mouth every day.      Facility-Administered Medications: None       Physical Exam  Temp:  [36.8 °C (98.3 °F)] 36.8 °C (98.3 °F)  Pulse:  [72-87] 72  Resp:  [16] 16  BP: (117-152)/(69-77) 152/77  SpO2:  [96 %-97 %] 96 %  Blood Pressure : (!) 152/77   Temperature: 36.8 °C (98.3 °F)   Pulse: 72   Respiration: 16   Pulse Oximetry: 96 %       Physical Exam  Vitals and nursing note  reviewed.   Constitutional:       Appearance: He is ill-appearing.   HENT:      Head: Normocephalic and atraumatic.      Right Ear: External ear normal.      Left Ear: External ear normal.      Nose: Nose normal.      Mouth/Throat:      Mouth: Mucous membranes are moist.      Pharynx: Oropharynx is clear.   Eyes:      General:         Right eye: No discharge.         Left eye: No discharge.      Extraocular Movements: Extraocular movements intact.      Pupils: Pupils are equal, round, and reactive to light.   Cardiovascular:      Rate and Rhythm: Normal rate and regular rhythm.      Heart sounds: No murmur heard.  Pulmonary:      Effort: Pulmonary effort is normal. No respiratory distress.      Breath sounds: Normal breath sounds.   Abdominal:      General: There is distension.      Tenderness: There is abdominal tenderness.      Comments: NG tube in place   Musculoskeletal:      Cervical back: Normal range of motion and neck supple.      Right lower leg: No edema.      Left lower leg: No edema.   Skin:     General: Skin is warm.   Neurological:      General: No focal deficit present.      Mental Status: He is alert and oriented to person, place, and time.   Psychiatric:         Mood and Affect: Mood normal.         Behavior: Behavior normal.         Laboratory:  Recent Labs     05/17/23  0520   WBC 13.1*   RBC 5.10   HEMOGLOBIN 14.9   HEMATOCRIT 44.0   MCV 86.3   MCH 29.2   MCHC 33.9   RDW 39.2   PLATELETCT 294   MPV 9.9     Recent Labs     05/17/23  0520   SODIUM 132*   POTASSIUM 3.8   CHLORIDE 96   CO2 24   GLUCOSE 181*   BUN 20   CREATININE 0.92   CALCIUM 9.0     Recent Labs     05/17/23  0520   ALTSGPT 9   ASTSGOT 13   ALKPHOSPHAT 69   TBILIRUBIN 0.8   LIPASE 22   GLUCOSE 181*         No results for input(s): NTPROBNP in the last 72 hours.      No results for input(s): TROPONINT in the last 72 hours.    Imaging:  DX-ABDOMEN FOR TUBE PLACEMENT   Final Result      NG tube tip overlies the gastric fundus.     "  CT-ABDOMEN-PELVIS WITH   Final Result         1.  Fluid-filled distended proximal small bowel loops with distal small bowel decompression, appearance suggests changes of partial or evolving small bowel obstruction.   2.  Hazy fat stranding in the mesenteric root with prominent mesenteric root lymph nodes, consider panniculitis or sclerosing mesenteritis in the appropriate clinical setting.   3.  Low-density lesion in the inferior right hepatic lobe, appearance favors hepatic cyst or hemangioma, stable since prior study, otherwise indeterminate.   4.  Dilatation of the bilateral renal pelvis and calyces, similar to prior study, appearance favors extra renal pelvis morphology.   5.  Enlarged prostate, workup and evaluation for causes of prostate enlargement recommended as clinically appropriate.   6.  Bilateral fat-containing inguinal hernias, right greater than left   7.  Atherosclerosis and atherosclerotic coronary artery disease              Assessment/Plan:  Justification for Admission Status  I anticipate this patient will require at least two midnights for appropriate medical management, necessitating inpatient admission because patient with small bowel obstruction    Patient will need a  bed on MED/SURG service .  The need is secondary to small bowel obstruction.    * Small bowel obstruction (HCC)- (present on admission)  Assessment & Plan  Ct scan reported: \"Fluid-filled distended proximal small bowel loops with distal small bowel decompression, appearance suggests changes of partial or evolving small bowel obstruction.\"  Currently on NG tube  Strict NPO  General Surgery has been consulted, we appreciate further recommendations.    Advance care planning  Assessment & Plan  I discussed for at least 16 minutes further goals of care which included CODE STATUS which included full code versus DNR/DNI.  The patient is adamant he would like to be DNR/DNI.    Hyponatremia  Assessment & Plan  Mild  Monitor  Currently " "on IVF    Leukocytosis- (present on admission)  Assessment & Plan  In the setting of small bowel obstruction  No signs of infection at this time  We will hold antibiotics for now  Monitor, repeat CBC    CT scan did report: \"Hazy fat stranding in the mesenteric root with prominent mesenteric root lymph nodes, consider panniculitis or sclerosing mesenteritis in the appropriate clinical setting.\" However patient afebrile and pain is acute, we will monitor closely and if trending upwards or any other signs of infection we will consider starting antibiotics. We will order procalcitonin.    BPH associated with nocturia- (present on admission)  Assessment & Plan  Patient currently n.p.o. for small bowel obstruction.  Once the patient is able to tolerate p.o. we will resume home medication.    (HCC) Hyperlipidemia due to type 2 diabetes mellitus- (present on admission)  Assessment & Plan  Patient currently n.p.o. for small bowel obstruction.  Once the patient is able to tolerate p.o. we will resume home medication.    Hypothyroidism (acquired)- (present on admission)  Assessment & Plan  Patient currently n.p.o. for small bowel obstruction.  Once the patient is able to tolerate p.o. we will resume home medication.    (HCC) Hypertension associated with diabetes- (present on admission)  Assessment & Plan  Patient currently n.p.o. for small bowel obstruction.  Once the patient is able to tolerate p.o. we will resume home medication.    DM (diabetes mellitus) (HCC)  Assessment & Plan  ISS for now  Monitor and make changes accordingly.  We will repeat A1c        VTE prophylaxis: SCDs/TEDs    I spent at least 76 minutes providing care for this patient which included face-to-face interview, physical examination.  Review of lab work including CBC, CMP.  Review of imaging studies including CT scan.  Discussion at length with emergency physician.  Discussion with multidisciplinary team.  Creating plan of care, reviewing orders.  "

## 2023-05-17 NOTE — ANESTHESIA PREPROCEDURE EVALUATION
Case: [id number] Date/Time: 05/17/23 1545    Procedures:       LAPAROSCOPY diagnostic, possible bowel resection, possible exploratory laparotomy      EXCISION, INTESTINE possible    Location: [facility] OR 01 / SURGERY [facility]    Surgeons: Galo Ashton M.D.          Relevant Problems   ANESTHESIA   (positive) Motion sickness      CARDIAC   (positive) (HCC) Hypertension associated with diabetes      ENDO   (positive) Hypothyroidism (acquired)      Other   (positive) Idiopathic chronic gout, left ankle and foot, without tophus (tophi)       Physical Exam    Airway   Mallampati: III  TM distance: >3 FB  Neck ROM: full       Cardiovascular - normal exam  Rhythm: regular  Rate: normal  (-) murmur     Dental - normal exam           Pulmonary - normal exam  Breath sounds clear to auscultation     Abdominal    Neurological - normal exam                 Anesthesia Plan    ASA 2- EMERGENT   ASA physical status emergent criteria: acute peritonitis    Plan - general       Airway plan will be ETT          Induction: intravenous    Postoperative Plan: Postoperative administration of opioids is intended.    Pertinent diagnostic labs and testing reviewed    Informed Consent:    Anesthetic plan and risks discussed with patient.    Use of blood products discussed with: patient whom consented to blood products.

## 2023-05-17 NOTE — ASSESSMENT & PLAN NOTE
Patient currently n.p.o. for small bowel obstruction.  Once the patient is able to tolerate p.o. we will resume home medication.    5/18: Patient still n.p.o. pending GI series.

## 2023-05-17 NOTE — ANESTHESIA PROCEDURE NOTES
Airway    Date/Time: 5/17/2023 4:00 PM    Performed by: Inocente Car M.D.  Authorized by: Inocente Car M.D.    Location:  OR  Urgency:  Elective  Indications for Airway Management:  Anesthesia      Spontaneous Ventilation: absent    Sedation Level:  Deep  Preoxygenated: Yes    Patient Position:  Sniffing  Final Airway Type:  Endotracheal airway  Final Endotracheal Airway:  ETT  Cuffed: Yes    Technique Used for Successful ETT Placement:  Direct laryngoscopy    Insertion Site:  Oral  Blade Type:  Glide  Laryngoscope Blade/Videolaryngoscope Blade Size:  4  ETT Size (mm):  7.5  Measured from:  Teeth  ETT to Teeth (cm):  21  Placement Verified by: auscultation and capnometry    Cormack-Lehane Classification:  Grade I - full view of glottis  Number of Attempts at Approach:  1

## 2023-05-17 NOTE — ED PROVIDER NOTES
ED Provider Note    CHIEF COMPLAINT  Chief Complaint   Patient presents with    Abdominal Pain     Pt presents with concerns of SBO, reports h/o and feels the same as did previously        EXTERNAL RECORDS REVIEWED  Patient was last seen April 10 of this year after a fall suffered a finger injury and headache and an abrasion to his forehead.      Previous to that patient was admitted to the hospital in September of last year for nausea vomiting, abdominal pain and diagnosed with a small bowel obstruction.  He had an NG tube placed and had resolution of his symptoms.  He was seen by Dr. Teran from [facility].    HPI/ROS  LIMITATION TO HISTORY   Select: : None  OUTSIDE HISTORIAN(S):  None    Saleem Whitney is a 77 y.o. male who presents to the emergency department with abdominal pain that started last night.  He has had before and is concerned about recurrence of a bowel obstruction.  He reports distention.  He reports 3 episodes of nonbloody emesis prior to arrival but he also notes that he had a bowel movement yesterday.  Denies fever cough or cold symptoms.  No chest pain or shortness of breath.  No radiation of pain.  No urinary symptoms.    PAST MEDICAL HISTORY   has a past medical history of Hyperlipidemia, Hypertension, Hypothyroid, and Type II or unspecified type diabetes mellitus without mention of complication, not stated as uncontrolled.    SURGICAL HISTORY   has a past surgical history that includes hand surgery.    FAMILY HISTORY  Family History   Problem Relation Age of Onset    Diabetes Mother     Stroke Father         45    Diabetes Sister     Diabetes Maternal Uncle        SOCIAL HISTORY  Social History     Tobacco Use    Smoking status: Former     Packs/day: 1.00     Years: 5.00     Pack years: 5.00     Types: Cigarettes     Quit date: 1971     Years since quittin.5    Smokeless tobacco: Never   Vaping Use    Vaping Use: Never used   Substance and Sexual Activity    Alcohol  use: Yes     Alcohol/week: 4.2 oz     Types: 7 Glasses of wine per week     Comment: daily    Drug use: No    Sexual activity: Yes     Partners: Female       CURRENT MEDICATIONS  Home Medications       Reviewed by Dionicio Anguiano (Pharmacy Tech) on 05/17/23 at 0801  Med List Status: Complete     Medication Last Dose Status   acetaminophen (TYLENOL) 500 MG Tab 5/11/2023 Active   albuterol 108 (90 Base) MCG/ACT Aero Soln inhalation aerosol new Active   amLODIPine (NORVASC) 10 MG Tab 5/16/2023 Active   atorvastatin (LIPITOR) 10 MG Tab 5/16/2023 Active   Cyanocobalamin (VITAMIN B-12) 2500 MCG SL Tab 5/16/2023 Active   Dulaglutide (TRULICITY) 1.5 MG/0.5ML Solution Pen-injector 5/14/2023 Active   DULoxetine (CYMBALTA) 30 MG Cap DR Particles 5/16/2023 Active   glipiZIDE (GLUCOTROL) 10 MG Tab 5/16/2023 Active   HYDROcodone-acetaminophen (NORCO) 5-325 MG Tab per tablet 5/12/2023 Active   ibuprofen (MOTRIN) 200 MG Tab 5/11/2023 Active   levothyroxine (SYNTHROID) 88 MCG Tab 5/16/2023 Active   losartan (COZAAR) 100 MG Tab 5/16/2023 Active   Magnesium 500 MG Tab 5/16/2023 Active   metformin (GLUCOPHAGE) 1000 MG tablet 5/16/2023 Active   mupirocin (BACTROBAN) 2 % Ointment 5/15/2023 Active   Potassium Citrate 15 MEQ (1620 MG) Tab CR 5/16/2023 Active   tamsulosin (FLOMAX) 0.4 MG capsule 5/16/2023 Active   vitamin D (CHOLECALCIFEROL) 1000 UNIT Tab 5/16/2023 Active                    ALLERGIES  No Known Allergies    PHYSICAL EXAM  VITAL SIGNS: BP (!) 152/77   Pulse 72   Temp 36.8 °C (98.3 °F) (Temporal)   Resp 16   Wt 88.5 kg (195 lb)   SpO2 96%   BMI 25.73 kg/m²    Vitals reviewed.  Constitutional: Patient is oriented to person, place, and time. Appears well-developed and well-nourished. Mild distress.    Head: Normocephalic and atraumatic.   Ears: Normal external ears bilaterally.   Mouth/Throat: Oropharynx is clear  Eyes: Conjunctivae are normal. Pupils are equal, round  Neck: Normal range of motion. Neck supple.    Cardiovascular: Normal rate, regular rhythm and normal heart sounds. Normal peripheral pulses.  Pulmonary/Chest: Effort normal and breath sounds normal. No respiratory distress, no wheezes  Abdominal: Soft, Mild distension. Decreased bowel sounds are normal. There is diffuse tenderness. No rebound or guarding, or peritoneal signs.  Musculoskeletal: No edema and no tenderness.   Neurological: Normal motor and sensory exam. No focal deficits.   Skin: Skin is warm and dry. No erythema. No pallor.   Psychiatric: Patient has a normal mood and affect.       DIAGNOSTIC STUDIES / PROCEDURES    LABS  Results for orders placed or performed during the hospital encounter of 05/17/23   CBC WITH DIFFERENTIAL   Result Value Ref Range    WBC 13.1 (H) 4.8 - 10.8 K/uL    RBC 5.10 4.70 - 6.10 M/uL    Hemoglobin 14.9 14.0 - 18.0 g/dL    Hematocrit 44.0 42.0 - 52.0 %    MCV 86.3 81.4 - 97.8 fL    MCH 29.2 27.0 - 33.0 pg    MCHC 33.9 33.7 - 35.3 g/dL    RDW 39.2 35.9 - 50.0 fL    Platelet Count 294 164 - 446 K/uL    MPV 9.9 9.0 - 12.9 fL    Neutrophils-Polys 85.30 (H) 44.00 - 72.00 %    Lymphocytes 8.50 (L) 22.00 - 41.00 %    Monocytes 4.00 0.00 - 13.40 %    Eosinophils 1.70 0.00 - 6.90 %    Basophils 0.10 0.00 - 1.80 %    Immature Granulocytes 0.40 0.00 - 0.90 %    Nucleated RBC 0.00 /100 WBC    Neutrophils (Absolute) 11.19 (H) 1.82 - 7.42 K/uL    Lymphs (Absolute) 1.12 1.00 - 4.80 K/uL    Monos (Absolute) 0.52 0.00 - 0.85 K/uL    Eos (Absolute) 0.22 0.00 - 0.51 K/uL    Baso (Absolute) 0.01 0.00 - 0.12 K/uL    Immature Granulocytes (abs) 0.05 0.00 - 0.11 K/uL    NRBC (Absolute) 0.00 K/uL   COMP METABOLIC PANEL   Result Value Ref Range    Sodium 132 (L) 135 - 145 mmol/L    Potassium 3.8 3.6 - 5.5 mmol/L    Chloride 96 96 - 112 mmol/L    Co2 24 20 - 33 mmol/L    Anion Gap 12.0 7.0 - 16.0    Glucose 181 (H) 65 - 99 mg/dL    Bun 20 8 - 22 mg/dL    Creatinine 0.92 0.50 - 1.40 mg/dL    Calcium 9.0 8.4 - 10.2 mg/dL    AST(SGOT) 13 12 - 45  U/L    ALT(SGPT) 9 2 - 50 U/L    Alkaline Phosphatase 69 30 - 99 U/L    Total Bilirubin 0.8 0.1 - 1.5 mg/dL    Albumin 4.2 3.2 - 4.9 g/dL    Total Protein 7.1 6.0 - 8.2 g/dL    Globulin 2.9 1.9 - 3.5 g/dL    A-G Ratio 1.4 g/dL   LIPASE   Result Value Ref Range    Lipase 22 7 - 58 U/L   LACTIC ACID   Result Value Ref Range    Lactic Acid 1.5 0.5 - 2.0 mmol/L   CORRECTED CALCIUM   Result Value Ref Range    Correct Calcium 8.8 8.5 - 10.5 mg/dL   ESTIMATED GFR   Result Value Ref Range    GFR (CKD-EPI) 84 >60 mL/min/1.73 m 2         RADIOLOGY  I have independently interpreted the diagnostic imaging associated with this visit and am waiting the final reading from the radiologist.   My preliminary interpretation is as follows: AF levels throughout small bowel on CT.  Plain film abdomen shows an appropriately placed NG tube.  Radiologist interpretation:   DX-ABDOMEN FOR TUBE PLACEMENT   Final Result      NG tube tip overlies the gastric fundus.      CT-ABDOMEN-PELVIS WITH   Final Result         1.  Fluid-filled distended proximal small bowel loops with distal small bowel decompression, appearance suggests changes of partial or evolving small bowel obstruction.   2.  Hazy fat stranding in the mesenteric root with prominent mesenteric root lymph nodes, consider panniculitis or sclerosing mesenteritis in the appropriate clinical setting.   3.  Low-density lesion in the inferior right hepatic lobe, appearance favors hepatic cyst or hemangioma, stable since prior study, otherwise indeterminate.   4.  Dilatation of the bilateral renal pelvis and calyces, similar to prior study, appearance favors extra renal pelvis morphology.   5.  Enlarged prostate, workup and evaluation for causes of prostate enlargement recommended as clinically appropriate.   6.  Bilateral fat-containing inguinal hernias, right greater than left   7.  Atherosclerosis and atherosclerotic coronary artery disease            COURSE & MEDICAL DECISION MAKING    ED  Observation Status? Yes; I am placing the patient in to an observation status due to a diagnostic uncertainty as well as therapeutic intensity. Patient placed in observation status at 06:02 AM, 5/17/2023.     Observation plan is as follows: Due to diagnostic uncertainty, possible need for NG tube insertion, surgical consultation, admission to the hospital versus discharge to home, patient placed in ED observation    Upon Reevaluation, the patient's condition has: not improved; and will be escalated to hospitalization.    Patient discharged from ED Observation status at 0900 (Time) May 17, 2023 (Date).     INITIAL ASSESSMENT, COURSE AND PLAN  Care Narrative:   This is a pleasant 77-year-old male.  He is having diffuse abdominal pain but he does not look toxic or wildly uncomfortable.  He is sitting reading a book.  Work-up was initiated by my partner, Dr. Bolivar.  CT, labs pending.  History of small bowel obstruction fall of last year resolved after NG tube insertion.    7:30 AM patient's reevaluated at the bedside.  No change in condition or complaints.  We discussed CT findings which do indeed show small bowel obstruction with air-fluid levels throughout the small colon.  There are stable liver lesions that are concerning for possible cyst versus hemangioma.  Inflammation in the mesentery concerning for possibly panniculitis or mesenteritis.  Enlarged prostate and bilateral fat-containing umbilical hernia are also seen.  Sugar was elevated 181 and patient states this is not typical for him that he usually runs about 1 30-1 40.  Sodium slightly low 132 and his white blood cell count was elevated, 13.  We discussed NG tube versus more conservative therapy.  He states he responded so well in the past to previous NG tube insertion, he would like to proceed with this.  Hospitalist paged as well as general surgery.    8:30 AM discussed with jean carlos Wong regarding patient's presentation, prior similar evaluation  and admission in September of last year.  Patient's desire for NG tube based on its improvement in symptoms last time.  This has been placed.  Await confirmatory x-ray.  Await response from general surgery.    0841AM D/W Dr. Ashton, who agrees to se the patient in consultation.     9:51 AM patient's NG tube was pulled back slightly.  It is in the fundus.  I have discussed this with nursing staff.     HYDRATION: Based on the patient's presentation of Inability to take oral fluids the patient was given IV fluids. IV Hydration was used because oral hydration was not adequate alone. Upon recheck following hydration, the patient was improved.        DISPOSITION AND DISCUSSIONS  I have discussed management of the patient with the following physicians and FREDDY's: Hospitalist, general surgery    Discussion of management with other Q or appropriate source(s): None    Barriers to care at this time, including but not limited to: None.     FINAL DIAGNOSIS  1. Generalized abdominal pain Acute   2. Nausea and vomiting, unspecified vomiting type Acute   3. SBO (small bowel obstruction) (HCC)    4. Liver lesion    5. Enlarged prostate    6. Bilateral inguinal hernia without obstruction or gangrene, recurrence not specified           Electronically signed by: Darshana Woodward D.O., 5/17/2023 5:54 AM

## 2023-05-17 NOTE — OP REPORT
OPERATIVE NOTE    PREOPERATIVE DIAGNOSIS: Partial small bowel obstruction    POSTOPERATIVE DIAGNOSIS: Enteritis versus ileus    PROCEDURE PERFORMED: Diagnostic laparoscopy    SURGEON: Galo Ashton M.D.    ASSISTANT: None        ANESTHESIOLOGIST:  Anesthesiologist: Inocente Car M.D.     ANESTHESIA: general     FINDINGS: Mildly thickened stretch of small intestine.  No clear transition point..     WOUND CLASS: Clean    SPECIMEN: None    ESTIMATED BLOOD LOSS: 5 mL    Indications: Patient is a 77-year-old male who presents with a second bowel obstruction without prior surgery.  Patient did have abnormal findings on CT scan.  Patient was counseled extensively as of the risk first benefits of surgery and agreed to proceed fully informed.    Description:    The patient was prepped and draped in the standard sterile surgical fashion after induction of general anesthesia.  Appropriate timeout was performed and antibiotics delivered.  I began with a left upper quadrant Veress insertion and the abdomen was insufflated to 15 mmHg CO2.  I placed 2 more trocars under direct visualization.    I began by inspecting the abdomen.  There were no gross abnormalities visible.  I then proceeded by running the small intestine from the ileocecal valve to the ligament of Treitz.  There was a stretch of mildly thickened small bowel in the region of concern seen on CT scan.  It was a fairly significant length.  I did not feel excision was indicated.  It was viable and without obvious transition point.  The appendix appeared normal and there were no other obvious abnormalities.    At that point I assured that hemostasis was achieved.  The sponge and instrument count was correct.  The ports were withdrawn.  Skin edges were reapproximated with Monocryl.  Appropriate dry sterile dressings were applied.  The patient was then extubated to recovery in satisfactory condition.    Disposition:    The patient will be observed on the benavides.  I  will order a small bowel series for tomorrow to ensure there is nothing intraluminal.  The patient would likely benefit from a GI consultation and pill endoscopy as an outpatient.        ____________________________________     Galo Ashton M.D.    DT: 5/17/2023  4:22 PM      Cc: Jose Flores D.O.

## 2023-05-17 NOTE — ED TRIAGE NOTES
Chief Complaint   Patient presents with    Abdominal Pain     Pt presents with concerns of SBO, reports h/o and feels the same as did previously      /69   Pulse 87   Temp 36.8 °C (98.3 °F) (Temporal)   Resp 16   Wt 88.5 kg (195 lb)   SpO2 97%   BMI 25.73 kg/m²     
: Yes

## 2023-05-17 NOTE — ED NOTES
April 27, 2021      Fortino Polo  [address] [facility] [id number]        Mr. Polo,     All of your labs were normal for you.   Your LDL (bad cholesterol)  was above goal.  Genetics, diet, weight and low exercise levels can contribute to this. Your HDL (good cholesterol) was below my goal for you (>45 in men and > 55 in women).  Genetics and inactivity can contribute to this. Your triglycerides were above normal.  Poor diet, genetics and being overweight can contribute to this.  1000mg daily of omega-3 fatty acids may improve this.     I suggest that you start a generic medication (lipitor) to help lower this.  I will send a prescription to your pharmacy.  You should return in 3 months for a follow-up fasting blood test.  Call sooner if you develop severe muscle aches, nausea or very dark urine Maintaining a healthy diet with lean proteins, whole grains and healthy fats such as olive oil as well as regular exercise and maintaining an appropriate weight all contribute to healthier cholesterol levels.     Please contact the clinic if you have additional questions.  Thank you.     Sincerely,     Zbigniew Garcia PA-C     Resulted Orders   Lipid panel reflex to direct LDL Fasting   Result Value Ref Range    Cholesterol 275 (H) <200 mg/dL      Comment:      Desirable:       <200 mg/dl    Triglycerides 378 (H) <150 mg/dL      Comment:      Borderline high:  150-199 mg/dl  High:             200-499 mg/dl  Very high:       >499 mg/dl  Fasting specimen      HDL Cholesterol 27 (L) >39 mg/dL    LDL Cholesterol Calculated 172 (H) <100 mg/dL      Comment:      Above desirable:  100-129 mg/dl  Borderline High:  130-159 mg/dL  High:             160-189 mg/dL  Very high:       >189 mg/dl      Non HDL Cholesterol 248 (H) <130 mg/dL      Comment:      Above Desirable:  130-159 mg/dl  Borderline high:  160-189 mg/dl  High:             190-219 mg/dl  Very high:       >219 mg/dl     Basic metabolic panel   Result Value Ref  Med rec complete per pt at bedside     Allergies reviewed and updated.     Range    Sodium 137 133 - 144 mmol/L    Potassium 4.1 3.4 - 5.3 mmol/L    Chloride 102 94 - 109 mmol/L    Carbon Dioxide 29 20 - 32 mmol/L    Anion Gap 6 3 - 14 mmol/L    Glucose 95 70 - 99 mg/dL      Comment:      Fasting specimen    Urea Nitrogen 13 7 - 30 mg/dL    Creatinine 1.15 0.66 - 1.25 mg/dL    GFR Estimate 73 >60 mL/min/[1.73_m2]      Comment:      Non  GFR Calc  Starting 12/18/2018, serum creatinine based estimated GFR (eGFR) will be   calculated using the Chronic Kidney Disease Epidemiology Collaboration   (CKD-EPI) equation.      GFR Estimate If Black 85 >60 mL/min/[1.73_m2]      Comment:       GFR Calc  Starting 12/18/2018, serum creatinine based estimated GFR (eGFR) will be   calculated using the Chronic Kidney Disease Epidemiology Collaboration   (CKD-EPI) equation.      Calcium 9.5 8.5 - 10.1 mg/dL

## 2023-05-17 NOTE — ED NOTES
Room ready , report called to Tanika.  OR staff called , pt to go from ER to OR for procedure   Update given to pt, no further questions

## 2023-05-17 NOTE — ASSESSMENT & PLAN NOTE
ISS for now  Monitor and make changes accordingly.  We will repeat A1c    5/18: A1c is 7. Continue ISS, monitor and make changes accordingly.

## 2023-05-17 NOTE — ASSESSMENT & PLAN NOTE
"Ct scan reported: \"Fluid-filled distended proximal small bowel loops with distal small bowel decompression, appearance suggests changes of partial or evolving small bowel obstruction.\"  Currently on NG tube  Strict NPO  General Surgery has been consulted, we appreciate further recommendations.    5/18: Patient underwent laparoscopy yesterday by general surgery. We are pending GI series. We appreciate further recommendations by general surgery.  "

## 2023-05-17 NOTE — ED NOTES
Pt notified of need for urine sample. Unable to provide one. Supplies given to pt. NAD. Care ongoing.

## 2023-05-17 NOTE — ANESTHESIA TIME REPORT
Anesthesia Start and Stop Event Times     Date Time Event    5/17/2023 1540 Ready for Procedure     1553 Anesthesia Start     1633 Anesthesia Stop        Responsible Staff  05/17/23    Name Role Begin End    Inocente Car M.D. Anesth 1553 1633        Overtime Reason:  no overtime (within assigned shift)    Comments:

## 2023-05-17 NOTE — CONSULTS
General Surgery Consult (EGS)  [facility]          CHIEF COMPLAINT: Nausea vomiting abdominal pain.    HISTORY OF PRESENT ILLNESS: The patient is a 77 y.o. male, who presents with the above complaints.  He has a history of a bowel obstruction in September of last year.  He was managed conservatively.  He has had no prior surgical history which is concerning.  He is up-to-date on his colonoscopy.     Referring Provider: Dr. Be    BMI:Body mass index is 25.73 kg/m².     PAST MEDICAL HISTORY:  has a past medical history of Hyperlipidemia, Hypertension, Hypothyroid, and Type II or unspecified type diabetes mellitus without mention of complication, not stated as uncontrolled.     PAST SURGICAL HISTORY:  has a past surgical history that includes hand surgery.     ALLERGIES: No Known Allergies     CURRENT MEDICATIONS:   Home Medications       Reviewed by Dionicio Anguiano (Pharmacy Tech) on 05/17/23 at 0801  Med List Status: Complete     Medication Last Dose Status   acetaminophen (TYLENOL) 500 MG Tab 5/11/2023 Active   albuterol 108 (90 Base) MCG/ACT Aero Soln inhalation aerosol new Active   amLODIPine (NORVASC) 10 MG Tab 5/16/2023 Active   atorvastatin (LIPITOR) 10 MG Tab 5/16/2023 Active   Cyanocobalamin (VITAMIN B-12) 2500 MCG SL Tab 5/16/2023 Active   Dulaglutide (TRULICITY) 1.5 MG/0.5ML Solution Pen-injector 5/14/2023 Active   DULoxetine (CYMBALTA) 30 MG Cap DR Particles 5/16/2023 Active   glipiZIDE (GLUCOTROL) 10 MG Tab 5/16/2023 Active   HYDROcodone-acetaminophen (NORCO) 5-325 MG Tab per tablet 5/12/2023 Active   ibuprofen (MOTRIN) 200 MG Tab 5/11/2023 Active   levothyroxine (SYNTHROID) 88 MCG Tab 5/16/2023 Active   losartan (COZAAR) 100 MG Tab 5/16/2023 Active   Magnesium 500 MG Tab 5/16/2023 Active   metformin (GLUCOPHAGE) 1000 MG tablet 5/16/2023 Active   mupirocin (BACTROBAN) 2 % Ointment 5/15/2023 Active   Potassium Citrate 15 MEQ (1620 MG) Tab CR 5/16/2023 Active   tamsulosin (FLOMAX) 0.4  MG capsule 2023 Active   vitamin D (CHOLECALCIFEROL) 1000 UNIT Tab 2023 Active                    FAMILY HISTORY:   Family History   Problem Relation Age of Onset    Diabetes Mother     Stroke Father         45    Diabetes Sister     Diabetes Maternal Uncle         SOCIAL HISTORY:   Social History     Tobacco Use    Smoking status: Former     Packs/day: 1.00     Years: 5.00     Pack years: 5.00     Types: Cigarettes     Quit date: 1971     Years since quittin.5    Smokeless tobacco: Never   Vaping Use    Vaping Use: Never used   Substance and Sexual Activity    Alcohol use: Yes     Alcohol/week: 4.2 oz     Types: 7 Glasses of wine per week     Comment: daily    Drug use: No    Sexual activity: Yes     Partners: Female       REVIEW OF SYSTEMS: Comprehensive review of systems was negative aside from abdominal pain nausea and vomiting described in the history and physical    PHYSICAL EXAMINATION:     GENERAL: The patient is in no acute distress.   VITAL SIGNS: /74   Pulse 67   Temp 36.8 °C (98.3 °F) (Temporal)   Resp 16   Wt 88.5 kg (195 lb)   SpO2 93%   HEAD AND NECK: Demonstrates symmetric, reactive pupils. Extraocular muscles   are intact. Nares and oropharynx are clear.   NECK: Supple. No adenopathy.  CHEST:No respiratory distress.    CARDIOVASCULAR: Regular rate. The extremities are well perfused.   ABDOMEN: Rotund abdomen.  No guarding or rebound.  Moderate distention..   EXTREMITIES: Examination of the upper and lower extremities demonstrates no cyanosis edema or clubbing.  NEUROLOGIC: Alert & oriented x 3, Normal motor function, Normal sensory function, No focal deficits noted.    LABORATORY VALUES:   Recent Labs     23  0520   WBC 13.1*   RBC 5.10   HEMOGLOBIN 14.9   HEMATOCRIT 44.0   MCV 86.3   MCH 29.2   MCHC 33.9   RDW 39.2   PLATELETCT 294   MPV 9.9     Recent Labs     23  0520   SODIUM 132*   POTASSIUM 3.8   CHLORIDE 96   CO2 24   GLUCOSE 181*   BUN 20    CREATININE 0.92   CALCIUM 9.0     Recent Labs     05/17/23  0520   ASTSGOT 13   ALTSGPT 9   TBILIRUBIN 0.8   ALKPHOSPHAT 69   GLOBULIN 2.9            IMAGING:   DX-ABDOMEN FOR TUBE PLACEMENT   Final Result      NG tube tip overlies the gastric fundus.      CT-ABDOMEN-PELVIS WITH   Final Result         1.  Fluid-filled distended proximal small bowel loops with distal small bowel decompression, appearance suggests changes of partial or evolving small bowel obstruction.   2.  Hazy fat stranding in the mesenteric root with prominent mesenteric root lymph nodes, consider panniculitis or sclerosing mesenteritis in the appropriate clinical setting.   3.  Low-density lesion in the inferior right hepatic lobe, appearance favors hepatic cyst or hemangioma, stable since prior study, otherwise indeterminate.   4.  Dilatation of the bilateral renal pelvis and calyces, similar to prior study, appearance favors extra renal pelvis morphology.   5.  Enlarged prostate, workup and evaluation for causes of prostate enlargement recommended as clinically appropriate.   6.  Bilateral fat-containing inguinal hernias, right greater than left   7.  Atherosclerosis and atherosclerotic coronary artery disease          Problem List:    Patient Active Problem List    Diagnosis Date Noted    Hyponatremia 05/17/2023    Advance care planning 05/17/2023    Acute cough 05/02/2023    Facial laceration 04/17/2023    Visit for suture removal 04/17/2023    Long term (current) use of oral hypoglycemic drugs 03/02/2023    Dyslipidemia 03/02/2023    Small bowel obstruction (HCC) 09/08/2022    Leukocytosis 09/08/2022    Lactic acidosis 09/08/2022    Elevated serum creatinine 07/22/2022    Itching of ear 01/13/2021    PVD (peripheral vascular disease) (HCC) 12/26/2020    Degenerative arthritis of knee, bilateral 12/22/2020    Acute right-sided low back pain 08/06/2020    Chronic pain of right knee 06/16/2020    Motion sickness 10/02/2019    Altitude  sickness prophylaxis 10/02/2019    Leg mass, right 07/12/2019    Vitamin D deficiency 06/10/2019    Leg cramping 06/10/2019    Baker cyst, right 04/16/2019    Erythematous rash 04/16/2019    Bilateral leg edema 01/08/2019    Nocturia more than twice per night 11/05/2018    Pitting edema 08/07/2018    Idiopathic chronic gout, left ankle and foot, without tophus (tophi) 06/25/2018    Memory changes 05/24/2018    Chronic pain of left knee 09/25/2017    Diastasis of rectus abdominis 06/22/2017    BPH associated with nocturia 06/07/2017    DM (diabetes mellitus) (HCC) 11/09/2016    (HCC) Hypertension associated with diabetes 11/09/2016    Hypothyroidism (acquired) 11/09/2016    (HCC) Hyperlipidemia due to type 2 diabetes mellitus 11/09/2016    Primary insomnia 11/09/2016       IMPRESSION AND PLAN:     1.  Recurrent partial small bowel obstruction without prior surgery.    1.  Given that he is never had surgery and this is a recurrent obstruction I favor diagnostic laparoscopy with indicated procedures.  We will decompress him overnight and plan for surgery tomorrow.    2.  The patient will be taken to the operating room for a diagnostic laparoscopy, possible laparotomy and indicated procedures. The surgical conduct was explained. Potential complications including but not limited to infection, bleeding, damage to adjacent structures, anesthetic complications were discussed in detail. Questions were elicited and answered to his satisfaction. He understands the rationale for surgery and elects to proceed.  Operative consent signed.              ___________________________________   Galo Ashton M.D.    [facility]  [phone]    DD: 5/17/2023 DT: 1:05 PM

## 2023-05-17 NOTE — ED PROVIDER NOTES
ED Provider Note    CHIEF COMPLAINT  Chief Complaint   Patient presents with    Abdominal Pain     Pt presents with concerns of SBO, reports h/o and feels the same as did previously        EXTERNAL RECORDS REVIEWED  Inpatient Notes patient was admitted to the hospital in 2022 for a small bowel obstruction.  He was treated conservatively with NGT.  Ultimately presentation felt to be more consistent with viral gastroenteritis.    HPI/ROS  LIMITATION TO HISTORY   Select: : None  OUTSIDE HISTORIAN(S):  None    Saleem Whitney is a 77 y.o. male who presents abdominal pain and distention. He states his symptoms started yesterday evening.  Just prior to arrival she had three large episodes of nonbloody nonbilious emesis. He says the bloating has improved since then. He says his last bowel movement was yesterday morning and was normal in consistency. He has not had a bowel movement since.  He thinks he is still passing gas though 'not very much'.  He denies any fevers or chills.  He describes the pain as a bloating sensation all throughout his mid abdomen.  It does not radiate.  He denies any back pain.  No dysuria, hematuria or urgency.    He denies previous abdominal surgical history.    PAST MEDICAL HISTORY   has a past medical history of Hyperlipidemia, Hypertension, Hypothyroid, and Type II or unspecified type diabetes mellitus without mention of complication, not stated as uncontrolled.    SURGICAL HISTORY   has a past surgical history that includes hand surgery.    FAMILY HISTORY  Family History   Problem Relation Age of Onset    Diabetes Mother     Stroke Father         45    Diabetes Sister     Diabetes Maternal Uncle        SOCIAL HISTORY  Social History     Tobacco Use    Smoking status: Former     Packs/day: 1.00     Years: 5.00     Pack years: 5.00     Types: Cigarettes     Quit date: 1971     Years since quittin.5    Smokeless tobacco: Never   Vaping Use    Vaping Use: Never used    Substance and Sexual Activity    Alcohol use: Yes     Alcohol/week: 4.2 oz     Types: 7 Glasses of wine per week     Comment: daily    Drug use: No    Sexual activity: Yes     Partners: Female       CURRENT MEDICATIONS  Home Medications    **Home medications have not yet been reviewed for this encounter**         ALLERGIES  No Known Allergies    PHYSICAL EXAM  VITAL SIGNS: /69   Pulse 87   Temp 36.8 °C (98.3 °F) (Temporal)   Resp 16   Wt 88.5 kg (195 lb)   SpO2 97%   BMI 25.73 kg/m²    Constitutional: Awake and alert . Non toxic  HENT: Normal inspection.  Dry mucous membranes  Eyes: Normal inspection  Neck: Grossly normal range of motion.  Cardiovascular: Normal heart rate, Normal rhythm.  Thorax & Lungs: No respiratory distress, No wheezing, No rales, No rhonchi, No chest tenderness.   Abdomen: Soft, distended, diffuse tenderness to palpation, no rebound or guarding  Skin: No obvious rash.  Extremities: Warm, well perfused. No clubbing, cyanosis, edema   Neurologic: Grossly normal   Psychiatric: Normal for situation    DIAGNOSTIC STUDIES / PROCEDURES    LABS  Pending    RADIOLOGY  I have independently interpreted the diagnostic imaging associated with this visit and am waiting the final reading from the radiologist.   My preliminary interpretation is as follows: CT Pending  Radiologist interpretation:   CT-ABDOMEN-PELVIS WITH    (Results Pending)       COURSE & MEDICAL DECISION MAKING    ED Observation Status? Yes; I am placing the patient in to an observation status due to a diagnostic uncertainty as well as therapeutic intensity. Patient placed in observation status at 4:58 AM, 5/17/2023.     Observation plan is as follows: IV, Labs, CT, Serial Exams      INITIAL ASSESSMENT, COURSE AND PLAN  Care Narrative: 77-year-old male who presents with one day of abdominal discomfort, bloating and vomiting.  He arrives with normal vital signs and is not systemically ill appearing.  He is mildly distended and  diffusely tender without signs of peritonitis.  Patient states symptoms feel similar to prior small bowel obstruction.   Plan for labs and CT to evaluate for recurrent obstruction among others acute intraabdominal etiologies (e.g. appendicitis, colitis, diverticulitis, kidney pathology, AAA, intraabdominal infection).  Ischemic process seems less likely in the setting of reassuring exam.   Currently pending labs and imaging. Patient was endorsed to my colleague Dr. Woodward with disposition pending work-up as above.     HYDRATION: Based on the patient's presentation of Acute Vomiting the patient was given IV fluids. IV Hydration was used because oral hydration was not adequate alone. Upon recheck following hydration, the patient was improved.        DISPOSITION AND DISCUSSIONS  I have discussed management of the patient with the following physicians and FREDDY's:  Dr. Woodward    Discussion of management with other [facility] or appropriate source(s): None       FINAL DIAGNOSIS  1. Generalized abdominal pain Acute   2. Nausea and vomiting, unspecified vomiting type Acute          Electronically signed by: Wilda Bolivar M.D., 5/17/2023 4:36 AM

## 2023-05-17 NOTE — ASSESSMENT & PLAN NOTE
I discussed for at least 16 minutes further goals of care which included CODE STATUS which included full code versus DNR/DNI.  The patient is adamant he would like to be DNR/DNI.

## 2023-05-18 ENCOUNTER — APPOINTMENT (OUTPATIENT)
Dept: RADIOLOGY | Facility: MEDICAL CENTER | Age: 78
DRG: 345 | End: 2023-05-18
Attending: SURGERY
Payer: MEDICARE

## 2023-05-18 PROBLEM — E83.42 HYPOMAGNESEMIA: Status: ACTIVE | Noted: 2023-05-18

## 2023-05-18 LAB
ALBUMIN SERPL BCP-MCNC: 3.8 G/DL (ref 3.2–4.9)
ALBUMIN/GLOB SERPL: 1.4 G/DL
ALP SERPL-CCNC: 68 U/L (ref 30–99)
ALT SERPL-CCNC: 8 U/L (ref 2–50)
ANION GAP SERPL CALC-SCNC: 11 MMOL/L (ref 7–16)
AST SERPL-CCNC: 13 U/L (ref 12–45)
BILIRUB SERPL-MCNC: 0.4 MG/DL (ref 0.1–1.5)
BUN SERPL-MCNC: 14 MG/DL (ref 8–22)
CALCIUM ALBUM COR SERPL-MCNC: 8.7 MG/DL (ref 8.5–10.5)
CALCIUM SERPL-MCNC: 8.5 MG/DL (ref 8.4–10.2)
CHLORIDE SERPL-SCNC: 100 MMOL/L (ref 96–112)
CO2 SERPL-SCNC: 24 MMOL/L (ref 20–33)
CREAT SERPL-MCNC: 0.94 MG/DL (ref 0.5–1.4)
ERYTHROCYTE [DISTWIDTH] IN BLOOD BY AUTOMATED COUNT: 39.8 FL (ref 35.9–50)
GFR SERPLBLD CREATININE-BSD FMLA CKD-EPI: 82 ML/MIN/1.73 M 2
GLOBULIN SER CALC-MCNC: 2.8 G/DL (ref 1.9–3.5)
GLUCOSE BLD STRIP.AUTO-MCNC: 134 MG/DL (ref 65–99)
GLUCOSE BLD STRIP.AUTO-MCNC: 138 MG/DL (ref 65–99)
GLUCOSE BLD STRIP.AUTO-MCNC: 143 MG/DL (ref 65–99)
GLUCOSE BLD STRIP.AUTO-MCNC: 154 MG/DL (ref 65–99)
GLUCOSE SERPL-MCNC: 179 MG/DL (ref 65–99)
HCT VFR BLD AUTO: 43.2 % (ref 42–52)
HGB BLD-MCNC: 14.4 G/DL (ref 14–18)
MAGNESIUM SERPL-MCNC: 1.7 MG/DL (ref 1.5–2.5)
MCH RBC QN AUTO: 29.1 PG (ref 27–33)
MCHC RBC AUTO-ENTMCNC: 33.3 G/DL (ref 33.7–35.3)
MCV RBC AUTO: 87.3 FL (ref 81.4–97.8)
PLATELET # BLD AUTO: 289 K/UL (ref 164–446)
PMV BLD AUTO: 10 FL (ref 9–12.9)
POTASSIUM SERPL-SCNC: 4.1 MMOL/L (ref 3.6–5.5)
PROT SERPL-MCNC: 6.6 G/DL (ref 6–8.2)
RBC # BLD AUTO: 4.95 M/UL (ref 4.7–6.1)
SODIUM SERPL-SCNC: 135 MMOL/L (ref 135–145)
WBC # BLD AUTO: 9.4 K/UL (ref 4.8–10.8)

## 2023-05-18 PROCEDURE — 99233 SBSQ HOSP IP/OBS HIGH 50: CPT | Performed by: INTERNAL MEDICINE

## 2023-05-18 PROCEDURE — 700117 HCHG RX CONTRAST REV CODE 255: Performed by: SURGERY

## 2023-05-18 PROCEDURE — 83735 ASSAY OF MAGNESIUM: CPT

## 2023-05-18 PROCEDURE — 85027 COMPLETE CBC AUTOMATED: CPT

## 2023-05-18 PROCEDURE — 94760 N-INVAS EAR/PLS OXIMETRY 1: CPT

## 2023-05-18 PROCEDURE — 80053 COMPREHEN METABOLIC PANEL: CPT

## 2023-05-18 PROCEDURE — 82962 GLUCOSE BLOOD TEST: CPT | Mod: 91

## 2023-05-18 PROCEDURE — 700102 HCHG RX REV CODE 250 W/ 637 OVERRIDE(OP): Performed by: INTERNAL MEDICINE

## 2023-05-18 PROCEDURE — 36415 COLL VENOUS BLD VENIPUNCTURE: CPT

## 2023-05-18 PROCEDURE — 74250 X-RAY XM SM INT 1CNTRST STD: CPT

## 2023-05-18 PROCEDURE — 770001 HCHG ROOM/CARE - MED/SURG/GYN PRIV*

## 2023-05-18 PROCEDURE — 700111 HCHG RX REV CODE 636 W/ 250 OVERRIDE (IP): Performed by: INTERNAL MEDICINE

## 2023-05-18 RX ORDER — MAGNESIUM SULFATE 1 G/100ML
1 INJECTION INTRAVENOUS ONCE
Status: COMPLETED | OUTPATIENT
Start: 2023-05-18 | End: 2023-05-18

## 2023-05-18 RX ADMIN — IOHEXOL 150 ML: 350 INJECTION, SOLUTION INTRAVENOUS at 09:03

## 2023-05-18 RX ADMIN — MAGNESIUM SULFATE IN DEXTROSE 1 G: 10 INJECTION, SOLUTION INTRAVENOUS at 08:52

## 2023-05-18 RX ADMIN — IOHEXOL 100 ML: 350 INJECTION, SOLUTION INTRAVENOUS at 09:04

## 2023-05-18 ASSESSMENT — LIFESTYLE VARIABLES: SUBSTANCE_ABUSE: 0

## 2023-05-18 ASSESSMENT — PAIN DESCRIPTION - PAIN TYPE
TYPE: SURGICAL PAIN
TYPE: SURGICAL PAIN

## 2023-05-18 ASSESSMENT — ENCOUNTER SYMPTOMS
FALLS: 0
BLURRED VISION: 0
DOUBLE VISION: 0
NERVOUS/ANXIOUS: 0
HEARTBURN: 0
DIZZINESS: 0
FEVER: 0
BACK PAIN: 0
SHORTNESS OF BREATH: 0
COUGH: 0
NAUSEA: 1
CHILLS: 0
PALPITATIONS: 0
HEADACHES: 0
ABDOMINAL PAIN: 1

## 2023-05-18 NOTE — OR NURSING
1631- Patient arrived from OR via gurney.  2 lap sites to ABD CDI; NG tube in place in L nare. Received orders to put it to low intermittent suction.    Sedation/Resp Status: Non responsive to verbal with OPA in place. Respirations spontaneous and non-labored.    HR 66SR; VSS on 6L 02 via simple mask.  The patient does not appear to be in pain or nauseous as evidence by sleeping.     1633- OPA out for return of spontaneous eye opening/gag reflex - respirations continue spontaneous and non-labored.     1645- No pain or nausea reported.    1700- Called the patient's family and gave updates.    1708- Checked the patients BG per anesthesia order. It was 140. No interventions needed.    1726- Patient transported to room on 2L via NC. O2 tank 1/4 full. All belongings with patient. Transport took to patient to room via [facility].

## 2023-05-18 NOTE — CARE PLAN
The patient is Stable - Low risk of patient condition declining or worsening    Shift Goals  Clinical Goals: Pt will tolerate diet throughout the shift  Patient Goals: Remove NG tube, rest comfortably    Progress made toward(s) clinical / shift goals:  Tolerating current diet. Pt denies any nausea/vomiting. Pt able to ambulate to bathroom steady.    Patient is not progressing towards the following goals:      Problem: Fall Risk  Goal: Patient will remain free from falls  Outcome: Progressing     Problem: Early Mobilization - Post Surgery  Goal: Early mobilization post surgery  Outcome: Progressing     Problem: Pain - Post Surgery  Goal: Alleviation or reduction of pain post surgery  Outcome: Progressing     Problem: Wound/ / Incision Healing  Goal: Patient's wound/surgical incision will decrease in size and heals properly  Outcome: Progressing     Problem: Bowel Elimination  Goal: Establish and maintain regular bowel function  Outcome: Progressing     Problem: Gastrointestinal Irritability  Goal: Nausea and vomiting will be absent or improve  Outcome: Progressing

## 2023-05-18 NOTE — PROGRESS NOTES
4 Eyes Skin Assessment Completed by ESPERANZA Sagastume and ESPERANZA Ford.    Head Scar  Ears WDL  Nose WDL  Mouth WDL  Neck WDL  Breast/Chest WDL  Shoulder Blades WDL  Spine Bruising, scab  (R) Arm/Elbow/Hand WDL  (L) Arm/Elbow/Hand Scar, incision with sutures  Abdomen Incision  Groin WDL  Scrotum/Coccyx/Buttocks WDL  (R) Leg WDL  (L) Leg WDL  (R) Heel/Foot/Toe WDL  (L) Heel/Foot/Toe WDL          Devices In Places Blood Pressure Cuff, Pulse Ox, and Nasal Cannula      Interventions In Place Pillows    Possible Skin Injury No    Pictures Uploaded Into Epic N/A  Wound Consult Placed N/A  RN Wound Prevention Protocol Ordered No

## 2023-05-18 NOTE — PROGRESS NOTES
Received bedside report from Mahesh MATA.  Assumed pt care.   Assessment and chart check complete.  Pt is [id number], no signs of distress, denies nausea/vomiting.  Updated for the  POC of the day.  IVF infusing.  Ng tube left left nare in place connected to LIS.  Abdominal incision CDI  1230-NG tube removed.  Fall precautions in place, treaded socks on pt, bed in low position.   Call light within reach.   Educated pt to call if needing anything.   Hourly rounding.

## 2023-05-18 NOTE — PROGRESS NOTES
Surgery  POD#1  NO extrinsic blockage seen on laparoscopy  Passed sbs  D/c NGT  Advance diet as tolerated.  Recommend a GI consultation as an outpatient for consideration for capsule endoscopy.

## 2023-05-18 NOTE — PROGRESS NOTES
Pt is resting in bed. Awakes to voice. VSS. Pt c/o 3/10 abdominal pain at this time. Pt declines pain interventions. NG tube to LIS. Scant amount of brown fluid seen in cannister. Pt is requesting to rest at this time. Fall precautions in place.

## 2023-05-18 NOTE — PROGRESS NOTES
Patient arrived from PACU in stable condition . KARON uriase NGT in place. Hooked it to LIS. Patient  voided. Slightly unsteady on feet. Denies pain at this time. Will continue to monitor.

## 2023-05-18 NOTE — PROGRESS NOTES
"Hospital Medicine Daily Progress Note    Date of Service  5/18/2023    Chief Complaint  Saleem Whitney is a 77 y.o. male admitted 5/17/2023 with abdominal pain.    Hospital Course  77 y.o. male past medical history of hypertension, dyslipidemia, BPH, diabetes, hypothyroidism who presented 5/17/2023 with abdominal pain.     The patient states that yesterday afternoon he developed sudden acute abdominal pain and distention.  Due to the fact that it did not get any better throughout yesterday and today he decided to come to the ER for further assessment and evaluation.  The patient denies fever, sick contacts, chest pain, headache or any other focal neurological deficit.     In the ER the patient had a CT scan which reported: \"Fluid-filled distended proximal small bowel loops with distal small bowel decompression, appearance suggests changes of partial or evolving small bowel obstruction.\"      Of note the patient had a similar episode with small bowel obstruction last September.     The patient has been placed with an NG tube, general surgery has been consulted by the ER physician.  The patient will be admitted, pending surgical evaluation.  For now we will continue with NG tube and try to correct conservatively.    Interval Problem Update  5/18: Patient seen at bedside this morning.  Patient underwent laparoscopy by general surgery yesterday.  We are pending GI series.  We appreciate further recommendations.  Patient continues n.p.o. with NG tube placement.    I have discussed this patient's plan of care and discharge plan at IDT rounds today with Case Management, Nursing, Nursing leadership, and other members of the IDT team.    Consultants/Specialty  general surgery    Code Status  DNAR/DNI    Disposition  The patient is not medically cleared for discharge to home or a post-acute facility.      I have placed the appropriate orders for post-discharge needs.    Review of Systems  Review of Systems "   Constitutional:  Positive for malaise/fatigue. Negative for chills and fever.   HENT:  Negative for hearing loss and nosebleeds.    Eyes:  Negative for blurred vision and double vision.   Respiratory:  Negative for cough and shortness of breath.    Cardiovascular:  Negative for chest pain and palpitations.   Gastrointestinal:  Positive for abdominal pain and nausea. Negative for heartburn.   Genitourinary:  Negative for dysuria and urgency.   Musculoskeletal:  Negative for back pain and falls.   Skin:  Negative for itching and rash.   Neurological:  Negative for dizziness and headaches.   Psychiatric/Behavioral:  Negative for substance abuse. The patient is not nervous/anxious.    All other systems reviewed and are negative.       Physical Exam  Temp:  [36.3 °C (97.3 °F)-37.2 °C (98.9 °F)] 37.2 °C (98.9 °F)  Pulse:  [60-90] 63  Resp:  [15-18] 17  BP: (111-176)/(51-82) 114/79  SpO2:  [90 %-98 %] 93 %    Physical Exam  Vitals and nursing note reviewed.   Constitutional:       Appearance: He is ill-appearing.   HENT:      Head: Normocephalic and atraumatic.      Right Ear: External ear normal.      Left Ear: External ear normal.      Nose: Nose normal.      Mouth/Throat:      Mouth: Mucous membranes are moist.      Pharynx: Oropharynx is clear.   Eyes:      General:         Right eye: No discharge.         Left eye: No discharge.      Extraocular Movements: Extraocular movements intact.      Pupils: Pupils are equal, round, and reactive to light.   Cardiovascular:      Rate and Rhythm: Normal rate and regular rhythm.      Heart sounds: No murmur heard.  Pulmonary:      Effort: Pulmonary effort is normal. No respiratory distress.      Breath sounds: Normal breath sounds.   Abdominal:      General: Bowel sounds are normal. There is distension.      Palpations: Abdomen is soft.      Tenderness: There is no abdominal tenderness.      Comments: NG tube in place   Musculoskeletal:      Cervical back: Normal range of motion  and neck supple.      Right lower leg: No edema.      Left lower leg: No edema.   Skin:     General: Skin is warm.   Neurological:      General: No focal deficit present.      Mental Status: He is alert and oriented to person, place, and time.   Psychiatric:         Mood and Affect: Mood normal.         Behavior: Behavior normal.         Fluids    Intake/Output Summary (Last 24 hours) at 5/18/2023 0824  Last data filed at 5/18/2023 0500  Gross per 24 hour   Intake 700 ml   Output 1920 ml   Net -1220 ml       Laboratory  Recent Labs     05/17/23  0520 05/18/23  0144   WBC 13.1* 9.4   RBC 5.10 4.95   HEMOGLOBIN 14.9 14.4   HEMATOCRIT 44.0 43.2   MCV 86.3 87.3   MCH 29.2 29.1   MCHC 33.9 33.3*   RDW 39.2 39.8   PLATELETCT 294 289   MPV 9.9 10.0     Recent Labs     05/17/23  0520 05/18/23  0144   SODIUM 132* 135   POTASSIUM 3.8 4.1   CHLORIDE 96 100   CO2 24 24   GLUCOSE 181* 179*   BUN 20 14   CREATININE 0.92 0.94   CALCIUM 9.0 8.5                   Imaging  DX-ABDOMEN FOR TUBE PLACEMENT   Final Result      NG tube tip overlies the gastric fundus.      CT-ABDOMEN-PELVIS WITH   Final Result         1.  Fluid-filled distended proximal small bowel loops with distal small bowel decompression, appearance suggests changes of partial or evolving small bowel obstruction.   2.  Hazy fat stranding in the mesenteric root with prominent mesenteric root lymph nodes, consider panniculitis or sclerosing mesenteritis in the appropriate clinical setting.   3.  Low-density lesion in the inferior right hepatic lobe, appearance favors hepatic cyst or hemangioma, stable since prior study, otherwise indeterminate.   4.  Dilatation of the bilateral renal pelvis and calyces, similar to prior study, appearance favors extra renal pelvis morphology.   5.  Enlarged prostate, workup and evaluation for causes of prostate enlargement recommended as clinically appropriate.   6.  Bilateral fat-containing inguinal hernias, right greater than left   7.   "Atherosclerosis and atherosclerotic coronary artery disease      DX-SMALL BOWEL SERIES    (Results Pending)        Assessment/Plan  * Small bowel obstruction (HCC)- (present on admission)  Assessment & Plan  Ct scan reported: \"Fluid-filled distended proximal small bowel loops with distal small bowel decompression, appearance suggests changes of partial or evolving small bowel obstruction.\"  Currently on NG tube  Strict NPO  General Surgery has been consulted, we appreciate further recommendations.    5/18: Patient underwent laparoscopy yesterday by general surgery. We are pending GI series. We appreciate further recommendations by general surgery.    Hypomagnesemia  Assessment & Plan  We will replace IV today.  monitor    Advance care planning  Assessment & Plan  I discussed for at least 16 minutes further goals of care which included CODE STATUS which included full code versus DNR/DNI.  The patient is adamant he would like to be DNR/DNI.    Hyponatremia  Assessment & Plan  Mild  Monitor  Currently on IVF    --resolved    Leukocytosis- (present on admission)  Assessment & Plan  In the setting of small bowel obstruction  No signs of infection at this time  We will hold antibiotics for now  Monitor, repeat CBC    CT scan did report: \"Hazy fat stranding in the mesenteric root with prominent mesenteric root lymph nodes, consider panniculitis or sclerosing mesenteritis in the appropriate clinical setting.\" However patient afebrile and pain is acute, we will monitor closely and if trending upwards or any other signs of infection we will consider starting antibiotics. We will order procalcitonin.    5/18: Procalcitonin within normal limits. WBC today WNL. We will continue to hold antibiotics.    BPH associated with nocturia- (present on admission)  Assessment & Plan  Patient currently n.p.o. for small bowel obstruction.  Once the patient is able to tolerate p.o. we will resume home medication.    5/18: Patient still n.p.o. " pending GI series.    (HCC) Hyperlipidemia due to type 2 diabetes mellitus- (present on admission)  Assessment & Plan  Patient currently n.p.o. for small bowel obstruction.  Once the patient is able to tolerate p.o. we will resume home medication.    5/18: Patient still n.p.o. pending GI series.    Hypothyroidism (acquired)- (present on admission)  Assessment & Plan  Patient currently n.p.o. for small bowel obstruction.  Once the patient is able to tolerate p.o. we will resume home medication.    5/18: Patient still n.p.o. pending GI series.    (HCC) Hypertension associated with diabetes- (present on admission)  Assessment & Plan  Patient currently n.p.o. for small bowel obstruction.  Once the patient is able to tolerate p.o. we will resume home medication.    5/18: Patient still n.p.o. pending GI series.    DM (diabetes mellitus) ([facility])  Assessment & Plan  ISS for now  Monitor and make changes accordingly.  We will repeat A1c    5/18: A1c is 7. Continue ISS, monitor and make changes accordingly.         VTE prophylaxis: SCDs/TEDs    I have performed a physical exam and reviewed and updated ROS and Plan today (5/18/2023). In review of yesterday's note (5/17/2023), there are no changes except as documented above.    I spent at least 56 minutes providing care for this patient, this included face-to-face interview, physical examination, review of labs including CBC, CMP, magnesium.  Review of previous notes including operative note from surgery from yesterday.  The patient is pending GI series.  Discussion with multidisciplinary team including charge nurse, case management and pharmacy.  Creating plan of care and reviewing orders.

## 2023-05-18 NOTE — CARE PLAN
The patient is Stable - Low risk of patient condition declining or worsening    Shift Goals  Clinical Goals: Pain controled at a 3/10 or less this shift  Patient Goals: NGT with no nausea this shift    Progress made toward(s) clinical / shift goals:    Patient with no pain.    Patient is not progressing towards the following goals:

## 2023-05-18 NOTE — CARE PLAN
The patient is Stable - Low risk of patient condition declining or worsening    Shift Goals  Clinical Goals: pt will remain at stated pain goal of 3/10 pain this shift.  Patient Goals: sleep comfortably    Progress made toward(s) clinical / shift goals:      Non-pharmacological management allowed pt to remain at stated pain goal this shift.    Problem: Knowledge Deficit - Standard  Goal: Patient and family/care givers will demonstrate understanding of plan of care, disease process/condition, diagnostic tests and medications  Outcome: Progressing     Problem: Fall Risk  Goal: Patient will remain free from falls  Outcome: Progressing       Patient is not progressing towards the following goals:

## 2023-05-19 VITALS
HEIGHT: 73 IN | DIASTOLIC BLOOD PRESSURE: 70 MMHG | SYSTOLIC BLOOD PRESSURE: 145 MMHG | WEIGHT: 201.06 LBS | BODY MASS INDEX: 26.65 KG/M2 | OXYGEN SATURATION: 94 % | RESPIRATION RATE: 18 BRPM | HEART RATE: 60 BPM | TEMPERATURE: 98.6 F

## 2023-05-19 LAB
ANION GAP SERPL CALC-SCNC: 9 MMOL/L (ref 7–16)
BUN SERPL-MCNC: 19 MG/DL (ref 8–22)
CALCIUM SERPL-MCNC: 8.5 MG/DL (ref 8.4–10.2)
CHLORIDE SERPL-SCNC: 103 MMOL/L (ref 96–112)
CO2 SERPL-SCNC: 26 MMOL/L (ref 20–33)
CREAT SERPL-MCNC: 0.94 MG/DL (ref 0.5–1.4)
GFR SERPLBLD CREATININE-BSD FMLA CKD-EPI: 82 ML/MIN/1.73 M 2
GLUCOSE BLD STRIP.AUTO-MCNC: 126 MG/DL (ref 65–99)
GLUCOSE SERPL-MCNC: 146 MG/DL (ref 65–99)
MAGNESIUM SERPL-MCNC: 2 MG/DL (ref 1.5–2.5)
POTASSIUM SERPL-SCNC: 3.8 MMOL/L (ref 3.6–5.5)
SODIUM SERPL-SCNC: 138 MMOL/L (ref 135–145)

## 2023-05-19 PROCEDURE — 700105 HCHG RX REV CODE 258: Performed by: INTERNAL MEDICINE

## 2023-05-19 PROCEDURE — 80048 BASIC METABOLIC PNL TOTAL CA: CPT

## 2023-05-19 PROCEDURE — 99239 HOSP IP/OBS DSCHRG MGMT >30: CPT | Performed by: INTERNAL MEDICINE

## 2023-05-19 PROCEDURE — 82962 GLUCOSE BLOOD TEST: CPT

## 2023-05-19 PROCEDURE — 83735 ASSAY OF MAGNESIUM: CPT

## 2023-05-19 PROCEDURE — 36415 COLL VENOUS BLD VENIPUNCTURE: CPT

## 2023-05-19 PROCEDURE — 94760 N-INVAS EAR/PLS OXIMETRY 1: CPT

## 2023-05-19 RX ADMIN — SODIUM CHLORIDE, POTASSIUM CHLORIDE, SODIUM LACTATE AND CALCIUM CHLORIDE: 600; 310; 30; 20 INJECTION, SOLUTION INTRAVENOUS at 02:55

## 2023-05-19 ASSESSMENT — PAIN DESCRIPTION - PAIN TYPE: TYPE: SURGICAL PAIN

## 2023-05-19 NOTE — DISCHARGE SUMMARY
"Discharge Summary    CHIEF COMPLAINT ON ADMISSION  Chief Complaint   Patient presents with    Abdominal Pain     Pt presents with concerns of SBO, reports h/o and feels the same as did previously        Reason for Admission  bloating, vomitting      Admission Date  5/17/2023    CODE STATUS  DNAR/DNI    HPI & HOSPITAL COURSE  77 y.o. male past medical history of hypertension, dyslipidemia, BPH, diabetes, hypothyroidism who presented 5/17/2023 with abdominal pain.     The patient states that yesterday afternoon he developed sudden acute abdominal pain and distention.  Due to the fact that it did not get any better throughout yesterday and today he decided to come to the ER for further assessment and evaluation.  The patient denies fever, sick contacts, chest pain, headache or any other focal neurological deficit.     In the ER the patient had a CT scan which reported: \"Fluid-filled distended proximal small bowel loops with distal small bowel decompression, appearance suggests changes of partial or evolving small bowel obstruction.\"      Of note the patient had a similar episode with small bowel obstruction last September.     The patient has been placed with an NG tube, general surgery has been consulted by the ER physician.  The patient will be admitted, pending surgical evaluation.  For now we will continue with NG tube and try to correct conservatively.    The patient was admitted for further management and care.  General surgery was consulted, the patient underwent diagnostic laparoscopy and general surgery did not report any obvious extrinsic blockage.  The patient eventually underwent a small bowel series which did not report obstruction.  The NG tube was removed and the patient was tolerating p.o. and having bowel movements.  As per general surgery they recommend outpatient GI evaluation.  We have placed GI.    Patient will be discharged today, he will require close follow-up with PCP, general surgery and GI as " an outpatient.    Therefore, he is discharged in fair and stable condition to home with close outpatient follow-up.    The patient met 2-midnight criteria for an inpatient stay at the time of discharge.    Discharge Date  05/19/2023    FOLLOW UP ITEMS POST DISCHARGE  The patient will be discharged home, he will require close follow-up with PCP, general surgery and GI as an outpatient.    DISCHARGE DIAGNOSES  Principal Problem:    Small bowel obstruction (HCC) (POA: Yes)  Active Problems:    DM (diabetes mellitus) (HCC) (POA: Unknown)      Overview: Chronic condition. Followed by endocrinology. Onset around age 71. Fasting       blood sugar 130s.      Current medication regimen: metformin 1000mg BID, glipizide 10mg daily,       Trulicity 1.5mg weekly      Patient tolerating and reports compliant with medications. Does not need       refill of medications today. Denies vision changes, dry mouth, chest pain,       nausea/vomiting/diarrhea, polydipsia, polyphagia, polyuria, hypoglycemia,       numbness/tingling. He checks his feet at home.      Last eye exam: 02/2021      Last foot exam: 01/2022      Diet: tries to eat healthy in general      Exercise: not so much now due to leg pain      Vaccines: flu received 09/2021, PPSV23 completed 11/2016, Tdap received       08/2017            He has been told to drink more water now so he increased his fluid intake       daily and has to urinate more frequently now due to increased fluid       intake. He otherwise denies dribbling, nocturia, weak urinary stream.    (HCC) Hypertension associated with diabetes (POA: Yes)      Overview: Chronic condition.      Current medication regimen: amlodipine 5mg daily, losartan 100mg daily      Patient tolerating and reports compliant with medications. He does not       regularly monitor blood pressure at home. Does not need refill of       medications today. Denies headache, dizziness, vision changes, chest pain,       dyspnea, edema.     Hypothyroidism (acquired) (POA: Yes)      Overview: Chronic condition.      Current medication regimen: levothyroxine 88mcg daily      Patient tolerating and reports compliant with medications. He feels       euthyroid with current medication. Does not need refill of medications       today. No acute concerns at this time.    (HCC) Hyperlipidemia due to type 2 diabetes mellitus (POA: Yes)      Overview: Chronic condition.      Current regimen: atorvastatin 10mg daily.       He reports compliance and tolerating medication well. Denies       musculoskeletal pain, headache, diarrhea. He does not need medication       refill today. No acute concerns at this time.    BPH associated with nocturia (POA: Yes)      Overview: Chronic condition.             Current regimen: tamsulosin 0.8 mg every morning            He has been having nocturia once nightly. He has been taking tamsulosin       0.4mg daily which he did not find helpful and would like to stop taking       the medication for now. He reports his symptoms are mild at this time.    Leukocytosis (POA: Yes)    Hyponatremia (POA: Unknown)    Advance care planning (POA: Unknown)    Hypomagnesemia (POA: Unknown)  Resolved Problems:    * No resolved hospital problems. *      FOLLOW UP  Future Appointments   Date Time Provider Department Center   7/6/2023  3:00 PM WILBER Pineda   7/7/2023 10:00 AM Jose Flores D.O. [facility]     Jose Flores D.O.  [address] [facility]  [address]  [facility] [address]  [phone]    Follow up      Gastroenterology    Schedule an appointment as soon as possible for a visit        MEDICATIONS ON DISCHARGE     Medication List        CONTINUE taking these medications        Instructions   acetaminophen 500 MG Tabs  Commonly known as: TYLENOL   Take 500 mg by mouth 1 time a day as needed for Mild Pain. Indications: Pain  Dose: 500 mg     albuterol 108 (90 Base) MCG/ACT Aers inhalation aerosol   Inhale 1-2 Puffs  every four hours as needed for Shortness of Breath.  Dose: 1-2 Puff     amLODIPine 10 MG Tabs  Commonly known as: NORVASC   Take 1 Tablet by mouth every day.  Dose: 10 mg     atorvastatin 10 MG Tabs  Commonly known as: LIPITOR   Take 1 Tablet by mouth every day.  Dose: 10 mg     DULoxetine 30 MG Cpep  Commonly known as: CYMBALTA   Take 1 Capsule by mouth every day.  Dose: 30 mg     glipiZIDE 10 MG Tabs  Commonly known as: GLUCOTROL   Take 1 Tablet by mouth every day.  Dose: 10 mg     levothyroxine 88 MCG Tabs  Commonly known as: SYNTHROID   Take 1 Tablet by mouth every morning on an empty stomach.  Dose: 88 mcg     losartan 100 MG Tabs  Commonly known as: COZAAR   Take 1 Tablet by mouth every day.  Dose: 100 mg     Magnesium 500 MG Tabs   Take 1,000 mg by mouth every day.  Dose: 1,000 mg     metformin 1000 MG tablet  Commonly known as: GLUCOPHAGE   Take 1 Tablet by mouth 2 times a day with meals.  Dose: 1,000 mg     tamsulosin 0.4 MG capsule  Commonly known as: FLOMAX   Take 2 Capsules by mouth 1/2 hour after breakfast for 90 days.  Dose: 0.8 mg     Trulicity 1.5 MG/0.5ML Sopn  Generic drug: Dulaglutide   Inject 0.5 mL under the skin every 7 days.  Dose: 0.5 mL     Vitamin B-12 2500 MCG Subl   Place 1 Tablet under the tongue every day.  Dose: 2,500 mcg     vitamin D 1000 Unit (25 mcg) Tabs  Commonly known as: cholecalciferol   Take 1 Tab by mouth every day.  Dose: 1,000 Units            STOP taking these medications      HYDROcodone-acetaminophen 5-325 MG Tabs per tablet  Commonly known as: NORCO     ibuprofen 200 MG Tabs  Commonly known as: MOTRIN     mupirocin 2 % Oint  Commonly known as: BACTROBAN     Potassium Citrate 15 MEQ (1620 MG) Tbcr              Allergies  No Known Allergies    DIET  Orders Placed This Encounter   Procedures    Diet Order Diet: Low Fiber(GI Soft) (diabetic)     Standing Status:   Standing     Number of Occurrences:   1     Order Specific Question:   Diet:     Answer:   Low Fiber(GI Soft)  [2]     Comments:   diabetic       ACTIVITY  As tolerated.  Weight bearing as tolerated    CONSULTATIONS  General Surgery    PROCEDURES  Diagnostic laparoscopy      DX-SMALL BOWEL SERIES   Final Result      No evidence of small bowel obstruction.      DX-ABDOMEN FOR TUBE PLACEMENT   Final Result      NG tube tip overlies the gastric fundus.      CT-ABDOMEN-PELVIS WITH   Final Result         1.  Fluid-filled distended proximal small bowel loops with distal small bowel decompression, appearance suggests changes of partial or evolving small bowel obstruction.   2.  Hazy fat stranding in the mesenteric root with prominent mesenteric root lymph nodes, consider panniculitis or sclerosing mesenteritis in the appropriate clinical setting.   3.  Low-density lesion in the inferior right hepatic lobe, appearance favors hepatic cyst or hemangioma, stable since prior study, otherwise indeterminate.   4.  Dilatation of the bilateral renal pelvis and calyces, similar to prior study, appearance favors extra renal pelvis morphology.   5.  Enlarged prostate, workup and evaluation for causes of prostate enlargement recommended as clinically appropriate.   6.  Bilateral fat-containing inguinal hernias, right greater than left   7.  Atherosclerosis and atherosclerotic coronary artery disease           LABORATORY  Lab Results   Component Value Date    SODIUM 138 05/19/2023    POTASSIUM 3.8 05/19/2023    CHLORIDE 103 05/19/2023    CO2 26 05/19/2023    GLUCOSE 146 (H) 05/19/2023    BUN 19 05/19/2023    CREATININE 0.94 05/19/2023        Lab Results   Component Value Date    WBC 9.4 05/18/2023    HEMOGLOBIN 14.4 05/18/2023    HEMATOCRIT 43.2 05/18/2023    PLATELETCT 289 05/18/2023        Total time of the discharge process exceeds 36 minutes.

## 2023-05-19 NOTE — PROGRESS NOTES
Received report from dayshift RN. Pt resting in bed, awake. Pt A&O x4, on 2 lpm via NC. Pt denies pain at this time. Pt continued on IV fluids, LR at 83 ml/hr. Pt updated with POC which includes pain mgt and blood glucose checks. Call light within reach, fall precautions in place, all needs met at this time.

## 2023-05-19 NOTE — DISCHARGE INSTRUCTIONS
Diet    Resume your normal diet as tolerated.  A diet low in cholesterol, fat, and sodium is recommended for good health.     Diabetic Diet     A Diabetic Diet can help you control your blood glucose, lower your risk of heart disease, improve your blood pressure and maintain a healthy weight.  Eating healthy foods, low in calories, fat and carbohydrates at the same time every day can help control your blood glucose. Carbohydrates can greatly affect your blood glucose levels.  Counting carbs is important to keep your blood glucose at a healthy level, especially if you use insulin or take certain oral diabetes medicines.  Avoid alcohol as it can cause a sudden decrease in blood glucose (hypoglycemia), especially if you use insulin or take certain oral diabetes medicines.

## 2023-05-19 NOTE — CARE PLAN
The patient is Stable - Low risk of patient condition declining or worsening    Shift Goals  Clinical Goals: Pt will tolerate diet during shift; continued on IV fluids.  Patient Goals: Sleep and rest.  Family Goals: NA    Progress made toward(s) clinical / shift goals:    Pt's pain was tolerable during shift; continued on IV fluids; pt's O2 weaned off to RA sating at 93%; pt tolerated full liquid and GI soft diet and endorsed to AM shift to progress to regular diet in AM, no nausea and vomiting overnight. Latest BS - 126, no insulin coverage given.     Problem: Diabetes Management  Goal: Patient will achieve and maintain glucose in satisfactory range  Outcome: Progressing     Problem: Early Mobilization - Post Surgery  Goal: Early mobilization post surgery  Outcome: Progressing       Patient is not progressing towards the following goals: NA

## 2023-05-19 NOTE — PROGRESS NOTES
Received report from night shift RN. Assumed pt care 0700  Pt is A&Ox4, resting comfortably in bed. Plan of care reviewed for activities and goals this shift. Medication eduction provided.  Pt verbalizes understanding of plan of care for this shift. Pt on room air; no signs of SOB/respiratory distress. Pt denies pain and nausea at this moment.  Patients needs attended well. Fall precautions in place. Bed at lowest position. Call light and personal belongings within reach.   Hourly rounding in progress.    Discharge instructions reviewed. Medication education, follow up and home care education provided. Pt verbalizes understanding of instructions.   Level of comfort increased prior to discharge. VSS. PIV removed. Belongings gathered to take home.

## 2023-05-22 ENCOUNTER — PATIENT OUTREACH (OUTPATIENT)
Dept: MEDICAL GROUP | Facility: MEDICAL CENTER | Age: 78
End: 2023-05-22
Payer: MEDICARE

## 2023-05-22 NOTE — PROGRESS NOTES
5/22: Patient outreach for TCM follow up.  Reviewed discharge instructions and new and current medications.  Patient verbalized understanding.  Patient declined follow-up appointment at this time, states feeling very good and does not feel he needs to see PCP before seeing GI.    Transitional Care Management    Discharge Questions  Discharge Date: 5/19/2023  Outreach call: Date 05/22/23  Time: 11:13 AM   Now that you are home, how are you feeling?  Very Good  Did you receive any new prescriptions? No  Do you have any questions about your current medications or new medications (Review Med Rec)?  No  Do you have a follow up appointment scheduled with your PCP?  No Was an appointment scheduled? No  Declines    Any issues or paperwork you wish to discuss with your PCP? No  Does this patient qualify for the CCM program?  Yes    Transitional Care  Number of attempts: 1  Current or previous attempts competed within two business days of discharge?  Yes  Provided education regarding treatment plan, medications, self-management, ADLs?  Yes  Has patient completed an Advanced Directive?  No  Care Manager phone number provided? Yes  Is there anything else I can help you with?  No    Discharge Summary   Chief Complaint:  abdominal pain - Pt presents with concerns of SBO, reports h/o and feels the same as did previously    Admitting Dx:  bloating, vomiting   Discharge Dx:  SBO

## 2023-06-20 ENCOUNTER — HOSPITAL ENCOUNTER (OUTPATIENT)
Dept: LAB | Facility: MEDICAL CENTER | Age: 78
End: 2023-06-20
Attending: PHYSICIAN ASSISTANT
Payer: MEDICARE

## 2023-06-20 LAB
ALBUMIN SERPL BCP-MCNC: 4.8 G/DL (ref 3.2–4.9)
ALBUMIN/GLOB SERPL: 1.5 G/DL
ALP SERPL-CCNC: 82 U/L (ref 30–99)
ALT SERPL-CCNC: 16 U/L (ref 2–50)
ANION GAP SERPL CALC-SCNC: 15 MMOL/L (ref 7–16)
AST SERPL-CCNC: 20 U/L (ref 12–45)
BILIRUB SERPL-MCNC: 0.8 MG/DL (ref 0.1–1.5)
BUN SERPL-MCNC: 19 MG/DL (ref 8–22)
CALCIUM ALBUM COR SERPL-MCNC: 9.5 MG/DL (ref 8.5–10.5)
CALCIUM SERPL-MCNC: 10.1 MG/DL (ref 8.4–10.2)
CHLORIDE SERPL-SCNC: 102 MMOL/L (ref 96–112)
CO2 SERPL-SCNC: 25 MMOL/L (ref 20–33)
CREAT SERPL-MCNC: 1.12 MG/DL (ref 0.5–1.4)
GFR SERPLBLD CREATININE-BSD FMLA CKD-EPI: 67 ML/MIN/1.73 M 2
GLOBULIN SER CALC-MCNC: 3.1 G/DL (ref 1.9–3.5)
GLUCOSE SERPL-MCNC: 121 MG/DL (ref 65–99)
POTASSIUM SERPL-SCNC: 4.2 MMOL/L (ref 3.6–5.5)
PROT SERPL-MCNC: 7.9 G/DL (ref 6–8.2)
SODIUM SERPL-SCNC: 142 MMOL/L (ref 135–145)
URATE SERPL-MCNC: 5.1 MG/DL (ref 2.5–8.3)

## 2023-06-20 PROCEDURE — 84550 ASSAY OF BLOOD/URIC ACID: CPT

## 2023-06-20 PROCEDURE — 83970 ASSAY OF PARATHORMONE: CPT

## 2023-06-20 PROCEDURE — 36415 COLL VENOUS BLD VENIPUNCTURE: CPT

## 2023-06-20 PROCEDURE — 80053 COMPREHEN METABOLIC PANEL: CPT

## 2023-06-21 LAB — PTH-INTACT SERPL-MCNC: 25.9 PG/ML (ref 14–72)

## 2023-07-06 ENCOUNTER — OFFICE VISIT (OUTPATIENT)
Dept: ENDOCRINOLOGY | Facility: MEDICAL CENTER | Age: 78
End: 2023-07-06
Attending: INTERNAL MEDICINE
Payer: MEDICARE

## 2023-07-06 VITALS
HEART RATE: 93 BPM | DIASTOLIC BLOOD PRESSURE: 66 MMHG | WEIGHT: 198.6 LBS | BODY MASS INDEX: 26.32 KG/M2 | OXYGEN SATURATION: 98 % | HEIGHT: 73 IN | SYSTOLIC BLOOD PRESSURE: 132 MMHG

## 2023-07-06 DIAGNOSIS — Z79.84 LONG TERM (CURRENT) USE OF ORAL HYPOGLYCEMIC DRUGS: ICD-10-CM

## 2023-07-06 DIAGNOSIS — E78.5 DYSLIPIDEMIA: ICD-10-CM

## 2023-07-06 DIAGNOSIS — E11.42 CONTROLLED TYPE 2 DIABETES MELLITUS WITH DIABETIC POLYNEUROPATHY, WITHOUT LONG-TERM CURRENT USE OF INSULIN (HCC): ICD-10-CM

## 2023-07-06 DIAGNOSIS — E55.9 VITAMIN D DEFICIENCY: ICD-10-CM

## 2023-07-06 DIAGNOSIS — E08.42 DIABETIC POLYNEUROPATHY ASSOCIATED WITH DIABETES MELLITUS DUE TO UNDERLYING CONDITION (HCC): ICD-10-CM

## 2023-07-06 DIAGNOSIS — E03.9 HYPOTHYROIDISM (ACQUIRED): ICD-10-CM

## 2023-07-06 PROCEDURE — 99212 OFFICE O/P EST SF 10 MIN: CPT | Performed by: INTERNAL MEDICINE

## 2023-07-06 PROCEDURE — 3075F SYST BP GE 130 - 139MM HG: CPT | Performed by: INTERNAL MEDICINE

## 2023-07-06 PROCEDURE — 99214 OFFICE O/P EST MOD 30 MIN: CPT | Performed by: INTERNAL MEDICINE

## 2023-07-06 PROCEDURE — 3078F DIAST BP <80 MM HG: CPT | Performed by: INTERNAL MEDICINE

## 2023-07-06 RX ORDER — EMPAGLIFLOZIN 10 MG/1
TABLET, FILM COATED ORAL
COMMUNITY
End: 2023-07-06

## 2023-07-06 RX ORDER — DULOXETIN HYDROCHLORIDE 60 MG/1
60 CAPSULE, DELAYED RELEASE ORAL DAILY
Qty: 90 CAPSULE | Refills: 3 | Status: SHIPPED | OUTPATIENT
Start: 2023-07-06 | End: 2023-10-25

## 2023-07-06 ASSESSMENT — FIBROSIS 4 INDEX: FIB4 SCORE: 1.37

## 2023-07-06 NOTE — PROGRESS NOTES
CHIEF COMPLAINT: Patient is here for follow up of Type 2 Diabetes Mellitus    HPI:     Saleem Whitney is a 77 y.o. male with Type 2 Diabetes Mellitus here for follow up.    Labs from 5/17/2023 Hba1c is 7.0%  Labs from 12/12/2023 HbA1c was 7.4%    He was previously seen by the nurse practitioner   He has a medical history of hyperlipidemia,, hypertension, peripheral vascular disease, type 2 diabetes, primary hypothyroidism.  He also reports diabetic neuropathy      He is on  Metformin at 1000 mg twice a day  Glipizide 10 mg pill 1/2 tablet with breakfast  Trulicity 1.5 mg weekly    He denies side effects with his medications  He was just diagnosed with RCC and is scheduled for surgery       He is on atorvastatin 10 mg daily for hyperlipidemia  He denies muscle aches and weakness   LDL cholesterol was 30 on April 2023      He has hypertension is on losartan and amlodipine  Blood pressure is at goal  He does not have albuminuria  U ACR was less than 30 on April 2023        He has DPN and takes Duloxetine 30mg daily to help with neuropathic pain.  He repots improved pain scores since starting this medication ( from 1o down to 4) but he is worried that the pain is coming back.          He had an eye exam in August 25, 2022 at [facility] which showed no diabetic retinopathy  He has an appt next week       For his primary hypothyroidism  TPO antibodies were negative  He has been on levothyroxine 88 mcg daily which has been his dose for at least 2 years  He denies fatigue, constipation and cold intolerance  TSH is 3.1 on 4/2023  His TSH was 3.0 on February 2022      His last vitamin D was normal at 42 on April 2023        BG Diary:  Patient is not testing BGs    Weight has been stable    Diabetes Complications   Retinopathy: No known retinopathy.  Last eye exam: 8/25/2022 from [facility]  Neuropathy: He report paresthesias or numbness in both feet. Denies any foot wounds.  Exercise: Minimal.  Diet:  Fair.  Patient's medications, allergies, and social histories were reviewed and updated as appropriate.    ROS:     CONS:     No fever, no chills   EYES:     No diplopia, no blurry vision   CV:           No chest pain, no palpitations   PULM:     No SOB, no cough, no hemoptysis.   GI:            No nausea, no vomiting, no diarrhea, no constipation   ENDO:     No polyuria, no polydipsia, no heat intolerance, no cold intolerance       Past Medical History:  Problem List:  2023-05: Hypomagnesemia  2023-05: Hyponatremia  2023-05: Advance care planning  2023-05: Acute cough  2023-04: Facial laceration  2023-04: Visit for suture removal  2023-03: Long term (current) use of oral hypoglycemic drugs  2023-03: Dyslipidemia  2022-09: Small bowel obstruction (HCC)  2022-09: Leukocytosis  2022-09: Lactic acidosis  2022-07: Elevated serum creatinine  2021-01: Itching of ear  2020-12: PVD (peripheral vascular disease) (Formerly [facility])  2020-12: Degenerative arthritis of knee, bilateral  2020-08: Acute right-sided low back pain  2020-06: Chronic pain of right knee  2019-10: Motion sickness  2019-10: Altitude sickness prophylaxis  2019-07: Leg mass, right  2019-06: Vitamin D deficiency  2019-06: Leg cramping  2019-04: Baker cyst, right  2019-04: Erythematous rash  2019-01: Bilateral leg edema  2018-11: Quadriceps muscle strain  2018-11: Nocturia more than twice per night  2018-08: Pitting edema  2018-06: Idiopathic chronic gout, left ankle and foot, without tophus   (tophi)  2018-05: Memory changes  2017-09: Chronic pain of left knee  2017-06: Diastasis of rectus abdominis  2017-06: Left flank pain  2017-06: BPH associated with nocturia  2016-11: Swelling of left hand  2016-11: (HCC) Obesity, diabetes, and hypertension syndrome  2016-11: DM (diabetes mellitus) (HCC)  2016-11: (HCC) Hypertension associated with diabetes  2016-11: Hypothyroidism (acquired)  2016-11: (Formerly [facility]) Hyperlipidemia due to type 2 diabetes mellitus  2016-11: Primary  "insomnia  2016-: Screening for AAA (abdominal aortic aneurysm)      Past Surgical History:  Past Surgical History:   Procedure Laterality Date    TN LAP,DIAGNOSTIC ABDOMEN  2023    Procedure: LAPAROSCOPY diagnostic;  Surgeon: Galo Ashton M.D.;  Location: SURGERY [facility];  Service: General    HAND SURGERY          Allergies:  Patient has no known allergies.     Social History:  Social History     Tobacco Use    Smoking status: Former     Packs/day: 1.00     Years: 5.00     Pack years: 5.00     Types: Cigarettes     Quit date: 1971     Years since quittin.6    Smokeless tobacco: Never   Vaping Use    Vaping Use: Never used   Substance Use Topics    Alcohol use: Yes     Alcohol/week: 4.2 oz     Types: 7 Glasses of wine per week     Comment: daily    Drug use: No        Family History:   family history includes Diabetes in his maternal uncle, mother, and sister; Stroke in his father.      PHYSICAL EXAM:   OBJECTIVE:  Vital signs: /66 (BP Location: Left arm, Patient Position: Sitting)   Pulse 93   Ht 1.854 m (6' 1\")   Wt 90.1 kg (198 lb 9.6 oz)   SpO2 98%   BMI 26.20 kg/m²   GENERAL: Well-developed, well-nourished in no apparent distress.   EYE:  No ocular asymmetry, PERRLA  HENT: Pink, moist mucous membranes.    NECK: No thyromegaly.   CARDIOVASCULAR:  No murmurs  LUNGS: Clear breath sounds  ABDOMEN: Soft, nontender   EXTREMITIES: No clubbing, cyanosis, or edema.   NEUROLOGICAL: No gross focal motor abnormalities   LYMPH: No cervical adenopathy palpated.   SKIN: No rashes, lesions.   Monofilament testing with a 10 gram force: sensation: intact bilaterally  Visual Inspection: Feet without maceration, ulcers, or fissures.  Pedal pulses: intact bilaterally        Labs:  Lab Results   Component Value Date/Time    HBA1C 7.0 (H) 2023 09:58 AM        Lab Results   Component Value Date/Time    WBC 9.4 2023 01:44 AM    RBC 4.95 2023 01:44 AM    HEMOGLOBIN 14.4 2023 " 01:44 AM    MCV 87.3 05/18/2023 01:44 AM    MCH 29.1 05/18/2023 01:44 AM    MCHC 33.3 (L) 05/18/2023 01:44 AM    RDW 39.8 05/18/2023 01:44 AM    MPV 10.0 05/18/2023 01:44 AM       Lab Results   Component Value Date/Time    SODIUM 142 06/20/2023 01:42 PM    POTASSIUM 4.2 06/20/2023 01:42 PM    CHLORIDE 102 06/20/2023 01:42 PM    CO2 25 06/20/2023 01:42 PM    ANION 15.0 06/20/2023 01:42 PM    GLUCOSE 121 (H) 06/20/2023 01:42 PM    BUN 19 06/20/2023 01:42 PM    CREATININE 1.12 06/20/2023 01:42 PM    CALCIUM 10.1 06/20/2023 01:42 PM    ASTSGOT 20 06/20/2023 01:42 PM    ALTSGPT 16 06/20/2023 01:42 PM    TBILIRUBIN 0.8 06/20/2023 01:42 PM    ALBUMIN 4.8 06/20/2023 01:42 PM    TOTPROTEIN 7.9 06/20/2023 01:42 PM    GLOBULIN 3.1 06/20/2023 01:42 PM    AGRATIO 1.5 06/20/2023 01:42 PM       Lab Results   Component Value Date/Time    CHOLSTRLTOT 116 12/12/2022 0742    TRIGLYCERIDE 162 (H) 12/12/2022 0742    HDL 41 12/12/2022 0742    LDL 43 12/12/2022 0742       Lab Results   Component Value Date/Time    MALBCRT see below 04/13/2023 07:28 AM    MICROALBUR <1.2 04/13/2023 07:28 AM        Lab Results   Component Value Date/Time    TSHULTRASEN 3.040 02/02/2022 0847     No results found for: FREEDIR  Lab Results   Component Value Date/Time    FREET3 2.71 02/02/2022 0847     No results found for: THYSTIMIG        ASSESSMENT/PLAN:     1. Controlled type 2 diabetes mellitus with diabetic polyneuropathy, without long-term current use of insulin (HCC)  Fair control  Given his advanced age and A1c of 7.0% is acceptable  Continue Metfomin 1000mg bid   Glipizide 5 mg with breakfast ( hold prior to surgery for RCC)  Trulicity 1.5 mg weekly   he is up-to-date with his labs   Recommend follow-up in 3 months    2. Hypothyroidism (acquired)  Controlled  Continue levothyroxine 88 mcg daily  Reviewed proper administration of thyroid hormone  Patient should take thyroid hormone 30-60 minutes before breakfast on an empty stomach plain water and not  take it together with food, iron, calcium, and antacids.  Iron, calcium, and antacids should be taken at least 4 hours apart from thyroid hormone.    Repeat TSH in 6 months      3. Dyslipidemia  Stable  Continue Lipitor 10 mg daily  Repeat fasting lipids in 12 months      4. Diabetic polyneuropathy associated with diabetes mellitus due to underlying condition (HCC)  Patient reports improved pain scores from 10 to 4 but he wants to try a higher dose  I will increase duloxetine to 60mg daily     5. Vitamin D deficiency  Stable   Vitamin D labs were reviewed with patient  Continue current supplements  Continue monitoring levels     6. Long term (current) use of oral hypoglycemic drugs  Patient is on multiple oral agents and a GLP-1 for type 2 diabetes management      Return in about 4 months (around 11/6/2023).      Thank you kindly for allowing me to participate in the diabetes care plan for this patient.    Saleem Cardoso MD, JESSIE, [facility]      CC:   Jose Flores D.O.

## 2023-07-06 NOTE — PROGRESS NOTES
"RN-CDE Note    Subjective:   Endocrinology Clinic Progress Note  PCP: Jose Flores D.O.    HPI:  Saleem Whitney is a 77 y.o. old patient who is seen today by the Diabetes Nurse Specialist for review of his type 2 diabetes.    Recent changes in health: recently diagnosed with renal cancer.  Scheduled for surgery on the 31st of the month.    DM:   Last A1c:   Lab Results   Component Value Date/Time    HBA1C 7.0 (H) 05/17/2023 09:58 AM      Previous A1c was 7.4 on 12/12/22  A1C GOAL: < 7    Diabetes Medications:   Metformin 1000 mg bid  Glipizide 10 mg daily  Trulicity 1.5 mg weekly  **Jardiance 10 mg daily    Exercise: no regular exercise, sedentary  Diet: \"healthy\" diet  in general  Patient's body mass index is 26.2 kg/m². Exercise and nutrition counseling were performed at this visit.    Glucose monitoring frequency: testing several times a week.     Hypoglycemic episodes: yes - once in the past 3 months.   Last Retinal Exam: on file and up-to-date  Daily Foot Exam: Yes   Foot Exam:  Monofilament: current.   Lab Results   Component Value Date/Time    MALBCRT see below 04/13/2023 07:28 AM    MICROALBUR <1.2 04/13/2023 07:28 AM        ACR Albumin/Creatinine Ratio goal <30     HTN:   Blood pressure goal <130/<80 .   Currently Rx ACE/ARB: Yes     Dyslipidemia:    Lab Results   Component Value Date/Time    CHOLSTRLTOT 102 04/13/2023 07:23 AM    LDL 30 04/13/2023 07:23 AM    HDL 43 04/13/2023 07:23 AM    TRIGLYCERIDE 147 04/13/2023 07:23 AM         Currently Rx Statin: Yes     He  reports that he quit smoking about 51 years ago. His smoking use included cigarettes. He has a 5.00 pack-year smoking history. He has never used smokeless tobacco.      Plan:     Discussed and educated on:   - All medications, side effects and compliance (discussed carefully)  - Annual eye examinations at Ophthalmology  - Foot Care: what to look for when checking feet every day  - HbA1C: target  - Home glucose monitoring emphasized  - " Weight control and daily exercise    Recommended medication changes: no change.

## 2023-07-07 ENCOUNTER — OFFICE VISIT (OUTPATIENT)
Dept: MEDICAL GROUP | Facility: MEDICAL CENTER | Age: 78
End: 2023-07-07
Payer: MEDICARE

## 2023-07-07 VITALS
SYSTOLIC BLOOD PRESSURE: 118 MMHG | OXYGEN SATURATION: 97 % | HEIGHT: 73 IN | BODY MASS INDEX: 26.27 KG/M2 | TEMPERATURE: 98.2 F | DIASTOLIC BLOOD PRESSURE: 60 MMHG | HEART RATE: 73 BPM | WEIGHT: 198.19 LBS

## 2023-07-07 DIAGNOSIS — E78.5 HYPERLIPIDEMIA DUE TO TYPE 2 DIABETES MELLITUS (HCC): ICD-10-CM

## 2023-07-07 DIAGNOSIS — N40.1 BPH ASSOCIATED WITH NOCTURIA: ICD-10-CM

## 2023-07-07 DIAGNOSIS — E11.65 TYPE 2 DIABETES MELLITUS WITH HYPERGLYCEMIA, UNSPECIFIED WHETHER LONG TERM INSULIN USE (HCC): ICD-10-CM

## 2023-07-07 DIAGNOSIS — I15.2 HYPERTENSION ASSOCIATED WITH DIABETES (HCC): ICD-10-CM

## 2023-07-07 DIAGNOSIS — E11.69 HYPERLIPIDEMIA DUE TO TYPE 2 DIABETES MELLITUS (HCC): ICD-10-CM

## 2023-07-07 DIAGNOSIS — M65.342 TRIGGER RING FINGER OF LEFT HAND: ICD-10-CM

## 2023-07-07 DIAGNOSIS — R35.1 BPH ASSOCIATED WITH NOCTURIA: ICD-10-CM

## 2023-07-07 DIAGNOSIS — E53.8 VITAMIN B12 DEFICIENCY: ICD-10-CM

## 2023-07-07 DIAGNOSIS — E11.59 HYPERTENSION ASSOCIATED WITH DIABETES (HCC): ICD-10-CM

## 2023-07-07 PROBLEM — Z48.02 VISIT FOR SUTURE REMOVAL: Status: RESOLVED | Noted: 2023-04-17 | Resolved: 2023-07-07

## 2023-07-07 PROBLEM — R05.1 ACUTE COUGH: Status: RESOLVED | Noted: 2023-05-02 | Resolved: 2023-07-07

## 2023-07-07 PROCEDURE — 20550 NJX 1 TENDON SHEATH/LIGAMENT: CPT | Mod: F3 | Performed by: STUDENT IN AN ORGANIZED HEALTH CARE EDUCATION/TRAINING PROGRAM

## 2023-07-07 PROCEDURE — 3074F SYST BP LT 130 MM HG: CPT | Performed by: STUDENT IN AN ORGANIZED HEALTH CARE EDUCATION/TRAINING PROGRAM

## 2023-07-07 PROCEDURE — 99214 OFFICE O/P EST MOD 30 MIN: CPT | Mod: 25 | Performed by: STUDENT IN AN ORGANIZED HEALTH CARE EDUCATION/TRAINING PROGRAM

## 2023-07-07 PROCEDURE — 3078F DIAST BP <80 MM HG: CPT | Performed by: STUDENT IN AN ORGANIZED HEALTH CARE EDUCATION/TRAINING PROGRAM

## 2023-07-07 RX ORDER — GLIPIZIDE 5 MG/1
TABLET ORAL
COMMUNITY
End: 2023-07-07

## 2023-07-07 RX ORDER — DULAGLUTIDE 0.75 MG/.5ML
INJECTION, SOLUTION SUBCUTANEOUS
COMMUNITY
End: 2023-07-07

## 2023-07-07 RX ORDER — TAMSULOSIN HYDROCHLORIDE 0.4 MG/1
1 CAPSULE ORAL DAILY
COMMUNITY
End: 2023-07-07

## 2023-07-07 RX ORDER — THIAMINE HCL 100 MG
2500 TABLET ORAL DAILY
Qty: 100 TABLET | Refills: 3 | Status: SHIPPED | OUTPATIENT
Start: 2023-07-07

## 2023-07-07 RX ORDER — TRIAMCINOLONE ACETONIDE 40 MG/ML
40 INJECTION, SUSPENSION INTRA-ARTICULAR; INTRAMUSCULAR ONCE
Status: COMPLETED | OUTPATIENT
Start: 2023-07-07 | End: 2023-07-07

## 2023-07-07 RX ADMIN — Medication 0.5 ML: at 10:19

## 2023-07-07 RX ADMIN — TRIAMCINOLONE ACETONIDE 40 MG: 40 INJECTION, SUSPENSION INTRA-ARTICULAR; INTRAMUSCULAR at 10:17

## 2023-07-07 ASSESSMENT — FIBROSIS 4 INDEX: FIB4 SCORE: 1.37

## 2023-07-07 NOTE — PROGRESS NOTES
Subjective:     CC: chronic conditions follow up    HPI:   Saleem presents today for chronic conditions follow up    Problem   Trigger Ring Finger of Left Hand    Chronic condition. He would like to get a steroid injection today.    Character: finger locks up  Onset: 6 months   Location: left ring finger  Duration: constant  Exacerbating factors: flexing fingers  Relieving factors: has tried steroid injection in the past which helped  Associated symptoms: none  Severity: persistent     Vitamin B12 Deficiency    Chronic condition.  Current regimen: vitamin B12 2500 mcg daily       Bph Associated With Nocturia    Chronic condition.     Current regimen: tamsulosin 0.8 mg every morning    He has been having nocturia once nightly. He has been taking tamsulosin 0.4mg daily which he did not find helpful and would like to stop taking the medication for now. He reports his symptoms are mild at this time.     DM (Diabetes Mellitus) ([facility])    Chronic condition. Followed by endocrinology. Onset around age 71. Fasting blood sugar 130s.    Current medication regimen: metformin 1000mg BID, glipizide 10mg daily, Trulicity 1.5mg weekly  Patient tolerating and reports compliant with medications. Does not need refill of medications today. Denies vision changes, dry mouth, chest pain, nausea/vomiting/diarrhea, polydipsia, polyphagia, polyuria, hypoglycemia, numbness/tingling. He checks his feet at home.  Last eye exam: 02/2021  Last foot exam: 01/2022  Diet: tries to eat healthy in general  Exercise: not so much now due to leg pain  Vaccines: flu received 09/2021, PPSV23 completed 11/2016, Tdap received 08/2017    He has been told to drink more water now so he increased his fluid intake daily and has to urinate more frequently now due to increased fluid intake. He otherwise denies dribbling, nocturia, weak urinary stream.     (HCC) Hypertension associated with diabetes    Chronic condition.    Current medication regimen: amlodipine 5mg  daily, losartan 50mg daily  Patient tolerating and reports compliant with medications. He does not regularly monitor blood pressure at home. Does not need refill of medications today. Denies headache, dizziness, vision changes, chest pain, dyspnea, edema.     (HCC) Hyperlipidemia due to type 2 diabetes mellitus    Chronic condition.    Current regimen: atorvastatin 10mg daily.   He reports compliance and tolerating medication well. Denies musculoskeletal pain, headache, diarrhea. He does not need medication refill today. No acute concerns at this time.     Acute Cough (Resolved)    Acute condition.    Date of symptoms onset: Sunday 4/30/23  Symptoms: dry cough worse at night, slight shortness of breath  Denies chest pain.  Medications tried: OTC cough medicine, Tylenol with mild relief  Home max temperature: no fever  He has been able to tolerate PO. Normal urination.    Potential exposure: convention event over the weekend     Visit for Suture Removal (Resolved)    Patient is here for suture removal today. He had facial laceration which was repaired in ED on 4/10/23. He would like to have those sutures removed.    He also had a skin biopsy on a right sided back lesion that was done by dermatology about 2-3 weeks ago. He would like to have those sutures removed today as well.         Current Outpatient Medications Ordered in Epic   Medication Sig Dispense Refill    Cyanocobalamin (VITAMIN B-12) 2500 MCG SL Tab Place 1 Tablet under the tongue every day. 100 Tablet 3    DULoxetine (CYMBALTA) 60 MG Cap DR Particles delayed-release capsule Take 1 Capsule by mouth every day. 90 Capsule 3    acetaminophen (TYLENOL) 500 MG Tab Take 500 mg by mouth 1 time a day as needed for Mild Pain. Indications: Pain      albuterol 108 (90 Base) MCG/ACT Aero Soln inhalation aerosol Inhale 1-2 Puffs every four hours as needed for Shortness of Breath. (Patient not taking: Reported on 7/6/2023) 1 Each 3    tamsulosin (FLOMAX) 0.4 MG capsule  "Take 2 Capsules by mouth 1/2 hour after breakfast for 90 days. 180 Capsule 3    Dulaglutide (TRULICITY) 1.5 MG/0.5ML Solution Pen-injector Inject 0.5 mL under the skin every 7 days. 6 mL 3    losartan (COZAAR) 100 MG Tab Take 1 Tablet by mouth every day. (Patient taking differently: Take 50 mg by mouth every day.) 90 Tablet 3    atorvastatin (LIPITOR) 10 MG Tab Take 1 Tablet by mouth every day. 90 Tablet 3    levothyroxine (SYNTHROID) 88 MCG Tab Take 1 Tablet by mouth every morning on an empty stomach. 90 Tablet 3    metformin (GLUCOPHAGE) 1000 MG tablet Take 1 Tablet by mouth 2 times a day with meals. 180 Tablet 3    glipiZIDE (GLUCOTROL) 10 MG Tab Take 1 Tablet by mouth every day. 90 Tablet 3    amLODIPine (NORVASC) 10 MG Tab Take 1 Tablet by mouth every day. 90 Tablet 3    vitamin D (CHOLECALCIFEROL) 1000 UNIT Tab Take 1 Tab by mouth every day. 90 Tab 3    Magnesium 500 MG Tab Take 1,000 mg by mouth every day. 90 Tab 3     No current Epic-ordered facility-administered medications on file.     Social history  Living situation: lives with wife at home  Occupation: retired since 2017, used to run a construction company, and sales work  Marital status:   Alcohol/tobacco/illicit drugs: former smoker x 5yrs quit 1971, drink 1 glass of wine daily, denies illicit drugs  Baseline functional status: independent with ADLs    ROS:  See HPI    Objective:     Exam:  /60 (BP Location: Left arm, Patient Position: Sitting, BP Cuff Size: Adult)   Pulse 73   Temp 36.8 °C (98.2 °F) (Temporal)   Ht 1.854 m (6' 1\")   Wt 89.9 kg (198 lb 3.1 oz)   SpO2 97%   BMI 26.15 kg/m²  Body mass index is 26.15 kg/m².    Gen: Alert and oriented, No apparent distress.  Lungs: Normal effort, CTA bilaterally, no wheezes, rhonchi, or rales  CV: Regular rate and rhythm. No murmurs, rubs, or gallops.  Ext: No clubbing, cyanosis, edema. Locking of left 4th digit PIP joint with finger flexion.    Labs:   Lab Results   Component Value " Date/Time    SODIUM 142 06/20/2023 01:42 PM    POTASSIUM 4.2 06/20/2023 01:42 PM    CHLORIDE 102 06/20/2023 01:42 PM    CO2 25 06/20/2023 01:42 PM    ANION 15.0 06/20/2023 01:42 PM    GLUCOSE 121 (H) 06/20/2023 01:42 PM    BUN 19 06/20/2023 01:42 PM    CREATININE 1.12 06/20/2023 01:42 PM    CALCIUM 10.1 06/20/2023 01:42 PM    ASTSGOT 20 06/20/2023 01:42 PM    ALTSGPT 16 06/20/2023 01:42 PM    TBILIRUBIN 0.8 06/20/2023 01:42 PM    ALBUMIN 4.8 06/20/2023 01:42 PM    TOTPROTEIN 7.9 06/20/2023 01:42 PM    GLOBULIN 3.1 06/20/2023 01:42 PM    AGRATIO 1.5 06/20/2023 01:42 PM     Lab Results   Component Value Date/Time    CHOLSTRLTOT 102 04/13/2023 0723    TRIGLYCERIDE 147 04/13/2023 0723    HDL 43 04/13/2023 0723    LDL 30 04/13/2023 0723     Lab Results   Component Value Date/Time    HBA1C 7.0 (H) 05/17/2023 09:58 AM      Lab Results   Component Value Date/Time    TSHULTRASEN 3.170 04/13/2023 0723     PRE-OP DIAGNOSIS: left 4th digit trigger finger  POST-OP DIAGNOSIS: Same   PROCEDURE: trigger finger injection     Dose:          X  1%        _  2%   Lidocaine      0.5  mL    X  Steroid 40mg/mL Triamcinolone acetonide     1mL           Procedure: The area was prepped in the usual sterile manner. The needle was inserted over the palmar aspect distal to the metacarpal head of left 4th digit at a 30-degree angle and then directed proximally toward the nodule and the steroid was injected into the tendon sheath. There were no complications during this procedure.    Assessment & Plan:     77 y.o. male with the following -     1. Trigger ring finger of left hand  Chronic condition, persistent. Patient would like to trial therapeutic steroid injection which was performed today and he tolerated procedure well without complications. Follow up as needed.  - lidocaine (Xylocaine) 1 % injection 0.5 mL  - triamcinolone acetonide (Kenalog-40) injection 40 mg    2. Type 2 diabetes mellitus with hyperglycemia, unspecified whether long term  insulin use (HCC)  Chronic condition, controlled with medications. Most recent A1C 7.0. Followed and managed by endocrinology.  - cont current medications: metformin 1000mg BID, glipizide 10mg daily, Trulicity 1.5mg weekly    3. (HCC) Hypertension associated with diabetes  Chronic condition, stable. He has been having intermittent ankle swelling. We discussed that he can do a trial of stopping of amlodipine and if blood pressure elevates, he can go back to losartan 100mg daily as needed.  - cont current regimen: losartan 50mg daily, amlodipine 10mg daily    4. (HCC) Hyperlipidemia due to type 2 diabetes mellitus  Chronic condition, stable.  - cont current regimen: atorvastatin 10mg daily    5. BPH associated with nocturia  Chronic condition, stable.  - cont current regimen: tamsulosin 0.8mg qAM    6. Vitamin B12 deficiency  Chronic condition, stable.  - cont current regimen: vitamin B12 2500mcg daily  - Cyanocobalamin (VITAMIN B-12) 2500 MCG SL Tab; Place 1 Tablet under the tongue every day.  Dispense: 100 Tablet; Refill: 3      Return in about 6 months (around 1/7/2024) for chronic medical conditions.    Patient is aware that I will be leaving [facility] at the end of September and that he will need to establish with a new primary care provider. It has been a real privilege serving as his family doctor.    Please note that this dictation was created using voice recognition software. I have made every reasonable attempt to correct obvious errors, but I expect that there are errors of grammar and possibly content that I did not discover before finalizing the note.

## 2023-07-10 ENCOUNTER — APPOINTMENT (OUTPATIENT)
Dept: ADMISSIONS | Facility: MEDICAL CENTER | Age: 78
End: 2023-07-10
Attending: UROLOGY
Payer: MEDICARE

## 2023-07-21 ENCOUNTER — PRE-ADMISSION TESTING (OUTPATIENT)
Dept: ADMISSIONS | Facility: MEDICAL CENTER | Age: 78
End: 2023-07-21
Attending: UROLOGY
Payer: MEDICARE

## 2023-07-21 DIAGNOSIS — Z01.812 PRE-OPERATIVE LABORATORY EXAMINATION: ICD-10-CM

## 2023-07-21 DIAGNOSIS — Z01.810 PRE-OPERATIVE CARDIOVASCULAR EXAMINATION: ICD-10-CM

## 2023-07-21 RX ORDER — TAMSULOSIN HYDROCHLORIDE 0.4 MG/1
0.8 CAPSULE ORAL DAILY
COMMUNITY
End: 2024-01-08 | Stop reason: SDUPTHER

## 2023-07-25 ENCOUNTER — APPOINTMENT (OUTPATIENT)
Dept: ADMISSIONS | Facility: MEDICAL CENTER | Age: 78
End: 2023-07-25
Attending: UROLOGY
Payer: MEDICARE

## 2023-07-25 DIAGNOSIS — Z01.810 PRE-OPERATIVE CARDIOVASCULAR EXAMINATION: ICD-10-CM

## 2023-07-25 DIAGNOSIS — Z01.812 PRE-OPERATIVE LABORATORY EXAMINATION: ICD-10-CM

## 2023-07-25 LAB
APPEARANCE UR: CLEAR
BILIRUB UR QL STRIP.AUTO: NEGATIVE
COLOR UR: YELLOW
EKG IMPRESSION: NORMAL
GLUCOSE UR STRIP.AUTO-MCNC: NEGATIVE MG/DL
KETONES UR STRIP.AUTO-MCNC: NEGATIVE MG/DL
LEUKOCYTE ESTERASE UR QL STRIP.AUTO: NEGATIVE
MICRO URNS: NORMAL
NITRITE UR QL STRIP.AUTO: NEGATIVE
PH UR STRIP.AUTO: 5.5 [PH] (ref 5–8)
PROT UR QL STRIP: NEGATIVE MG/DL
RBC UR QL AUTO: NEGATIVE
SP GR UR STRIP.AUTO: 1.02
UROBILINOGEN UR STRIP.AUTO-MCNC: 0.2 MG/DL

## 2023-07-25 PROCEDURE — 87086 URINE CULTURE/COLONY COUNT: CPT

## 2023-07-25 PROCEDURE — 81003 URINALYSIS AUTO W/O SCOPE: CPT

## 2023-07-25 PROCEDURE — 93010 ELECTROCARDIOGRAM REPORT: CPT | Performed by: INTERNAL MEDICINE

## 2023-07-25 PROCEDURE — 93005 ELECTROCARDIOGRAM TRACING: CPT

## 2023-07-27 LAB
BACTERIA UR CULT: NORMAL
SIGNIFICANT IND 70042: NORMAL
SITE SITE: NORMAL
SOURCE SOURCE: NORMAL

## 2023-07-31 ENCOUNTER — ANESTHESIA EVENT (OUTPATIENT)
Dept: SURGERY | Facility: MEDICAL CENTER | Age: 78
End: 2023-07-31
Payer: MEDICARE

## 2023-07-31 ENCOUNTER — ANESTHESIA (OUTPATIENT)
Dept: SURGERY | Facility: MEDICAL CENTER | Age: 78
End: 2023-07-31
Payer: MEDICARE

## 2023-07-31 ENCOUNTER — HOSPITAL ENCOUNTER (OUTPATIENT)
Facility: MEDICAL CENTER | Age: 78
End: 2023-07-31
Attending: UROLOGY | Admitting: UROLOGY
Payer: MEDICARE

## 2023-07-31 VITALS
BODY MASS INDEX: 25.68 KG/M2 | HEIGHT: 73 IN | DIASTOLIC BLOOD PRESSURE: 88 MMHG | RESPIRATION RATE: 17 BRPM | SYSTOLIC BLOOD PRESSURE: 147 MMHG | OXYGEN SATURATION: 96 % | WEIGHT: 193.78 LBS | TEMPERATURE: 97 F | HEART RATE: 86 BPM

## 2023-07-31 LAB
ERYTHROCYTE [DISTWIDTH] IN BLOOD BY AUTOMATED COUNT: 41.7 FL (ref 35.9–50)
GLUCOSE BLD STRIP.AUTO-MCNC: 143 MG/DL (ref 65–99)
HCT VFR BLD AUTO: 41.1 % (ref 42–52)
HGB BLD-MCNC: 13.7 G/DL (ref 14–18)
MCH RBC QN AUTO: 29.6 PG (ref 27–33)
MCHC RBC AUTO-ENTMCNC: 33.3 G/DL (ref 32.3–36.5)
MCV RBC AUTO: 88.8 FL (ref 81.4–97.8)
PATHOLOGY CONSULT NOTE: NORMAL
PLATELET # BLD AUTO: 218 K/UL (ref 164–446)
PMV BLD AUTO: 9.8 FL (ref 9–12.9)
RBC # BLD AUTO: 4.63 M/UL (ref 4.7–6.1)
WBC # BLD AUTO: 7.7 K/UL (ref 4.8–10.8)

## 2023-07-31 PROCEDURE — 160002 HCHG RECOVERY MINUTES (STAT): Performed by: UROLOGY

## 2023-07-31 PROCEDURE — 160028 HCHG SURGERY MINUTES - 1ST 30 MINS LEVEL 3: Performed by: UROLOGY

## 2023-07-31 PROCEDURE — 160046 HCHG PACU - 1ST 60 MINS PHASE II: Performed by: UROLOGY

## 2023-07-31 PROCEDURE — 700111 HCHG RX REV CODE 636 W/ 250 OVERRIDE (IP): Performed by: ANESTHESIOLOGY

## 2023-07-31 PROCEDURE — 36415 COLL VENOUS BLD VENIPUNCTURE: CPT

## 2023-07-31 PROCEDURE — 88307 TISSUE EXAM BY PATHOLOGIST: CPT

## 2023-07-31 PROCEDURE — 99100 ANES PT EXTEME AGE<1 YR&>70: CPT | Performed by: ANESTHESIOLOGY

## 2023-07-31 PROCEDURE — 160035 HCHG PACU - 1ST 60 MINS PHASE I: Performed by: UROLOGY

## 2023-07-31 PROCEDURE — 700101 HCHG RX REV CODE 250: Performed by: UROLOGY

## 2023-07-31 PROCEDURE — 700105 HCHG RX REV CODE 258: Mod: JZ | Performed by: ANESTHESIOLOGY

## 2023-07-31 PROCEDURE — 700101 HCHG RX REV CODE 250: Performed by: ANESTHESIOLOGY

## 2023-07-31 PROCEDURE — 700111 HCHG RX REV CODE 636 W/ 250 OVERRIDE (IP): Mod: JZ | Performed by: UROLOGY

## 2023-07-31 PROCEDURE — 160039 HCHG SURGERY MINUTES - EA ADDL 1 MIN LEVEL 3: Performed by: UROLOGY

## 2023-07-31 PROCEDURE — 82962 GLUCOSE BLOOD TEST: CPT

## 2023-07-31 PROCEDURE — 160009 HCHG ANES TIME/MIN: Performed by: UROLOGY

## 2023-07-31 PROCEDURE — 700105 HCHG RX REV CODE 258: Mod: JZ | Performed by: UROLOGY

## 2023-07-31 PROCEDURE — 160048 HCHG OR STATISTICAL LEVEL 1-5: Performed by: UROLOGY

## 2023-07-31 PROCEDURE — 160036 HCHG PACU - EA ADDL 30 MINS PHASE I: Performed by: UROLOGY

## 2023-07-31 PROCEDURE — 00912 ANES TRURL PX RESCJ BLDR TUM: CPT | Performed by: ANESTHESIOLOGY

## 2023-07-31 PROCEDURE — 85027 COMPLETE CBC AUTOMATED: CPT

## 2023-07-31 PROCEDURE — 160025 RECOVERY II MINUTES (STATS): Performed by: UROLOGY

## 2023-07-31 RX ORDER — CEFAZOLIN SODIUM 1 G/3ML
INJECTION, POWDER, FOR SOLUTION INTRAMUSCULAR; INTRAVENOUS PRN
Status: DISCONTINUED | OUTPATIENT
Start: 2023-07-31 | End: 2023-07-31 | Stop reason: SURG

## 2023-07-31 RX ORDER — ONDANSETRON 2 MG/ML
4 INJECTION INTRAMUSCULAR; INTRAVENOUS
Status: DISCONTINUED | OUTPATIENT
Start: 2023-07-31 | End: 2023-07-31 | Stop reason: HOSPADM

## 2023-07-31 RX ORDER — HYDROMORPHONE HYDROCHLORIDE 1 MG/ML
0.2 INJECTION, SOLUTION INTRAMUSCULAR; INTRAVENOUS; SUBCUTANEOUS
Status: DISCONTINUED | OUTPATIENT
Start: 2023-07-31 | End: 2023-07-31 | Stop reason: HOSPADM

## 2023-07-31 RX ORDER — HYDROMORPHONE HYDROCHLORIDE 1 MG/ML
0.4 INJECTION, SOLUTION INTRAMUSCULAR; INTRAVENOUS; SUBCUTANEOUS
Status: DISCONTINUED | OUTPATIENT
Start: 2023-07-31 | End: 2023-07-31 | Stop reason: HOSPADM

## 2023-07-31 RX ORDER — SODIUM CHLORIDE, SODIUM LACTATE, POTASSIUM CHLORIDE, CALCIUM CHLORIDE 600; 310; 30; 20 MG/100ML; MG/100ML; MG/100ML; MG/100ML
INJECTION, SOLUTION INTRAVENOUS CONTINUOUS
Status: DISCONTINUED | OUTPATIENT
Start: 2023-07-31 | End: 2023-07-31 | Stop reason: HOSPADM

## 2023-07-31 RX ORDER — ONDANSETRON 2 MG/ML
INJECTION INTRAMUSCULAR; INTRAVENOUS PRN
Status: DISCONTINUED | OUTPATIENT
Start: 2023-07-31 | End: 2023-07-31 | Stop reason: SURG

## 2023-07-31 RX ORDER — ROCURONIUM BROMIDE 10 MG/ML
INJECTION, SOLUTION INTRAVENOUS PRN
Status: DISCONTINUED | OUTPATIENT
Start: 2023-07-31 | End: 2023-07-31 | Stop reason: SURG

## 2023-07-31 RX ORDER — OXYCODONE HCL 5 MG/5 ML
5 SOLUTION, ORAL ORAL
Status: DISCONTINUED | OUTPATIENT
Start: 2023-07-31 | End: 2023-07-31 | Stop reason: HOSPADM

## 2023-07-31 RX ORDER — HALOPERIDOL 5 MG/ML
1 INJECTION INTRAMUSCULAR
Status: DISCONTINUED | OUTPATIENT
Start: 2023-07-31 | End: 2023-07-31 | Stop reason: HOSPADM

## 2023-07-31 RX ORDER — EPHEDRINE SULFATE 50 MG/ML
5 INJECTION, SOLUTION INTRAVENOUS
Status: DISCONTINUED | OUTPATIENT
Start: 2023-07-31 | End: 2023-07-31 | Stop reason: HOSPADM

## 2023-07-31 RX ORDER — MAGNESIUM HYDROXIDE 1200 MG/15ML
LIQUID ORAL
Status: COMPLETED | OUTPATIENT
Start: 2023-07-31 | End: 2023-07-31

## 2023-07-31 RX ORDER — METOPROLOL TARTRATE 1 MG/ML
1 INJECTION, SOLUTION INTRAVENOUS
Status: DISCONTINUED | OUTPATIENT
Start: 2023-07-31 | End: 2023-07-31 | Stop reason: HOSPADM

## 2023-07-31 RX ORDER — HYDROMORPHONE HYDROCHLORIDE 1 MG/ML
0.1 INJECTION, SOLUTION INTRAMUSCULAR; INTRAVENOUS; SUBCUTANEOUS
Status: DISCONTINUED | OUTPATIENT
Start: 2023-07-31 | End: 2023-07-31 | Stop reason: HOSPADM

## 2023-07-31 RX ORDER — OXYCODONE HCL 5 MG/5 ML
10 SOLUTION, ORAL ORAL
Status: DISCONTINUED | OUTPATIENT
Start: 2023-07-31 | End: 2023-07-31 | Stop reason: HOSPADM

## 2023-07-31 RX ORDER — LIDOCAINE HYDROCHLORIDE 20 MG/ML
INJECTION, SOLUTION EPIDURAL; INFILTRATION; INTRACAUDAL; PERINEURAL PRN
Status: DISCONTINUED | OUTPATIENT
Start: 2023-07-31 | End: 2023-07-31 | Stop reason: SURG

## 2023-07-31 RX ORDER — PHENAZOPYRIDINE HYDROCHLORIDE 100 MG/1
200 TABLET, FILM COATED ORAL
Status: DISCONTINUED | OUTPATIENT
Start: 2023-07-31 | End: 2023-07-31 | Stop reason: HOSPADM

## 2023-07-31 RX ORDER — SODIUM CHLORIDE, SODIUM LACTATE, POTASSIUM CHLORIDE, CALCIUM CHLORIDE 600; 310; 30; 20 MG/100ML; MG/100ML; MG/100ML; MG/100ML
INJECTION, SOLUTION INTRAVENOUS CONTINUOUS
Status: ACTIVE | OUTPATIENT
Start: 2023-07-31 | End: 2023-07-31

## 2023-07-31 RX ORDER — SODIUM CHLORIDE, SODIUM LACTATE, POTASSIUM CHLORIDE, CALCIUM CHLORIDE 600; 310; 30; 20 MG/100ML; MG/100ML; MG/100ML; MG/100ML
INJECTION, SOLUTION INTRAVENOUS
Status: DISCONTINUED | OUTPATIENT
Start: 2023-07-31 | End: 2023-07-31 | Stop reason: SURG

## 2023-07-31 RX ORDER — HYDRALAZINE HYDROCHLORIDE 20 MG/ML
5 INJECTION INTRAMUSCULAR; INTRAVENOUS
Status: DISCONTINUED | OUTPATIENT
Start: 2023-07-31 | End: 2023-07-31 | Stop reason: HOSPADM

## 2023-07-31 RX ADMIN — LIDOCAINE HYDROCHLORIDE 80 MG: 20 INJECTION, SOLUTION EPIDURAL; INFILTRATION; INTRACAUDAL at 13:09

## 2023-07-31 RX ADMIN — FENTANYL CITRATE 75 MCG: 50 INJECTION, SOLUTION INTRAMUSCULAR; INTRAVENOUS at 13:09

## 2023-07-31 RX ADMIN — CEFAZOLIN 2 G: 1 INJECTION, POWDER, FOR SOLUTION INTRAMUSCULAR; INTRAVENOUS at 13:09

## 2023-07-31 RX ADMIN — PROPOFOL 100 MG: 10 INJECTION, EMULSION INTRAVENOUS at 13:09

## 2023-07-31 RX ADMIN — HYDRALAZINE HYDROCHLORIDE 5 MG: 20 INJECTION, SOLUTION INTRAMUSCULAR; INTRAVENOUS at 13:52

## 2023-07-31 RX ADMIN — GEMCITABINE 1000 MG: 38 INJECTION, POWDER, LYOPHILIZED, FOR SOLUTION INTRAVENOUS at 13:35

## 2023-07-31 RX ADMIN — GEMCITABINE 1000 MG: 38 INJECTION, POWDER, LYOPHILIZED, FOR SOLUTION INTRAVENOUS at 13:34

## 2023-07-31 RX ADMIN — SODIUM CHLORIDE, POTASSIUM CHLORIDE, SODIUM LACTATE AND CALCIUM CHLORIDE: 600; 310; 30; 20 INJECTION, SOLUTION INTRAVENOUS at 10:19

## 2023-07-31 RX ADMIN — SUGAMMADEX 200 MG: 100 INJECTION, SOLUTION INTRAVENOUS at 13:30

## 2023-07-31 RX ADMIN — PROPOFOL 50 MG: 10 INJECTION, EMULSION INTRAVENOUS at 13:10

## 2023-07-31 RX ADMIN — SODIUM CHLORIDE, POTASSIUM CHLORIDE, SODIUM LACTATE AND CALCIUM CHLORIDE: 600; 310; 30; 20 INJECTION, SOLUTION INTRAVENOUS at 13:04

## 2023-07-31 RX ADMIN — ONDANSETRON 4 MG: 2 INJECTION INTRAMUSCULAR; INTRAVENOUS at 13:24

## 2023-07-31 RX ADMIN — ROCURONIUM BROMIDE 40 MG: 50 INJECTION, SOLUTION INTRAVENOUS at 13:09

## 2023-07-31 ASSESSMENT — FIBROSIS 4 INDEX
FIB4 SCORE: 1.37
FIB4 SCORE: 1.37

## 2023-07-31 ASSESSMENT — PAIN SCALES - GENERAL: PAIN_LEVEL: 0

## 2023-07-31 ASSESSMENT — PAIN DESCRIPTION - PAIN TYPE: TYPE: SURGICAL PAIN

## 2023-07-31 NOTE — PROGRESS NOTES
"Pharmacy Chemotherapy verification:       Dx: Bladder Cancer        Protocol: Intravesical Gemcitabine     Dosing Reference:  Gemcitabine 2000 mg in  ml instilled intravesically within 3 hours after TURBT  Tamara YANES, et al. Effect of Intravesical Instillation of Gemcitabine vs Saline Immediately Following Resection of Suspected Low-Grade Non-Muscle-Invasive Bladder Cancer on Tumor Recurrence SWOG  Randomized Clinical Trial. MARIAH. 2018;319(18):3740-5930     Ht 1.854 m (6' 1\")   Wt 93 kg (205 lb)   BMI 27.05 kg/m²     Body surface area is 2.19 meters squared.       Allergies: Latex  No labs required     Drug Order   (Drug name, dose, route, IV Fluid & volume, frequency, number of doses) Cycle: 1      Previous treatment: N/a      Medication = Gemcitabine  Base Dose= 2000 mg total  Calc Dose: No calc required  Final Dose = 2000 mg total split into 1000 mg x 2 in cath tip syringe  Route = Intravesical  Fluid & Volume = NS 50 mL in cath tip syringe each  Administered by physician    To be administered by MD mallory to treat with final dose      By my signature below, I confirm this process was performed independently with the BSA and all final chemotherapy dosing calculations congruent. I have reviewed the above chemotherapy order and that my calculation of the final dose and BSA (when applicable) corroborate those calculations of the  pharmacist. Discrepancies of 10% or greater in the written dose have been addressed and documented within the [facility] Progress notes.    Michelle Aaron, PharmD, BCOP     "

## 2023-07-31 NOTE — ANESTHESIA TIME REPORT
Anesthesia Start and Stop Event Times     Date Time Event    7/31/2023 1259 Ready for Procedure     1304 Anesthesia Start     1340 Anesthesia Stop        Responsible Staff  07/31/23    Name Role Begin End    Kannan Sher D.O. Anesth 1304 1340        Overtime Reason:  no overtime (within assigned shift)    Comments:

## 2023-07-31 NOTE — ANESTHESIA PROCEDURE NOTES
Airway    Date/Time: 7/31/2023 1:10 PM    Performed by: Kannan Sher D.O.  Authorized by: Kannan Sher D.O.    Location:  OR  Urgency:  Elective  Indications for Airway Management:  Anesthesia      Spontaneous Ventilation: absent    Sedation Level:  Deep  Preoxygenated: Yes    Patient Position:  Sniffing  Mask Difficulty Assessment:  2 - vent by mask + OA or adjuvant +/- NMBA  Final Airway Type:  Endotracheal airway  Final Endotracheal Airway:  ETT  Cuffed: Yes    Technique Used for Successful ETT Placement:  Direct laryngoscopy    Insertion Site:  Oral  Blade Type:  Leila  Laryngoscope Blade/Videolaryngoscope Blade Size:  4  ETT Size (mm):  7.0  Measured from:  Teeth  ETT to Teeth (cm):  23  Placement Verified by: auscultation and capnometry    Cormack-Lehane Classification:  Grade I - full view of glottis  Number of Attempts at Approach:  1

## 2023-07-31 NOTE — OR SURGEON
Immediate Post OP Note    PreOp Diagnosis: bladder tumor    PostOp Diagnosis: same    Procedure(s):  BIPOLAR TRANSURETHRAL RESECTION OF BLADDER TUMOR (1.5cm) WITH INSTILLATION GEMCITABINE - Wound Class: Clean Contaminated    Surgeon(s):  Bradly Velasco M.D.    Anesthesiologist/Type of Anesthesia:  Anesthesiologist: Kannan Sher D.O./General    Surgical Staff:  Circulator: Lola Banda R.N.  Scrub Person: Radu Tello; Davey Rahman    Specimens removed if any:  ID Type Source Tests Collected by Time Destination   A : Bladder tumor Tissue Bladder PATHOLOGY SPECIMEN Bradly Velasco M.D. 7/31/2023  1:28 PM        Estimated Blood Loss: 10ml    Findings: 1.5cm L lateral wall papillary bladder tumor    Complications: none    Drain: 20Fr adams with gemcitabine (chemo) instilled at 13:25, to be drained and adams removed at 14:25    7/31/2023 1:48 PM Bradly Velasco M.D.

## 2023-07-31 NOTE — OP REPORT
DATE OF SERVICE:  07/31/2023     PREOPERATIVE DIAGNOSIS:  Bladder tumor.     POSTOPERATIVE DIAGNOSIS:  Bladder tumor.     PROCEDURE PERFORMED:  Bipolar transurethral resection of bladder tumor with   instillation of gemcitabine intravesically.     SURGEON:  Bradly Velasco MD     ANESTHESIOLOGIST:  Kannan Sher DO     ANESTHESIA:  General.     INDICATIONS FOR PROCEDURE:  The is a 77-year-old male with a recently   diagnosed bladder tumor, 1.5 cm left lateral wall tumor.  After discussing   treatment options, he has elected for a transurethral resection of bladder   tumor with instillation of gemcitabine.     DESCRIPTION OF PROCEDURE:  After obtaining informed consent, the patient was   brought to the operating room.  After the induction of general anesthesia, he   was positioned in dorsal lithotomy position and his genitalia were prepped and   draped in sterile fashion.  He received Ancef as antibiotic prophylaxis prior   to starting the procedure.  I passed a 26-Micronesian bipolar resectoscope per   urethra into the bladder under direct vision using a visual obturator.  There   was a 1.5 cm papillary tumor on the left lateral wall, appeared superficial.    This was just lateral and posterior to the left ureteral orifice.  I used the   bipolar loop to resect that tumor down to the muscle of the bladder, sent off   the bladder tumor chips for pathology.  I then cauterized the resection site   using the bipolar loop until there was good hemostasis.  There were no other   signs of tumor within the bladder and no signs of perforation and good   hemostasis, I removed the scope and passed in a 20-Micronesian Robertson catheter,   filled the balloon with 10 mL of sterile water and then instilled the   gemcitabine into the bladder and clamped off the catheter at 13:25. The patient   was then awoken from anesthesia and taken to recovery room in stable   condition.     COMPLICATIONS:  None.     ESTIMATED BLOOD LOSS:  10 mL.      SPECIMENS:  Bladder tumor chips.     DRAINS:  20-Kyrgyz Robertson catheter to be removed before discharge.        ______________________________  MD OLIVER Noyola/DEMETRIUS/THEODORE    DD:  07/31/2023 13:54  DT:  07/31/2023 14:17    Job#:  [id number]

## 2023-07-31 NOTE — ANESTHESIA PREPROCEDURE EVALUATION
Case: [id number] Date/Time: 07/31/23 1115    Procedure: BIPOLAR TRANSURETHRAL RESECTION OF BLADDER TUMOR WITH INSTILLATION GEMCITABINE    Pre-op diagnosis: MASS OF URINARY BLADDER    Location: [facility] OR 17 / SURGERY [facility]    Surgeons: Bradly Velasco M.D.          Relevant Problems   ANESTHESIA   (positive) Motion sickness      CARDIAC   (positive) (HCC) Hypertension associated with diabetes      ENDO   (positive) Hypothyroidism (acquired)      Other   (positive) DM (diabetes mellitus) (HCC)   (positive) Idiopathic chronic gout, left ankle and foot, without tophus (tophi)       Physical Exam    Airway   Mallampati: II  TM distance: >3 FB  Neck ROM: full       Cardiovascular - normal exam  Rhythm: regular  Rate: normal  (-) murmur     Dental - normal exam           Pulmonary - normal exam  Breath sounds clear to auscultation     Abdominal    Neurological - normal exam                 Anesthesia Plan    ASA 2       Plan - general       Airway plan will be ETT          Induction: intravenous    Postoperative Plan: Postoperative administration of opioids is intended.    Pertinent diagnostic labs and testing reviewed    Informed Consent:    Anesthetic plan and risks discussed with patient.    Use of blood products discussed with: patient whom consented to blood products.

## 2023-07-31 NOTE — PROGRESS NOTES
"Pharmacy Chemotherapy calculation:    Pt Name: Saleem Whitney  DX: Bladder Cancer    Cycle 1  Previous treatment = none    Regimen: Intravesical Gemcitabine  Gemcitabine 2000 mg in  ml instilled intravesically within 3 hours after TURBT  Tamara YANES, et al. Effect of Intravesical Instillation of Gemcitabine vs Saline Immediately Following Resection of Suspected Low-Grade Non-Muscle-Invasive Bladder Cancer on Tumor Recurrence SWOG  Randomized Clinical Trial. MARIAH. 2018;319(18):1391-6990       Ht 1.854 m (6' 1\")   Wt 93 kg (205 lb)   BMI 27.05 kg/m²   Body surface area is 2.19 meters squared.  LABS: not required       Gemcitabine 1000 mg in NS 50ml intravesically via cath tipped syringe x 2 syringes   Fixed dose, no calculation required. To be administered by MD. Rosa Vance, PharmD    "

## 2023-07-31 NOTE — ANESTHESIA POSTPROCEDURE EVALUATION
Patient: Saleem Whitney    Procedure Summary     Date: 07/31/23 Room / Location: [place] [id number] / SURGERY [facility]    Anesthesia Start: 1304 Anesthesia Stop: 1340    Procedure: BIPOLAR TRANSURETHRAL RESECTION OF BLADDER TUMOR WITH INSTILLATION GEMCITABINE (Bladder) Diagnosis: (MASS OF URINARY BLADDER)    Surgeons: Bradly Velasco M.D. Responsible Provider: Kannan Sher D.O.    Anesthesia Type: general ASA Status: 2          Final Anesthesia Type: general  Last vitals  BP   Blood Pressure : (!) 147/88    Temp   36.1 °C (97 °F)    Pulse   86   Resp   17    SpO2   96 %      Anesthesia Post Evaluation    Patient location during evaluation: PACU  Patient participation: complete - patient participated  Level of consciousness: awake and alert  Pain score: 0    Airway patency: patent  Anesthetic complications: no  Cardiovascular status: hemodynamically stable  Respiratory status: acceptable  Hydration status: euvolemic    PONV: none          No notable events documented.     Nurse Pain Score: 0 (NPRS)

## 2023-07-31 NOTE — DISCHARGE INSTRUCTIONS
HOME CARE INSTRUCTIONS    ACTIVITY: Rest and take it easy for the first 24 hours.  A responsible adult is recommended to remain with you during that time.  It is normal to feel sleepy.  We encourage you to not do anything that requires balance, judgment or coordination.    FOR 24 HOURS DO NOT:  Drive, operate machinery or run household appliances.  Drink beer or alcoholic beverages.  Make important decisions or sign legal documents.    SPECIAL INSTRUCTIONS:     Transurethral Resection of Bladder Tumor, Care After  The following information offers guidance on how to care for yourself after your procedure. Your health care provider may also give you more specific instructions. If you have problems or questions, contact your health care provider.  What can I expect after the procedure?  After the procedure, it is common to have:  A small amount of blood or small blood clots in your urine for up to 2 weeks.  Soreness or mild pain from your catheter. After your catheter is removed, you may have mild soreness, especially when urinating.  A need to urinate often.  Pain in your lower abdomen.  Follow these instructions at home:  Medicines  Take over-the-counter and prescription medicines only as told by your health care provider.  If you were prescribed an antibiotic medicine, take it as told by your health care provider. Do not stop taking the antibiotic even if you start to feel better.  Ask your health care provider if the medicine prescribed to you:  Requires you to avoid driving or using machinery.  Can cause constipation. You may need to take these actions to prevent or treat constipation:  Drink enough fluid to keep your urine pale yellow.  Take over-the-counter or prescription medicines.  Eat foods that are high in fiber, such as beans, whole grains, and fresh fruits and vegetables.  Limit foods that are high in fat and processed sugars, such as fried or sweet foods.  Activity  If you were given a sedative during  the procedure, it can affect you for several hours. Do not drive or operate machinery until your health care provider says that it is safe.  Rest as told by your health care provider.  Avoid sitting for a long time without moving. Get up to take short walks every 1-2 hours. This is important to improve blood flow and breathing. Ask for help if you feel weak or unsteady.  Do not lift anything that is heavier than 10 lb (4.5 kg), or the limit that you are told, until your health care provider says that it is safe.  Avoid intense physical activity for as long as told by your health care provider.  Do not have sex until your health care provider approves.  Return to your normal activities as told by your health care provider. Ask your health care provider what activities are safe for you.  General instructions  If you have a catheter, follow instructions from your health care provider about caring for your catheter and your drainage bag.  Do not drink alcohol for as long as told by your health care provider. This is especially important if you are taking prescription pain medicines.  Do not use any products that contain nicotine or tobacco. These products include cigarettes, chewing tobacco, and vaping devices, such as e-cigarettes. If you need help quitting, ask your health care provider.  Wear compression stockings as told by your health care provider. These stockings help to prevent blood clots and reduce swelling in your legs.  Keep all follow-up visits. This is important.  You will need to be followed closely with regular checks of your bladder and urethra (cystoscopies) to make sure that the cancer does not come back.  Contact a health care provider if:  You have blood in your urine for more than 2 weeks.  You become constipated. Signs of constipation may include:  Having fewer than three bowel movements in a week.  Difficulty having a bowel movement.  Stools that are dry, hard, or larger than normal.  You have a  urinary catheter in place, and you have:  Spasms or pain.  Problems with your catheter or your catheter is blocked.  Your catheter has been taken out but you are unable to urinate.  You have signs of infection, such as:  Fever or chills.  Cloudy or bad-smelling urine.  Get help right away if:  You have severe abdominal pain that gets worse or does not improve with medicine.  You have a lot of large blood clots in your urine.  You develop swelling or pain in your leg.  You have difficulty breathing.  These symptoms may be an emergency. Get help right away. Call 911.  Do not wait to see if the symptoms will go away.  Do not drive yourself to the hospital.  Summary  After your procedure, it is common to have a small amount of blood or small blood clots in your urine, soreness or mild pain from your catheter, and pain in your lower abdomen.  Take over-the-counter and prescription medicines only as told by your health care provider.  Rest as told by your health care provider. Follow your health care provider's instructions about returning to normal activities. Ask what activities are safe for you.  If you have a catheter, follow instructions from your health care provider about caring for your catheter and your drainage bag.  This information is not intended to replace advice given to you by your health care provider. Make sure you discuss any questions you have with your health care provider.      DIET: To avoid nausea, slowly advance diet as tolerated, avoiding spicy or greasy foods for the first day.  Add more substantial food to your diet according to your physician's instructions.  Babies can be fed formula or breast milk as soon as they are hungry.  INCREASE FLUIDS AND FIBER TO AVOID CONSTIPATION.    SURGICAL DRESSING/BATHING: N/A    MEDICATIONS: Resume taking daily medication.  Take prescribed pain medication with food.  If no medication is prescribed, you may take non-aspirin pain medication if needed.  PAIN  MEDICATION CAN BE VERY CONSTIPATING.  Take a stool softener or laxative such as senokot, pericolace, or milk of magnesia if needed.    A follow-up appointment should be arranged with your doctor in 1-2 weeks; call to schedule.    You should CALL YOUR PHYSICIAN if you develop:  Fever greater than 101 degrees F.  Pain not relieved by medication, or persistent nausea or vomiting.  Excessive bleeding (blood soaking through dressing) or unexpected drainage from the wound.  Extreme redness or swelling around the incision site, drainage of pus or foul smelling drainage.  Inability to urinate or empty your bladder within 8 hours.  Problems with breathing or chest pain.    You should call 911 if you develop problems with breathing or chest pain.  If you are unable to contact your doctor or surgical center, you should go to the nearest emergency room or urgent care center.  Physician's telephone #: Dr. Velasco [phone].    MILD FLU-LIKE SYMPTOMS ARE NORMAL.  YOU MAY EXPERIENCE GENERALIZED MUSCLE ACHES, THROAT IRRITATION, HEADACHE AND/OR SOME NAUSEA.    If any questions arise, call your doctor.  If your doctor is not available, please feel free to call the Surgical Center at [phone].  The Center is open Monday through Friday from 7AM to 7PM.      A registered nurse may call you a few days after your surgery to see how you are doing after your procedure.    You may also receive a survey in the mail within the next two weeks and we ask that you take a few moments to complete the survey and return it to us.  Our goal is to provide you with very good care and we value your comments.     Depression / Suicide Risk    As you are discharged from this [facility] facility, it is important to learn how to keep safe from harming yourself.    Recognize the warning signs:  Abrupt changes in personality, positive or negative- including increase in energy   Giving away possessions  Change in eating patterns- significant weight  changes-  positive or negative  Change in sleeping patterns- unable to sleep or sleeping all the time   Unwillingness or inability to communicate  Depression  Unusual sadness, discouragement and loneliness  Talk of wanting to die  Neglect of personal appearance   Rebelliousness- reckless behavior  Withdrawal from people/activities they love  Confusion- inability to concentrate     If you or a loved one observes any of these behaviors or has concerns about self-harm, here's what you can do:  Talk about it- your feelings and reasons for harming yourself  Remove any means that you might use to hurt yourself (examples: pills, rope, extension cords, firearm)  Get professional help from the community (Mental Health, Substance Abuse, psychological counseling)  Do not be alone:Call your Safe Contact- someone whom you trust who will be there for you.  Call your local CRISIS HOTLINE 504-5380 or 503-580-9421  Call your local Children's Mobile Crisis Response Team [place] (850) 219-8060 or www.[employer] [facility]  Call the toll free National Suicide Prevention Hotlines   National Suicide Prevention Lifeline 590-375-GTCE (6239)  [place] [facility] 800-SUICIDE (926-5537)    I acknowledge receipt and understanding of these Home Care instructions.

## 2023-08-16 DIAGNOSIS — I15.2 HYPERTENSION ASSOCIATED WITH DIABETES (HCC): ICD-10-CM

## 2023-08-16 DIAGNOSIS — E11.59 HYPERTENSION ASSOCIATED WITH DIABETES (HCC): ICD-10-CM

## 2023-08-16 DIAGNOSIS — E11.9 TYPE 2 DIABETES MELLITUS WITHOUT COMPLICATION, WITHOUT LONG-TERM CURRENT USE OF INSULIN (HCC): ICD-10-CM

## 2023-08-16 RX ORDER — AMLODIPINE BESYLATE 10 MG/1
10 TABLET ORAL
Qty: 90 TABLET | Refills: 3 | Status: SHIPPED | OUTPATIENT
Start: 2023-08-16

## 2023-08-16 RX ORDER — GLIPIZIDE 10 MG/1
10 TABLET ORAL
Qty: 90 TABLET | Refills: 3 | Status: SHIPPED | OUTPATIENT
Start: 2023-08-16

## 2023-08-18 NOTE — PROCEDURE: EXCISION
Intermediate Repair Preamble Text (Leave Blank If You Do Not Want): Undermining was performed with blunt dissection.
Show Accession Variable: Yes
Advancement Flap (Single) Text: The defect edges were debeveled with a #15 scalpel blade.  Given the location of the defect and the proximity to free margins a single advancement flap was deemed most appropriate.  Using a sterile surgical marker, an appropriate advancement flap was drawn incorporating the defect and placing the expected incisions within the relaxed skin tension lines where possible.    The area thus outlined was incised deep to adipose tissue with a #15 scalpel blade.  The skin margins were undermined to an appropriate distance in all directions utilizing iris scissors.
Transposition Flap Text: The defect edges were debeveled with a #15 scalpel blade.  Given the location of the defect and the proximity to free margins a transposition flap was deemed most appropriate.  Using a sterile surgical marker, an appropriate transposition flap was drawn incorporating the defect.    The area thus outlined was incised deep to adipose tissue with a #15 scalpel blade.  The skin margins were undermined to an appropriate distance in all directions utilizing iris scissors.
Keystone Flap Text: The defect edges were debeveled with a #15 scalpel blade.  Given the location of the defect, shape of the defect a keystone flap was deemed most appropriate.  Using a sterile surgical marker, an appropriate keystone flap was drawn incorporating the defect, outlining the appropriate donor tissue and placing the expected incisions within the relaxed skin tension lines where possible. The area thus outlined was incised deep to adipose tissue with a #15 scalpel blade.  The skin margins were undermined to an appropriate distance in all directions around the primary defect and laterally outward around the flap utilizing iris scissors.
Excision Method: Fusiform
Suture Removal: 7 days
Cartilage Graft Text: The defect edges were debeveled with a #15 scalpel blade.  Given the location of the defect, shape of the defect, the fact the defect involved a full thickness cartilage defect a cartilage graft was deemed most appropriate.  An appropriate donor site was identified, cleansed, and anesthetized. The cartilage graft was then harvested and transferred to the recipient site, oriented appropriately and then sutured into place.  The secondary defect was then repaired using a primary closure.
Complex Repair And Rotation Flap Text: The defect edges were debeveled with a #15 scalpel blade.  The primary defect was closed partially with a complex linear closure.  Given the location of the remaining defect, shape of the defect and the proximity to free margins a rotation flap was deemed most appropriate for complete closure of the defect.  Using a sterile surgical marker, an appropriate advancement flap was drawn incorporating the defect and placing the expected incisions within the relaxed skin tension lines where possible.    The area thus outlined was incised deep to adipose tissue with a #15 scalpel blade.  The skin margins were undermined to an appropriate distance in all directions utilizing iris scissors.
Crescentic Complex Repair Preamble Text (Leave Blank If You Do Not Want): Extensive wide undermining was performed.
Post-Care Instructions: I reviewed with the patient in detail post-care instructions. Patient is not to engage in any heavy lifting, exercise, or swimming for the next 14 days. Should the patient develop any fevers, chills, bleeding, severe pain patient will contact the office immediately.
Advancement Flap (Double) Text: The defect edges were debeveled with a #15 scalpel blade.  Given the location of the defect and the proximity to free margins a double advancement flap was deemed most appropriate.  Using a sterile surgical marker, the appropriate advancement flaps were drawn incorporating the defect and placing the expected incisions within the relaxed skin tension lines where possible.    The area thus outlined was incised deep to adipose tissue with a #15 scalpel blade.  The skin margins were undermined to an appropriate distance in all directions utilizing iris scissors.
Muscle Hinge Flap Text: The defect edges were debeveled with a #15 scalpel blade.  Given the size, depth and location of the defect and the proximity to free margins a muscle hinge flap was deemed most appropriate.  Using a sterile surgical marker, an appropriate hinge flap was drawn incorporating the defect. The area thus outlined was incised with a #15 scalpel blade.  The skin margins were undermined to an appropriate distance in all directions utilizing iris scissors.
O-T Plasty Text: The defect edges were debeveled with a #15 scalpel blade.  Given the location of the defect, shape of the defect and the proximity to free margins an O-T plasty was deemed most appropriate.  Using a sterile surgical marker, an appropriate O-T plasty was drawn incorporating the defect and placing the expected incisions within the relaxed skin tension lines where possible.    The area thus outlined was incised deep to adipose tissue with a #15 scalpel blade.  The skin margins were undermined to an appropriate distance in all directions utilizing iris scissors.
Size Of Margin In Cm: 0.3
Anemia, sickle cell with crisis
Composite Graft Text: The defect edges were debeveled with a #15 scalpel blade.  Given the location of the defect, shape of the defect, the proximity to free margins and the fact the defect was full thickness a composite graft was deemed most appropriate.  The defect was outline and then transferred to the donor site.  A full thickness graft was then excised from the donor site. The graft was then placed in the primary defect, oriented appropriately and then sutured into place.  The secondary defect was then repaired using a primary closure.
Complex Repair And Rhombic Flap Text: The defect edges were debeveled with a #15 scalpel blade.  The primary defect was closed partially with a complex linear closure.  Given the location of the remaining defect, shape of the defect and the proximity to free margins a rhombic flap was deemed most appropriate for complete closure of the defect.  Using a sterile surgical marker, an appropriate advancement flap was drawn incorporating the defect and placing the expected incisions within the relaxed skin tension lines where possible.    The area thus outlined was incised deep to adipose tissue with a #15 scalpel blade.  The skin margins were undermined to an appropriate distance in all directions utilizing iris scissors.
Burow's Advancement Flap Text: The defect edges were debeveled with a #15 scalpel blade.  Given the location of the defect and the proximity to free margins a Burow's advancement flap was deemed most appropriate.  Using a sterile surgical marker, the appropriate advancement flap was drawn incorporating the defect and placing the expected incisions within the relaxed skin tension lines where possible.    The area thus outlined was incised deep to adipose tissue with a #15 scalpel blade.  The skin margins were undermined to an appropriate distance in all directions utilizing iris scissors.
Melolabial Transposition Flap Text: The defect edges were debeveled with a #15 scalpel blade.  Given the location of the defect and the proximity to free margins a melolabial flap was deemed most appropriate.  Using a sterile surgical marker, an appropriate melolabial transposition flap was drawn incorporating the defect.    The area thus outlined was incised deep to adipose tissue with a #15 scalpel blade.  The skin margins were undermined to an appropriate distance in all directions utilizing iris scissors.
O-Z Plasty Text: The defect edges were debeveled with a #15 scalpel blade.  Given the location of the defect, shape of the defect and the proximity to free margins an O-Z plasty (double transposition flap) was deemed most appropriate.  Using a sterile surgical marker, the appropriate transposition flaps were drawn incorporating the defect and placing the expected incisions within the relaxed skin tension lines where possible.    The area thus outlined was incised deep to adipose tissue with a #15 scalpel blade.  The skin margins were undermined to an appropriate distance in all directions utilizing iris scissors.  Hemostasis was achieved with electrocautery.  The flaps were then transposed into place, one clockwise and the other counterclockwise, and anchored with interrupted buried subcutaneous sutures.
Epidermal Autograft Text: The defect edges were debeveled with a #15 scalpel blade.  Given the location of the defect, shape of the defect and the proximity to free margins an epidermal autograft was deemed most appropriate.  Using a sterile surgical marker, the primary defect shape was transferred to the donor site. The epidermal graft was then harvested.  The skin graft was then placed in the primary defect and oriented appropriately.
Complex Repair And Transposition Flap Text: The defect edges were debeveled with a #15 scalpel blade.  The primary defect was closed partially with a complex linear closure.  Given the location of the remaining defect, shape of the defect and the proximity to free margins a transposition flap was deemed most appropriate for complete closure of the defect.  Using a sterile surgical marker, an appropriate advancement flap was drawn incorporating the defect and placing the expected incisions within the relaxed skin tension lines where possible.    The area thus outlined was incised deep to adipose tissue with a #15 scalpel blade.  The skin margins were undermined to an appropriate distance in all directions utilizing iris scissors.
Graft Donor Site Will Heal By Secondary Intention: No
Where Do You Want The Question To Include Opioid Counseling Located?: Case Summary Tab
Secondary Defect Width (In Cm): 0
Chonodrocutaneous Helical Advancement Flap Text: The defect edges were debeveled with a #15 scalpel blade.  Given the location of the defect and the proximity to free margins a chondrocutaneous helical advancement flap was deemed most appropriate.  Using a sterile surgical marker, the appropriate advancement flap was drawn incorporating the defect and placing the expected incisions within the relaxed skin tension lines where possible.    The area thus outlined was incised deep to adipose tissue with a #15 scalpel blade.  The skin margins were undermined to an appropriate distance in all directions utilizing iris scissors.
Rhombic Flap Text: The defect edges were debeveled with a #15 scalpel blade.  Given the location of the defect and the proximity to free margins a rhombic flap was deemed most appropriate.  Using a sterile surgical marker, an appropriate rhombic flap was drawn incorporating the defect.    The area thus outlined was incised deep to adipose tissue with a #15 scalpel blade.  The skin margins were undermined to an appropriate distance in all directions utilizing iris scissors.
Double O-Z Plasty Text: The defect edges were debeveled with a #15 scalpel blade.  Given the location of the defect, shape of the defect and the proximity to free margins a Double O-Z plasty (double transposition flap) was deemed most appropriate.  Using a sterile surgical marker, the appropriate transposition flaps were drawn incorporating the defect and placing the expected incisions within the relaxed skin tension lines where possible. The area thus outlined was incised deep to adipose tissue with a #15 scalpel blade.  The skin margins were undermined to an appropriate distance in all directions utilizing iris scissors.  Hemostasis was achieved with electrocautery.  The flaps were then transposed into place, one clockwise and the other counterclockwise, and anchored with interrupted buried subcutaneous sutures.
Dermal Autograft Text: The defect edges were debeveled with a #15 scalpel blade.  Given the location of the defect, shape of the defect and the proximity to free margins a dermal autograft was deemed most appropriate.  Using a sterile surgical marker, the primary defect shape was transferred to the donor site. The area thus outlined was incised deep to adipose tissue with a #15 scalpel blade.  The harvested graft was then trimmed of adipose and epidermal tissue until only dermis was left.  The skin graft was then placed in the primary defect and oriented appropriately.
Complex Repair And V-Y Plasty Text: The defect edges were debeveled with a #15 scalpel blade.  The primary defect was closed partially with a complex linear closure.  Given the location of the remaining defect, shape of the defect and the proximity to free margins a V-Y plasty was deemed most appropriate for complete closure of the defect.  Using a sterile surgical marker, an appropriate advancement flap was drawn incorporating the defect and placing the expected incisions within the relaxed skin tension lines where possible.    The area thus outlined was incised deep to adipose tissue with a #15 scalpel blade.  The skin margins were undermined to an appropriate distance in all directions utilizing iris scissors.
Size Of Lesion In Cm: 0.4
Consent was obtained from the patient. The risks and benefits to therapy were discussed in detail. Specifically, the risks of infection, scarring, bleeding, prolonged wound healing, incomplete removal, allergy to anesthesia, nerve injury and recurrence were addressed. Prior to the procedure, the treatment site was clearly identified and confirmed by the patient. All components of Universal Protocol/PAUSE Rule completed.
Crescentic Advancement Flap Text: The defect edges were debeveled with a #15 scalpel blade.  Given the location of the defect and the proximity to free margins a crescentic advancement flap was deemed most appropriate.  Using a sterile surgical marker, the appropriate advancement flap was drawn incorporating the defect and placing the expected incisions within the relaxed skin tension lines where possible.    The area thus outlined was incised deep to adipose tissue with a #15 scalpel blade.  The skin margins were undermined to an appropriate distance in all directions utilizing iris scissors.
Rhomboid Transposition Flap Text: The defect edges were debeveled with a #15 scalpel blade.  Given the location of the defect and the proximity to free margins a rhomboid transposition flap was deemed most appropriate.  Using a sterile surgical marker, an appropriate rhomboid flap was drawn incorporating the defect.    The area thus outlined was incised deep to adipose tissue with a #15 scalpel blade.  The skin margins were undermined to an appropriate distance in all directions utilizing iris scissors.
S Plasty Text: Given the location and shape of the defect, and the orientation of relaxed skin tension lines, an S-plasty was deemed most appropriate for repair.  Using a sterile surgical marker, the appropriate outline of the S-plasty was drawn, incorporating the defect and placing the expected incisions within the relaxed skin tension lines where possible.  The area thus outlined was incised deep to adipose tissue with a #15 scalpel blade.  The skin margins were undermined to an appropriate distance in all directions utilizing iris scissors. The skin flaps were advanced over the defect.  The opposing margins were then approximated with interrupted buried subcutaneous sutures.
Skin Substitute Text: The defect edges were debeveled with a #15 scalpel blade.  Given the location of the defect, shape of the defect and the proximity to free margins a skin substitute graft was deemed most appropriate.  The graft material was trimmed to fit the size of the defect. The graft was then placed in the primary defect and oriented appropriately.
Complex Repair And M Plasty Text: The defect edges were debeveled with a #15 scalpel blade.  The primary defect was closed partially with a complex linear closure.  Given the location of the remaining defect, shape of the defect and the proximity to free margins an M plasty was deemed most appropriate for complete closure of the defect.  Using a sterile surgical marker, an appropriate advancement flap was drawn incorporating the defect and placing the expected incisions within the relaxed skin tension lines where possible.    The area thus outlined was incised deep to adipose tissue with a #15 scalpel blade.  The skin margins were undermined to an appropriate distance in all directions utilizing iris scissors.
A-T Advancement Flap Text: The defect edges were debeveled with a #15 scalpel blade.  Given the location of the defect, shape of the defect and the proximity to free margins an A-T advancement flap was deemed most appropriate.  Using a sterile surgical marker, an appropriate advancement flap was drawn incorporating the defect and placing the expected incisions within the relaxed skin tension lines where possible.    The area thus outlined was incised deep to adipose tissue with a #15 scalpel blade.  The skin margins were undermined to an appropriate distance in all directions utilizing iris scissors.
Bi-Rhombic Flap Text: The defect edges were debeveled with a #15 scalpel blade.  Given the location of the defect and the proximity to free margins a bi-rhombic flap was deemed most appropriate.  Using a sterile surgical marker, an appropriate rhombic flap was drawn incorporating the defect. The area thus outlined was incised deep to adipose tissue with a #15 scalpel blade.  The skin margins were undermined to an appropriate distance in all directions utilizing iris scissors.
V-Y Plasty Text: The defect edges were debeveled with a #15 scalpel blade.  Given the location of the defect, shape of the defect and the proximity to free margins an V-Y advancement flap was deemed most appropriate.  Using a sterile surgical marker, an appropriate advancement flap was drawn incorporating the defect and placing the expected incisions within the relaxed skin tension lines where possible.    The area thus outlined was incised deep to adipose tissue with a #15 scalpel blade.  The skin margins were undermined to an appropriate distance in all directions utilizing iris scissors.
Tissue Cultured Epidermal Autograft Text: The defect edges were debeveled with a #15 scalpel blade.  Given the location of the defect, shape of the defect and the proximity to free margins a tissue cultured epidermal autograft was deemed most appropriate.  The graft was then trimmed to fit the size of the defect.  The graft was then placed in the primary defect and oriented appropriately.
Epidermal Sutures: 6-0 Nylon
Detail Level: Detailed
Complex Repair And Double M Plasty Text: The defect edges were debeveled with a #15 scalpel blade.  The primary defect was closed partially with a complex linear closure.  Given the location of the remaining defect, shape of the defect and the proximity to free margins a double M plasty was deemed most appropriate for complete closure of the defect.  Using a sterile surgical marker, an appropriate advancement flap was drawn incorporating the defect and placing the expected incisions within the relaxed skin tension lines where possible.    The area thus outlined was incised deep to adipose tissue with a #15 scalpel blade.  The skin margins were undermined to an appropriate distance in all directions utilizing iris scissors.
Scalpel Size: 15 blade
O-T Advancement Flap Text: The defect edges were debeveled with a #15 scalpel blade.  Given the location of the defect, shape of the defect and the proximity to free margins an O-T advancement flap was deemed most appropriate.  Using a sterile surgical marker, an appropriate advancement flap was drawn incorporating the defect and placing the expected incisions within the relaxed skin tension lines where possible.    The area thus outlined was incised deep to adipose tissue with a #15 scalpel blade.  The skin margins were undermined to an appropriate distance in all directions utilizing iris scissors.
Helical Rim Advancement Flap Text: The defect edges were debeveled with a #15 blade scalpel.  Given the location of the defect and the proximity to free margins (helical rim) a double helical rim advancement flap was deemed most appropriate.  Using a sterile surgical marker, the appropriate advancement flaps were drawn incorporating the defect and placing the expected incisions between the helical rim and antihelix where possible.  The area thus outlined was incised through and through with a #15 scalpel blade.  With a skin hook and iris scissors, the flaps were gently and sharply undermined and freed up.
H Plasty Text: Given the location of the defect, shape of the defect and the proximity to free margins a H-plasty was deemed most appropriate for repair.  Using a sterile surgical marker, the appropriate advancement arms of the H-plasty were drawn incorporating the defect and placing the expected incisions within the relaxed skin tension lines where possible. The area thus outlined was incised deep to adipose tissue with a #15 scalpel blade. The skin margins were undermined to an appropriate distance in all directions utilizing iris scissors.  The opposing advancement arms were then advanced into place in opposite direction and anchored with interrupted buried subcutaneous sutures.
Xenograft Text: The defect edges were debeveled with a #15 scalpel blade.  Given the location of the defect, shape of the defect and the proximity to free margins a xenograft was deemed most appropriate.  The graft was then trimmed to fit the size of the defect.  The graft was then placed in the primary defect and oriented appropriately.
Complex Repair And W Plasty Text: The defect edges were debeveled with a #15 scalpel blade.  The primary defect was closed partially with a complex linear closure.  Given the location of the remaining defect, shape of the defect and the proximity to free margins a W plasty was deemed most appropriate for complete closure of the defect.  Using a sterile surgical marker, an appropriate advancement flap was drawn incorporating the defect and placing the expected incisions within the relaxed skin tension lines where possible.    The area thus outlined was incised deep to adipose tissue with a #15 scalpel blade.  The skin margins were undermined to an appropriate distance in all directions utilizing iris scissors.
Path Notes (To The Dermatopathologist): Please check margins.
O-L Flap Text: The defect edges were debeveled with a #15 scalpel blade.  Given the location of the defect, shape of the defect and the proximity to free margins an O-L flap was deemed most appropriate.  Using a sterile surgical marker, an appropriate advancement flap was drawn incorporating the defect and placing the expected incisions within the relaxed skin tension lines where possible.    The area thus outlined was incised deep to adipose tissue with a #15 scalpel blade.  The skin margins were undermined to an appropriate distance in all directions utilizing iris scissors.
Bilateral Helical Rim Advancement Flap Text: The defect edges were debeveled with a #15 blade scalpel.  Given the location of the defect and the proximity to free margins (helical rim) a bilateral helical rim advancement flap was deemed most appropriate.  Using a sterile surgical marker, the appropriate advancement flaps were drawn incorporating the defect and placing the expected incisions between the helical rim and antihelix where possible.  The area thus outlined was incised through and through with a #15 scalpel blade.  With a skin hook and iris scissors, the flaps were gently and sharply undermined and freed up.
W Plasty Text: The lesion was extirpated to the level of the fat with a #15 scalpel blade.  Given the location of the defect, shape of the defect and the proximity to free margins a W-plasty was deemed most appropriate for repair.  Using a sterile surgical marker, the appropriate transposition arms of the W-plasty were drawn incorporating the defect and placing the expected incisions within the relaxed skin tension lines where possible.    The area thus outlined was incised deep to adipose tissue with a #15 scalpel blade.  The skin margins were undermined to an appropriate distance in all directions utilizing iris scissors.  The opposing transposition arms were then transposed into place in opposite direction and anchored with interrupted buried subcutaneous sutures.
Purse String (Intermediate) Text: Given the location of the defect and the characteristics of the surrounding skin a purse string intermediate closure was deemed most appropriate.  Undermining was performed circumfirentially around the surgical defect.  A purse string suture was then placed and tightened.
Dermal Closure: simple interrupted
Complex Repair And Z Plasty Text: The defect edges were debeveled with a #15 scalpel blade.  The primary defect was closed partially with a complex linear closure.  Given the location of the remaining defect, shape of the defect and the proximity to free margins a Z plasty was deemed most appropriate for complete closure of the defect.  Using a sterile surgical marker, an appropriate advancement flap was drawn incorporating the defect and placing the expected incisions within the relaxed skin tension lines where possible.    The area thus outlined was incised deep to adipose tissue with a #15 scalpel blade.  The skin margins were undermined to an appropriate distance in all directions utilizing iris scissors.
Excision Depth: adipose tissue
Graft Donor Site Bandage (Optional-Leave Blank If You Don't Want In Note): Steri-strips and a pressure bandage were applied to the donor site.
Previous Accession (Optional): [id number]
O-Z Flap Text: The defect edges were debeveled with a #15 scalpel blade.  Given the location of the defect, shape of the defect and the proximity to free margins an O-Z flap was deemed most appropriate.  Using a sterile surgical marker, an appropriate transposition flap was drawn incorporating the defect and placing the expected incisions within the relaxed skin tension lines where possible. The area thus outlined was incised deep to adipose tissue with a #15 scalpel blade.  The skin margins were undermined to an appropriate distance in all directions utilizing iris scissors.
Ear Star Wedge Flap Text: The defect edges were debeveled with a #15 blade scalpel.  Given the location of the defect and the proximity to free margins (helical rim) an ear star wedge flap was deemed most appropriate.  Using a sterile surgical marker, the appropriate flap was drawn incorporating the defect and placing the expected incisions between the helical rim and antihelix where possible.  The area thus outlined was incised through and through with a #15 scalpel blade.
Z Plasty Text: The lesion was extirpated to the level of the fat with a #15 scalpel blade.  Given the location of the defect, shape of the defect and the proximity to free margins a Z-plasty was deemed most appropriate for repair.  Using a sterile surgical marker, the appropriate transposition arms of the Z-plasty were drawn incorporating the defect and placing the expected incisions within the relaxed skin tension lines where possible.    The area thus outlined was incised deep to adipose tissue with a #15 scalpel blade.  The skin margins were undermined to an appropriate distance in all directions utilizing iris scissors.  The opposing transposition arms were then transposed into place in opposite direction and anchored with interrupted buried subcutaneous sutures.
Purse String (Simple) Text: Given the location of the defect and the characteristics of the surrounding skin a purse string simple closure was deemed most appropriate.  Undermining was performed circumferentially around the surgical defect.  A purse string suture was then placed and tightened.
Complex Repair And Dorsal Nasal Flap Text: The defect edges were debeveled with a #15 scalpel blade.  The primary defect was closed partially with a complex linear closure.  Given the location of the remaining defect, shape of the defect and the proximity to free margins a dorsal nasal flap was deemed most appropriate for complete closure of the defect.  Using a sterile surgical marker, an appropriate flap was drawn incorporating the defect and placing the expected incisions within the relaxed skin tension lines where possible.    The area thus outlined was incised deep to adipose tissue with a #15 scalpel blade.  The skin margins were undermined to an appropriate distance in all directions utilizing iris scissors.
Lab Facility: 
Double O-Z Flap Text: The defect edges were debeveled with a #15 scalpel blade.  Given the location of the defect, shape of the defect and the proximity to free margins a Double O-Z flap was deemed most appropriate.  Using a sterile surgical marker, an appropriate transposition flap was drawn incorporating the defect and placing the expected incisions within the relaxed skin tension lines where possible. The area thus outlined was incised deep to adipose tissue with a #15 scalpel blade.  The skin margins were undermined to an appropriate distance in all directions utilizing iris scissors.
Wound Care: Vaseline
Banner Transposition Flap Text: The defect edges were debeveled with a #15 scalpel blade.  Given the location of the defect and the proximity to free margins a Banner transposition flap was deemed most appropriate.  Using a sterile surgical marker, an appropriate flap drawn around the defect. The area thus outlined was incised deep to adipose tissue with a #15 scalpel blade.  The skin margins were undermined to an appropriate distance in all directions utilizing iris scissors.
Curvilinear Excision Additional Text (Leave Blank If You Do Not Want): The margin was drawn around the clinically apparent lesion.  A curvilinear shape was then drawn on the skin incorporating the lesion and margins.  Incisions were then made along these lines to the appropriate tissue plane and the lesion was extirpated.
Cheek Interpolation Flap Text: A decision was made to reconstruct the defect utilizing an interpolation axial flap and a staged reconstruction.  A telfa template was made of the defect.  This telfa template was then used to outline the Cheek Interpolation flap.  The donor area for the pedicle flap was then injected with anesthesia.  The flap was excised through the skin and subcutaneous tissue down to the layer of the underlying musculature.  The interpolation flap was carefully excised within this deep plane to maintain its blood supply.  The edges of the donor site were undermined.   The donor site was closed in a primary fashion.  The pedicle was then rotated into position and sutured.  Once the tube was sutured into place, adequate blood supply was confirmed with blanching and refill.  The pedicle was then wrapped with xeroform gauze and dressed appropriately with a telfa and gauze bandage to ensure continued blood supply and protect the attached pedicle.
Estimated Blood Loss (Cc): minimal
Partial Purse String (Intermediate) Text: Given the location of the defect and the characteristics of the surrounding skin an intermediate purse string closure was deemed most appropriate.  Undermining was performed circumferentially around the surgical defect.  A purse string suture was then placed and tightened. Wound tension of the circular defect prevented complete closure of the wound.
Complex Repair And Ftsg Text: The defect edges were debeveled with a #15 scalpel blade.  The primary defect was closed partially with a complex linear closure.  Given the location of the defect, shape of the defect and the proximity to free margins a full thickness skin graft was deemed most appropriate to repair the remaining defect.  The graft was trimmed to fit the size of the remaining defect.  The graft was then placed in the primary defect, oriented appropriately, and sutured into place.
Lab: [id number]
V-Y Flap Text: The defect edges were debeveled with a #15 scalpel blade.  Given the location of the defect, shape of the defect and the proximity to free margins a V-Y flap was deemed most appropriate.  Using a sterile surgical marker, an appropriate advancement flap was drawn incorporating the defect and placing the expected incisions within the relaxed skin tension lines where possible.    The area thus outlined was incised deep to adipose tissue with a #15 scalpel blade.  The skin margins were undermined to an appropriate distance in all directions utilizing iris scissors.
Information: Selecting Yes will display possible errors in your note based on the variables you have selected. This validation is only offered as a suggestion for you. PLEASE NOTE THAT THE VALIDATION TEXT WILL BE REMOVED WHEN YOU FINALIZE YOUR NOTE. IF YOU WANT TO FAX A PRELIMINARY NOTE YOU WILL NEED TO TOGGLE THIS TO 'NO' IF YOU DO NOT WANT IT IN YOUR FAXED NOTE.
Bilobed Flap Text: The defect edges were debeveled with a #15 scalpel blade.  Given the location of the defect and the proximity to free margins a bilobe flap was deemed most appropriate.  Using a sterile surgical marker, an appropriate bilobe flap drawn around the defect.    The area thus outlined was incised deep to adipose tissue with a #15 scalpel blade.  The skin margins were undermined to an appropriate distance in all directions utilizing iris scissors.
Fusiform Excision Additional Text (Leave Blank If You Do Not Want): The margin was drawn around the clinically apparent lesion.  A fusiform shape was then drawn on the skin incorporating the lesion and margins.  Incisions were then made along these lines to the appropriate tissue plane and the lesion was extirpated.
Cheek-To-Nose Interpolation Flap Text: A decision was made to reconstruct the defect utilizing an interpolation axial flap and a staged reconstruction.  A telfa template was made of the defect.  This telfa template was then used to outline the Cheek-To-Nose Interpolation flap.  The donor area for the pedicle flap was then injected with anesthesia.  The flap was excised through the skin and subcutaneous tissue down to the layer of the underlying musculature.  The interpolation flap was carefully excised within this deep plane to maintain its blood supply.  The edges of the donor site were undermined.   The donor site was closed in a primary fashion.  The pedicle was then rotated into position and sutured.  Once the tube was sutured into place, adequate blood supply was confirmed with blanching and refill.  The pedicle was then wrapped with xeroform gauze and dressed appropriately with a telfa and gauze bandage to ensure continued blood supply and protect the attached pedicle.
Partial Purse String (Simple) Text: Given the location of the defect and the characteristics of the surrounding skin a simple purse string closure was deemed most appropriate.  Undermining was performed circumferentially around the surgical defect.  A purse string suture was then placed and tightened. Wound tension of the circular defect prevented complete closure of the wound.
Complex Repair And Split-Thickness Skin Graft Text: The defect edges were debeveled with a #15 scalpel blade.  The primary defect was closed partially with a complex linear closure.  Given the location of the defect, shape of the defect and the proximity to free margins a split thickness skin graft was deemed most appropriate to repair the remaining defect.  The graft was trimmed to fit the size of the remaining defect.  The graft was then placed in the primary defect, oriented appropriately, and sutured into place.
Advancement-Rotation Flap Text: The defect edges were debeveled with a #15 scalpel blade.  Given the location of the defect, shape of the defect and the proximity to free margins an advancement-rotation flap was deemed most appropriate.  Using a sterile surgical marker, an appropriate flap was drawn incorporating the defect and placing the expected incisions within the relaxed skin tension lines where possible. The area thus outlined was incised deep to adipose tissue with a #15 scalpel blade.  The skin margins were undermined to an appropriate distance in all directions utilizing iris scissors.
Bilobed Transposition Flap Text: The defect edges were debeveled with a #15 scalpel blade.  Given the location of the defect and the proximity to free margins a bilobed transposition flap was deemed most appropriate.  Using a sterile surgical marker, an appropriate bilobe flap drawn around the defect.    The area thus outlined was incised deep to adipose tissue with a #15 scalpel blade.  The skin margins were undermined to an appropriate distance in all directions utilizing iris scissors.
Dressing: pressure dressing
Interpolation Flap Text: A decision was made to reconstruct the defect utilizing an interpolation axial flap and a staged reconstruction.  A telfa template was made of the defect.  This telfa template was then used to outline the interpolation flap.  The donor area for the pedicle flap was then injected with anesthesia.  The flap was excised through the skin and subcutaneous tissue down to the layer of the underlying musculature.  The interpolation flap was carefully excised within this deep plane to maintain its blood supply.  The edges of the donor site were undermined.   The donor site was closed in a primary fashion.  The pedicle was then rotated into position and sutured.  Once the tube was sutured into place, adequate blood supply was confirmed with blanching and refill.  The pedicle was then wrapped with xeroform gauze and dressed appropriately with a telfa and gauze bandage to ensure continued blood supply and protect the attached pedicle.
Elliptical Excision Additional Text (Leave Blank If You Do Not Want): The margin was drawn around the clinically apparent lesion.  An elliptical shape was then drawn on the skin incorporating the lesion and margins.  Incisions were then made along these lines to the appropriate tissue plane and the lesion was extirpated.
Hemostasis: Electrocautery
Complex Repair And Single Advancement Flap Text: The defect edges were debeveled with a #15 scalpel blade.  The primary defect was closed partially with a complex linear closure.  Given the location of the remaining defect, shape of the defect and the proximity to free margins a single advancement flap was deemed most appropriate for complete closure of the defect.  Using a sterile surgical marker, an appropriate advancement flap was drawn incorporating the defect and placing the expected incisions within the relaxed skin tension lines where possible.    The area thus outlined was incised deep to adipose tissue with a #15 scalpel blade.  The skin margins were undermined to an appropriate distance in all directions utilizing iris scissors.
Complex Repair And Epidermal Autograft Text: The defect edges were debeveled with a #15 scalpel blade.  The primary defect was closed partially with a complex linear closure.  Given the location of the defect, shape of the defect and the proximity to free margins an epidermal autograft was deemed most appropriate to repair the remaining defect.  The graft was trimmed to fit the size of the remaining defect.  The graft was then placed in the primary defect, oriented appropriately, and sutured into place.
Surgeon Performing The Repair (Optional): [place]
Mercedes Flap Text: The defect edges were debeveled with a #15 scalpel blade.  Given the location of the defect, shape of the defect and the proximity to free margins a Mercedes flap was deemed most appropriate.  Using a sterile surgical marker, an appropriate advancement flap was drawn incorporating the defect and placing the expected incisions within the relaxed skin tension lines where possible. The area thus outlined was incised deep to adipose tissue with a #15 scalpel blade.  The skin margins were undermined to an appropriate distance in all directions utilizing iris scissors.
Trilobed Flap Text: The defect edges were debeveled with a #15 scalpel blade.  Given the location of the defect and the proximity to free margins a trilobed flap was deemed most appropriate.  Using a sterile surgical marker, an appropriate trilobed flap drawn around the defect.    The area thus outlined was incised deep to adipose tissue with a #15 scalpel blade.  The skin margins were undermined to an appropriate distance in all directions utilizing iris scissors.
Melolabial Interpolation Flap Text: A decision was made to reconstruct the defect utilizing an interpolation axial flap and a staged reconstruction.  A telfa template was made of the defect.  This telfa template was then used to outline the melolabial interpolation flap.  The donor area for the pedicle flap was then injected with anesthesia.  The flap was excised through the skin and subcutaneous tissue down to the layer of the underlying musculature.  The pedicle flap was carefully excised within this deep plane to maintain its blood supply.  The edges of the donor site were undermined.   The donor site was closed in a primary fashion.  The pedicle was then rotated into position and sutured.  Once the tube was sutured into place, adequate blood supply was confirmed with blanching and refill.  The pedicle was then wrapped with xeroform gauze and dressed appropriately with a telfa and gauze bandage to ensure continued blood supply and protect the attached pedicle.
Saucerization Excision Additional Text (Leave Blank If You Do Not Want): The margin was drawn around the clinically apparent lesion.  Incisions were then made along these lines, in a tangential fashion, to the appropriate tissue plane and the lesion was extirpated.
Complex Repair And Double Advancement Flap Text: The defect edges were debeveled with a #15 scalpel blade.  The primary defect was closed partially with a complex linear closure.  Given the location of the remaining defect, shape of the defect and the proximity to free margins a double advancement flap was deemed most appropriate for complete closure of the defect.  Using a sterile surgical marker, an appropriate advancement flap was drawn incorporating the defect and placing the expected incisions within the relaxed skin tension lines where possible.    The area thus outlined was incised deep to adipose tissue with a #15 scalpel blade.  The skin margins were undermined to an appropriate distance in all directions utilizing iris scissors.
Complex Repair And Dermal Autograft Text: The defect edges were debeveled with a #15 scalpel blade.  The primary defect was closed partially with a complex linear closure.  Given the location of the defect, shape of the defect and the proximity to free margins an dermal autograft was deemed most appropriate to repair the remaining defect.  The graft was trimmed to fit the size of the remaining defect.  The graft was then placed in the primary defect, oriented appropriately, and sutured into place.
Intermediate / Complex Repair - Final Wound Length In Cm: 2.7
Dorsal Nasal Flap Text: The defect edges were debeveled with a #15 scalpel blade.  Given the location of the defect and the proximity to free margins a dorsal nasal flap was deemed most appropriate.  Using a sterile surgical marker, an appropriate dorsal nasal flap was drawn around the defect.    The area thus outlined was incised deep to adipose tissue with a #15 scalpel blade.  The skin margins were undermined to an appropriate distance in all directions utilizing iris scissors.
Slit Excision Additional Text (Leave Blank If You Do Not Want): A linear line was drawn on the skin overlying the lesion. An incision was made slowly until the lesion was visualized.  Once visualized, the lesion was removed with blunt dissection.
Mastoid Interpolation Flap Text: A decision was made to reconstruct the defect utilizing an interpolation axial flap and a staged reconstruction.  A telfa template was made of the defect.  This telfa template was then used to outline the mastoid interpolation flap.  The donor area for the pedicle flap was then injected with anesthesia.  The flap was excised through the skin and subcutaneous tissue down to the layer of the underlying musculature.  The pedicle flap was carefully excised within this deep plane to maintain its blood supply.  The edges of the donor site were undermined.   The donor site was closed in a primary fashion.  The pedicle was then rotated into position and sutured.  Once the tube was sutured into place, adequate blood supply was confirmed with blanching and refill.  The pedicle was then wrapped with xeroform gauze and dressed appropriately with a telfa and gauze bandage to ensure continued blood supply and protect the attached pedicle.
Deep Sutures: 5-0 Polysorb
Complex Repair And Modified Advancement Flap Text: The defect edges were debeveled with a #15 scalpel blade.  The primary defect was closed partially with a complex linear closure.  Given the location of the remaining defect, shape of the defect and the proximity to free margins a modified advancement flap was deemed most appropriate for complete closure of the defect.  Using a sterile surgical marker, an appropriate advancement flap was drawn incorporating the defect and placing the expected incisions within the relaxed skin tension lines where possible.    The area thus outlined was incised deep to adipose tissue with a #15 scalpel blade.  The skin margins were undermined to an appropriate distance in all directions utilizing iris scissors.
Modified Advancement Flap Text: The defect edges were debeveled with a #15 scalpel blade.  Given the location of the defect, shape of the defect and the proximity to free margins a modified advancement flap was deemed most appropriate.  Using a sterile surgical marker, an appropriate advancement flap was drawn incorporating the defect and placing the expected incisions within the relaxed skin tension lines where possible.    The area thus outlined was incised deep to adipose tissue with a #15 scalpel blade.  The skin margins were undermined to an appropriate distance in all directions utilizing iris scissors.
Complex Repair And Tissue Cultured Epidermal Autograft Text: The defect edges were debeveled with a #15 scalpel blade.  The primary defect was closed partially with a complex linear closure.  Given the location of the defect, shape of the defect and the proximity to free margins an tissue cultured epidermal autograft was deemed most appropriate to repair the remaining defect.  The graft was trimmed to fit the size of the remaining defect.  The graft was then placed in the primary defect, oriented appropriately, and sutured into place.
Billing Type: Third-Party Bill
Posterior Auricular Interpolation Flap Text: A decision was made to reconstruct the defect utilizing an interpolation axial flap and a staged reconstruction.  A telfa template was made of the defect.  This telfa template was then used to outline the posterior auricular interpolation flap.  The donor area for the pedicle flap was then injected with anesthesia.  The flap was excised through the skin and subcutaneous tissue down to the layer of the underlying musculature.  The pedicle flap was carefully excised within this deep plane to maintain its blood supply.  The edges of the donor site were undermined.   The donor site was closed in a primary fashion.  The pedicle was then rotated into position and sutured.  Once the tube was sutured into place, adequate blood supply was confirmed with blanching and refill.  The pedicle was then wrapped with xeroform gauze and dressed appropriately with a telfa and gauze bandage to ensure continued blood supply and protect the attached pedicle.
Excisional Biopsy Additional Text (Leave Blank If You Do Not Want): The margin was drawn around the clinically apparent lesion. An elliptical shape was then drawn on the skin incorporating the lesion and margins.  Incisions were then made along these lines to the appropriate tissue plane and the lesion was extirpated.
Mucosal Advancement Flap Text: Given the location of the defect, shape of the defect and the proximity to free margins a mucosal advancement flap was deemed most appropriate. Incisions were made with a 15 blade scalpel in the appropriate fashion along the cutaneous vermilion border and the mucosal lip. The remaining actinically damaged mucosal tissue was excised.  The mucosal advancement flap was then elevated to the gingival sulcus with care taken to preserve the neurovascular structures and advanced into the primary defect. Care was taken to ensure that precise realignment of the vermilion border was achieved.
Additional Anesthesia Volume In Cc: 6
Complex Repair And A-T Advancement Flap Text: The defect edges were debeveled with a #15 scalpel blade.  The primary defect was closed partially with a complex linear closure.  Given the location of the remaining defect, shape of the defect and the proximity to free margins an A-T advancement flap was deemed most appropriate for complete closure of the defect.  Using a sterile surgical marker, an appropriate advancement flap was drawn incorporating the defect and placing the expected incisions within the relaxed skin tension lines where possible.    The area thus outlined was incised deep to adipose tissue with a #15 scalpel blade.  The skin margins were undermined to an appropriate distance in all directions utilizing iris scissors.
Repair Type: Intermediate
Island Pedicle Flap Text: The defect edges were debeveled with a #15 scalpel blade.  Given the location of the defect, shape of the defect and the proximity to free margins an island pedicle advancement flap was deemed most appropriate.  Using a sterile surgical marker, an appropriate advancement flap was drawn incorporating the defect, outlining the appropriate donor tissue and placing the expected incisions within the relaxed skin tension lines where possible.    The area thus outlined was incised deep to adipose tissue with a #15 scalpel blade.  The skin margins were undermined to an appropriate distance in all directions around the primary defect and laterally outward around the island pedicle utilizing iris scissors.  There was minimal undermining beneath the pedicle flap.
Complex Repair And Xenograft Text: The defect edges were debeveled with a #15 scalpel blade.  The primary defect was closed partially with a complex linear closure.  Given the location of the defect, shape of the defect and the proximity to free margins a xenograft was deemed most appropriate to repair the remaining defect.  The graft was trimmed to fit the size of the remaining defect.  The graft was then placed in the primary defect, oriented appropriately, and sutured into place.
Perilesional Excision Additional Text (Leave Blank If You Do Not Want): The margin was drawn around the clinically apparent lesion. Incisions were then made along these lines to the appropriate tissue plane and the lesion was extirpated.
Complex Repair And O-T Advancement Flap Text: The defect edges were debeveled with a #15 scalpel blade.  The primary defect was closed partially with a complex linear closure.  Given the location of the remaining defect, shape of the defect and the proximity to free margins an O-T advancement flap was deemed most appropriate for complete closure of the defect.  Using a sterile surgical marker, an appropriate advancement flap was drawn incorporating the defect and placing the expected incisions within the relaxed skin tension lines where possible.    The area thus outlined was incised deep to adipose tissue with a #15 scalpel blade.  The skin margins were undermined to an appropriate distance in all directions utilizing iris scissors.
Hatchet Flap Text: The defect edges were debeveled with a #15 scalpel blade.  Given the location of the defect, shape of the defect and the proximity to free margins a hatchet flap was deemed most appropriate.  Using a sterile surgical marker, an appropriate hatchet flap was drawn incorporating the defect and placing the expected incisions within the relaxed skin tension lines where possible.    The area thus outlined was incised deep to adipose tissue with a #15 scalpel blade.  The skin margins were undermined to an appropriate distance in all directions utilizing iris scissors.
Complex Repair And Skin Substitute Graft Text: The defect edges were debeveled with a #15 scalpel blade.  The primary defect was closed partially with a complex linear closure.  Given the location of the remaining defect, shape of the defect and the proximity to free margins a skin substitute graft was deemed most appropriate to repair the remaining defect.  The graft was trimmed to fit the size of the remaining defect.  The graft was then placed in the primary defect, oriented appropriately, and sutured into place.
Island Pedicle Flap With Canthal Suspension Text: The defect edges were debeveled with a #15 scalpel blade.  Given the location of the defect, shape of the defect and the proximity to free margins an island pedicle advancement flap was deemed most appropriate.  Using a sterile surgical marker, an appropriate advancement flap was drawn incorporating the defect, outlining the appropriate donor tissue and placing the expected incisions within the relaxed skin tension lines where possible. The area thus outlined was incised deep to adipose tissue with a #15 scalpel blade.  The skin margins were undermined to an appropriate distance in all directions around the primary defect and laterally outward around the island pedicle utilizing iris scissors.  There was minimal undermining beneath the pedicle flap. A suspension suture was placed in the canthal tendon to prevent tension and prevent ectropion.
Paramedian Forehead Flap Text: A decision was made to reconstruct the defect utilizing an interpolation axial flap and a staged reconstruction.  A telfa template was made of the defect.  This telfa template was then used to outline the paramedian forehead pedicle flap.  The donor area for the pedicle flap was then injected with anesthesia.  The flap was excised through the skin and subcutaneous tissue down to the layer of the underlying musculature.  The pedicle flap was carefully excised within this deep plane to maintain its blood supply.  The edges of the donor site were undermined.   The donor site was closed in a primary fashion.  The pedicle was then rotated into position and sutured.  Once the tube was sutured into place, adequate blood supply was confirmed with blanching and refill.  The pedicle was then wrapped with xeroform gauze and dressed appropriately with a telfa and gauze bandage to ensure continued blood supply and protect the attached pedicle.
Positioning (Leave Blank If You Do Not Want): The patient was placed in a comfortable position exposing the surgical site.
Rotation Flap Text: The defect edges were debeveled with a #15 scalpel blade.  Given the location of the defect, shape of the defect and the proximity to free margins a rotation flap was deemed most appropriate.  Using a sterile surgical marker, an appropriate rotation flap was drawn incorporating the defect and placing the expected incisions within the relaxed skin tension lines where possible.    The area thus outlined was incised deep to adipose tissue with a #15 scalpel blade.  The skin margins were undermined to an appropriate distance in all directions utilizing iris scissors.
Complex Repair And O-L Flap Text: The defect edges were debeveled with a #15 scalpel blade.  The primary defect was closed partially with a complex linear closure.  Given the location of the remaining defect, shape of the defect and the proximity to free margins an O-L flap was deemed most appropriate for complete closure of the defect.  Using a sterile surgical marker, an appropriate flap was drawn incorporating the defect and placing the expected incisions within the relaxed skin tension lines where possible.    The area thus outlined was incised deep to adipose tissue with a #15 scalpel blade.  The skin margins were undermined to an appropriate distance in all directions utilizing iris scissors.
Alar Island Pedicle Flap Text: The defect edges were debeveled with a #15 scalpel blade.  Given the location of the defect, shape of the defect and the proximity to the alar rim an island pedicle advancement flap was deemed most appropriate.  Using a sterile surgical marker, an appropriate advancement flap was drawn incorporating the defect, outlining the appropriate donor tissue and placing the expected incisions within the nasal ala running parallel to the alar rim. The area thus outlined was incised with a #15 scalpel blade.  The skin margins were undermined minimally to an appropriate distance in all directions around the primary defect and laterally outward around the island pedicle utilizing iris scissors.  There was minimal undermining beneath the pedicle flap.
Lip Wedge Excision Repair Text: Given the location of the defect and the proximity to free margins a full thickness wedge repair was deemed most appropriate.  Using a sterile surgical marker, the appropriate repair was drawn incorporating the defect and placing the expected incisions perpendicular to the vermilion border.  The vermilion border was also meticulously outlined to ensure appropriate reapproximation during the repair.  The area thus outlined was incised through and through with a #15 scalpel blade.  The muscularis and dermis were reaproximated with deep sutures following hemostasis. Care was taken to realign the vermilion border before proceeding with the superficial closure.  Once the vermilion was realigned the superfical and mucosal closure was finished.
Pre-Excision Curettage Text (Leave Blank If You Do Not Want): Prior to drawing the surgical margin the visible lesion was removed with electrodesiccation and curettage to clearly define the lesion size.
Repair Performed By Another Provider Text (Leave Blank If You Do Not Want): After the tissue was excised the defect was repaired by another provider.
Spiral Flap Text: The defect edges were debeveled with a #15 scalpel blade.  Given the location of the defect, shape of the defect and the proximity to free margins a spiral flap was deemed most appropriate.  Using a sterile surgical marker, an appropriate rotation flap was drawn incorporating the defect and placing the expected incisions within the relaxed skin tension lines where possible. The area thus outlined was incised deep to adipose tissue with a #15 scalpel blade.  The skin margins were undermined to an appropriate distance in all directions utilizing iris scissors.
Complex Repair And Bilobe Flap Text: The defect edges were debeveled with a #15 scalpel blade.  The primary defect was closed partially with a complex linear closure.  Given the location of the remaining defect, shape of the defect and the proximity to free margins a bilobe flap was deemed most appropriate for complete closure of the defect.  Using a sterile surgical marker, an appropriate advancement flap was drawn incorporating the defect and placing the expected incisions within the relaxed skin tension lines where possible.    The area thus outlined was incised deep to adipose tissue with a #15 scalpel blade.  The skin margins were undermined to an appropriate distance in all directions utilizing iris scissors.
Double Island Pedicle Flap Text: The defect edges were debeveled with a #15 scalpel blade.  Given the location of the defect, shape of the defect and the proximity to free margins a double island pedicle advancement flap was deemed most appropriate.  Using a sterile surgical marker, an appropriate advancement flap was drawn incorporating the defect, outlining the appropriate donor tissue and placing the expected incisions within the relaxed skin tension lines where possible.    The area thus outlined was incised deep to adipose tissue with a #15 scalpel blade.  The skin margins were undermined to an appropriate distance in all directions around the primary defect and laterally outward around the island pedicle utilizing iris scissors.  There was minimal undermining beneath the pedicle flap.
Ftsg Text: The defect edges were debeveled with a #15 scalpel blade.  Given the location of the defect, shape of the defect and the proximity to free margins a full thickness skin graft was deemed most appropriate.  Using a sterile surgical marker, the primary defect shape was transferred to the donor site. The area thus outlined was incised deep to adipose tissue with a #15 scalpel blade.  The harvested graft was then trimmed of adipose tissue until only dermis and epidermis was left.  The skin margins of the secondary defect were undermined to an appropriate distance in all directions utilizing iris scissors.  The secondary defect was closed with interrupted buried subcutaneous sutures.  The skin edges were then re-apposed with running  sutures.  The skin graft was then placed in the primary defect and oriented appropriately.
No Repair - Repaired With Adjacent Surgical Defect Text (Leave Blank If You Do Not Want): After the excision the defect was repaired concurrently with another surgical defect which was in close approximation.
Star Wedge Flap Text: The defect edges were debeveled with a #15 scalpel blade.  Given the location of the defect, shape of the defect and the proximity to free margins a star wedge flap was deemed most appropriate.  Using a sterile surgical marker, an appropriate rotation flap was drawn incorporating the defect and placing the expected incisions within the relaxed skin tension lines where possible. The area thus outlined was incised deep to adipose tissue with a #15 scalpel blade.  The skin margins were undermined to an appropriate distance in all directions utilizing iris scissors.
Anesthesia Type: 1% lidocaine with 1:100,000 epinephrine and a 1:10 solution of 8.4% sodium bicarbonate
Island Pedicle Flap-Requiring Vessel Identification Text: The defect edges were debeveled with a #15 scalpel blade.  Given the location of the defect, shape of the defect and the proximity to free margins an island pedicle advancement flap was deemed most appropriate.  Using a sterile surgical marker, an appropriate advancement flap was drawn, based on the axial vessel mentioned above, incorporating the defect, outlining the appropriate donor tissue and placing the expected incisions within the relaxed skin tension lines where possible.    The area thus outlined was incised deep to adipose tissue with a #15 scalpel blade.  The skin margins were undermined to an appropriate distance in all directions around the primary defect and laterally outward around the island pedicle utilizing iris scissors.  There was minimal undermining beneath the pedicle flap.
Split-Thickness Skin Graft Text: The defect edges were debeveled with a #15 scalpel blade.  Given the location of the defect, shape of the defect and the proximity to free margins a split thickness skin graft was deemed most appropriate.  Using a sterile surgical marker, the primary defect shape was transferred to the donor site. The split thickness graft was then harvested.  The skin graft was then placed in the primary defect and oriented appropriately.
Epidermal Closure: running cuticular
Complex Repair And Melolabial Flap Text: The defect edges were debeveled with a #15 scalpel blade.  The primary defect was closed partially with a complex linear closure.  Given the location of the remaining defect, shape of the defect and the proximity to free margins a melolabial flap was deemed most appropriate for complete closure of the defect.  Using a sterile surgical marker, an appropriate advancement flap was drawn incorporating the defect and placing the expected incisions within the relaxed skin tension lines where possible.    The area thus outlined was incised deep to adipose tissue with a #15 scalpel blade.  The skin margins were undermined to an appropriate distance in all directions utilizing iris scissors.

## 2023-09-05 ENCOUNTER — DOCUMENTATION (OUTPATIENT)
Dept: HEALTH INFORMATION MANAGEMENT | Facility: OTHER | Age: 78
End: 2023-09-05
Payer: MEDICARE

## 2023-10-07 DIAGNOSIS — E11.69 HYPERLIPIDEMIA DUE TO TYPE 2 DIABETES MELLITUS (HCC): ICD-10-CM

## 2023-10-07 DIAGNOSIS — I15.2 HYPERTENSION ASSOCIATED WITH DIABETES (HCC): ICD-10-CM

## 2023-10-07 DIAGNOSIS — E11.9 TYPE 2 DIABETES MELLITUS WITHOUT COMPLICATION, WITHOUT LONG-TERM CURRENT USE OF INSULIN (HCC): ICD-10-CM

## 2023-10-07 DIAGNOSIS — E78.5 HYPERLIPIDEMIA DUE TO TYPE 2 DIABETES MELLITUS (HCC): ICD-10-CM

## 2023-10-07 DIAGNOSIS — E11.59 HYPERTENSION ASSOCIATED WITH DIABETES (HCC): ICD-10-CM

## 2023-10-10 RX ORDER — LOSARTAN POTASSIUM 100 MG/1
100 TABLET ORAL
Qty: 90 TABLET | Refills: 3 | Status: SHIPPED | OUTPATIENT
Start: 2023-10-10

## 2023-10-10 RX ORDER — ATORVASTATIN CALCIUM 10 MG/1
10 TABLET, FILM COATED ORAL DAILY
Qty: 90 TABLET | Refills: 3 | Status: SHIPPED | OUTPATIENT
Start: 2023-10-10

## 2023-10-23 PROBLEM — M17.12 DEGENERATIVE ARTHRITIS OF LEFT KNEE: Status: ACTIVE | Noted: 2023-10-23

## 2023-11-02 DIAGNOSIS — E03.9 ACQUIRED HYPOTHYROIDISM: ICD-10-CM

## 2023-11-02 NOTE — TELEPHONE ENCOUNTER
Received request via: Pharmacy    Was the patient seen in the last year in this department? Yes-Has upcoming apt with new PCP    Does the patient have an active prescription (recently filled or refills available) for medication(s) requested? No    Does the patient have USP Plus and need 100 day supply (blood pressure, diabetes and cholesterol meds only)? Patient does not have SCP

## 2023-11-03 ENCOUNTER — HOSPITAL ENCOUNTER (OUTPATIENT)
Dept: RADIOLOGY | Facility: MEDICAL CENTER | Age: 78
End: 2023-11-03
Attending: ORTHOPAEDIC SURGERY
Payer: MEDICARE

## 2023-11-03 DIAGNOSIS — M17.12 PRIMARY OSTEOARTHRITIS OF LEFT KNEE: ICD-10-CM

## 2023-11-03 PROCEDURE — 73700 CT LOWER EXTREMITY W/O DYE: CPT | Mod: LT

## 2023-11-03 RX ORDER — LEVOTHYROXINE SODIUM 88 UG/1
88 TABLET ORAL
Qty: 90 TABLET | Refills: 0 | Status: SHIPPED | OUTPATIENT
Start: 2023-11-03 | End: 2023-11-07 | Stop reason: SDUPTHER

## 2023-11-07 ENCOUNTER — NON-PROVIDER VISIT (OUTPATIENT)
Dept: ENDOCRINOLOGY | Facility: MEDICAL CENTER | Age: 78
End: 2023-11-07
Attending: INTERNAL MEDICINE
Payer: MEDICARE

## 2023-11-07 DIAGNOSIS — E55.9 VITAMIN D DEFICIENCY: ICD-10-CM

## 2023-11-07 DIAGNOSIS — E11.42 CONTROLLED TYPE 2 DIABETES MELLITUS WITH DIABETIC POLYNEUROPATHY, WITHOUT LONG-TERM CURRENT USE OF INSULIN (HCC): ICD-10-CM

## 2023-11-07 DIAGNOSIS — E03.9 ACQUIRED HYPOTHYROIDISM: ICD-10-CM

## 2023-11-07 DIAGNOSIS — E78.5 DYSLIPIDEMIA: ICD-10-CM

## 2023-11-07 LAB
HBA1C MFR BLD: 6.8 % (ref ?–5.8)
POCT INT CON NEG: NEGATIVE
POCT INT CON POS: POSITIVE

## 2023-11-07 PROCEDURE — 99212 OFFICE O/P EST SF 10 MIN: CPT | Performed by: INTERNAL MEDICINE

## 2023-11-07 PROCEDURE — 83036 HEMOGLOBIN GLYCOSYLATED A1C: CPT

## 2023-11-07 RX ORDER — LEVOTHYROXINE SODIUM 88 UG/1
88 TABLET ORAL
Qty: 90 TABLET | Refills: 0 | Status: SHIPPED | OUTPATIENT
Start: 2023-11-07

## 2023-11-07 NOTE — PROGRESS NOTES
RN-CDE Note    Subjective:   Endocrinology Clinic Progress Note  PCP: Jose Flores D.O.    HPI:  Saleem Whitney is a 78 y.o. old patient who is seen today by the Diabetes Nurse Specialist for review of type 2 Diabetes.  Recent changes in health: Having left knee surgery 12/5/23.  He has lost 45 pounds since starting Trulicity.  DM:   Last A1c:   Lab Results   Component Value Date/Time    HBA1C 6.8 (A) 11/07/2023 08:18 AM      Previous A1c was 7.0 on 5/17/23  A1C GOAL: < 7    Diabetes Medications:   Metformin 1000 mg BID  Glipizide 10 mg daily  Trulicity 1.5 mg weekly      Exercise: Unable to walk much due to knee  Diet: Breakfast is bagel and fruit.  Lunch is protein, vegetable, and carbohydrates.  Patient's body mass index is unknown because there is no height or weight on file. Exercise and nutrition counseling were performed at this visit.    Glucose monitoring frequency: Not testing    Hypoglycemic episodes: no  Last Retinal Exam: on file and up-to-date  Daily Foot Exam: Yes   Foot Exam:  Monofilament: done  Monofilament testing with a 10 gram force: sensation intact: intact bilaterally  Visual Inspection: Feet without maceration, ulcers, fissures.  Pedal pulses: intact bilaterally   Lab Results   Component Value Date/Time    MALBCRT see below 04/13/2023 07:28 AM    MICROALBUR <1.2 04/13/2023 07:28 AM        ACR Albumin/Creatinine Ratio goal <30     HTN:   Blood pressure goal <130/<80 .   Currently Rx ACE/ARB: Yes     Dyslipidemia:    Lab Results   Component Value Date/Time    CHOLSTRLTOT 102 04/13/2023 07:23 AM    LDL 30 04/13/2023 07:23 AM    HDL 43 04/13/2023 07:23 AM    TRIGLYCERIDE 147 04/13/2023 07:23 AM         Currently Rx Statin: Yes     He  reports that he quit smoking about 52 years ago. His smoking use included cigarettes. He started smoking about 57 years ago. He has a 5.0 pack-year smoking history. He has never used smokeless tobacco.      Plan:     Discussed and educated on:   - All  medications, side effects and compliance (discussed carefully)  - Annual eye examinations at Ophthalmology  - Home glucose monitoring emphasized  - Weight control and daily exercise    Recommended medication changes: No changes at this time.  He will follow up with Dr. Cardoso in 6 months.

## 2023-11-28 ENCOUNTER — TELEPHONE (OUTPATIENT)
Dept: ENDOCRINOLOGY | Facility: MEDICAL CENTER | Age: 78
End: 2023-11-28
Payer: MEDICARE

## 2023-11-28 ENCOUNTER — HOSPITAL ENCOUNTER (OUTPATIENT)
Dept: RADIOLOGY | Facility: MEDICAL CENTER | Age: 78
End: 2023-11-28
Attending: NURSE PRACTITIONER
Payer: MEDICARE

## 2023-11-28 ENCOUNTER — HOSPITAL ENCOUNTER (OUTPATIENT)
Dept: LAB | Facility: MEDICAL CENTER | Age: 78
End: 2023-11-28
Attending: INTERNAL MEDICINE
Payer: MEDICARE

## 2023-11-28 DIAGNOSIS — E11.42 CONTROLLED TYPE 2 DIABETES MELLITUS WITH DIABETIC POLYNEUROPATHY, WITHOUT LONG-TERM CURRENT USE OF INSULIN (HCC): ICD-10-CM

## 2023-11-28 DIAGNOSIS — E03.9 ACQUIRED HYPOTHYROIDISM: ICD-10-CM

## 2023-11-28 DIAGNOSIS — K40.90 BUBONOCELE: ICD-10-CM

## 2023-11-28 DIAGNOSIS — E78.5 DYSLIPIDEMIA: ICD-10-CM

## 2023-11-28 DIAGNOSIS — E55.9 VITAMIN D DEFICIENCY: ICD-10-CM

## 2023-11-28 LAB
25(OH)D3 SERPL-MCNC: 37 NG/ML (ref 30–100)
ALBUMIN SERPL BCP-MCNC: 4.4 G/DL (ref 3.2–4.9)
ALBUMIN/GLOB SERPL: 1.4 G/DL
ALP SERPL-CCNC: 68 U/L (ref 30–99)
ALT SERPL-CCNC: 10 U/L (ref 2–50)
ANION GAP SERPL CALC-SCNC: 9 MMOL/L (ref 7–16)
AST SERPL-CCNC: 14 U/L (ref 12–45)
BILIRUB SERPL-MCNC: 0.5 MG/DL (ref 0.1–1.5)
BUN SERPL-MCNC: 28 MG/DL (ref 8–22)
CALCIUM ALBUM COR SERPL-MCNC: 8.8 MG/DL (ref 8.5–10.5)
CALCIUM SERPL-MCNC: 9.1 MG/DL (ref 8.4–10.2)
CHLORIDE SERPL-SCNC: 101 MMOL/L (ref 96–112)
CHOLEST SERPL-MCNC: 121 MG/DL (ref 100–199)
CO2 SERPL-SCNC: 28 MMOL/L (ref 20–33)
CREAT SERPL-MCNC: 1.1 MG/DL (ref 0.5–1.4)
FASTING STATUS PATIENT QL REPORTED: NORMAL
GFR SERPLBLD CREATININE-BSD FMLA CKD-EPI: 68 ML/MIN/1.73 M 2
GLOBULIN SER CALC-MCNC: 3.1 G/DL (ref 1.9–3.5)
GLUCOSE SERPL-MCNC: 127 MG/DL (ref 65–99)
HDLC SERPL-MCNC: 47 MG/DL
LDLC SERPL CALC-MCNC: 52 MG/DL
POTASSIUM SERPL-SCNC: 4.2 MMOL/L (ref 3.6–5.5)
PROT SERPL-MCNC: 7.5 G/DL (ref 6–8.2)
SODIUM SERPL-SCNC: 138 MMOL/L (ref 135–145)
T3FREE SERPL-MCNC: 2.44 PG/ML (ref 2–4.4)
T4 FREE SERPL-MCNC: 1.4 NG/DL (ref 0.93–1.7)
TRIGL SERPL-MCNC: 108 MG/DL (ref 0–149)
TSH SERPL DL<=0.005 MIU/L-ACNC: 3.93 UIU/ML (ref 0.38–5.33)

## 2023-11-28 PROCEDURE — 84481 FREE ASSAY (FT-3): CPT

## 2023-11-28 PROCEDURE — 82306 VITAMIN D 25 HYDROXY: CPT

## 2023-11-28 PROCEDURE — 82043 UR ALBUMIN QUANTITATIVE: CPT

## 2023-11-28 PROCEDURE — 80053 COMPREHEN METABOLIC PANEL: CPT

## 2023-11-28 PROCEDURE — 82570 ASSAY OF URINE CREATININE: CPT

## 2023-11-28 PROCEDURE — 84443 ASSAY THYROID STIM HORMONE: CPT

## 2023-11-28 PROCEDURE — 84439 ASSAY OF FREE THYROXINE: CPT

## 2023-11-28 PROCEDURE — 36415 COLL VENOUS BLD VENIPUNCTURE: CPT

## 2023-11-28 PROCEDURE — 80061 LIPID PANEL: CPT

## 2023-11-28 PROCEDURE — 76700 US EXAM ABDOM COMPLETE: CPT

## 2023-11-28 NOTE — TELEPHONE ENCOUNTER
Patient is requesting a refill for DULOXETINE 60MG CAP with the [facility] on file. Thank you!

## 2023-11-29 ENCOUNTER — PATIENT MESSAGE (OUTPATIENT)
Dept: HEALTH INFORMATION MANAGEMENT | Facility: OTHER | Age: 78
End: 2023-11-29

## 2023-11-29 LAB
CREAT UR-MCNC: 104.3 MG/DL
MICROALBUMIN UR-MCNC: 1.6 MG/DL
MICROALBUMIN/CREAT UR: 15 MG/G (ref 0–30)

## 2023-12-05 ENCOUNTER — HOSPITAL ENCOUNTER (INPATIENT)
Facility: MEDICAL CENTER | Age: 78
LOS: 4 days | DRG: 561 | End: 2023-12-11
Attending: EMERGENCY MEDICINE | Admitting: HOSPITALIST
Payer: MEDICARE

## 2023-12-05 ENCOUNTER — APPOINTMENT (OUTPATIENT)
Dept: RADIOLOGY | Facility: MEDICAL CENTER | Age: 78
DRG: 561 | End: 2023-12-05
Attending: HOSPITALIST
Payer: MEDICARE

## 2023-12-05 DIAGNOSIS — R53.1 WEAKNESS: ICD-10-CM

## 2023-12-05 DIAGNOSIS — M96.89 POSTOPERATIVE SURGICAL COMPLICATION INVOLVING MUSCULOSKELETAL SYSTEM ASSOCIATED WITH MUSCULOSKELETAL PROCEDURE, UNSPECIFIED COMPLICATION: ICD-10-CM

## 2023-12-05 PROBLEM — Z96.652 S/P TOTAL KNEE ARTHROPLASTY, LEFT: Status: ACTIVE | Noted: 2023-12-05

## 2023-12-05 PROBLEM — M25.562 ACUTE PAIN OF LEFT KNEE: Status: ACTIVE | Noted: 2023-12-05

## 2023-12-05 LAB
ALBUMIN SERPL BCP-MCNC: 3.7 G/DL (ref 3.2–4.9)
ALBUMIN/GLOB SERPL: 1.4 G/DL
ALP SERPL-CCNC: 66 U/L (ref 30–99)
ALT SERPL-CCNC: 17 U/L (ref 2–50)
ANION GAP SERPL CALC-SCNC: 12 MMOL/L (ref 7–16)
AST SERPL-CCNC: 17 U/L (ref 12–45)
BASOPHILS # BLD AUTO: 0.1 % (ref 0–1.8)
BASOPHILS # BLD: 0.01 K/UL (ref 0–0.12)
BILIRUB SERPL-MCNC: 0.3 MG/DL (ref 0.1–1.5)
BUN SERPL-MCNC: 20 MG/DL (ref 8–22)
CALCIUM ALBUM COR SERPL-MCNC: 8.3 MG/DL (ref 8.5–10.5)
CALCIUM SERPL-MCNC: 8.1 MG/DL (ref 8.4–10.2)
CHLORIDE SERPL-SCNC: 101 MMOL/L (ref 96–112)
CO2 SERPL-SCNC: 23 MMOL/L (ref 20–33)
CREAT SERPL-MCNC: 0.98 MG/DL (ref 0.5–1.4)
EOSINOPHIL # BLD AUTO: 0.02 K/UL (ref 0–0.51)
EOSINOPHIL NFR BLD: 0.1 % (ref 0–6.9)
ERYTHROCYTE [DISTWIDTH] IN BLOOD BY AUTOMATED COUNT: 42.3 FL (ref 35.9–50)
GFR SERPLBLD CREATININE-BSD FMLA CKD-EPI: 78 ML/MIN/1.73 M 2
GLOBULIN SER CALC-MCNC: 2.7 G/DL (ref 1.9–3.5)
GLUCOSE BLD STRIP.AUTO-MCNC: 251 MG/DL (ref 65–99)
GLUCOSE BLD STRIP.AUTO-MCNC: 257 MG/DL (ref 65–99)
GLUCOSE SERPL-MCNC: 266 MG/DL (ref 65–99)
HCT VFR BLD AUTO: 41.5 % (ref 42–52)
HGB BLD-MCNC: 13.6 G/DL (ref 14–18)
IMM GRANULOCYTES # BLD AUTO: 0.1 K/UL (ref 0–0.11)
IMM GRANULOCYTES NFR BLD AUTO: 0.6 % (ref 0–0.9)
LYMPHOCYTES # BLD AUTO: 1.05 K/UL (ref 1–4.8)
LYMPHOCYTES NFR BLD: 6.5 % (ref 22–41)
MCH RBC QN AUTO: 30 PG (ref 27–33)
MCHC RBC AUTO-ENTMCNC: 32.8 G/DL (ref 32.3–36.5)
MCV RBC AUTO: 91.6 FL (ref 81.4–97.8)
MONOCYTES # BLD AUTO: 0.75 K/UL (ref 0–0.85)
MONOCYTES NFR BLD AUTO: 4.6 % (ref 0–13.4)
NEUTROPHILS # BLD AUTO: 14.23 K/UL (ref 1.82–7.42)
NEUTROPHILS NFR BLD: 88.1 % (ref 44–72)
NRBC # BLD AUTO: 0 K/UL
NRBC BLD-RTO: 0 /100 WBC (ref 0–0.2)
PLATELET # BLD AUTO: 253 K/UL (ref 164–446)
PMV BLD AUTO: 9.5 FL (ref 9–12.9)
POTASSIUM SERPL-SCNC: 3.8 MMOL/L (ref 3.6–5.5)
PROT SERPL-MCNC: 6.4 G/DL (ref 6–8.2)
RBC # BLD AUTO: 4.53 M/UL (ref 4.7–6.1)
SODIUM SERPL-SCNC: 136 MMOL/L (ref 135–145)
WBC # BLD AUTO: 16.2 K/UL (ref 4.8–10.8)

## 2023-12-05 PROCEDURE — 73562 X-RAY EXAM OF KNEE 3: CPT | Mod: LT

## 2023-12-05 PROCEDURE — 82962 GLUCOSE BLOOD TEST: CPT | Mod: 91

## 2023-12-05 PROCEDURE — G0378 HOSPITAL OBSERVATION PER HR: HCPCS

## 2023-12-05 PROCEDURE — A9270 NON-COVERED ITEM OR SERVICE: HCPCS | Performed by: HOSPITALIST

## 2023-12-05 PROCEDURE — 99285 EMERGENCY DEPT VISIT HI MDM: CPT

## 2023-12-05 PROCEDURE — 94760 N-INVAS EAR/PLS OXIMETRY 1: CPT

## 2023-12-05 PROCEDURE — 99223 1ST HOSP IP/OBS HIGH 75: CPT | Mod: 25 | Performed by: HOSPITALIST

## 2023-12-05 PROCEDURE — 85025 COMPLETE CBC W/AUTO DIFF WBC: CPT

## 2023-12-05 PROCEDURE — 96372 THER/PROPH/DIAG INJ SC/IM: CPT

## 2023-12-05 PROCEDURE — 36415 COLL VENOUS BLD VENIPUNCTURE: CPT

## 2023-12-05 PROCEDURE — 80053 COMPREHEN METABOLIC PANEL: CPT

## 2023-12-05 PROCEDURE — 73522 X-RAY EXAM HIPS BI 3-4 VIEWS: CPT

## 2023-12-05 PROCEDURE — 700102 HCHG RX REV CODE 250 W/ 637 OVERRIDE(OP): Performed by: HOSPITALIST

## 2023-12-05 PROCEDURE — 99497 ADVNCD CARE PLAN 30 MIN: CPT | Performed by: HOSPITALIST

## 2023-12-05 RX ORDER — ATORVASTATIN CALCIUM 10 MG/1
10 TABLET, FILM COATED ORAL EVERY EVENING
Status: DISCONTINUED | OUTPATIENT
Start: 2023-12-05 | End: 2023-12-11 | Stop reason: HOSPADM

## 2023-12-05 RX ORDER — BISACODYL 10 MG
10 SUPPOSITORY, RECTAL RECTAL
Status: DISCONTINUED | OUTPATIENT
Start: 2023-12-05 | End: 2023-12-11 | Stop reason: HOSPADM

## 2023-12-05 RX ORDER — OXYCODONE HYDROCHLORIDE 5 MG/1
5 TABLET ORAL
Status: DISCONTINUED | OUTPATIENT
Start: 2023-12-05 | End: 2023-12-07

## 2023-12-05 RX ORDER — HYDROMORPHONE HYDROCHLORIDE 1 MG/ML
0.25 INJECTION, SOLUTION INTRAMUSCULAR; INTRAVENOUS; SUBCUTANEOUS
Status: DISCONTINUED | OUTPATIENT
Start: 2023-12-05 | End: 2023-12-07

## 2023-12-05 RX ORDER — OXYCODONE HYDROCHLORIDE 5 MG/1
2.5 TABLET ORAL
Status: DISCONTINUED | OUTPATIENT
Start: 2023-12-05 | End: 2023-12-07

## 2023-12-05 RX ORDER — ONDANSETRON 2 MG/ML
4 INJECTION INTRAMUSCULAR; INTRAVENOUS EVERY 4 HOURS PRN
Status: DISCONTINUED | OUTPATIENT
Start: 2023-12-05 | End: 2023-12-11 | Stop reason: HOSPADM

## 2023-12-05 RX ORDER — AMLODIPINE BESYLATE 5 MG/1
10 TABLET ORAL EVERY EVENING
Status: DISCONTINUED | OUTPATIENT
Start: 2023-12-05 | End: 2023-12-11 | Stop reason: HOSPADM

## 2023-12-05 RX ORDER — DEXTROSE MONOHYDRATE 25 G/50ML
25 INJECTION, SOLUTION INTRAVENOUS
Status: DISCONTINUED | OUTPATIENT
Start: 2023-12-05 | End: 2023-12-08

## 2023-12-05 RX ORDER — ASPIRIN 81 MG/1
81 TABLET ORAL 2 TIMES DAILY
Status: DISCONTINUED | OUTPATIENT
Start: 2023-12-06 | End: 2023-12-11 | Stop reason: HOSPADM

## 2023-12-05 RX ORDER — LOSARTAN POTASSIUM 50 MG/1
100 TABLET ORAL DAILY
Status: DISCONTINUED | OUTPATIENT
Start: 2023-12-05 | End: 2023-12-11 | Stop reason: HOSPADM

## 2023-12-05 RX ORDER — TRAMADOL HYDROCHLORIDE 50 MG/1
50 TABLET ORAL EVERY 6 HOURS PRN
Status: DISCONTINUED | OUTPATIENT
Start: 2023-12-05 | End: 2023-12-05

## 2023-12-05 RX ORDER — ONDANSETRON 4 MG/1
4 TABLET, ORALLY DISINTEGRATING ORAL EVERY 4 HOURS PRN
Status: DISCONTINUED | OUTPATIENT
Start: 2023-12-05 | End: 2023-12-11 | Stop reason: HOSPADM

## 2023-12-05 RX ORDER — POLYETHYLENE GLYCOL 3350 17 G/17G
1 POWDER, FOR SOLUTION ORAL
Status: DISCONTINUED | OUTPATIENT
Start: 2023-12-05 | End: 2023-12-11 | Stop reason: HOSPADM

## 2023-12-05 RX ORDER — ACETAMINOPHEN 325 MG/1
650 TABLET ORAL EVERY 6 HOURS PRN
Status: DISCONTINUED | OUTPATIENT
Start: 2023-12-05 | End: 2023-12-10

## 2023-12-05 RX ORDER — LEVOTHYROXINE SODIUM 88 UG/1
88 TABLET ORAL
Status: DISCONTINUED | OUTPATIENT
Start: 2023-12-05 | End: 2023-12-11 | Stop reason: HOSPADM

## 2023-12-05 RX ORDER — TAMSULOSIN HYDROCHLORIDE 0.4 MG/1
0.8 CAPSULE ORAL DAILY
Status: DISCONTINUED | OUTPATIENT
Start: 2023-12-05 | End: 2023-12-11 | Stop reason: HOSPADM

## 2023-12-05 RX ADMIN — OXYCODONE 5 MG: 5 TABLET ORAL at 22:27

## 2023-12-05 RX ADMIN — LOSARTAN POTASSIUM 100 MG: 50 TABLET, FILM COATED ORAL at 15:50

## 2023-12-05 RX ADMIN — ATORVASTATIN CALCIUM 10 MG: 10 TABLET, FILM COATED ORAL at 17:22

## 2023-12-05 RX ADMIN — INSULIN HUMAN 3 UNITS: 100 INJECTION, SOLUTION PARENTERAL at 17:19

## 2023-12-05 RX ADMIN — OXYCODONE 5 MG: 5 TABLET ORAL at 19:24

## 2023-12-05 RX ADMIN — TAMSULOSIN HYDROCHLORIDE 0.8 MG: 0.4 CAPSULE ORAL at 15:57

## 2023-12-05 RX ADMIN — INSULIN HUMAN 3 UNITS: 100 INJECTION, SOLUTION PARENTERAL at 20:26

## 2023-12-05 RX ADMIN — AMLODIPINE BESYLATE 10 MG: 5 TABLET ORAL at 17:22

## 2023-12-05 RX ADMIN — LEVOTHYROXINE SODIUM 88 MCG: 88 TABLET ORAL at 15:50

## 2023-12-05 ASSESSMENT — COGNITIVE AND FUNCTIONAL STATUS - GENERAL
DAILY ACTIVITIY SCORE: 20
WALKING IN HOSPITAL ROOM: A LITTLE
SUGGESTED CMS G CODE MODIFIER DAILY ACTIVITY: CJ
STANDING UP FROM CHAIR USING ARMS: A LITTLE
TOILETING: A LITTLE
DRESSING REGULAR LOWER BODY CLOTHING: A LITTLE
HELP NEEDED FOR BATHING: A LITTLE
MOVING TO AND FROM BED TO CHAIR: A LITTLE
MOBILITY SCORE: 18
SUGGESTED CMS G CODE MODIFIER MOBILITY: CK
DRESSING REGULAR UPPER BODY CLOTHING: A LITTLE
CLIMB 3 TO 5 STEPS WITH RAILING: A LITTLE
TURNING FROM BACK TO SIDE WHILE IN FLAT BAD: A LITTLE
MOVING FROM LYING ON BACK TO SITTING ON SIDE OF FLAT BED: A LITTLE

## 2023-12-05 ASSESSMENT — LIFESTYLE VARIABLES
TOTAL SCORE: 0
EVER HAD A DRINK FIRST THING IN THE MORNING TO STEADY YOUR NERVES TO GET RID OF A HANGOVER: NO
HAVE PEOPLE ANNOYED YOU BY CRITICIZING YOUR DRINKING: NO
HOW MANY TIMES IN THE PAST YEAR HAVE YOU HAD 5 OR MORE DRINKS IN A DAY: 0
EVER FELT BAD OR GUILTY ABOUT YOUR DRINKING: NO
HAVE YOU EVER FELT YOU SHOULD CUT DOWN ON YOUR DRINKING: NO
TOTAL SCORE: 0
CONSUMPTION TOTAL: NEGATIVE
ON A TYPICAL DAY WHEN YOU DRINK ALCOHOL HOW MANY DRINKS DO YOU HAVE: 0
TOTAL SCORE: 0
ALCOHOL_USE: NO
AVERAGE NUMBER OF DAYS PER WEEK YOU HAVE A DRINK CONTAINING ALCOHOL: 0

## 2023-12-05 ASSESSMENT — FIBROSIS 4 INDEX
FIB4 SCORE: 1.62
FIB4 SCORE: 1.3
FIB4 SCORE: 1.3

## 2023-12-05 ASSESSMENT — PAIN DESCRIPTION - PAIN TYPE
TYPE: SURGICAL PAIN
TYPE: SURGICAL PAIN

## 2023-12-05 ASSESSMENT — ENCOUNTER SYMPTOMS
VOMITING: 0
SHORTNESS OF BREATH: 0
FLANK PAIN: 0
STRIDOR: 0
FALLS: 1
COUGH: 0
FOCAL WEAKNESS: 0
EYE REDNESS: 0
NERVOUS/ANXIOUS: 0
MYALGIAS: 0
ABDOMINAL PAIN: 0
FEVER: 0
CHILLS: 0
BRUISES/BLEEDS EASILY: 0
EYE DISCHARGE: 0

## 2023-12-05 ASSESSMENT — PATIENT HEALTH QUESTIONNAIRE - PHQ9
SUM OF ALL RESPONSES TO PHQ9 QUESTIONS 1 AND 2: 0
1. LITTLE INTEREST OR PLEASURE IN DOING THINGS: NOT AT ALL
2. FEELING DOWN, DEPRESSED, IRRITABLE, OR HOPELESS: NOT AT ALL

## 2023-12-05 NOTE — ASSESSMENT & PLAN NOTE
Had left Total Knee Arthroplasty today 12/5/2023 w Dr Thomas.   Multimodal pain control.  Pharmacologic prophylaxis to start tomorrow.   Consider physical and Occupational Therapy when okay with orthopedics.      12/7: PT/OT and pain management. We had to increase PRN medications. Patient requiring IV pain medication    12/10: Pending placement.  We are also pending ultrasound of the lower extremities to rule out DVT.

## 2023-12-05 NOTE — ED PROVIDER NOTES
CHIEF COMPLAINT  Chief Complaint   Patient presents with    Knee Pain     Pt had Left Total Knee at [facility] this am, unable to bear any wt on LLE    Fall       LIMITATION TO HISTORY   Select: none    HPI    Saleem Whitney is a 78 y.o. male who presents to the Emergency Department complaining of inability to bear weight after surgery.  Patient had a total knee done on his left knee today by Dr. Rushing.  As they were working on postoperatively with the patient he is just been unable to get up out of bed and ambulate.  After multiple attempts at the surgery center they do not feel the patient can be sent home for outpatient and requested the patient to be placed inpatient and sent the patient in the emergency department for evaluation.  Upon arrival here patient is just complaining of weakness to the left leg he did have a nerve block done on that left leg.  States that his pain is actually not too bad as a 2/10 type discomfort.    OUTSIDE HISTORIAN(S):  Select: Spoke with Dr. Rushing.  The patient did get a nerve block to his left leg.  He will see the patient tomorrow    EXTERNAL RECORDS REVIEWED  Select: Other last CBC was July 2023 had a hemoglobin of 13.7.  He did have a CMP done on 11/28/2023 showed a glucose slightly elevated at 127 elevated BUN of 28 the rest electrolytes were normal.      PAST MEDICAL HISTORY  Past Medical History:   Diagnosis Date    Bowel habit changes 06/2023    bowel blockage    Cancer (HCC)     kidney    Cataract     Dental disorder     implants    Elevated serum creatinine 07/22/2022    Hyperlipidemia     Hypertension     Hypothyroid     Pneumonia 2020    Renal disorder     kidney ca    Snoring     no sleep study    Type II or unspecified type diabetes mellitus without mention of complication, not stated as uncontrolled      .    SURGICAL HISTORY  Past Surgical History:   Procedure Laterality Date    TURBT (TRANSURETHRAL RESECTION OF BLADDER TUMOR)  7/31/2023    Procedure: BIPOLAR  TRANSURETHRAL RESECTION OF BLADDER TUMOR WITH INSTILLATION GEMCITABINE;  Surgeon: Bradly Velasco M.D.;  Location: SURGERY [facility];  Service: Urology    DC LAP,DIAGNOSTIC ABDOMEN  2023    Procedure: LAPAROSCOPY diagnostic;  Surgeon: Galo Ashton M.D.;  Location: SURGERY [facility];  Service: General    HAND SURGERY           FAMILY HISTORY  Family History   Problem Relation Age of Onset    Diabetes Mother     Stroke Father         45    Diabetes Sister     Diabetes Maternal Uncle           SOCIAL HISTORY  Social History     Socioeconomic History    Marital status:      Spouse name: Not on file    Number of children: Not on file    Years of education: Not on file    Highest education level: Bachelor's degree (e.g., BA, AB, BS)   Occupational History    Not on file   Tobacco Use    Smoking status: Former     Current packs/day: 0.00     Average packs/day: 1 pack/day for 5.0 years (5.0 ttl pk-yrs)     Types: Cigarettes     Start date: 1966     Quit date: 1971     Years since quittin.1    Smokeless tobacco: Never   Vaping Use    Vaping Use: Never used   Substance and Sexual Activity    Alcohol use: Yes     Alcohol/week: 4.2 oz     Types: 7 Glasses of wine per week    Drug use: No    Sexual activity: Yes     Partners: Female   Other Topics Concern     Service No    Blood Transfusions No    Caffeine Concern No    Occupational Exposure No    Hobby Hazards No    Sleep Concern No    Stress Concern No    Weight Concern No    Special Diet No    Back Care No    Exercise Yes    Bike Helmet No    Seat Belt Yes    Self-Exams No   Social History Narrative    Not on file     Social Determinants of Health     Financial Resource Strain: Low Risk  (2022)    Overall Financial Resource Strain (CARDIA)     Difficulty of Paying Living Expenses: Not hard at all   Food Insecurity: No Food Insecurity (2022)    Hunger Vital Sign     Worried About Running Out of Food in the Last Year: Never  true     Ran Out of Food in the Last Year: Never true   Transportation Needs: No Transportation Needs (9/12/2022)    PRAPARE - Transportation     Lack of Transportation (Medical): No     Lack of Transportation (Non-Medical): No   Physical Activity: Inactive (1/20/2022)    Exercise Vital Sign     Days of Exercise per Week: 0 days     Minutes of Exercise per Session: 0 min   Stress: No Stress Concern Present (1/20/2022)    [employer] [place] [employer] - Occupational Stress Questionnaire     Feeling of Stress : Not at all   Social Connections: Moderately Integrated (1/20/2022)    Social Connection and Isolation Panel [NHANES]     Frequency of Communication with Friends and Family: More than three times a week     Frequency of Social Gatherings with Friends and Family: Once a week     Attends Denominational Services: Never     Active Member of Clubs or Organizations: Yes     Attends Club or Organization Meetings: More than 4 times per year     Marital Status:    Intimate Partner Violence: Not on file   Housing Stability: Low Risk  (1/20/2022)    Housing Stability Vital Sign     Unable to Pay for Housing in the Last Year: No     Number of Places Lived in the Last Year: 1     Unstable Housing in the Last Year: No         CURRENT MEDICATIONS  Current Facility-Administered Medications on File Prior to Encounter   Medication Dose Route Frequency Provider Last Rate Last Admin    lidocaine (XYLOCAINE) 1%  injection  0.5 mL Intradermal Pre-Op Once PRN Neli Ham        lactated ringers infusion   Intravenous Continuous Neli LYUDMILA Ham 10 mL/hr at 12/05/23 0712 New Bag at 12/05/23 0801    lactated ringers infusion  1,000 mL Intravenous Continuous Neli Ham        naloxone (Narcan) injection 0.1 mg  0.1 mg Intravenous Q3HRS PRN Neli Ham        glycopyrrolate (Robinul) injection 0.2 mg  0.2 mg Intravenous Q5 MIN PRN Neli Ham        bethanechol (Urecholine) TABS 5 mg  5 mg Oral PRN Neli COREAS  Ham        fentaNYL Citrate (PF) SOLN 12.5 mcg  12.5 mcg Intravenous Q5 MIN PRN Neli Ham        Or    fentaNYL Citrate (PF) SOLN 25 mcg  25 mcg Intravenous Q5 MIN PRN Neli LYUDMILA Ham   25 mcg at 12/05/23 0901    Or    fentaNYL Citrate (PF) SOLN 50 mcg  50 mcg Intravenous Q5 MIN PRN Neli LYUDMILA Ham        ondansetron (Zofran) syringe/vial injection 4 mg  4 mg Intravenous Once PRN Neli LYUDMILA Ham        albuterol (Proventil) 2.5mg/3ml nebulizer solution 2.5 mg  3 mL Nebulization PRN Neli LYUDMILA Ham        ePHEDrine injection 25 mg  25 mg Intramuscular PRN Neli LYUDMILA Ham        vancomycin 1500 mg/250mL NS IVPB premix  1,500 mg Intravenous Once PRN Wilbur Thomas M.D.         Current Outpatient Medications on File Prior to Encounter   Medication Sig Dispense Refill    MAGNESIUM PO Take 1 Capsule by mouth every evening. (OTC)      oxyCODONE immediate-release (ROXICODONE) 5 MG Tab Take 1 Tablet by mouth every four hours as needed for Severe Pain for up to 7 days. 42 Tablet 0    traMADol (ULTRAM) 50 MG Tab Take 1 Tablet by mouth every four hours as needed for Moderate Pain for up to 7 days. 42 Tablet 0    levothyroxine (SYNTHROID) 88 MCG Tab Take 1 Tablet by mouth every morning on an empty stomach. 90 Tablet 0    metformin (GLUCOPHAGE) 1000 MG tablet Take 1 Tablet by mouth 2 times a day with meals. 180 Tablet 3    losartan (COZAAR) 100 MG Tab Take 1 Tablet by mouth every day. 90 Tablet 3    atorvastatin (LIPITOR) 10 MG Tab Take 1 Tablet by mouth every day. 90 Tablet 3    amLODIPine (NORVASC) 10 MG Tab TAKE 1 TABLET EVERY DAY 90 Tablet 3    glipiZIDE (GLUCOTROL) 10 MG Tab TAKE 1 TABLET EVERY DAY 90 Tablet 3    tamsulosin (FLOMAX) 0.4 MG capsule Take 0.8 mg by mouth every day.      Cyanocobalamin (VITAMIN B-12) 2500 MCG SL Tab Place 1 Tablet under the tongue every day. (Patient taking differently: Place 2,500 mcg under the tongue every evening.) 100 Tablet 3    Dulaglutide (TRULICITY) 1.5 MG/0.5ML Solution  "Pen-injector Inject 0.5 mL under the skin every 7 days. (Patient taking differently: Inject 0.5 mL under the skin every 7 days. On Sunday) 6 mL 3    vitamin D (CHOLECALCIFEROL) 1000 UNIT Tab Take 1 Tab by mouth every day. (Patient taking differently: Take 1,000 Units by mouth every evening.) 90 Tab 3           ALLERGIES  Allergies   Allergen Reactions    Kdc:Red Dye+Yellow Dye+Ci Pigment Blue 63+Oxymorphone Hives and Shortness of Breath    Latex Rash     Adhesives-rash       PHYSICAL EXAM  VITAL SIGNS:BP (!) 157/76   Pulse 66   Temp 36.7 °C (98 °F) (Temporal)   Resp 17   Ht 1.854 m (6' 1\")   Wt 87 kg (191 lb 12.8 oz)   SpO2 96%   BMI 25.30 kg/m²     Constitutional: Well-developed no acute distress   HENT: Normocephalic, Atraumatic, Bilateral external ears normal.  Eyes:  conjunctiva are normal.   Neck: Supple.  Nontender midline  Cardiovascular: Regular rate and rhythm without murmurs gallops or rubs.   Thorax & Lungs: No respiratory distress. Breathing comfortably. Lungs are clear to auscultation bilaterally, there are no wheezes no rales. Chest wall is nontender.  Abdomen: Soft, non distended, non tender   Skin: Warm, Dry, No erythema,   Back: No tenderness, No CVA tenderness.  Musculoskeletal: Patient's left leg is wrapped in a knee wrap.  He has no calf tenderness no swelling he is unable however to fire his quadriceps muscles and elevate the leg up.  This discussed the neurologic  Neurologic: Alert & oriented x 3, normal sensation moving all extremities appears normal cranial nerves II through XII grossly intact.  Musculoskeletal wise all but the left leg is normal.  He has 5 or 5 strength in pretty much all distributions.  The patient was unable to hold his weight according to nursing staff with both legs.  Left lower extremity however he is unable to fire his quadriceps muscle specifically and lift his leg up.  The rest of the muscles the dorsiflexion and plantarflexion of the foot are all intact.  The " patient is decree sensation around the knee itself.  This seems to be more from a femoral nerve block most likely.  Psychiatric: Affect normal, Judgment normal, Mood normal.       DIAGNOSTIC STUDIES / PROCEDURES      LABS  Results for orders placed or performed during the hospital encounter of 12/05/23   CBC WITH DIFFERENTIAL   Result Value Ref Range    WBC 16.2 (H) 4.8 - 10.8 K/uL    RBC 4.53 (L) 4.70 - 6.10 M/uL    Hemoglobin 13.6 (L) 14.0 - 18.0 g/dL    Hematocrit 41.5 (L) 42.0 - 52.0 %    MCV 91.6 81.4 - 97.8 fL    MCH 30.0 27.0 - 33.0 pg    MCHC 32.8 32.3 - 36.5 g/dL    RDW 42.3 35.9 - 50.0 fL    Platelet Count 253 164 - 446 K/uL    MPV 9.5 9.0 - 12.9 fL    Neutrophils-Polys 88.10 (H) 44.00 - 72.00 %    Lymphocytes 6.50 (L) 22.00 - 41.00 %    Monocytes 4.60 0.00 - 13.40 %    Eosinophils 0.10 0.00 - 6.90 %    Basophils 0.10 0.00 - 1.80 %    Immature Granulocytes 0.60 0.00 - 0.90 %    Nucleated RBC 0.00 0.00 - 0.20 /100 WBC    Neutrophils (Absolute) 14.23 (H) 1.82 - 7.42 K/uL    Lymphs (Absolute) 1.05 1.00 - 4.80 K/uL    Monos (Absolute) 0.75 0.00 - 0.85 K/uL    Eos (Absolute) 0.02 0.00 - 0.51 K/uL    Baso (Absolute) 0.01 0.00 - 0.12 K/uL    Immature Granulocytes (abs) 0.10 0.00 - 0.11 K/uL    NRBC (Absolute) 0.00 K/uL   Comp Metabolic Panel   Result Value Ref Range    Sodium 136 135 - 145 mmol/L    Potassium 3.8 3.6 - 5.5 mmol/L    Chloride 101 96 - 112 mmol/L    Co2 23 20 - 33 mmol/L    Anion Gap 12.0 7.0 - 16.0    Glucose 266 (H) 65 - 99 mg/dL    Bun 20 8 - 22 mg/dL    Creatinine 0.98 0.50 - 1.40 mg/dL    Calcium 8.1 (L) 8.4 - 10.2 mg/dL    Correct Calcium 8.3 (L) 8.5 - 10.5 mg/dL    AST(SGOT) 17 12 - 45 U/L    ALT(SGPT) 17 2 - 50 U/L    Alkaline Phosphatase 66 30 - 99 U/L    Total Bilirubin 0.3 0.1 - 1.5 mg/dL    Albumin 3.7 3.2 - 4.9 g/dL    Total Protein 6.4 6.0 - 8.2 g/dL    Globulin 2.7 1.9 - 3.5 g/dL    A-G Ratio 1.4 g/dL   ESTIMATED GFR   Result Value Ref Range    GFR (CKD-EPI) 78 >60 mL/min/1.73 m 2    POCT glucose device results   Result Value Ref Range    POC Glucose, Blood 251 (H) 65 - 99 mg/dL         RADIOLOGY  I have independently interpreted the diagnostic imaging associated with this visit and am waiting the final reading from the radiologist.   My preliminary interpretation is as follows: No acute fractures      Radiologist interpretation:   DX-HIP-BILATERAL-WITH PELVIS-3/4 VIEWS   Final Result      Mild bilateral hip osteoarthritis      DX-KNEE 3 VIEWS LEFT   Final Result      Total knee arthroplasty without periprosthetic fracture.              COURSE & MEDICAL DECISION MAKING    ED COURSE:    ED Observation Status?  No the patient does not qualify for observation.    INTERVENTIONS BY ME:  Medications   acetaminophen (Tylenol) tablet 650 mg (has no administration in time range)   polyethylene glycol/lytes (Miralax) Packet 1 Packet (has no administration in time range)     And   magnesium hydroxide (Milk Of Magnesia) suspension 30 mL (has no administration in time range)     And   bisacodyl (Dulcolax) suppository 10 mg (has no administration in time range)   Pharmacy Consult Request ...Pain Management Review 1 Each (has no administration in time range)   oxyCODONE immediate-release (Roxicodone) tablet 2.5 mg (has no administration in time range)     Or   oxyCODONE immediate-release (Roxicodone) tablet 5 mg (has no administration in time range)     Or   HYDROmorphone (Dilaudid) injection 0.25 mg (has no administration in time range)   ondansetron (Zofran) syringe/vial injection 4 mg (has no administration in time range)   ondansetron (Zofran ODT) dispertab 4 mg (has no administration in time range)   insulin regular (HumuLIN R,NovoLIN R) injection (3 Units Subcutaneous Given 12/5/23 1719)     And   dextrose 50% (D50W) injection 25 g (has no administration in time range)   amLODIPine (Norvasc) tablet 10 mg (10 mg Oral Given 12/5/23 1722)   atorvastatin (Lipitor) tablet 10 mg (10 mg Oral Given 12/5/23  1722)   levothyroxine (Synthroid) tablet 88 mcg (88 mcg Oral Given 12/5/23 1550)   losartan (Cozaar) tablet 100 mg (100 mg Oral Given 12/5/23 1550)   tamsulosin (Flomax) capsule 0.8 mg (0.8 mg Oral Given 12/5/23 1557)   aspirin EC tablet 81 mg (has no administration in time range)             INITIAL ASSESSMENT, COURSE AND PLAN  Care Narrative: Patient presents for evaluation.  Clinically I do feel that is most likely from the nerve block that is causing his inability to fire his quadriceps of his left side which is most likely relating to his imbalance and inability to walk.  His right leg is slightly weak but it is fully functional he has no evidence of stroke.  Laboratory studies do show slight anemia elevated white blood cell count most likely secondary to his surgery.  X-ray shows no signs of fractures.  At this point because the patient is unable to ambulate we will admit the patient for further physical therapy and treatment postoperatively.  I spoke with the hospitalist for this admission.             ADDITIONAL PROBLEM LIST    DISPOSITION AND DISCUSSIONS  Patient will be admitted for further treatment and care.    FINAL DIAGNOSIS  1. Postoperative surgical complication involving musculoskeletal system associated with musculoskeletal procedure, unspecified complication    2. Weakness        Electronically signed by: Peewee Quinonez M.D.,5:27 PM 12/05/23

## 2023-12-05 NOTE — ED TRIAGE NOTES
"Chief Complaint   Patient presents with    Knee Pain     Pt had Left Total Knee at [facility] this am, unable to bear any wt on LLE    Fall     BP (!) 158/76   Pulse 96   Temp 36.5 °C (97.7 °F) (Temporal)   Resp 15   Ht 1.854 m (6' 1\")   Wt 87 kg (191 lb 12.8 oz)   SpO2 98%   BMI 25.30 kg/m²     Pt to ED via [facility] from [facility] s/p Left Total Knee this am, unable to bear any wt, stated \"legs just give out on me.\"  Pt slid off hospital [facility] while sitting on the side of the bed to use a urinal.  Pt did not hit head, no LOC, denies taking blood thinners, denied any increased pain in LLE.   "

## 2023-12-05 NOTE — ED NOTES
Medication history reviewed with pt. Med rec is complete.  Allergies reviewed, per pt    Pt reports that it been a bout a week since he took any of his home medications.     Patient has not had any outpatient antibiotics in the last 30 days.    Pt is not on any anticoagulants

## 2023-12-05 NOTE — ASSESSMENT & PLAN NOTE
Last glycated hemoglobin was 6.8%  I will start short acting insulin for now  I will order Accu-Checks, hypoglycemia protocol  Adjust according to blood sugars trend.     12/10: We have increased to high insulin sliding scale.  Will monitor closely and if needed we will start the patient on long-acting as well.

## 2023-12-05 NOTE — H&P
Hospital Medicine History & Physical Note    Date of Service  12/5/2023    Primary Care Physician  Jose Flores D.O.     Consultants  Sent by Dr Martha Rudd      Code Status  DNAR/DNI    Chief Complaint  Chief Complaint   Patient presents with    Knee Pain     Pt had Left Total Knee at [facility] this am, unable to bear any wt on LLE    Fall     History of Presenting Illness  Saleem Whitney is a 78 y.o. male with a past medical history of diabetes, hypertension, hyperlipidemia and arthritis s/p total left knee arthroplasty done today with Dr. Thomas who presented 12/5/2023 with worsening left knee pain and weakness of the left lower extremity after his procedure.  Patient reports that he did fall because of the weakness.  Pain is now moderate in severity.  He denies having weakness in other parts of his body.  He denies having fevers or chills.     I discussed the plan of care with emergency department physician, and the patient.    Review of Systems  Review of Systems   Constitutional:  Negative for chills and fever.   Eyes:  Negative for discharge and redness.   Respiratory:  Negative for cough, shortness of breath and stridor.    Cardiovascular:  Negative for chest pain and leg swelling.   Gastrointestinal:  Negative for abdominal pain and vomiting.   Genitourinary:  Negative for flank pain.   Musculoskeletal:  Positive for falls and joint pain (Left knee). Negative for myalgias.   Skin: Negative.    Neurological:  Negative for focal weakness.   Endo/Heme/Allergies:  Does not bruise/bleed easily.   Psychiatric/Behavioral:  The patient is not nervous/anxious.      Past Medical History   has a past medical history of Bowel habit changes (06/2023), Cancer (HCC), Cataract, Dental disorder, Elevated serum creatinine (07/22/2022), Hyperlipidemia, Hypertension, Hypothyroid, Pneumonia (2020), Renal disorder, Snoring, and Type II or unspecified type diabetes mellitus without mention of complication, not stated as  uncontrolled.    Surgical History   has a past surgical history that includes hand surgery; pr lap,diagnostic abdomen (5/17/2023); and turbt (transurethral resection of bladder tumor) (7/31/2023).     Family History  family history includes Diabetes in his maternal uncle, mother, and sister; Stroke in his father.      Social History   reports that he quit smoking about 52 years ago. His smoking use included cigarettes. He started smoking about 57 years ago. He has a 5.0 pack-year smoking history. He has never used smokeless tobacco. He reports current alcohol use of about 4.2 oz of alcohol per week. He reports that he does not use drugs.    Allergies  Allergies   Allergen Reactions    Kdc:Red Dye+Yellow Dye+Ci Pigment Blue 63+Oxymorphone Hives and Shortness of Breath    Latex Rash     Adhesives-rash     Medications  Prior to Admission Medications   Prescriptions Last Dose Informant Patient Reported? Taking?   Cyanocobalamin (VITAMIN B-12) 2500 MCG SL Tab  Patient No No   Sig: Place 1 Tablet under the tongue every day.   Patient taking differently: Place 2,500 mcg under the tongue every evening.   Dulaglutide (TRULICITY) 1.5 MG/0.5ML Solution Pen-injector  Patient No No   Sig: Inject 0.5 mL under the skin every 7 days.   Magnesium 500 MG Tab  Patient No No   Sig: Take 1,000 mg by mouth every day.   Patient taking differently: Take 1,000 mg by mouth every evening.   amLODIPine (NORVASC) 10 MG Tab   No No   Sig: TAKE 1 TABLET EVERY DAY   atorvastatin (LIPITOR) 10 MG Tab   No No   Sig: Take 1 Tablet by mouth every day.   glipiZIDE (GLUCOTROL) 10 MG Tab   No No   Sig: TAKE 1 TABLET EVERY DAY   levothyroxine (SYNTHROID) 88 MCG Tab   No No   Sig: Take 1 Tablet by mouth every morning on an empty stomach.   losartan (COZAAR) 100 MG Tab   No No   Sig: Take 1 Tablet by mouth every day.   metformin (GLUCOPHAGE) 1000 MG tablet   No No   Sig: Take 1 Tablet by mouth 2 times a day with meals.   oxyCODONE immediate-release  (ROXICODONE) 5 MG Tab   No No   Sig: Take 1 Tablet by mouth every four hours as needed for Severe Pain for up to 7 days.   tamsulosin (FLOMAX) 0.4 MG capsule  Patient Yes No   Sig: Take 0.8 mg by mouth every day.   traMADol (ULTRAM) 50 MG Tab   No No   Sig: Take 1 Tablet by mouth every four hours as needed for Moderate Pain for up to 7 days.   vitamin D (CHOLECALCIFEROL) 1000 UNIT Tab  Patient No No   Sig: Take 1 Tab by mouth every day.   Patient taking differently: Take 1,000 Units by mouth every evening.      Facility-Administered Medications: None     Physical Exam  Temp:  [36.2 °C (97.2 °F)-36.6 °C (97.9 °F)] 36.5 °C (97.7 °F)  Pulse:  [57-96] 85  Resp:  [8-17] 15  BP: (133-191)/() 133/69  SpO2:  [92 %-98 %] 97 %  Blood Pressure : (!) 158/76   Temperature: 36.5 °C (97.7 °F)   Pulse: 96   Respiration: 15   Pulse Oximetry: 98 %     Physical Exam  Constitutional:       General: He is not in acute distress.     Appearance: He is not ill-appearing or diaphoretic.   HENT:      Head: Atraumatic.      Right Ear: External ear normal.      Left Ear: External ear normal.      Nose: No congestion or rhinorrhea.      Mouth/Throat:      Mouth: Mucous membranes are moist.   Eyes:      General: No scleral icterus.        Right eye: No discharge.         Left eye: No discharge.      Pupils: Pupils are equal, round, and reactive to light.   Cardiovascular:      Rate and Rhythm: Normal rate and regular rhythm.   Pulmonary:      Effort: Pulmonary effort is normal.   Abdominal:      General: There is no distension.   Musculoskeletal:         General: Swelling and tenderness present.      Cervical back: Neck supple. No rigidity. No muscular tenderness.      Right lower leg: No edema.      Left lower leg: Edema present.   Skin:     Coloration: Skin is not jaundiced or pale.   Neurological:      Mental Status: He is alert and oriented to person, place, and time.      Motor: Weakness (Left lower extremity weakness power 2/5 at  "left knee joint, 3/5 at left hip) present.      Coordination: Coordination abnormal.   Psychiatric:         Mood and Affect: Mood normal.         Behavior: Behavior normal.       Laboratory:  Recent Labs     12/05/23  1440   WBC 16.2*   RBC 4.53*   HEMOGLOBIN 13.6*   HEMATOCRIT 41.5*   MCV 91.6   MCH 30.0   MCHC 32.8   RDW 42.3   PLATELETCT 253   MPV 9.5     Recent Labs     12/05/23  1440   SODIUM 136   POTASSIUM 3.8   CHLORIDE 101   CO2 23   GLUCOSE 266*   BUN 20   CREATININE 0.98   CALCIUM 8.1*     Recent Labs     12/05/23  1440   ALTSGPT 17   ASTSGOT 17   ALKPHOSPHAT 66   TBILIRUBIN 0.3   GLUCOSE 266*         No results for input(s): \"NTPROBNP\" in the last 72 hours.      No results for input(s): \"TROPONINT\" in the last 72 hours.    Imaging:  DX-KNEE 3 VIEWS LEFT    (Results Pending)   DX-HIP-BILATERAL-WITH PELVIS-3/4 VIEWS    (Results Pending)     Assessment/Plan:  Justification for Admission Status  I anticipate this patient is appropriate for observation status at this time because possible discharge after 1 midnight    Patient will need a Med/Surg bed on ORTHOPEDICS service .  The patient has weakness after left knee arthroplasty.    * Left lower extremity weakness- (present on admission)  Assessment & Plan  Had left Total Knee Arthroplasty today 12/5/2023 w Dr Thomas.    Developed left lower extremity weakness in recovery.    Likely secondary to nerve block  Anticipate improvement over the next 24 hours   PT/OT     Acute pain of left knee- (present on admission)  Assessment & Plan  Had left Total Knee Arthroplasty today 12/5/2023 w Dr Thomas.    Developed left lower extremity weakness in recovery.    Patient fell hitting his while attempting to walk and then developed pain in his knee  I will check knee and hip x-rays to rule out occult fractures    S/P total knee arthroplasty, left- (present on admission)  Assessment & Plan  Had left Total Knee Arthroplasty today 12/5/2023 w Dr Thomas.   Multimodal pain " control.  Pharmacologic prophylaxis to start tomorrow.   Consider physical and Occupational Therapy when okay with orthopedics.      Advance care planning- (present on admission)  Assessment & Plan  I had a discussion with the patient regarding goals of care, diagnoses, prognosis, and CODE STATUS. We discussed his  and comorbidities.  The patient has advanced age and multiple comorbid conditions including diabetes, hypertension and hyperlipidemia.  However, he enjoys a good mental capacity and relatively good functional status.  At this point he wants to proceed with a DNAR/DNI code.  However, he is open to all forms of diagnostic and therapeutic invasive and noninvasive interventions as indicated     Type 2 diabetes mellitus without complication, without long-term current use of insulin (HCC)- (present on admission)  Assessment & Plan  Last glycated hemoglobin was 6.8%  I will start short acting insulin for now  I will order Accu-Checks, hypoglycemia protocol  Adjust according to blood sugars trend.     Dyslipidemia- (present on admission)  Assessment & Plan  Cardiac diet   Resume atorvastatin     BPH associated with nocturia- (present on admission)  Assessment & Plan  Resume tamsulosin        VTE prophylaxis: SCDs/TEDs aspirin twice daily to start tomorrow 12/6.    I had a discussion with the patient regarding goals of care, diagnoses, prognosis, and CODE STATUS. We discussed his  and comorbidities.  The patient has advanced age and multiple comorbid conditions including diabetes, hypertension and hyperlipidemia.  However, he enjoys a good mental capacity and relatively good functional status.  At this point he wants to proceed with a DNAR/DNI code.  However, he is open to all forms of diagnostic and therapeutic invasive and noninvasive interventions as indicated. I spent 17 minutes on advanced care planning in addition to the time spent managing the other medical problems.

## 2023-12-05 NOTE — ASSESSMENT & PLAN NOTE
"As per previous hospitalist:  \"I had a discussion with the patient regarding goals of care, diagnoses, prognosis, and CODE STATUS. We discussed his  and comorbidities.  The patient has advanced age and multiple comorbid conditions including diabetes, hypertension and hyperlipidemia.  However, he enjoys a good mental capacity and relatively good functional status.  At this point he wants to proceed with a DNAR/DNI code.  However, he is open to all forms of diagnostic and therapeutic invasive and noninvasive interventions as indicated.\"  "

## 2023-12-05 NOTE — ASSESSMENT & PLAN NOTE
Had left Total Knee Arthroplasty today 12/5/2023 w Dr Thomas.    Developed left lower extremity weakness in recovery.    Likely secondary to nerve block  Anticipate improvement over the next 24 hours   PT/OT     12/6: Continue PT/OT. Surgeon evaluated patient at bedside today, we appreciate further recommendations.    12/10: Pending placement.

## 2023-12-05 NOTE — ASSESSMENT & PLAN NOTE
Had left Total Knee Arthroplasty today 12/5/2023 w Dr Thomas.    Developed left lower extremity weakness in recovery.    Patient fell hitting his while attempting to walk and then developed pain in his knee  I will check knee and hip x-rays to rule out occult fractures    12/7: Patient still with significant pain. We will increase dose of PRN medications. PT/OT    12/10: Pending placement.

## 2023-12-06 PROBLEM — D64.9 ANEMIA: Status: ACTIVE | Noted: 2023-12-06

## 2023-12-06 PROBLEM — R50.9 FEVER: Status: ACTIVE | Noted: 2023-12-06

## 2023-12-06 LAB
ALBUMIN SERPL BCP-MCNC: 3.6 G/DL (ref 3.2–4.9)
ALBUMIN/GLOB SERPL: 1.3 G/DL
ALP SERPL-CCNC: 63 U/L (ref 30–99)
ALT SERPL-CCNC: 13 U/L (ref 2–50)
ANION GAP SERPL CALC-SCNC: 12 MMOL/L (ref 7–16)
AST SERPL-CCNC: 16 U/L (ref 12–45)
BILIRUB SERPL-MCNC: 0.5 MG/DL (ref 0.1–1.5)
BUN SERPL-MCNC: 22 MG/DL (ref 8–22)
CALCIUM ALBUM COR SERPL-MCNC: 8.6 MG/DL (ref 8.5–10.5)
CALCIUM SERPL-MCNC: 8.3 MG/DL (ref 8.4–10.2)
CHLORIDE SERPL-SCNC: 102 MMOL/L (ref 96–112)
CO2 SERPL-SCNC: 23 MMOL/L (ref 20–33)
CREAT SERPL-MCNC: 1.12 MG/DL (ref 0.5–1.4)
ERYTHROCYTE [DISTWIDTH] IN BLOOD BY AUTOMATED COUNT: 41.8 FL (ref 35.9–50)
GFR SERPLBLD CREATININE-BSD FMLA CKD-EPI: 66 ML/MIN/1.73 M 2
GLOBULIN SER CALC-MCNC: 2.7 G/DL (ref 1.9–3.5)
GLUCOSE BLD STRIP.AUTO-MCNC: 224 MG/DL (ref 65–99)
GLUCOSE BLD STRIP.AUTO-MCNC: 229 MG/DL (ref 65–99)
GLUCOSE BLD STRIP.AUTO-MCNC: 241 MG/DL (ref 65–99)
GLUCOSE BLD STRIP.AUTO-MCNC: 292 MG/DL (ref 65–99)
GLUCOSE SERPL-MCNC: 184 MG/DL (ref 65–99)
HCT VFR BLD AUTO: 36.5 % (ref 42–52)
HCT VFR BLD AUTO: 37.2 % (ref 42–52)
HCT VFR BLD AUTO: 37.4 % (ref 42–52)
HGB BLD-MCNC: 12.4 G/DL (ref 14–18)
HGB BLD-MCNC: 12.4 G/DL (ref 14–18)
HGB BLD-MCNC: 12.5 G/DL (ref 14–18)
MAGNESIUM SERPL-MCNC: 1.8 MG/DL (ref 1.5–2.5)
MCH RBC QN AUTO: 30 PG (ref 27–33)
MCHC RBC AUTO-ENTMCNC: 33.4 G/DL (ref 32.3–36.5)
MCV RBC AUTO: 89.9 FL (ref 81.4–97.8)
PLATELET # BLD AUTO: 249 K/UL (ref 164–446)
PMV BLD AUTO: 10.1 FL (ref 9–12.9)
POTASSIUM SERPL-SCNC: 4 MMOL/L (ref 3.6–5.5)
PROT SERPL-MCNC: 6.3 G/DL (ref 6–8.2)
RBC # BLD AUTO: 4.16 M/UL (ref 4.7–6.1)
SODIUM SERPL-SCNC: 137 MMOL/L (ref 135–145)
WBC # BLD AUTO: 14.3 K/UL (ref 4.8–10.8)

## 2023-12-06 PROCEDURE — 700102 HCHG RX REV CODE 250 W/ 637 OVERRIDE(OP): Performed by: INTERNAL MEDICINE

## 2023-12-06 PROCEDURE — 36415 COLL VENOUS BLD VENIPUNCTURE: CPT

## 2023-12-06 PROCEDURE — 94760 N-INVAS EAR/PLS OXIMETRY 1: CPT

## 2023-12-06 PROCEDURE — 80053 COMPREHEN METABOLIC PANEL: CPT

## 2023-12-06 PROCEDURE — 97163 PT EVAL HIGH COMPLEX 45 MIN: CPT

## 2023-12-06 PROCEDURE — 700102 HCHG RX REV CODE 250 W/ 637 OVERRIDE(OP): Performed by: HOSPITALIST

## 2023-12-06 PROCEDURE — 96365 THER/PROPH/DIAG IV INF INIT: CPT

## 2023-12-06 PROCEDURE — 97140 MANUAL THERAPY 1/> REGIONS: CPT

## 2023-12-06 PROCEDURE — 96375 TX/PRO/DX INJ NEW DRUG ADDON: CPT

## 2023-12-06 PROCEDURE — 99233 SBSQ HOSP IP/OBS HIGH 50: CPT | Performed by: INTERNAL MEDICINE

## 2023-12-06 PROCEDURE — 87040 BLOOD CULTURE FOR BACTERIA: CPT

## 2023-12-06 PROCEDURE — 85027 COMPLETE CBC AUTOMATED: CPT

## 2023-12-06 PROCEDURE — 85014 HEMATOCRIT: CPT | Mod: 91

## 2023-12-06 PROCEDURE — 82962 GLUCOSE BLOOD TEST: CPT | Mod: 91

## 2023-12-06 PROCEDURE — 96372 THER/PROPH/DIAG INJ SC/IM: CPT

## 2023-12-06 PROCEDURE — 85018 HEMOGLOBIN: CPT

## 2023-12-06 PROCEDURE — G0378 HOSPITAL OBSERVATION PER HR: HCPCS

## 2023-12-06 PROCEDURE — A9270 NON-COVERED ITEM OR SERVICE: HCPCS | Performed by: INTERNAL MEDICINE

## 2023-12-06 PROCEDURE — A9270 NON-COVERED ITEM OR SERVICE: HCPCS | Performed by: HOSPITALIST

## 2023-12-06 PROCEDURE — 83735 ASSAY OF MAGNESIUM: CPT

## 2023-12-06 PROCEDURE — 700111 HCHG RX REV CODE 636 W/ 250 OVERRIDE (IP): Mod: JZ | Performed by: INTERNAL MEDICINE

## 2023-12-06 RX ORDER — HYDROMORPHONE HYDROCHLORIDE 1 MG/ML
0.5 INJECTION, SOLUTION INTRAMUSCULAR; INTRAVENOUS; SUBCUTANEOUS ONCE
Status: COMPLETED | OUTPATIENT
Start: 2023-12-06 | End: 2023-12-06

## 2023-12-06 RX ORDER — MAGNESIUM SULFATE 1 G/100ML
1 INJECTION INTRAVENOUS ONCE
Status: COMPLETED | OUTPATIENT
Start: 2023-12-06 | End: 2023-12-06

## 2023-12-06 RX ADMIN — LOSARTAN POTASSIUM 100 MG: 50 TABLET, FILM COATED ORAL at 04:34

## 2023-12-06 RX ADMIN — LEVOTHYROXINE SODIUM 88 MCG: 88 TABLET ORAL at 04:34

## 2023-12-06 RX ADMIN — MAGNESIUM SULFATE IN DEXTROSE 1 G: 10 INJECTION, SOLUTION INTRAVENOUS at 05:54

## 2023-12-06 RX ADMIN — OXYCODONE 5 MG: 5 TABLET ORAL at 09:39

## 2023-12-06 RX ADMIN — INSULIN HUMAN 2 UNITS: 100 INJECTION, SOLUTION PARENTERAL at 05:45

## 2023-12-06 RX ADMIN — AMLODIPINE BESYLATE 10 MG: 5 TABLET ORAL at 17:29

## 2023-12-06 RX ADMIN — TAMSULOSIN HYDROCHLORIDE 0.8 MG: 0.4 CAPSULE ORAL at 04:34

## 2023-12-06 RX ADMIN — INSULIN HUMAN 2 UNITS: 100 INJECTION, SOLUTION PARENTERAL at 17:20

## 2023-12-06 RX ADMIN — HYDROMORPHONE HYDROCHLORIDE 0.5 MG: 1 INJECTION, SOLUTION INTRAMUSCULAR; INTRAVENOUS; SUBCUTANEOUS at 14:11

## 2023-12-06 RX ADMIN — ASPIRIN 81 MG: 81 TABLET, COATED ORAL at 17:29

## 2023-12-06 RX ADMIN — OXYCODONE HYDROCHLORIDE 2.5 MG: 5 TABLET ORAL at 21:04

## 2023-12-06 RX ADMIN — INSULIN HUMAN 2 UNITS: 100 INJECTION, SOLUTION PARENTERAL at 12:14

## 2023-12-06 RX ADMIN — INSULIN HUMAN 3 UNITS: 100 INJECTION, SOLUTION PARENTERAL at 21:08

## 2023-12-06 RX ADMIN — ATORVASTATIN CALCIUM 10 MG: 10 TABLET, FILM COATED ORAL at 17:29

## 2023-12-06 RX ADMIN — OXYCODONE 5 MG: 5 TABLET ORAL at 04:23

## 2023-12-06 ASSESSMENT — PAIN DESCRIPTION - PAIN TYPE
TYPE: ACUTE PAIN
TYPE: SURGICAL PAIN
TYPE: ACUTE PAIN
TYPE: SURGICAL PAIN
TYPE: ACUTE PAIN
TYPE: SURGICAL PAIN

## 2023-12-06 ASSESSMENT — ENCOUNTER SYMPTOMS
NERVOUS/ANXIOUS: 0
DIZZINESS: 0
PALPITATIONS: 0
HEARTBURN: 0
DOUBLE VISION: 0
BACK PAIN: 0
CHILLS: 0
FEVER: 0
BLURRED VISION: 0
FALLS: 1
COUGH: 0
HEADACHES: 0
ABDOMINAL PAIN: 0
SHORTNESS OF BREATH: 0
VOMITING: 0

## 2023-12-06 ASSESSMENT — COGNITIVE AND FUNCTIONAL STATUS - GENERAL
STANDING UP FROM CHAIR USING ARMS: TOTAL
MOVING FROM LYING ON BACK TO SITTING ON SIDE OF FLAT BED: UNABLE
TURNING FROM BACK TO SIDE WHILE IN FLAT BAD: UNABLE
SUGGESTED CMS G CODE MODIFIER MOBILITY: CN
MOVING TO AND FROM BED TO CHAIR: UNABLE
MOBILITY SCORE: 6
WALKING IN HOSPITAL ROOM: TOTAL
CLIMB 3 TO 5 STEPS WITH RAILING: TOTAL

## 2023-12-06 ASSESSMENT — PATIENT HEALTH QUESTIONNAIRE - PHQ9
2. FEELING DOWN, DEPRESSED, IRRITABLE, OR HOPELESS: NOT AT ALL
SUM OF ALL RESPONSES TO PHQ9 QUESTIONS 1 AND 2: 0
1. LITTLE INTEREST OR PLEASURE IN DOING THINGS: NOT AT ALL
1. LITTLE INTEREST OR PLEASURE IN DOING THINGS: NOT AT ALL
SUM OF ALL RESPONSES TO PHQ9 QUESTIONS 1 AND 2: 0
2. FEELING DOWN, DEPRESSED, IRRITABLE, OR HOPELESS: NOT AT ALL

## 2023-12-06 ASSESSMENT — GAIT ASSESSMENTS: GAIT LEVEL OF ASSIST: UNABLE TO PARTICIPATE

## 2023-12-06 ASSESSMENT — LIFESTYLE VARIABLES: SUBSTANCE_ABUSE: 0

## 2023-12-06 NOTE — PROGRESS NOTES
19:15 Received bedside report from day shift RN at    Assumed pt care. Pt seen AO4, on room air. Pain reported, medicated per mar at hrs 19:24. No nausea reported. POC and goals discussed. CMS intact. Whiteboard updated. Safety and fall precautions in place. Bed locked and lowest position. Bed and strip alarm on.  Instructed pt to use call light, verbalized understanding.   Call light kept within reach. No other needs reported at this time.  Implementation of POC in progress.    19:30 Bedside report given to Carolina RN for transfer of care.     03:55 Report received from Carolina RN. Assumed pt care. Pt asleep with calm unlabored breathing.

## 2023-12-06 NOTE — ASSESSMENT & PLAN NOTE
Patient had recent surgery  We have ordered blood cultures  Monitor    12/7: No further episodes of fever. Monitor for signs of infection.    12/9: Blood cultures negative to date

## 2023-12-06 NOTE — ASSESSMENT & PLAN NOTE
Most likely reactive from recent surgery.  The patient did have an episode of fever yesterday.  We did order blood cultures.  For now we will hold antibiotics.

## 2023-12-06 NOTE — CARE PLAN
The patient is Stable - Low risk of patient condition declining or worsening    Shift Goals  Clinical Goals: Pt will manage pain at tolerable level, able to ambulate safety  Patient Goals: rest cmfortably., pain control    Progress made toward(s) clinical / shift goals:  Pain has been controlled throughout the shift. Fall precaution in place. Refusing to get up in bed, he wants to rest first. Encouraged ambulation.    Patient is not progressing towards the following goals:    Problem: Fall Risk  Goal: Patient will remain free from falls  Outcome: Progressing     Problem: Post-Operative Knee Replacement  Goal: Patient will demonstrate understanding of knee replacement post-surgical weight bearing restrictions and DME usage during hospital stay and post discharge  Outcome: Progressing  Goal: Patient's neurovascular status will be maintained or improve  Outcome: Progressing  Goal: Early mobilization post surgery  Outcome: Progressing     Problem: Pain - Post Surgery  Goal: Alleviation or reduction of pain post surgery  Outcome: Progressing     Problem: Incision Care  Goal: Optimal post surgical incision care  Outcome: Progressing       Problem: Diabetes Management  Goal: Patient will achieve and maintain glucose in satisfactory range  Outcome: Progressing

## 2023-12-06 NOTE — ASSESSMENT & PLAN NOTE
There could be a component of acute blood loss anemia due to recent surgery.  Monitor hemoglobin.    12/10: Hb seems to be stable. Monitor for any signs of bleeding.

## 2023-12-06 NOTE — THERAPY
Physical Therapy   Initial Evaluation     Patient Name: Saleem Whitney  Age:  78 y.o., Sex:  male  Medical Record #: [id number]  Today's Date: 12/6/2023     Precautions  Precautions: Fall Risk;Weight Bearing As Tolerated Left Lower Extremity    Assessment  Pt is an 78 year old male who underwent TKA on 12/5. Pt with worsening pain and decreased mobility. PMH of DM, HTN, HLD, and arthritis.  Upon entry patient was in signficant amount of pain. Pt could not tolerate any movement or touch. Knowing that pt had a increased bleeding this AM, PT wanted to visualize surgical dressing. PT removed ace bandage, which was wrapped proximal to distal and it was noted that there was increased swelling in L knee, L lower leg, and L ankle/foot. RN present to give pain medicine.   Pt was still very hypersensitive to the touch and could only tolerate very light pressure. Manual drainage techniques to L LE to promote edema control, L ankle PROM, grade I and II medial and lateral patellar glides performed. L LE with manual oscillations for pain control. L LE with SPICA wrapping distal to proximal to promote decreased edema and ice provided to joint. Asked RN to order polar ice for patient. Pt could not tolerate any mobility at this point in time. PT will follow.    Plan    Physical Therapy Initial Treatment Plan   Treatment Plan : Bed Mobility, Equipment, Gait Training, Neuro Re-Education / Balance, Manual Therapy, Self Care / Home Evaluation, Stair Training, Therapeutic Activities, Therapeutic Exercise  Treatment Frequency: Daily  Duration: Until Therapy Goals Met    DC Equipment Recommendations: Unable to determine at this time  Discharge Recommendations: Recommend post-acute placement for additional physical therapy services prior to discharge home       Objective     12/06/23 1435   Initial Contact Note    Initial Contact Note Order Received and Verified, Physical Therapy Evaluation in Progress with Full Report to Follow.    Precautions   Precautions Fall Risk;Weight Bearing As Tolerated Left Lower Extremity   Pain 0 - 10 Group   Therapist Pain Assessment 8;During Activity;Nurse Notified   Prior Living Situation   Housing / Facility 1 Story House   Steps Into Home 3   Lives with - Patient's Self Care Capacity Spouse   Prior Level of Functional Mobility   Bed Mobility Independent   Transfer Status Independent   Ambulation Independent   Ambulation Distance community   Stairs Independent   Cognition    Level of Consciousness Alert   Passive ROM Lower Body   Passive ROM Lower Body X   Comments only able to tolerate 5 degrees of L knee flexion   Active ROM Lower Body    Active ROM Lower Body  X   Comments unable to move L LE due to pain   Strength Lower Body   Lower Body Strength  X   Comments unable to move L LE due to pain   Other Treatments   Other Treatments Provided Manual drainage techniques to L LE to promote edema control, L ankle PROM, grade I and II medial and lateral patellar glides performed. L LE with manual oscillations for pain control. L LE with SPICA wrapping distal to proximal to promote decreased edema and ice provided to joint. Pt educated on breathing techniques to help with pain control. Pt educated on resting position of L LE and icing regimen.   Balance Assessment   Comments Unable to tolerate any movement due to increased pain   Bed Mobility    Supine to Sit Unable to Participate   Sit to Supine Unable to Participate   Scooting Unable to Participate   Gait Analysis   Gait Level Of Assist Unable to Participate   Weight Bearing Status WBAT L LE   Functional Mobility   Sit to Stand Unable to Participate   Bed, Chair, Wheelchair Transfer Unable to Participate   How much difficulty does the patient currently have...   Turning over in bed (including adjusting bedclothes, sheets and blankets)? 1   Sitting down on and standing up from a chair with arms (e.g., wheelchair, bedside commode, etc.) 1   Moving from lying on back  to sitting on the side of the bed? 1   How much help from another person does the patient currently need...   Moving to and from a bed to a chair (including a wheelchair)? 1   Need to walk in a hospital room? 1   Climbing 3-5 steps with a railing? 1   6 clicks Mobility Score 6   Short Term Goals    Short Term Goal # 1 Pt will complete bed mobility without the use of bed features with SPV within 6 visits   Short Term Goal # 2 Pt will ambulate 50 feet with LRAD with SPV within 6 visits to improve functional mobility   Short Term Goal # 3 Pt will ascend/descend 3 steps with LRAD with SPV within 6 visits to allow home access   Education Group   Education Provided Role of Physical Therapist;Exercises - Supine  (pain control,)   Role of Physical Therapist Patient Response Patient;Acceptance;Explanation;Verbal Demonstration   Exercises - Supine Patient Response Patient;Acceptance;Explanation;Verbal Demonstration   Physical Therapy Initial Treatment Plan    Treatment Plan  Bed Mobility;Equipment;Gait Training;Neuro Re-Education / Balance;Manual Therapy;Self Care / Home Evaluation;Stair Training;Therapeutic Activities;Therapeutic Exercise   Treatment Frequency Daily   Duration Until Therapy Goals Met   Problem List    Problems Pain;Impaired Bed Mobility;Impaired Transfers;Impaired Ambulation;Functional ROM Deficit;Functional Strength Deficit;Impaired Balance;Impaired Coordination;Decreased Activity Tolerance;Limited Knowledge of Post-Op Precautions;Motor Planning / Sequencing   Anticipated Discharge Equipment and Recommendations   DC Equipment Recommendations Unable to determine at this time   Discharge Recommendations Recommend post-acute placement for additional physical therapy services prior to discharge home   Interdisciplinary Plan of Care Collaboration   IDT Collaboration with  Nursing   Patient Position at End of Therapy In Bed;Call Light within Reach;Tray Table within Reach   Collaboration Comments PT findings    Session Information   Date / Session Number  12/6-191/7, 12/12)

## 2023-12-06 NOTE — PROGRESS NOTES
4 Eyes Skin Assessment Completed by ESPERANZA Yap and ESPERANZA Barnett.    Head WDL  Ears WDL  Nose Redness and Blanching  Mouth WDL  Neck WDL  Breast/Chest WDL  Shoulder Blades WDL  Spine Redness, Scar  (R) Arm/Elbow/Hand Scab, Discoloration  (L) Arm/Elbow/Hand Scab, Discoloration  Abdomen WDL  Groin WDL  Scrotum/Coccyx/Buttocks Redness and Blanching  (R) Leg Redness and Scab  (L) Leg Incision post TKA  (R) Heel/Foot/Toe Redness, Blanching, and Swelling  (L) Heel/Foot/Toe Redness, Blanching, and Swelling          Devices In Places Blood Pressure Cuff, Pulse Ox, and SCD's      Interventions In Place Pillows    Possible Skin Injury No    Pictures Uploaded Into Epic N/A  Wound Consult Placed N/A  RN Wound Prevention Protocol Ordered No

## 2023-12-06 NOTE — PROGRESS NOTES
2929-Received report from ER RN.   1500- Pt arrived to floor via gurney then transferred to hospital bed using slide board.  Assumed care of pt.   Assessment and chart check complete.   Pt is AAOX4, respirations even and unlabored, no signs of distress, denies nausea/vomiting or lightheadedness.   Dressing CDI with dorsiflex and plantar flex both feet, palpable pulses on both LE.   CMS intact.  Wife at bedside.  Fall precautions in place, treaded socks on pt, bed in low position, bed alarm and strip alarm on.   Call light within reach.   Educated pt to call if needing anything.   Hourly rounding.

## 2023-12-06 NOTE — PROGRESS NOTES
04:09 Noted pts surgical dressing bloody as it seeped through ACE wrap. VS assessed BP elevated, HR WNL. . LLE swollen, warm to touch. CMS intact. No signs of distress noted. Pain reported, medicated per mar. Removed ACE and photo taken of the surgical dressing, uploaded on pts chart. Blood has dried and pt not actively bleeding, checked pts H&H from latest draw Hmg 12.5. Dressing reinforced with ABD pad and ACE. Pt resting in bed,  in place. Ice packs provided for comfort and swelling.    05:16 New orders for Stat H&H by MD    05:17 called lab to [facility] with STAT orders    06:28 Pt resting in bed, no signs of distress noted. One time dose of IV mag infusing.    06:39 Dr. Thomas updated at bedside. Orders given to change dressing to Aquacel.

## 2023-12-06 NOTE — THERAPY
Occupational Therapy Contact Note    Patient Name: Saleem Whitney  Age:  78 y.o., Sex:  male  Medical Record #: [id number]  Today's Date: 12/6/2023 12/06/23 8218   Initial Contact Note    Initial Contact Note Order Received and Verified, Occupational Therapy Evaluation in Progress with Full Report to Follow.   Interdisciplinary Plan of Care Collaboration   IDT Collaboration with  Nursing   Collaboration Comments OT orders rec'd.  Pt in severe pain when PT attempted to work with pt and was not able to tolerate OOB 2/2 pain and extreme swelling of post surgical leg.  OT will hold eval today and follow as needed when pain better managed.

## 2023-12-06 NOTE — PROGRESS NOTES
"Hospital Medicine Daily Progress Note    Date of Service  12/6/2023    Chief Complaint  Saleem Whitney is a 78 y.o. male admitted 12/5/2023 with knee pain.    Hospital Course  As per chart review:  \"78 y.o. male with a past medical history of diabetes, hypertension, hyperlipidemia and arthritis s/p total left knee arthroplasty done today with Dr. Thomas who presented 12/5/2023 with worsening left knee pain and weakness of the left lower extremity after his procedure.  Patient reports that he did fall because of the weakness.  Pain is now moderate in severity.  He denies having weakness in other parts of his body.  He denies having fevers or chills.\"    Interval Problem Update  12/6: Patient seen at bedside this morning.  The patient states that he is still having difficulty moving his leg.  We appreciate further recommendation by orthopedic surgery.  Will monitor hemoglobin as it seems the patient had soaked dressing earlier this morning and was changed by orthopedics request.  Repeat hemoglobin seems to be stable however.  Continue to monitor closely.  The patient will require PT/OT evaluation once approved by orthopedics.  The patient did have an episode of fever yesterday, however he has had recent surgery.  We did order blood cultures.  Will monitor closely for any signs of infection.    I have discussed this patient's plan of care and discharge plan at IDT rounds today with Case Management, Nursing, Nursing leadership, and other members of the IDT team.    Consultants/Specialty  Orthopedics - Dr Thomas    Code Status  DNAR/DNI    Disposition  The patient is not medically cleared for discharge to home or a post-acute facility.      I have placed the appropriate orders for post-discharge needs.    Review of Systems  Review of Systems   Constitutional:  Positive for malaise/fatigue. Negative for chills and fever.   HENT:  Negative for hearing loss and nosebleeds.    Eyes:  Negative for blurred vision and " double vision.   Respiratory:  Negative for cough and shortness of breath.    Cardiovascular:  Negative for chest pain and palpitations.   Gastrointestinal:  Negative for abdominal pain, heartburn and vomiting.   Genitourinary:  Negative for dysuria and urgency.   Musculoskeletal:  Positive for falls and joint pain. Negative for back pain.   Skin:  Negative for itching and rash.   Neurological:  Negative for dizziness and headaches.   Psychiatric/Behavioral:  Negative for substance abuse. The patient is not nervous/anxious.    All other systems reviewed and are negative.       Physical Exam  Temp:  [36.5 °C (97.7 °F)-38.2 °C (100.8 °F)] 36.7 °C (98.1 °F)  Pulse:  [] 97  Resp:  [11-18] 18  BP: (133-188)/() 141/60  SpO2:  [91 %-98 %] 91 %    Physical Exam  Vitals and nursing note reviewed.   Constitutional:       Appearance: He is ill-appearing.   HENT:      Head: Normocephalic and atraumatic.      Right Ear: External ear normal.      Left Ear: External ear normal.      Nose: Nose normal.      Mouth/Throat:      Mouth: Mucous membranes are moist.      Pharynx: Oropharynx is clear.   Eyes:      General:         Right eye: No discharge.         Left eye: No discharge.      Extraocular Movements: Extraocular movements intact.      Pupils: Pupils are equal, round, and reactive to light.   Cardiovascular:      Rate and Rhythm: Normal rate and regular rhythm.      Heart sounds: No murmur heard.  Pulmonary:      Effort: Pulmonary effort is normal. No respiratory distress.      Breath sounds: Normal breath sounds.   Abdominal:      General: Abdomen is flat. Bowel sounds are normal. There is no distension.      Palpations: Abdomen is soft.      Tenderness: There is no abdominal tenderness.   Musculoskeletal:         General: Swelling and tenderness present.      Cervical back: Normal range of motion and neck supple.   Skin:     General: Skin is warm.   Neurological:      General: No focal deficit present.       Mental Status: He is alert and oriented to person, place, and time.   Psychiatric:         Mood and Affect: Mood normal.         Behavior: Behavior normal.         Fluids    Intake/Output Summary (Last 24 hours) at 12/6/2023 0912  Last data filed at 12/6/2023 0445  Gross per 24 hour   Intake 550 ml   Output 1450 ml   Net -900 ml       Laboratory  Recent Labs     12/05/23  1440 12/06/23  0035 12/06/23  0525   WBC 16.2* 14.3*  --    RBC 4.53* 4.16*  --    HEMOGLOBIN 13.6* 12.5* 12.4*   HEMATOCRIT 41.5* 37.4* 36.5*   MCV 91.6 89.9  --    MCH 30.0 30.0  --    MCHC 32.8 33.4  --    RDW 42.3 41.8  --    PLATELETCT 253 249  --    MPV 9.5 10.1  --      Recent Labs     12/05/23  1440 12/06/23  0035   SODIUM 136 137   POTASSIUM 3.8 4.0   CHLORIDE 101 102   CO2 23 23   GLUCOSE 266* 184*   BUN 20 22   CREATININE 0.98 1.12   CALCIUM 8.1* 8.3*                   Imaging  DX-HIP-BILATERAL-WITH PELVIS-3/4 VIEWS   Final Result      Mild bilateral hip osteoarthritis      DX-KNEE 3 VIEWS LEFT   Final Result      Total knee arthroplasty without periprosthetic fracture.              Assessment/Plan  * Left lower extremity weakness- (present on admission)  Assessment & Plan  Had left Total Knee Arthroplasty today 12/5/2023 w Dr Thomas.    Developed left lower extremity weakness in recovery.    Likely secondary to nerve block  Anticipate improvement over the next 24 hours   PT/OT     12/6: Continue PT/OT. Surgeon evaluated patient at bedside today, we appreciate further recommendations.    Anemia  Assessment & Plan  There could be a component of acute blood loss anemia due to recent surgery.  Monitor hemoglobin.    Fever  Assessment & Plan  Patient had recent surgery  We have ordered blood cultures  monitor    Acute pain of left knee- (present on admission)  Assessment & Plan  Had left Total Knee Arthroplasty today 12/5/2023 w Dr Thomas.    Developed left lower extremity weakness in recovery.    Patient fell hitting his while attempting to  walk and then developed pain in his knee  I will check knee and hip x-rays to rule out occult fractures    S/P total knee arthroplasty, left- (present on admission)  Assessment & Plan  Had left Total Knee Arthroplasty today 12/5/2023 w Dr Thomas.   Multimodal pain control.  Pharmacologic prophylaxis to start tomorrow.   Consider physical and Occupational Therapy when okay with orthopedics.      Hypomagnesemia- (present on admission)  Assessment & Plan  Replace as needed  monitor    Advance care planning- (present on admission)  Assessment & Plan  I had a discussion with the patient regarding goals of care, diagnoses, prognosis, and CODE STATUS. We discussed his  and comorbidities.  The patient has advanced age and multiple comorbid conditions including diabetes, hypertension and hyperlipidemia.  However, he enjoys a good mental capacity and relatively good functional status.  At this point he wants to proceed with a DNAR/DNI code.  However, he is open to all forms of diagnostic and therapeutic invasive and noninvasive interventions as indicated     Dyslipidemia- (present on admission)  Assessment & Plan  Cardiac diet   Resume atorvastatin     Leukocytosis- (present on admission)  Assessment & Plan  Most likely reactive from recent surgery.  The patient did have an episode of fever yesterday.  We did order blood cultures.  For now we will hold antibiotics.    BPH associated with nocturia- (present on admission)  Assessment & Plan  Resume tamsulosin      Type 2 diabetes mellitus without complication, without long-term current use of insulin (HCC)- (present on admission)  Assessment & Plan  Last glycated hemoglobin was 6.8%  I will start short acting insulin for now  I will order Accu-Checks, hypoglycemia protocol  Adjust according to blood sugars trend.          VTE prophylaxis: Aspirin BID    I have performed a physical exam and reviewed and updated ROS and Plan today (12/6/2023). In review of yesterday's note  (12/5/2023), there are no changes except as documented above.      I spend at least 51 minutes providing care for this patient. This included face to face interview, physical examination.  Review of lab work including CBC, hemoglobin, CMP and magnesium.  Discussion with multidisciplinary team including case management, nursing staff and pharmacy.

## 2023-12-06 NOTE — CARE PLAN
The patient is Stable - Low risk of patient condition declining or worsening    Shift Goals  Clinical Goals: Patient will have a tolerable pain rating during this shift, maintain skin integrity  Patient Goals: pain control    Progress made toward(s) clinical / shift goals:  Patient is alert and oriented, has had some episodes of confusion, reoriented. Bed alarm on. Call light within reach.    Patient is not progressing towards the following goals:

## 2023-12-06 NOTE — PROGRESS NOTES
"S: Patient having difficulty with pain control and mobility    O: Patient not doing well, pain not well controlled and has not been able to mobilize adequately.    Dressing is saturated and needs changing.    Blood pressure 139/71, pulse 66, temperature 36.4 °C (97.6 °F), temperature source Temporal, resp. rate 17, height 1.854 m (6' 0.99\"), weight 87 kg (191 lb 12.8 oz), SpO2 95 %.    Recent Labs     12/05/23  1440 12/06/23  0035 12/06/23  0525   WBC 16.2* 14.3*  --    RBC 4.53* 4.16*  --    HEMOGLOBIN 13.6* 12.5* 12.4*   HEMATOCRIT 41.5* 37.4* 36.5*   MCV 91.6 89.9  --    MCH 30.0 30.0  --    MCHC 32.8 33.4  --    RDW 42.3 41.8  --    PLATELETCT 253 249  --    MPV 9.5 10.1  --          Intake/Output Summary (Last 24 hours) at 12/6/2023 1028  Last data filed at 12/6/2023 0445  Gross per 24 hour   Intake 550 ml   Output 1450 ml   Net -900 ml             A:  Patient needs more time in hospital to address pain control and mobility  Patient Active Problem List    Diagnosis Date Noted    Fever 12/06/2023    Anemia 12/06/2023    Left lower extremity weakness 12/05/2023    S/P total knee arthroplasty, left 12/05/2023    Acute pain of left knee 12/05/2023    Degenerative arthritis of left knee 10/23/2023    Trigger ring finger of left hand 07/07/2023    Vitamin B12 deficiency 07/07/2023    Hypomagnesemia 05/18/2023    Hyponatremia 05/17/2023    Advance care planning 05/17/2023    Facial laceration 04/17/2023    Long term (current) use of oral hypoglycemic drugs 03/02/2023    Dyslipidemia 03/02/2023    Small bowel obstruction (HCC) 09/08/2022    Leukocytosis 09/08/2022    Lactic acidosis 09/08/2022    Elevated serum creatinine 07/22/2022    Itching of ear 01/13/2021    PVD (peripheral vascular disease) (HCC) 12/26/2020    Degenerative arthritis of knee, bilateral 12/22/2020    Acute right-sided low back pain 08/06/2020    Chronic pain of right knee 06/16/2020    Motion sickness 10/02/2019    Altitude sickness prophylaxis " 10/02/2019    Leg mass, right 07/12/2019    Vitamin D deficiency 06/10/2019    Leg cramping 06/10/2019    Baker cyst, right 04/16/2019    Erythematous rash 04/16/2019    Bilateral leg edema 01/08/2019    Nocturia more than twice per night 11/05/2018    Pitting edema 08/07/2018    Idiopathic chronic gout, left ankle and foot, without tophus (tophi) 06/25/2018    Memory changes 05/24/2018    Chronic pain of left knee 09/25/2017    Diastasis of rectus abdominis 06/22/2017    BPH associated with nocturia 06/07/2017    Type 2 diabetes mellitus without complication, without long-term current use of insulin (HCC) 11/09/2016    (HCC) Hypertension associated with diabetes 11/09/2016    Hypothyroidism (acquired) 11/09/2016    (HCC) Hyperlipidemia due to type 2 diabetes mellitus 11/09/2016    Primary insomnia 11/09/2016         PLAN: Appreciate help from hospitalist.  Patient needs more time to get pain under control and mobilize safely.  Dressing will be changed today.  I will follow along.

## 2023-12-07 PROBLEM — R62.7 FAILURE TO THRIVE IN ADULT: Status: ACTIVE | Noted: 2023-12-07

## 2023-12-07 LAB
ALBUMIN SERPL BCP-MCNC: 3.5 G/DL (ref 3.2–4.9)
ALBUMIN/GLOB SERPL: 1.1 G/DL
ALP SERPL-CCNC: 81 U/L (ref 30–99)
ALT SERPL-CCNC: 9 U/L (ref 2–50)
ANION GAP SERPL CALC-SCNC: 11 MMOL/L (ref 7–16)
AST SERPL-CCNC: 12 U/L (ref 12–45)
BILIRUB SERPL-MCNC: 0.7 MG/DL (ref 0.1–1.5)
BUN SERPL-MCNC: 22 MG/DL (ref 8–22)
CALCIUM ALBUM COR SERPL-MCNC: 8.8 MG/DL (ref 8.5–10.5)
CALCIUM SERPL-MCNC: 8.4 MG/DL (ref 8.4–10.2)
CHLORIDE SERPL-SCNC: 100 MMOL/L (ref 96–112)
CO2 SERPL-SCNC: 24 MMOL/L (ref 20–33)
CREAT SERPL-MCNC: 1.05 MG/DL (ref 0.5–1.4)
EKG IMPRESSION: NORMAL
ERYTHROCYTE [DISTWIDTH] IN BLOOD BY AUTOMATED COUNT: 40.7 FL (ref 35.9–50)
GFR SERPLBLD CREATININE-BSD FMLA CKD-EPI: 72 ML/MIN/1.73 M 2
GLOBULIN SER CALC-MCNC: 3.1 G/DL (ref 1.9–3.5)
GLUCOSE BLD STRIP.AUTO-MCNC: 222 MG/DL (ref 65–99)
GLUCOSE BLD STRIP.AUTO-MCNC: 275 MG/DL (ref 65–99)
GLUCOSE BLD STRIP.AUTO-MCNC: 276 MG/DL (ref 65–99)
GLUCOSE BLD STRIP.AUTO-MCNC: 347 MG/DL (ref 65–99)
GLUCOSE SERPL-MCNC: 216 MG/DL (ref 65–99)
HCT VFR BLD AUTO: 34.6 % (ref 42–52)
HCT VFR BLD AUTO: 34.7 % (ref 42–52)
HGB BLD-MCNC: 11.5 G/DL (ref 14–18)
HGB BLD-MCNC: 11.6 G/DL (ref 14–18)
MAGNESIUM SERPL-MCNC: 2 MG/DL (ref 1.5–2.5)
MCH RBC QN AUTO: 29.4 PG (ref 27–33)
MCHC RBC AUTO-ENTMCNC: 33.5 G/DL (ref 32.3–36.5)
MCV RBC AUTO: 87.8 FL (ref 81.4–97.8)
PHOSPHATE SERPL-MCNC: 2.5 MG/DL (ref 2.5–4.5)
PLATELET # BLD AUTO: 224 K/UL (ref 164–446)
PMV BLD AUTO: 10 FL (ref 9–12.9)
POTASSIUM SERPL-SCNC: 4 MMOL/L (ref 3.6–5.5)
PROT SERPL-MCNC: 6.6 G/DL (ref 6–8.2)
RBC # BLD AUTO: 3.94 M/UL (ref 4.7–6.1)
SODIUM SERPL-SCNC: 135 MMOL/L (ref 135–145)
WBC # BLD AUTO: 14.6 K/UL (ref 4.8–10.8)

## 2023-12-07 PROCEDURE — 700102 HCHG RX REV CODE 250 W/ 637 OVERRIDE(OP): Performed by: INTERNAL MEDICINE

## 2023-12-07 PROCEDURE — 84100 ASSAY OF PHOSPHORUS: CPT

## 2023-12-07 PROCEDURE — 82962 GLUCOSE BLOOD TEST: CPT

## 2023-12-07 PROCEDURE — 93010 ELECTROCARDIOGRAM REPORT: CPT | Performed by: INTERNAL MEDICINE

## 2023-12-07 PROCEDURE — A9270 NON-COVERED ITEM OR SERVICE: HCPCS | Performed by: HOSPITALIST

## 2023-12-07 PROCEDURE — 94760 N-INVAS EAR/PLS OXIMETRY 1: CPT

## 2023-12-07 PROCEDURE — 96372 THER/PROPH/DIAG INJ SC/IM: CPT

## 2023-12-07 PROCEDURE — 700102 HCHG RX REV CODE 250 W/ 637 OVERRIDE(OP): Performed by: HOSPITALIST

## 2023-12-07 PROCEDURE — 85027 COMPLETE CBC AUTOMATED: CPT

## 2023-12-07 PROCEDURE — 770001 HCHG ROOM/CARE - MED/SURG/GYN PRIV*

## 2023-12-07 PROCEDURE — 36415 COLL VENOUS BLD VENIPUNCTURE: CPT

## 2023-12-07 PROCEDURE — A9270 NON-COVERED ITEM OR SERVICE: HCPCS | Performed by: INTERNAL MEDICINE

## 2023-12-07 PROCEDURE — 99233 SBSQ HOSP IP/OBS HIGH 50: CPT | Performed by: INTERNAL MEDICINE

## 2023-12-07 PROCEDURE — 80053 COMPREHEN METABOLIC PANEL: CPT

## 2023-12-07 PROCEDURE — 700111 HCHG RX REV CODE 636 W/ 250 OVERRIDE (IP): Mod: JZ | Performed by: HOSPITALIST

## 2023-12-07 PROCEDURE — 85014 HEMATOCRIT: CPT

## 2023-12-07 PROCEDURE — 97530 THERAPEUTIC ACTIVITIES: CPT

## 2023-12-07 PROCEDURE — 96376 TX/PRO/DX INJ SAME DRUG ADON: CPT

## 2023-12-07 PROCEDURE — 83735 ASSAY OF MAGNESIUM: CPT

## 2023-12-07 PROCEDURE — 97140 MANUAL THERAPY 1/> REGIONS: CPT

## 2023-12-07 PROCEDURE — 93005 ELECTROCARDIOGRAM TRACING: CPT | Performed by: INTERNAL MEDICINE

## 2023-12-07 PROCEDURE — 85018 HEMOGLOBIN: CPT

## 2023-12-07 RX ORDER — OXYCODONE HYDROCHLORIDE 5 MG/1
5 TABLET ORAL
Status: DISCONTINUED | OUTPATIENT
Start: 2023-12-07 | End: 2023-12-11 | Stop reason: HOSPADM

## 2023-12-07 RX ORDER — OXYCODONE HYDROCHLORIDE 10 MG/1
10 TABLET ORAL
Status: DISCONTINUED | OUTPATIENT
Start: 2023-12-07 | End: 2023-12-11 | Stop reason: HOSPADM

## 2023-12-07 RX ORDER — HYDROMORPHONE HYDROCHLORIDE 1 MG/ML
0.5 INJECTION, SOLUTION INTRAMUSCULAR; INTRAVENOUS; SUBCUTANEOUS
Status: DISCONTINUED | OUTPATIENT
Start: 2023-12-07 | End: 2023-12-11 | Stop reason: HOSPADM

## 2023-12-07 RX ADMIN — ASPIRIN 81 MG: 81 TABLET, COATED ORAL at 06:04

## 2023-12-07 RX ADMIN — INSULIN HUMAN 4 UNITS: 100 INJECTION, SOLUTION PARENTERAL at 21:02

## 2023-12-07 RX ADMIN — LEVOTHYROXINE SODIUM 88 MCG: 88 TABLET ORAL at 06:04

## 2023-12-07 RX ADMIN — OXYCODONE HYDROCHLORIDE 2.5 MG: 5 TABLET ORAL at 06:04

## 2023-12-07 RX ADMIN — ATORVASTATIN CALCIUM 10 MG: 10 TABLET, FILM COATED ORAL at 17:02

## 2023-12-07 RX ADMIN — AMLODIPINE BESYLATE 10 MG: 5 TABLET ORAL at 17:01

## 2023-12-07 RX ADMIN — INSULIN HUMAN 3 UNITS: 100 INJECTION, SOLUTION PARENTERAL at 17:02

## 2023-12-07 RX ADMIN — TAMSULOSIN HYDROCHLORIDE 0.8 MG: 0.4 CAPSULE ORAL at 06:04

## 2023-12-07 RX ADMIN — POLYETHYLENE GLYCOL 3350 1 PACKET: 17 POWDER, FOR SOLUTION ORAL at 17:04

## 2023-12-07 RX ADMIN — ASPIRIN 81 MG: 81 TABLET, COATED ORAL at 17:02

## 2023-12-07 RX ADMIN — HYDROMORPHONE HYDROCHLORIDE 0.25 MG: 1 INJECTION, SOLUTION INTRAMUSCULAR; INTRAVENOUS; SUBCUTANEOUS at 07:57

## 2023-12-07 RX ADMIN — INSULIN HUMAN 3 UNITS: 100 INJECTION, SOLUTION PARENTERAL at 11:09

## 2023-12-07 RX ADMIN — OXYCODONE HYDROCHLORIDE 2.5 MG: 5 TABLET ORAL at 13:06

## 2023-12-07 RX ADMIN — INSULIN HUMAN 2 UNITS: 100 INJECTION, SOLUTION PARENTERAL at 06:08

## 2023-12-07 RX ADMIN — LOSARTAN POTASSIUM 100 MG: 50 TABLET, FILM COATED ORAL at 06:04

## 2023-12-07 ASSESSMENT — ENCOUNTER SYMPTOMS
COUGH: 0
PALPITATIONS: 0
VOMITING: 0
BLURRED VISION: 0
FALLS: 1
HEARTBURN: 0
NERVOUS/ANXIOUS: 0
BACK PAIN: 0
SHORTNESS OF BREATH: 0
DOUBLE VISION: 0
FEVER: 0
HEADACHES: 0
DIZZINESS: 0
ABDOMINAL PAIN: 0
CHILLS: 0

## 2023-12-07 ASSESSMENT — PAIN DESCRIPTION - PAIN TYPE
TYPE: SURGICAL PAIN
TYPE: SURGICAL PAIN
TYPE: ACUTE PAIN
TYPE: SURGICAL PAIN

## 2023-12-07 ASSESSMENT — COGNITIVE AND FUNCTIONAL STATUS - GENERAL
TURNING FROM BACK TO SIDE WHILE IN FLAT BAD: A LOT
STANDING UP FROM CHAIR USING ARMS: TOTAL
WALKING IN HOSPITAL ROOM: TOTAL
MOBILITY SCORE: 7
CLIMB 3 TO 5 STEPS WITH RAILING: TOTAL
MOVING TO AND FROM BED TO CHAIR: UNABLE
SUGGESTED CMS G CODE MODIFIER MOBILITY: CM
MOVING FROM LYING ON BACK TO SITTING ON SIDE OF FLAT BED: UNABLE

## 2023-12-07 ASSESSMENT — LIFESTYLE VARIABLES: SUBSTANCE_ABUSE: 0

## 2023-12-07 ASSESSMENT — GAIT ASSESSMENTS: GAIT LEVEL OF ASSIST: UNABLE TO PARTICIPATE

## 2023-12-07 NOTE — PROGRESS NOTES
"Hospital Medicine Daily Progress Note    Date of Service  12/7/2023    Chief Complaint  Saleem Whitney is a 78 y.o. male admitted 12/5/2023 with knee pain.    Hospital Course  As per chart review:  \"78 y.o. male with a past medical history of diabetes, hypertension, hyperlipidemia and arthritis s/p total left knee arthroplasty done today with Dr. Thomas who presented 12/5/2023 with worsening left knee pain and weakness of the left lower extremity after his procedure.  Patient reports that he did fall because of the weakness.  Pain is now moderate in severity.  He denies having weakness in other parts of his body.  He denies having fevers or chills.\"    Interval Problem Update  12/6: Patient seen at bedside this morning.  The patient states that he is still having difficulty moving his leg.  We appreciate further recommendation by orthopedic surgery.  Will monitor hemoglobin as it seems the patient had soaked dressing earlier this morning and was changed by orthopedics request.  Repeat hemoglobin seems to be stable however.  Continue to monitor closely.  The patient will require PT/OT evaluation once approved by orthopedics.  The patient did have an episode of fever yesterday, however he has had recent surgery.  We did order blood cultures.  Will monitor closely for any signs of infection.    12/7: Patient seen at bedside this morning.  It seems the patient has functional impairment due to pain.  We have increased the patient's pain medications, as patient requiring IV pain medication.  It seems due to the patient's pain unable to fully work with physical therapy, the patient will most likely require skilled nursing facility placement.  It seems the patient has not had another episode of fever, we will continue to monitor for any signs of infection.    I have discussed this patient's plan of care and discharge plan at IDT rounds today with Case Management, Nursing, Nursing leadership, and other members of " the IDT team.    Consultants/Specialty  Orthopedics - Dr Thomas    Code Status  DNAR/DNI    Disposition  The patient is not medically cleared for discharge to home or a post-acute facility.      I have placed the appropriate orders for post-discharge needs.    Review of Systems  Review of Systems   Constitutional:  Positive for malaise/fatigue. Negative for chills and fever.   HENT:  Negative for hearing loss and nosebleeds.    Eyes:  Negative for blurred vision and double vision.   Respiratory:  Negative for cough and shortness of breath.    Cardiovascular:  Negative for chest pain and palpitations.   Gastrointestinal:  Negative for abdominal pain, heartburn and vomiting.   Genitourinary:  Negative for dysuria and urgency.   Musculoskeletal:  Positive for falls and joint pain. Negative for back pain.   Skin:  Negative for itching and rash.   Neurological:  Negative for dizziness and headaches.   Psychiatric/Behavioral:  Negative for substance abuse. The patient is not nervous/anxious.    All other systems reviewed and are negative.       Physical Exam  Temp:  [36.2 °C (97.2 °F)-37.2 °C (98.9 °F)] 36.3 °C (97.3 °F)  Pulse:  [] 103  Resp:  [16-18] 16  BP: (126-147)/(59-84) 140/59  SpO2:  [90 %-96 %] 95 %    Physical Exam  Vitals and nursing note reviewed.   Constitutional:       Appearance: He is ill-appearing.   HENT:      Head: Normocephalic and atraumatic.      Right Ear: External ear normal.      Left Ear: External ear normal.      Nose: Nose normal.      Mouth/Throat:      Mouth: Mucous membranes are moist.      Pharynx: Oropharynx is clear.   Eyes:      General:         Right eye: No discharge.         Left eye: No discharge.      Extraocular Movements: Extraocular movements intact.      Pupils: Pupils are equal, round, and reactive to light.   Cardiovascular:      Rate and Rhythm: Normal rate and regular rhythm.      Heart sounds: No murmur heard.  Pulmonary:      Effort: Pulmonary effort is normal. No  respiratory distress.      Breath sounds: Normal breath sounds.   Abdominal:      General: Abdomen is flat. Bowel sounds are normal. There is no distension.      Palpations: Abdomen is soft.      Tenderness: There is no abdominal tenderness.   Musculoskeletal:         General: Swelling and tenderness present.      Cervical back: Normal range of motion and neck supple.   Skin:     General: Skin is warm.   Neurological:      General: No focal deficit present.      Mental Status: He is alert and oriented to person, place, and time.   Psychiatric:         Mood and Affect: Mood normal.         Behavior: Behavior normal.         Fluids    Intake/Output Summary (Last 24 hours) at 12/7/2023 1327  Last data filed at 12/7/2023 1300  Gross per 24 hour   Intake 360 ml   Output 1650 ml   Net -1290 ml         Laboratory  Recent Labs     12/05/23  1440 12/06/23  0035 12/06/23  0525 12/06/23  1335 12/07/23  0033 12/07/23 0523   WBC 16.2* 14.3*  --   --  14.6*  --    RBC 4.53* 4.16*  --   --  3.94*  --    HEMOGLOBIN 13.6* 12.5*   < > 12.4* 11.6* 11.5*   HEMATOCRIT 41.5* 37.4*   < > 37.2* 34.6* 34.7*   MCV 91.6 89.9  --   --  87.8  --    MCH 30.0 30.0  --   --  29.4  --    MCHC 32.8 33.4  --   --  33.5  --    RDW 42.3 41.8  --   --  40.7  --    PLATELETCT 253 249  --   --  224  --    MPV 9.5 10.1  --   --  10.0  --     < > = values in this interval not displayed.       Recent Labs     12/05/23  1440 12/06/23  0035 12/07/23 0033   SODIUM 136 137 135   POTASSIUM 3.8 4.0 4.0   CHLORIDE 101 102 100   CO2 23 23 24   GLUCOSE 266* 184* 216*   BUN 20 22 22   CREATININE 0.98 1.12 1.05   CALCIUM 8.1* 8.3* 8.4                     Imaging  DX-HIP-BILATERAL-WITH PELVIS-3/4 VIEWS   Final Result      Mild bilateral hip osteoarthritis      DX-KNEE 3 VIEWS LEFT   Final Result      Total knee arthroplasty without periprosthetic fracture.              Assessment/Plan  * Left lower extremity weakness- (present on admission)  Assessment & Plan  Had  left Total Knee Arthroplasty today 12/5/2023 w Dr Thomas.    Developed left lower extremity weakness in recovery.    Likely secondary to nerve block  Anticipate improvement over the next 24 hours   PT/OT     12/6: Continue PT/OT. Surgeon evaluated patient at bedside today, we appreciate further recommendations.    Failure to thrive in adult  Assessment & Plan  Patient with significant pain, with functional impairment due to severe pain and unable to fully work with physical therapy.  Will try to continue to work with physical therapy and address pain management.    Anemia  Assessment & Plan  There could be a component of acute blood loss anemia due to recent surgery.  Monitor hemoglobin.    12/7: Hb seems to be stable. Monitor    Fever  Assessment & Plan  Patient had recent surgery  We have ordered blood cultures  Monitor    12/7: No further episodes of fever. Monitor for signs of infection.    Acute pain of left knee- (present on admission)  Assessment & Plan  Had left Total Knee Arthroplasty today 12/5/2023 w Dr Thomas.    Developed left lower extremity weakness in recovery.    Patient fell hitting his while attempting to walk and then developed pain in his knee  I will check knee and hip x-rays to rule out occult fractures    12/7: Patient still with significant pain. We will increase dose of PRN medications. PT/OT    S/P total knee arthroplasty, left- (present on admission)  Assessment & Plan  Had left Total Knee Arthroplasty today 12/5/2023 w Dr Thomas.   Multimodal pain control.  Pharmacologic prophylaxis to start tomorrow.   Consider physical and Occupational Therapy when okay with orthopedics.      12/7: PT/OT and pain management. We had to increase PRN medications. Patient requiring IV pain medication    Hypomagnesemia- (present on admission)  Assessment & Plan  Replace as needed  monitor    Advance care planning- (present on admission)  Assessment & Plan  I had a discussion with the patient regarding goals  of care, diagnoses, prognosis, and CODE STATUS. We discussed his  and comorbidities.  The patient has advanced age and multiple comorbid conditions including diabetes, hypertension and hyperlipidemia.  However, he enjoys a good mental capacity and relatively good functional status.  At this point he wants to proceed with a DNAR/DNI code.  However, he is open to all forms of diagnostic and therapeutic invasive and noninvasive interventions as indicated     Dyslipidemia- (present on admission)  Assessment & Plan  Cardiac diet   Resume atorvastatin     Leukocytosis- (present on admission)  Assessment & Plan  Most likely reactive from recent surgery.  The patient did have an episode of fever yesterday.  We did order blood cultures.  For now we will hold antibiotics.    BPH associated with nocturia- (present on admission)  Assessment & Plan  Resume tamsulosin      Type 2 diabetes mellitus without complication, without long-term current use of insulin (HCC)- (present on admission)  Assessment & Plan  Last glycated hemoglobin was 6.8%  I will start short acting insulin for now  I will order Accu-Checks, hypoglycemia protocol  Adjust according to blood sugars trend.          VTE prophylaxis: Aspirin BID    I have performed a physical exam and reviewed and updated ROS and Plan today (12/7/2023). In review of yesterday's note (12/6/2023), there are no changes except as documented above.      I spend at least 52 minutes providing care for this patient. This included face to face interview, physical examination.  Review of lab work including CBC, hemoglobin, CMP, phosphorus and magnesium.  Discussion with multidisciplinary team including case management, nursing staff and pharmacy.

## 2023-12-07 NOTE — PROGRESS NOTES
Received report from ian RN. Pt resting in bed, on RA. Reports 7/10 pain. Dressing to L knee CDI with ice applied. Safety precautions in place, hourly rounding in progress.    2100 Pt refuses SCDs, education provided.

## 2023-12-07 NOTE — CARE PLAN
The patient is Stable - Low risk of patient condition declining or worsening    Shift Goals  Clinical Goals: patient to report tolerable pain 4/10 or less by end of shift  Patient Goals: patient will be able to participate in pt/ot this shift  Family Goals: na    Progress made toward(s) clinical / shift goals:  Patient reported 3-7 pain throughout shift, relieved by medication and position changes    Patient is not progressing towards the following goals: patient tolerated PT but was unable to tolerate OT. This RN and CNA got patient up to chair with max assist which was tolerated x2 hours      Problem: Fall Risk  Goal: Patient will remain free from falls  Outcome: Not Progressing

## 2023-12-07 NOTE — THERAPY
Occupational Therapy Contact Note    Patient Name: Saleem Whitney  Age:  78 y.o., Sex:  male  Medical Record #: [id number]  Today's Date: 12/7/2023 12/07/23 [id number]   Initial Contact Note    Initial Contact Note Order Received and Verified, Occupational Therapy Evaluation in Progress with Full Report to Follow.   Interdisciplinary Plan of Care Collaboration   IDT Collaboration with  Nursing;Physical Therapist   Collaboration Comments Pt still with significant pain limiting partic in mobility and ADL's.  Attempted in am post PT, pt in too much pain.  Unable to return in pm, will benefit from OT eval in am to help determine post acute needs.

## 2023-12-07 NOTE — THERAPY
Physical Therapy   Daily Treatment     Patient Name: Saleem Whitney  Age:  78 y.o., Sex:  male  Medical Record #: [id number]  Today's Date: 12/7/2023     Precautions  Precautions: (P) Fall Risk;Weight Bearing As Tolerated Left Lower Extremity    Assessment    Pt is progressing slower than expected and continues to be limited by high pain level, dec ROM, and poor upright activity tolerance. Pt provided with grade 1-2 L knee mobilization in order to improve L knee pain and flexion. Pt provided with calf stretching and dorsiflexion of L ankle to improve knee extension and pain. Pt was provided with static flexion while sitting at EOB to improve flexion. Pt has most improvment in L knee flexion with static sitting at EOB with L knee dangling from EOB. Re-wrapped ace bandage from distal to proximal with figure 8 method. Pt required Mod A for bed mobility and sit<>stands with extensive effort and duration due to high pain level. Pt required frequent seated rest break and pacing to complete mobilization. Pt was able to take 1-2 side steps to get up higher towards HOB, however, unable to demonstrate functional steps for transfer or ambulation at this time. Pt did have orders for CPM, however, sling was unable to be found. Escalated to charge RN and NAM in order to complete CPM order per surgeon. Recommend post-acute placement for continued physical therapy services prior to discharge home.       Plan    Treatment Plan Status: (P) Continue Current Treatment Plan  Type of Treatment: Bed Mobility, Equipment, Gait Training, Neuro Re-Education / Balance, Manual Therapy, Self Care / Home Evaluation, Stair Training, Therapeutic Activities, Therapeutic Exercise  Treatment Frequency: Daily  Treatment Duration: Until Therapy Goals Met    DC Equipment Recommendations: (P) Unable to determine at this time  Discharge Recommendations: (P) Recommend post-acute placement for additional physical therapy services prior to discharge  home    Objective       12/07/23 0935   Precautions   Precautions Fall Risk;Weight Bearing As Tolerated Left Lower Extremity   Pain 0 - 10 Group   Location Knee   Location Orientation Left;Posterior   Description Constant;Heike   Therapist Pain Assessment Prior to Activity;During Activity;8;Post Activity;3   Cognition    Cognition / Consciousness WDL   Level of Consciousness Alert   Passive ROM Lower Body   Passive ROM Lower Body X   Comments able to tolerate dangling L knee at EOB and able to demo 60 deg of L knee flexion   Active ROM Lower Body    Active ROM Lower Body  X   Comments unable to demo active L knee flexion or extension at this time due to high pain level, able to demo improved L ankle dorsiflexion   Strength Lower Body   Lower Body Strength  X   Comments limited due to pain, poor ability to mobilize L LE for bed mobility for forward progression for ambulation   Sensation Lower Body   Lower Extremity Sensation   Not Tested   Lower Body Muscle Tone   Lower Body Muscle Tone  WDL   Supine Lower Body Exercise   Supine Lower Body Exercises Yes   Ankle Pumps Reciprocal;1 set of 10   Other Treatments   Other Treatments Provided Pt provided with grade 1-2 L knee mobilization in order to improve L knee pain and flexion. Pt provided with calf stretching and dorsiflexion of L ankle to improve knee extension and pain. Pt was provided with static flexion while sitting at EOB to improve flexion. Pt has most improvment in L knee flexion with static sitting at EOB with L knee dangling from EOB. Re-wrapped ace bandage from distal to proximal with figure 8 method   Neurological Concerns   Neurological Concerns No   Balance   Sitting Balance (Static) Fair -   Sitting Balance (Dynamic) Fair -   Standing Balance (Static) Fair -   Standing Balance (Dynamic) Poor +   Weight Shift Sitting Fair   Weight Shift Standing Poor   Skilled Intervention Verbal Cuing;Postural Facilitation;Facilitation   Comments w/fww use for standing  balance; had frequent posterior LOB while attempting to sit EOB secondary to high pain level   Bed Mobility    Supine to Sit Moderate Assist   Sit to Supine Moderate Assist   Scooting Minimal Assist   Skilled Intervention Sequencing;Verbal Cuing;Compensatory Strategies;Facilitation   Comments HOB elevated and rails up   Gait Analysis   Gait Level Of Assist Unable to Participate   Weight Bearing Status WBAT L LE   Comments ablet to take 1-2 side steps up HOB, however, no functional steps at this time   Functional Mobility   Sit to Stand Moderate Assist   Bed, Chair, Wheelchair Transfer Unable to Participate   Mobility EOB, sit<>stand, side steps, back to bed supine   How much difficulty does the patient currently have...   Turning over in bed (including adjusting bedclothes, sheets and blankets)? 2   Sitting down on and standing up from a chair with arms (e.g., wheelchair, bedside commode, etc.) 1   Moving from lying on back to sitting on the side of the bed? 1   How much help from another person does the patient currently need...   Moving to and from a bed to a chair (including a wheelchair)? 1   Need to walk in a hospital room? 1   Climbing 3-5 steps with a railing? 1   6 clicks Mobility Score 7   Activity Tolerance   Sitting in Chair NT   Sitting Edge of Bed 20 mins   Standing 3 mins   Comments limited by pain   Short Term Goals    Short Term Goal # 1 Pt will complete bed mobility without the use of bed features with SPV within 6 visits   Goal Outcome # 1 Progressing slower than expected   Short Term Goal # 2 Pt will ambulate 50 feet with LRAD with SPV within 6 visits to improve functional mobility   Goal Outcome # 2 Progressing slower than expected   Short Term Goal # 3 Pt will ascend/descend 3 steps with LRAD with SPV within 6 visits to allow home access   Goal Outcome # 3 Goal not met   Education Group   Role of Physical Therapist Patient Response Patient;Acceptance;Explanation;Verbal Demonstration   Exercises  - Supine Patient Response Patient;Acceptance;Explanation;Verbal Demonstration   Physical Therapy Treatment Plan   Physical Therapy Treatment Plan Continue Current Treatment Plan   Anticipated Discharge Equipment and Recommendations   DC Equipment Recommendations Unable to determine at this time   Discharge Recommendations Recommend post-acute placement for additional physical therapy services prior to discharge home   Interdisciplinary Plan of Care Collaboration   IDT Collaboration with  Nursing   Patient Position at End of Therapy In Bed;Call Light within Reach;Tray Table within Reach;Phone within Reach   Collaboration Comments aware of visit and recs   Session Information   Date / Session Number  12/7-2 (2/7, 12/12)

## 2023-12-07 NOTE — PROGRESS NOTES
Assumed care of patient from noc RN. Patient sitting up in bed eating breakfast. Reports 6/10 L knee pain, recently medicated. Ace and aquacel to knee with scant old drainage. Weak plantar and dorsiflexion to L leg, 2+ edema. 2+ pedal pulses bilaterally. Call light within reach, bed alarm on, refusing treaded socks at this time, refusing SCDs. IS in use. Chart check complete. Rounded on patient with Dr. Thomas, considering CPM. Ice pack in place

## 2023-12-07 NOTE — ASSESSMENT & PLAN NOTE
Patient with significant pain, with functional impairment due to severe pain and unable to fully work with physical therapy.  Will try to continue to work with physical therapy and address pain management.

## 2023-12-07 NOTE — PROGRESS NOTES
Patient is still extremely uncomfortable to the point he was unable to mobilize at all yesterday.    Today he does look to be in pain.  His left leg is swollen.  He does have the ability to dorsiflex and plantarflex his ankle but not much and he cannot move the leg on its own at all.    The new dressing looks clean and dry.    Impression: Increased pain and very poor mobility 2 days following total knee replacement    Plan: Continue to to try to mobilize him.  I will order a CPM for his bed to see if we can get a little bit of early motion going and try thigh-high Fredy to help with the swelling.

## 2023-12-07 NOTE — CARE PLAN
The patient is Stable - Low risk of patient condition declining or worsening    Shift Goals  Clinical Goals: Pt will report tolerable pain of 4/10 after PRN interventions  Patient Goals: Sleep and rest; pain mgt  Family Goals: NA    Progress made toward(s) clinical / shift goals:  Pt required x2 administration of PRN oral pain med during this shift; pt still unable to ambulate and required x2 CGA assist to pivot from bed to commode this morning.       Problem: Knowledge Deficit - Standard  Goal: Patient and family/care givers will demonstrate understanding of plan of care, disease process/condition, diagnostic tests and medications  Outcome: Progressing     Problem: Pain - Standard  Goal: Alleviation of pain or a reduction in pain to the patient’s comfort goal  Outcome: Progressing     Problem: Fall Risk  Goal: Patient will remain free from falls  Outcome: Progressing     Problem: Post-Operative Knee Replacement  Goal: Patient will demonstrate understanding of knee replacement post-surgical weight bearing restrictions and DME usage during hospital stay and post discharge  Outcome: Progressing  Goal: Patient's neurovascular status will be maintained or improve  Outcome: Progressing  Goal: Early mobilization post surgery  Outcome: Not Progressing     Patient is not progressing towards the following goals: NA      Problem: Post-Operative Knee Replacement  Goal: Early mobilization post surgery  Outcome: Not Progressing

## 2023-12-08 LAB
ALBUMIN SERPL BCP-MCNC: 3.2 G/DL (ref 3.2–4.9)
ALBUMIN/GLOB SERPL: 1 G/DL
ALP SERPL-CCNC: 75 U/L (ref 30–99)
ALT SERPL-CCNC: 8 U/L (ref 2–50)
ANION GAP SERPL CALC-SCNC: 11 MMOL/L (ref 7–16)
AST SERPL-CCNC: 10 U/L (ref 12–45)
BILIRUB SERPL-MCNC: 0.8 MG/DL (ref 0.1–1.5)
BUN SERPL-MCNC: 24 MG/DL (ref 8–22)
CALCIUM ALBUM COR SERPL-MCNC: 8.9 MG/DL (ref 8.5–10.5)
CALCIUM SERPL-MCNC: 8.3 MG/DL (ref 8.4–10.2)
CHLORIDE SERPL-SCNC: 99 MMOL/L (ref 96–112)
CO2 SERPL-SCNC: 24 MMOL/L (ref 20–33)
CREAT SERPL-MCNC: 1.06 MG/DL (ref 0.5–1.4)
ERYTHROCYTE [DISTWIDTH] IN BLOOD BY AUTOMATED COUNT: 40 FL (ref 35.9–50)
GFR SERPLBLD CREATININE-BSD FMLA CKD-EPI: 71 ML/MIN/1.73 M 2
GLOBULIN SER CALC-MCNC: 3.3 G/DL (ref 1.9–3.5)
GLUCOSE BLD STRIP.AUTO-MCNC: 231 MG/DL (ref 65–99)
GLUCOSE BLD STRIP.AUTO-MCNC: 249 MG/DL (ref 65–99)
GLUCOSE BLD STRIP.AUTO-MCNC: 251 MG/DL (ref 65–99)
GLUCOSE BLD STRIP.AUTO-MCNC: 312 MG/DL (ref 65–99)
GLUCOSE SERPL-MCNC: 221 MG/DL (ref 65–99)
HCT VFR BLD AUTO: 32.3 % (ref 42–52)
HCT VFR BLD AUTO: 33.1 % (ref 42–52)
HGB BLD-MCNC: 10.6 G/DL (ref 14–18)
HGB BLD-MCNC: 11 G/DL (ref 14–18)
MCH RBC QN AUTO: 29.4 PG (ref 27–33)
MCHC RBC AUTO-ENTMCNC: 32.8 G/DL (ref 32.3–36.5)
MCV RBC AUTO: 89.7 FL (ref 81.4–97.8)
PLATELET # BLD AUTO: 226 K/UL (ref 164–446)
PMV BLD AUTO: 10.2 FL (ref 9–12.9)
POTASSIUM SERPL-SCNC: 3.9 MMOL/L (ref 3.6–5.5)
PROT SERPL-MCNC: 6.5 G/DL (ref 6–8.2)
RBC # BLD AUTO: 3.6 M/UL (ref 4.7–6.1)
SODIUM SERPL-SCNC: 134 MMOL/L (ref 135–145)
WBC # BLD AUTO: 12.8 K/UL (ref 4.8–10.8)

## 2023-12-08 PROCEDURE — 770001 HCHG ROOM/CARE - MED/SURG/GYN PRIV*

## 2023-12-08 PROCEDURE — A9270 NON-COVERED ITEM OR SERVICE: HCPCS | Performed by: HOSPITALIST

## 2023-12-08 PROCEDURE — 82962 GLUCOSE BLOOD TEST: CPT | Mod: 91

## 2023-12-08 PROCEDURE — 80053 COMPREHEN METABOLIC PANEL: CPT

## 2023-12-08 PROCEDURE — 97530 THERAPEUTIC ACTIVITIES: CPT

## 2023-12-08 PROCEDURE — 36415 COLL VENOUS BLD VENIPUNCTURE: CPT

## 2023-12-08 PROCEDURE — 99233 SBSQ HOSP IP/OBS HIGH 50: CPT | Performed by: INTERNAL MEDICINE

## 2023-12-08 PROCEDURE — 97166 OT EVAL MOD COMPLEX 45 MIN: CPT

## 2023-12-08 PROCEDURE — 94760 N-INVAS EAR/PLS OXIMETRY 1: CPT

## 2023-12-08 PROCEDURE — 85014 HEMATOCRIT: CPT

## 2023-12-08 PROCEDURE — 700102 HCHG RX REV CODE 250 W/ 637 OVERRIDE(OP): Performed by: HOSPITALIST

## 2023-12-08 PROCEDURE — A9270 NON-COVERED ITEM OR SERVICE: HCPCS | Performed by: INTERNAL MEDICINE

## 2023-12-08 PROCEDURE — 700111 HCHG RX REV CODE 636 W/ 250 OVERRIDE (IP): Performed by: HOSPITALIST

## 2023-12-08 PROCEDURE — 97116 GAIT TRAINING THERAPY: CPT

## 2023-12-08 PROCEDURE — 700111 HCHG RX REV CODE 636 W/ 250 OVERRIDE (IP): Mod: JZ | Performed by: INTERNAL MEDICINE

## 2023-12-08 PROCEDURE — 700102 HCHG RX REV CODE 250 W/ 637 OVERRIDE(OP): Performed by: INTERNAL MEDICINE

## 2023-12-08 PROCEDURE — 85027 COMPLETE CBC AUTOMATED: CPT

## 2023-12-08 PROCEDURE — 85018 HEMOGLOBIN: CPT

## 2023-12-08 RX ADMIN — AMLODIPINE BESYLATE 10 MG: 5 TABLET ORAL at 17:32

## 2023-12-08 RX ADMIN — OXYCODONE HYDROCHLORIDE 5 MG: 5 TABLET ORAL at 21:51

## 2023-12-08 RX ADMIN — OXYCODONE HYDROCHLORIDE 10 MG: 10 TABLET ORAL at 11:20

## 2023-12-08 RX ADMIN — ONDANSETRON 4 MG: 4 TABLET, ORALLY DISINTEGRATING ORAL at 14:05

## 2023-12-08 RX ADMIN — INSULIN HUMAN 3 UNITS: 100 INJECTION, SOLUTION PARENTERAL at 05:40

## 2023-12-08 RX ADMIN — HYDROMORPHONE HYDROCHLORIDE 0.5 MG: 1 INJECTION, SOLUTION INTRAMUSCULAR; INTRAVENOUS; SUBCUTANEOUS at 23:41

## 2023-12-08 RX ADMIN — LOSARTAN POTASSIUM 100 MG: 50 TABLET, FILM COATED ORAL at 05:43

## 2023-12-08 RX ADMIN — TAMSULOSIN HYDROCHLORIDE 0.8 MG: 0.4 CAPSULE ORAL at 05:43

## 2023-12-08 RX ADMIN — OXYCODONE HYDROCHLORIDE 10 MG: 10 TABLET ORAL at 14:41

## 2023-12-08 RX ADMIN — ATORVASTATIN CALCIUM 10 MG: 10 TABLET, FILM COATED ORAL at 17:32

## 2023-12-08 RX ADMIN — ASPIRIN 81 MG: 81 TABLET, COATED ORAL at 17:32

## 2023-12-08 RX ADMIN — LEVOTHYROXINE SODIUM 88 MCG: 88 TABLET ORAL at 05:43

## 2023-12-08 RX ADMIN — INSULIN HUMAN 2 UNITS: 100 INJECTION, SOLUTION PARENTERAL at 11:26

## 2023-12-08 RX ADMIN — ASPIRIN 81 MG: 81 TABLET, COATED ORAL at 05:43

## 2023-12-08 ASSESSMENT — ENCOUNTER SYMPTOMS
HEARTBURN: 0
DOUBLE VISION: 0
DIZZINESS: 0
ABDOMINAL PAIN: 0
NERVOUS/ANXIOUS: 0
BACK PAIN: 0
FALLS: 1
COUGH: 0
PALPITATIONS: 0
BLURRED VISION: 0
CHILLS: 0
FEVER: 0
SHORTNESS OF BREATH: 0
HEADACHES: 0
VOMITING: 0

## 2023-12-08 ASSESSMENT — PAIN DESCRIPTION - PAIN TYPE
TYPE: SURGICAL PAIN

## 2023-12-08 ASSESSMENT — COGNITIVE AND FUNCTIONAL STATUS - GENERAL
DAILY ACTIVITIY SCORE: 17
DRESSING REGULAR LOWER BODY CLOTHING: A LOT
MOBILITY SCORE: 9
SUGGESTED CMS G CODE MODIFIER MOBILITY: CM
DRESSING REGULAR UPPER BODY CLOTHING: A LITTLE
WALKING IN HOSPITAL ROOM: TOTAL
MOVING FROM LYING ON BACK TO SITTING ON SIDE OF FLAT BED: UNABLE
HELP NEEDED FOR BATHING: A LOT
STANDING UP FROM CHAIR USING ARMS: A LOT
TOILETING: A LOT
TURNING FROM BACK TO SIDE WHILE IN FLAT BAD: A LITTLE
MOVING TO AND FROM BED TO CHAIR: UNABLE
CLIMB 3 TO 5 STEPS WITH RAILING: TOTAL
SUGGESTED CMS G CODE MODIFIER DAILY ACTIVITY: CK

## 2023-12-08 ASSESSMENT — GAIT ASSESSMENTS
GAIT LEVEL OF ASSIST: MODERATE ASSIST
ASSISTIVE DEVICE: FRONT WHEEL WALKER
DEVIATION: ANTALGIC;STEP TO;DECREASED HEEL STRIKE;DECREASED TOE OFF
DISTANCE (FEET): 3

## 2023-12-08 ASSESSMENT — LIFESTYLE VARIABLES: SUBSTANCE_ABUSE: 0

## 2023-12-08 ASSESSMENT — ACTIVITIES OF DAILY LIVING (ADL): TOILETING: INDEPENDENT

## 2023-12-08 NOTE — CARE PLAN
The patient is Stable - Low risk of patient condition declining or worsening    Shift Goals  Clinical Goals: Pt pain will be tolerable AEB pt stating pain is 5/10 or less or pt will be able to sleep at least 5 hours overnight.  Patient Goals: patient will be able to participate in pt/ot this shift  Family Goals: na    Progress made toward(s) clinical / shift goals:  Pt pain has been 5/10 or less throughout the night. Pt has not required PRN medication overnight.    Patient is not progressing towards the following goals:      Problem: Post-Operative Knee Replacement  Goal: Early mobilization post surgery  Outcome: Not Progressing   Pt unable to mobilize d/t pan. Encouraged pt to take pain medication prior to mobilization.

## 2023-12-08 NOTE — THERAPY
Physical Therapy   Daily Treatment     Patient Name: Saleem Whitney  Age:  78 y.o., Sex:  male  Medical Record #: [id number]  Today's Date: 12/8/2023     Precautions  Precautions: (P) Fall Risk;Weight Bearing As Tolerated Left Lower Extremity    Assessment    Pt is progressing slowly, however, he was able to demonstrate improved upright activity tolerance, pain tolerance, improved L knee ROM, and ability to WB on L LE. This has led to functional improvements of bed mobility, transfers, and ability to take a few steps. However, pt continues to demonstrate poor functional WB tolerance on L LE and unable to demonstrate functional ambulation. Pt was able to take a few steps for transfer to a chair with significant assist from 2 skilled therapist with Mod A. Pt was provided with multiple VC, compensatory strategies, and sequencing for step to pattern in order to initiate forward progression. Pt required VC and manual faciliation for hand placement during sit<>stands and transfer to chair. Pt was provided provided with PROM to L LE while in sitting and pt was able to demonstrate 90 deg of L knee flexion today. Pt will continue to benefit from skilled PT while in house, with recommendation for post acute therapy prior to d/c home.     Plan    Treatment Plan Status: (P) Continue Current Treatment Plan  Type of Treatment: Bed Mobility, Equipment, Gait Training, Neuro Re-Education / Balance, Manual Therapy, Self Care / Home Evaluation, Stair Training, Therapeutic Activities, Therapeutic Exercise  Treatment Frequency: Daily  Treatment Duration: Until Therapy Goals Met    DC Equipment Recommendations: (P) Unable to determine at this time  Discharge Recommendations: (P) Recommend post-acute placement for additional physical therapy services prior to discharge home    Objective       12/08/23 1235   Precautions   Precautions Fall Risk;Weight Bearing As Tolerated Left Lower Extremity   Pain 0 - 10 Group   Location Knee    Location Orientation Left;Posterior   Description Constant;Heike   Therapist Pain Assessment Prior to Activity;During Activity;Post Activity;Nurse Notified;7   Cognition    Cognition / Consciousness WDL   Level of Consciousness Alert   Passive ROM Lower Body   Passive ROM Lower Body X   Comments able to demonstrate -5 to 90 deg of L knee ROM   Active ROM Lower Body    Active ROM Lower Body  X   Comments unable to demo active L knee flexion , able to demo contraction of quads during quad sets, however, poor   Strength Lower Body   Lower Body Strength  X   Comments continues to have signficant pain which is leading to quad inhibition of L LE and weakness, poor stability during WB on L LE   Sensation Lower Body   Lower Extremity Sensation   Not Tested   Lower Body Muscle Tone   Lower Body Muscle Tone  WDL   Supine Lower Body Exercise   Supine Lower Body Exercises Yes   Ankle Pumps 1 set of 10;Left   Quadriceps Isometrics 1 set of 10;Left   Standing Lower Body Exercises   Standing Lower Body Exercises Yes   Comments attempted terminal knee extension in standing, however, limited due to poor WB and pain tolerance   Other Treatments   Other Treatments Provided Pt provided with calf stretching and dorsiflexion of L ankle to improve knee extension and pain. Pt was provided with static flexion while sitting at EOB to improve flexion. Pt has most improvment in L knee flexion with static sitting at EOB with L knee dangling from EOB.   Neurological Concerns   Neurological Concerns No   Balance   Sitting Balance (Static) Fair   Sitting Balance (Dynamic) Fair -   Standing Balance (Static) Fair -   Standing Balance (Dynamic) Poor +   Weight Shift Sitting Fair   Weight Shift Standing Poor   Skilled Intervention Verbal Cuing;Postural Facilitation;Facilitation   Comments w/fww use   Bed Mobility    Supine to Sit Minimal Assist   Scooting Contact Guard Assist   Skilled Intervention Verbal Cuing;Sequencing;Facilitation   Comments  HOB elevated and rails up   Gait Analysis   Gait Level Of Assist Moderate Assist   Assistive Device Front Wheel Walker   Distance (Feet) 3   # of Times Distance was Traveled 1   Deviation Antalgic;Step To;Decreased Heel Strike;Decreased Toe Off   Weight Bearing Status WBAT L LE   Skilled Intervention Verbal Cuing;Sequencing;Compensatory Strategies   Comments provided with VC and demo to use B UE on FWW and offload L LE, demonstrates with intermittent buckling due to fear of pain and quad inhibition during excessive WB on L LE   Functional Mobility   Sit to Stand Moderate Assist   Bed, Chair, Wheelchair Transfer Moderate Assist   Transfer Method Stand Step   Mobility EOB, sit<>stand, few steps forward, transfer to chair   Skilled Intervention Verbal Cuing;Sequencing;Facilitation;Compensatory Strategies   How much difficulty does the patient currently have...   Turning over in bed (including adjusting bedclothes, sheets and blankets)? 3   Sitting down on and standing up from a chair with arms (e.g., wheelchair, bedside commode, etc.) 1   Moving from lying on back to sitting on the side of the bed? 1   How much help from another person does the patient currently need...   Moving to and from a bed to a chair (including a wheelchair)? 2   Need to walk in a hospital room? 1   Climbing 3-5 steps with a railing? 1   6 clicks Mobility Score 9   Activity Tolerance   Sitting in Chair left up in chair, RN aware   Sitting Edge of Bed 10 mins   Standing 4-5 mins   Comments limited by pain   Short Term Goals    Short Term Goal # 1 Pt will complete bed mobility without the use of bed features with SPV within 6 visits   Goal Outcome # 1 Progressing slower than expected   Short Term Goal # 2 Pt will ambulate 50 feet with LRAD with SPV within 6 visits to improve functional mobility   Goal Outcome # 2 Progressing slower than expected   Short Term Goal # 3 Pt will ascend/descend 3 steps with LRAD with SPV within 6 visits to allow home  access   Goal Outcome # 3 Goal not met   Education Group   Role of Physical Therapist Patient Response Patient;Acceptance;Explanation;Verbal Demonstration   Exercises - Supine Patient Response Patient;Acceptance;Explanation;Verbal Demonstration   Physical Therapy Treatment Plan   Physical Therapy Treatment Plan Continue Current Treatment Plan   Anticipated Discharge Equipment and Recommendations   DC Equipment Recommendations Unable to determine at this time   Discharge Recommendations Recommend post-acute placement for additional physical therapy services prior to discharge home   Interdisciplinary Plan of Care Collaboration   IDT Collaboration with  Nursing   Patient Position at End of Therapy Seated;Tray Table within Reach;Call Light within Reach;Phone within Reach;Chair Alarm On   Collaboration Comments aware of visit and recs   Session Information   Date / Session Number  12/8-2 (3/7, 12/12)

## 2023-12-08 NOTE — PROGRESS NOTES
Bedside report received from night RN. Assumed care of patient. Daily plan of care discussed. Pt awake and alert, aware of plan for PT/OT evaluation today, pt aware of pain medication available before therapy. 12 hour chart check complete. Hourly rounding in place.

## 2023-12-08 NOTE — PROGRESS NOTES
Notified Dr. Be of eKG showing sinus tach per med tele charge. Patient seeming more confused than usual. A+o4 but attempting to get up and walk to the bathroom alone and get dressed. Temp 99.0. Restless. Night shift to notify Dr. Bustillos if this continues per Dr. Be

## 2023-12-08 NOTE — PROGRESS NOTES
4 Eyes Skin Assessment Completed by Tyler RN and ESPERANZA Crews.    Head WDL  Ears WDL  Nose WDL  Mouth WDL  Neck WDL  Breast/Chest WDL  Shoulder Blades WDL  Spine WDL  (R) Arm/Elbow/Hand WDL  (L) Arm/Elbow/Hand WDL  Abdomen WDL  Groin WDL  Scrotum/Coccyx/Buttocks Redness and Blanching  (R) Leg WDL  (L) Leg Edema and Incision  (R) Heel/Foot/Toe WDL  (L) Heel/Foot/Toe Redness and Blanching          Devices In Places Pulse Ox, Condom Cath, and DORI's      Interventions In Place NC W/Ear Foams, Waffle Overlay, and Pillows    Possible Skin Injury No    Pictures Uploaded Into Epic N/A  Wound Consult Placed N/A  RN Wound Prevention Protocol Ordered Yes

## 2023-12-08 NOTE — PROGRESS NOTES
"Hospital Medicine Daily Progress Note    Date of Service  12/8/2023    Chief Complaint  Saleem Whitney is a 78 y.o. male admitted 12/5/2023 with knee pain.    Hospital Course  As per chart review:  \"78 y.o. male with a past medical history of diabetes, hypertension, hyperlipidemia and arthritis s/p total left knee arthroplasty done today with Dr. Thomas who presented 12/5/2023 with worsening left knee pain and weakness of the left lower extremity after his procedure.  Patient reports that he did fall because of the weakness.  Pain is now moderate in severity.  He denies having weakness in other parts of his body.  He denies having fevers or chills.\"    Interval Problem Update  12/6: Patient seen at bedside this morning.  The patient states that he is still having difficulty moving his leg.  We appreciate further recommendation by orthopedic surgery.  Will monitor hemoglobin as it seems the patient had soaked dressing earlier this morning and was changed by orthopedics request.  Repeat hemoglobin seems to be stable however.  Continue to monitor closely.  The patient will require PT/OT evaluation once approved by orthopedics.  The patient did have an episode of fever yesterday, however he has had recent surgery.  We did order blood cultures.  Will monitor closely for any signs of infection.    12/7: Patient seen at bedside this morning.  It seems the patient has functional impairment due to pain.  We have increased the patient's pain medications, as patient requiring IV pain medication.  It seems due to the patient's pain unable to fully work with physical therapy, the patient will most likely require skilled nursing facility placement.  It seems the patient has not had another episode of fever, we will continue to monitor for any signs of infection.    12/8: Patient seen at bedside this morning.  Patient still complaining of significant pain.  Patient will most likely require placement upon discharge.  " Pending placement, we appreciate further recommendations by case management.  Continue to monitor closely.  Blood cultures negative to date.    I have discussed this patient's plan of care and discharge plan at IDT rounds today with Case Management, Nursing, Nursing leadership, and other members of the IDT team.    Consultants/Specialty  Orthopedics - Dr Thomas    Code Status  DNAR/DNI    Disposition  The patient is not medically cleared for discharge to home or a post-acute facility.      I have placed the appropriate orders for post-discharge needs.    Review of Systems  Review of Systems   Constitutional:  Positive for malaise/fatigue. Negative for chills and fever.   HENT:  Negative for hearing loss and nosebleeds.    Eyes:  Negative for blurred vision and double vision.   Respiratory:  Negative for cough and shortness of breath.    Cardiovascular:  Negative for chest pain and palpitations.   Gastrointestinal:  Negative for abdominal pain, heartburn and vomiting.   Genitourinary:  Negative for dysuria and urgency.   Musculoskeletal:  Positive for falls and joint pain. Negative for back pain.   Skin:  Negative for itching and rash.   Neurological:  Negative for dizziness and headaches.   Psychiatric/Behavioral:  Negative for substance abuse. The patient is not nervous/anxious.    All other systems reviewed and are negative.       Physical Exam  Temp:  [36.2 °C (97.1 °F)-36.6 °C (97.8 °F)] 36.5 °C (97.7 °F)  Pulse:  [] 75  Resp:  [16-17] 16  BP: (111-142)/(61-74) 111/63  SpO2:  [85 %-96 %] 94 %    Physical Exam  Vitals and nursing note reviewed.   Constitutional:       Appearance: He is ill-appearing.   HENT:      Head: Normocephalic and atraumatic.      Right Ear: External ear normal.      Left Ear: External ear normal.      Nose: Nose normal.      Mouth/Throat:      Mouth: Mucous membranes are moist.      Pharynx: Oropharynx is clear.   Eyes:      General:         Right eye: No discharge.         Left eye:  No discharge.      Extraocular Movements: Extraocular movements intact.      Pupils: Pupils are equal, round, and reactive to light.   Cardiovascular:      Rate and Rhythm: Normal rate and regular rhythm.      Heart sounds: No murmur heard.  Pulmonary:      Effort: Pulmonary effort is normal. No respiratory distress.      Breath sounds: Normal breath sounds.   Abdominal:      General: Abdomen is flat. Bowel sounds are normal. There is no distension.      Palpations: Abdomen is soft.      Tenderness: There is no abdominal tenderness.   Musculoskeletal:         General: Swelling and tenderness present.      Cervical back: Normal range of motion and neck supple.   Skin:     General: Skin is warm.   Neurological:      General: No focal deficit present.      Mental Status: He is alert and oriented to person, place, and time.   Psychiatric:         Mood and Affect: Mood normal.         Behavior: Behavior normal.         Fluids    Intake/Output Summary (Last 24 hours) at 12/8/2023 1342  Last data filed at 12/8/2023 1200  Gross per 24 hour   Intake 1720 ml   Output 1350 ml   Net 370 ml         Laboratory  Recent Labs     12/06/23  0035 12/06/23  0525 12/07/23  0033 12/07/23  0523 12/08/23  0131 12/08/23  0526   WBC 14.3*  --  14.6*  --  12.8*  --    RBC 4.16*  --  3.94*  --  3.60*  --    HEMOGLOBIN 12.5*   < > 11.6* 11.5* 10.6* 11.0*   HEMATOCRIT 37.4*   < > 34.6* 34.7* 32.3* 33.1*   MCV 89.9  --  87.8  --  89.7  --    MCH 30.0  --  29.4  --  29.4  --    MCHC 33.4  --  33.5  --  32.8  --    RDW 41.8  --  40.7  --  40.0  --    PLATELETCT 249  --  224  --  226  --    MPV 10.1  --  10.0  --  10.2  --     < > = values in this interval not displayed.       Recent Labs     12/06/23  0035 12/07/23  0033 12/08/23  0131   SODIUM 137 135 134*   POTASSIUM 4.0 4.0 3.9   CHLORIDE 102 100 99   CO2 23 24 24   GLUCOSE 184* 216* 221*   BUN 22 22 24*   CREATININE 1.12 1.05 1.06   CALCIUM 8.3* 8.4 8.3*                      Imaging  DX-HIP-BILATERAL-WITH PELVIS-3/4 VIEWS   Final Result      Mild bilateral hip osteoarthritis      DX-KNEE 3 VIEWS LEFT   Final Result      Total knee arthroplasty without periprosthetic fracture.              Assessment/Plan  * Left lower extremity weakness- (present on admission)  Assessment & Plan  Had left Total Knee Arthroplasty today 12/5/2023 w Dr Thomas.    Developed left lower extremity weakness in recovery.    Likely secondary to nerve block  Anticipate improvement over the next 24 hours   PT/OT     12/6: Continue PT/OT. Surgeon evaluated patient at bedside today, we appreciate further recommendations.    12/8: Pending placement.    Failure to thrive in adult  Assessment & Plan  Patient with significant pain, with functional impairment due to severe pain and unable to fully work with physical therapy.  Will try to continue to work with physical therapy and address pain management.    Anemia  Assessment & Plan  There could be a component of acute blood loss anemia due to recent surgery.  Monitor hemoglobin.    12/8: Hb seems to be stable. Monitor    Fever  Assessment & Plan  Patient had recent surgery  We have ordered blood cultures  Monitor    12/7: No further episodes of fever. Monitor for signs of infection.    12/8: Blood cultures negative to date    Acute pain of left knee- (present on admission)  Assessment & Plan  Had left Total Knee Arthroplasty today 12/5/2023 w Dr Thomas.    Developed left lower extremity weakness in recovery.    Patient fell hitting his while attempting to walk and then developed pain in his knee  I will check knee and hip x-rays to rule out occult fractures    12/7: Patient still with significant pain. We will increase dose of PRN medications. PT/OT    12/8: Pending placement.    S/P total knee arthroplasty, left- (present on admission)  Assessment & Plan  Had left Total Knee Arthroplasty today 12/5/2023 w Dr Thomas.   Multimodal pain control.  Pharmacologic  prophylaxis to start tomorrow.   Consider physical and Occupational Therapy when okay with orthopedics.      12/7: PT/OT and pain management. We had to increase PRN medications. Patient requiring IV pain medication    12/8: Pending placement.    Hypomagnesemia- (present on admission)  Assessment & Plan  Replace as needed  monitor    Advance care planning- (present on admission)  Assessment & Plan  I had a discussion with the patient regarding goals of care, diagnoses, prognosis, and CODE STATUS. We discussed his  and comorbidities.  The patient has advanced age and multiple comorbid conditions including diabetes, hypertension and hyperlipidemia.  However, he enjoys a good mental capacity and relatively good functional status.  At this point he wants to proceed with a DNAR/DNI code.  However, he is open to all forms of diagnostic and therapeutic invasive and noninvasive interventions as indicated     Dyslipidemia- (present on admission)  Assessment & Plan  Cardiac diet   Resume atorvastatin     Leukocytosis- (present on admission)  Assessment & Plan  Most likely reactive from recent surgery.  The patient did have an episode of fever yesterday.  We did order blood cultures.  For now we will hold antibiotics.    BPH associated with nocturia- (present on admission)  Assessment & Plan  Resume tamsulosin      Type 2 diabetes mellitus without complication, without long-term current use of insulin (HCC)- (present on admission)  Assessment & Plan  Last glycated hemoglobin was 6.8%  I will start short acting insulin for now  I will order Accu-Checks, hypoglycemia protocol  Adjust according to blood sugars trend.          VTE prophylaxis: Aspirin BID    I have performed a physical exam and reviewed and updated ROS and Plan today (12/8/2023). In review of yesterday's note (12/7/2023), there are no changes except as documented above.      I spend at least 51 minutes providing care for this patient. This included face to face  interview, physical examination.  Review of lab work including CBC, hemoglobin, CMP.  Discussion with multidisciplinary team including case management, nursing staff and pharmacy.

## 2023-12-08 NOTE — PROGRESS NOTES
Assumed care of pt at 1915. Received bedside report from Darshana MATA. Pt resting in bed. A&O X4 at this time. Pt appears to be more oriented at this time. Left leg dressing is CDI. Pt wearing el hose, refused SCDs at this time. Pt able to dorsiflex, pedal pulse present, some numbness noted. Discussed plan to monitor pain and O2. Pt at 85% on RA , placed 2L with spO2 at 95%. Encouraged pt to use IS. Pt refusing to ambulate overnight d/t pain. No other needs at this time, call light in reach, bed in lowest position.

## 2023-12-08 NOTE — PROGRESS NOTES
Patient is a little more comfortable today but still very immobile.  Catheter has been placed because of urinary retention.    The dressing and knee look clean and dry but there is essentially no mobility of that leg independently.    Impression: Status post left total knee with severe postoperative pain, decreased mobility, now urinary retention    Plan: It sounds like he is needing a little bit less pain medicine which is good and hopefully he will be able to mobilize enough to be transfer home at some point.  Otherwise he would be a candidate for a couple weeks and a skilled nursing or acute rehab.

## 2023-12-08 NOTE — PROGRESS NOTES
Pt reports episode of nausea with no vomiting. Gave medication per MAR.    1422- reports episode of nausea has subsided.

## 2023-12-09 LAB
ANION GAP SERPL CALC-SCNC: 10 MMOL/L (ref 7–16)
BUN SERPL-MCNC: 26 MG/DL (ref 8–22)
CALCIUM SERPL-MCNC: 8.5 MG/DL (ref 8.4–10.2)
CHLORIDE SERPL-SCNC: 97 MMOL/L (ref 96–112)
CO2 SERPL-SCNC: 29 MMOL/L (ref 20–33)
CREAT SERPL-MCNC: 1.18 MG/DL (ref 0.5–1.4)
ERYTHROCYTE [DISTWIDTH] IN BLOOD BY AUTOMATED COUNT: 41.1 FL (ref 35.9–50)
GFR SERPLBLD CREATININE-BSD FMLA CKD-EPI: 62 ML/MIN/1.73 M 2
GLUCOSE BLD STRIP.AUTO-MCNC: 239 MG/DL (ref 65–99)
GLUCOSE BLD STRIP.AUTO-MCNC: 254 MG/DL (ref 65–99)
GLUCOSE BLD STRIP.AUTO-MCNC: 271 MG/DL (ref 65–99)
GLUCOSE BLD STRIP.AUTO-MCNC: 284 MG/DL (ref 65–99)
GLUCOSE BLD STRIP.AUTO-MCNC: 288 MG/DL (ref 65–99)
GLUCOSE SERPL-MCNC: 227 MG/DL (ref 65–99)
HCT VFR BLD AUTO: 34.2 % (ref 42–52)
HGB BLD-MCNC: 11 G/DL (ref 14–18)
MCH RBC QN AUTO: 29.2 PG (ref 27–33)
MCHC RBC AUTO-ENTMCNC: 32.2 G/DL (ref 32.3–36.5)
MCV RBC AUTO: 90.7 FL (ref 81.4–97.8)
PLATELET # BLD AUTO: 291 K/UL (ref 164–446)
PMV BLD AUTO: 9.9 FL (ref 9–12.9)
POTASSIUM SERPL-SCNC: 4.3 MMOL/L (ref 3.6–5.5)
RBC # BLD AUTO: 3.77 M/UL (ref 4.7–6.1)
SODIUM SERPL-SCNC: 136 MMOL/L (ref 135–145)
WBC # BLD AUTO: 11.2 K/UL (ref 4.8–10.8)

## 2023-12-09 PROCEDURE — 99024 POSTOP FOLLOW-UP VISIT: CPT | Performed by: ORTHOPAEDIC SURGERY

## 2023-12-09 PROCEDURE — 97535 SELF CARE MNGMENT TRAINING: CPT

## 2023-12-09 PROCEDURE — 82962 GLUCOSE BLOOD TEST: CPT | Mod: 91

## 2023-12-09 PROCEDURE — 700102 HCHG RX REV CODE 250 W/ 637 OVERRIDE(OP): Performed by: INTERNAL MEDICINE

## 2023-12-09 PROCEDURE — 99233 SBSQ HOSP IP/OBS HIGH 50: CPT | Performed by: INTERNAL MEDICINE

## 2023-12-09 PROCEDURE — 770001 HCHG ROOM/CARE - MED/SURG/GYN PRIV*

## 2023-12-09 PROCEDURE — 700102 HCHG RX REV CODE 250 W/ 637 OVERRIDE(OP): Performed by: HOSPITALIST

## 2023-12-09 PROCEDURE — 94760 N-INVAS EAR/PLS OXIMETRY 1: CPT

## 2023-12-09 PROCEDURE — 85027 COMPLETE CBC AUTOMATED: CPT

## 2023-12-09 PROCEDURE — 36415 COLL VENOUS BLD VENIPUNCTURE: CPT

## 2023-12-09 PROCEDURE — A9270 NON-COVERED ITEM OR SERVICE: HCPCS | Performed by: HOSPITALIST

## 2023-12-09 PROCEDURE — 97112 NEUROMUSCULAR REEDUCATION: CPT

## 2023-12-09 PROCEDURE — 80048 BASIC METABOLIC PNL TOTAL CA: CPT

## 2023-12-09 PROCEDURE — A9270 NON-COVERED ITEM OR SERVICE: HCPCS | Performed by: INTERNAL MEDICINE

## 2023-12-09 RX ADMIN — ATORVASTATIN CALCIUM 10 MG: 10 TABLET, FILM COATED ORAL at 16:42

## 2023-12-09 RX ADMIN — ASPIRIN 81 MG: 81 TABLET, COATED ORAL at 04:48

## 2023-12-09 RX ADMIN — OXYCODONE HYDROCHLORIDE 10 MG: 10 TABLET ORAL at 21:05

## 2023-12-09 RX ADMIN — AMLODIPINE BESYLATE 10 MG: 5 TABLET ORAL at 16:42

## 2023-12-09 RX ADMIN — ASPIRIN 81 MG: 81 TABLET, COATED ORAL at 16:42

## 2023-12-09 RX ADMIN — LOSARTAN POTASSIUM 100 MG: 50 TABLET, FILM COATED ORAL at 04:49

## 2023-12-09 RX ADMIN — TAMSULOSIN HYDROCHLORIDE 0.8 MG: 0.4 CAPSULE ORAL at 04:50

## 2023-12-09 RX ADMIN — OXYCODONE HYDROCHLORIDE 10 MG: 10 TABLET ORAL at 04:47

## 2023-12-09 RX ADMIN — LEVOTHYROXINE SODIUM 88 MCG: 88 TABLET ORAL at 04:49

## 2023-12-09 RX ADMIN — OXYCODONE HYDROCHLORIDE 5 MG: 5 TABLET ORAL at 15:03

## 2023-12-09 ASSESSMENT — PATIENT HEALTH QUESTIONNAIRE - PHQ9
1. LITTLE INTEREST OR PLEASURE IN DOING THINGS: NOT AT ALL
2. FEELING DOWN, DEPRESSED, IRRITABLE, OR HOPELESS: NOT AT ALL
SUM OF ALL RESPONSES TO PHQ9 QUESTIONS 1 AND 2: 0

## 2023-12-09 ASSESSMENT — ENCOUNTER SYMPTOMS
HEARTBURN: 0
BACK PAIN: 0
HEADACHES: 0
DIZZINESS: 0
FALLS: 1
SHORTNESS OF BREATH: 0
VOMITING: 0
ABDOMINAL PAIN: 0
DOUBLE VISION: 0
FEVER: 0
NERVOUS/ANXIOUS: 0
CHILLS: 0
COUGH: 0
BLURRED VISION: 0
PALPITATIONS: 0

## 2023-12-09 ASSESSMENT — COGNITIVE AND FUNCTIONAL STATUS - GENERAL
HELP NEEDED FOR BATHING: A LOT
SUGGESTED CMS G CODE MODIFIER DAILY ACTIVITY: CK
DRESSING REGULAR LOWER BODY CLOTHING: A LOT
DRESSING REGULAR UPPER BODY CLOTHING: A LITTLE
DAILY ACTIVITIY SCORE: 17
TOILETING: A LOT

## 2023-12-09 ASSESSMENT — PAIN DESCRIPTION - PAIN TYPE
TYPE: SURGICAL PAIN

## 2023-12-09 ASSESSMENT — GAIT ASSESSMENTS: DISTANCE (FEET): 4

## 2023-12-09 ASSESSMENT — LIFESTYLE VARIABLES: SUBSTANCE_ABUSE: 0

## 2023-12-09 NOTE — PROGRESS NOTES
BSSR received from night RN. Pt is resting comfortably in bed, not in any distress on 1L at 94%. AAOx4. Pt reported pain, rest and reposition for relief. Denies any N/V. L knee dressing in place, CDI. CMS noted. Discussed POC. Fall risk precautions in place, locked bed in lowest position, bed alarm on and call light within reach. All needs met at this time. Hourly rounding in place.

## 2023-12-09 NOTE — PROGRESS NOTES
"Hospital Medicine Daily Progress Note    Date of Service  12/9/2023    Chief Complaint  Saleem Whitney is a 78 y.o. male admitted 12/5/2023 with knee pain.    Hospital Course  As per chart review:  \"78 y.o. male with a past medical history of diabetes, hypertension, hyperlipidemia and arthritis s/p total left knee arthroplasty done today with Dr. Thomas who presented 12/5/2023 with worsening left knee pain and weakness of the left lower extremity after his procedure.  Patient reports that he did fall because of the weakness.  Pain is now moderate in severity.  He denies having weakness in other parts of his body.  He denies having fevers or chills.\"    Interval Problem Update  12/6: Patient seen at bedside this morning.  The patient states that he is still having difficulty moving his leg.  We appreciate further recommendation by orthopedic surgery.  Will monitor hemoglobin as it seems the patient had soaked dressing earlier this morning and was changed by orthopedics request.  Repeat hemoglobin seems to be stable however.  Continue to monitor closely.  The patient will require PT/OT evaluation once approved by orthopedics.  The patient did have an episode of fever yesterday, however he has had recent surgery.  We did order blood cultures.  Will monitor closely for any signs of infection.    12/7: Patient seen at bedside this morning.  It seems the patient has functional impairment due to pain.  We have increased the patient's pain medications, as patient requiring IV pain medication.  It seems due to the patient's pain unable to fully work with physical therapy, the patient will most likely require skilled nursing facility placement.  It seems the patient has not had another episode of fever, we will continue to monitor for any signs of infection.    12/8: Patient seen at bedside this morning.  Patient still complaining of significant pain.  Patient will most likely require placement upon discharge.  " Pending placement, we appreciate further recommendations by case management.  Continue to monitor closely.  Blood cultures negative to date.    12/9: Patient seen at bedside this morning.  Overall the patient mentions he feels better.  We are pending placement.  We appreciate further recommendations by orthopedic surgery.  Blood cultures negative to date.    I have discussed this patient's plan of care and discharge plan at IDT rounds today with Case Management, Nursing, Nursing leadership, and other members of the IDT team.    Consultants/Specialty  Orthopedics - Dr Thomas    Code Status  DNAR/DNI    Disposition  The patient is not medically cleared for discharge to home or a post-acute facility.      I have placed the appropriate orders for post-discharge needs.    Review of Systems  Review of Systems   Constitutional:  Positive for malaise/fatigue. Negative for chills and fever.   HENT:  Negative for hearing loss and nosebleeds.    Eyes:  Negative for blurred vision and double vision.   Respiratory:  Negative for cough and shortness of breath.    Cardiovascular:  Negative for chest pain and palpitations.   Gastrointestinal:  Negative for abdominal pain, heartburn and vomiting.   Genitourinary:  Negative for dysuria and urgency.   Musculoskeletal:  Positive for falls and joint pain. Negative for back pain.   Skin:  Negative for itching and rash.   Neurological:  Negative for dizziness and headaches.   Psychiatric/Behavioral:  Negative for substance abuse. The patient is not nervous/anxious.    All other systems reviewed and are negative.       Physical Exam  Temp:  [36.3 °C (97.3 °F)-36.5 °C (97.7 °F)] 36.4 °C (97.5 °F)  Pulse:  [75-86] 86  Resp:  [16] 16  BP: (111-138)/(63-67) 138/66  SpO2:  [93 %-98 %] 94 %    Physical Exam  Vitals and nursing note reviewed.   Constitutional:       Appearance: He is ill-appearing.   HENT:      Head: Normocephalic and atraumatic.      Right Ear: External ear normal.      Left  Ear: External ear normal.      Nose: Nose normal.      Mouth/Throat:      Mouth: Mucous membranes are moist.      Pharynx: Oropharynx is clear.   Eyes:      General:         Right eye: No discharge.         Left eye: No discharge.      Extraocular Movements: Extraocular movements intact.      Pupils: Pupils are equal, round, and reactive to light.   Cardiovascular:      Rate and Rhythm: Normal rate and regular rhythm.      Heart sounds: No murmur heard.  Pulmonary:      Effort: Pulmonary effort is normal. No respiratory distress.      Breath sounds: Normal breath sounds.   Abdominal:      General: Abdomen is flat. Bowel sounds are normal. There is no distension.      Palpations: Abdomen is soft.      Tenderness: There is no abdominal tenderness.   Musculoskeletal:         General: Swelling and tenderness present.      Cervical back: Normal range of motion and neck supple.   Skin:     General: Skin is warm.   Neurological:      General: No focal deficit present.      Mental Status: He is alert and oriented to person, place, and time.   Psychiatric:         Mood and Affect: Mood normal.         Behavior: Behavior normal.         Fluids    Intake/Output Summary (Last 24 hours) at 12/9/2023 1054  Last data filed at 12/9/2023 0800  Gross per 24 hour   Intake 720 ml   Output 1910 ml   Net -1190 ml         Laboratory  Recent Labs     12/07/23  0033 12/07/23  0523 12/08/23  0131 12/08/23  0526 12/09/23  0130   WBC 14.6*  --  12.8*  --  11.2*   RBC 3.94*  --  3.60*  --  3.77*   HEMOGLOBIN 11.6*   < > 10.6* 11.0* 11.0*   HEMATOCRIT 34.6*   < > 32.3* 33.1* 34.2*   MCV 87.8  --  89.7  --  90.7   MCH 29.4  --  29.4  --  29.2   MCHC 33.5  --  32.8  --  32.2*   RDW 40.7  --  40.0  --  41.1   PLATELETCT 224  --  226  --  291   MPV 10.0  --  10.2  --  9.9    < > = values in this interval not displayed.       Recent Labs     12/07/23  0033 12/08/23  0131 12/09/23  0130   SODIUM 135 134* 136   POTASSIUM 4.0 3.9 4.3   CHLORIDE 100 99  97   CO2 24 24 29   GLUCOSE 216* 221* 227*   BUN 22 24* 26*   CREATININE 1.05 1.06 1.18   CALCIUM 8.4 8.3* 8.5                     Imaging  DX-HIP-BILATERAL-WITH PELVIS-3/4 VIEWS   Final Result      Mild bilateral hip osteoarthritis      DX-KNEE 3 VIEWS LEFT   Final Result      Total knee arthroplasty without periprosthetic fracture.              Assessment/Plan  * Left lower extremity weakness- (present on admission)  Assessment & Plan  Had left Total Knee Arthroplasty today 12/5/2023 w Dr Thomas.    Developed left lower extremity weakness in recovery.    Likely secondary to nerve block  Anticipate improvement over the next 24 hours   PT/OT     12/6: Continue PT/OT. Surgeon evaluated patient at bedside today, we appreciate further recommendations.    12/9: Pending placement.    Failure to thrive in adult  Assessment & Plan  Patient with significant pain, with functional impairment due to severe pain and unable to fully work with physical therapy.  Will try to continue to work with physical therapy and address pain management.    Anemia  Assessment & Plan  There could be a component of acute blood loss anemia due to recent surgery.  Monitor hemoglobin.    12/9: Hb seems to be stable. Monitor for any signs of bleeding.    Fever  Assessment & Plan  Patient had recent surgery  We have ordered blood cultures  Monitor    12/7: No further episodes of fever. Monitor for signs of infection.    12/9: Blood cultures negative to date    Acute pain of left knee- (present on admission)  Assessment & Plan  Had left Total Knee Arthroplasty today 12/5/2023 w Dr Thomas.    Developed left lower extremity weakness in recovery.    Patient fell hitting his while attempting to walk and then developed pain in his knee  I will check knee and hip x-rays to rule out occult fractures    12/7: Patient still with significant pain. We will increase dose of PRN medications. PT/OT    12/9: Pending placement.    S/P total knee arthroplasty, left-  (present on admission)  Assessment & Plan  Had left Total Knee Arthroplasty today 12/5/2023 w Dr Thomas.   Multimodal pain control.  Pharmacologic prophylaxis to start tomorrow.   Consider physical and Occupational Therapy when okay with orthopedics.      12/7: PT/OT and pain management. We had to increase PRN medications. Patient requiring IV pain medication    12/9: Pending placement.    Hypomagnesemia- (present on admission)  Assessment & Plan  Replace as needed  monitor    Advance care planning- (present on admission)  Assessment & Plan  I had a discussion with the patient regarding goals of care, diagnoses, prognosis, and CODE STATUS. We discussed his  and comorbidities.  The patient has advanced age and multiple comorbid conditions including diabetes, hypertension and hyperlipidemia.  However, he enjoys a good mental capacity and relatively good functional status.  At this point he wants to proceed with a DNAR/DNI code.  However, he is open to all forms of diagnostic and therapeutic invasive and noninvasive interventions as indicated     Dyslipidemia- (present on admission)  Assessment & Plan  Cardiac diet   Resume atorvastatin     Leukocytosis- (present on admission)  Assessment & Plan  Most likely reactive from recent surgery.  The patient did have an episode of fever yesterday.  We did order blood cultures.  For now we will hold antibiotics.    BPH associated with nocturia- (present on admission)  Assessment & Plan  Resume tamsulosin      Type 2 diabetes mellitus without complication, without long-term current use of insulin (HCC)- (present on admission)  Assessment & Plan  Last glycated hemoglobin was 6.8%  I will start short acting insulin for now  I will order Accu-Checks, hypoglycemia protocol  Adjust according to blood sugars trend.          VTE prophylaxis: Aspirin BID    I have performed a physical exam and reviewed and updated ROS and Plan today (12/9/2023). In review of yesterday's note  (12/8/2023), there are no changes except as documented above.      I spend at least 52 minutes providing care for this patient. This included face to face interview, physical examination.  Review of lab work including CBC, BMP.  Discussion with multidisciplinary team including case management, nursing staff and pharmacy.

## 2023-12-09 NOTE — PROGRESS NOTES
4 Eyes Skin Assessment Completed by Chucky RN and ESPERANZA Paz.    Head WDL  Ears WDL  Nose WDL  Mouth WDL  Neck WDL  Breast/Chest WDL  Shoulder Blades WDL  Spine WDL  (R) Arm/Elbow/Hand WDL  (L) Arm/Elbow/Hand WDL  Abdomen WDL  Groin WDL  Scrotum/Coccyx/Buttocks WDL  (R) Leg WDL  (L) Leg Incision covered with surgical dressing, cdi  (R) Heel/Foot/Toe WDL  (L) Heel/Foot/Toe WDL          Devices In Places Pulse Ox and DORI's      Interventions In Place NC W/Ear Foams, Waffle Overlay, and Pillows    Possible Skin Injury No    Pictures Uploaded Into Epic N/A  Wound Consult Placed N/A  RN Wound Prevention Protocol Ordered No

## 2023-12-09 NOTE — CARE PLAN
The patient is Stable - Low risk of patient condition declining or worsening    Shift Goals  Clinical Goals: Pt will remain free from falls and injury during shift; Pt will report pain <4/10 after ordered PRN interventions are administered.  Patient Goals: Less pain, Sleep.  Family Goals: na    Progress made toward(s) clinical / shift goals:  Pt remained free from falls and injury; Pt reported pain 3/10 after interventions.    Patient is not progressing towards the following goals:

## 2023-12-09 NOTE — PROGRESS NOTES
Received report from day shift nurse. Assumed pt care at 1915. Pt is A&Ox4, resting comfortably in bed. Pt on , NC. No signs of SOB/respiratory distress. Labs, VS, medications reviewed.  Fall precautions in place. Bed at lowest position. Call light and personal belongings within reach. Plan of care discussed, no further concerns at this time.

## 2023-12-09 NOTE — THERAPY
"Occupational Therapy   Initial Evaluation     Patient Name: Saleem Whitney  Age:  78 y.o., Sex:  male  Medical Record #: [id number]  Today's Date: 12/8/2023     Precautions: Fall Risk, Weight Bearing As Tolerated Left Lower Extremity    Assessment  Patient is 78 y.o. male s/p L TKA POD 3. Same day dc planned on 12/5/23; although pt with GLF then admitted through ED. Hx of Diabetes HTN, Hyperlipidemia. Pt is A&Ox4, pleasant and cooperative. Knee pain is primary limiting factor and currently impairs function. Extra time, v/c's for energy conservation tech's and encouragement during activities of session. Robertson, O2 @1L NC in place. Pt lives with spouse, indep prior. Pt is not near his baseline level; will benefit from inpt OT services as well as post-acute prior to dc home.         Plan  Occupational Therapy Initial Treatment Plan   Treatment Interventions: (P) Self Care / Activities of Daily Living, Neuro Re-Education / Balance, Therapeutic Activity  Treatment Frequency: (P) 3 Times per Week  Duration: (P) Until Therapy Goals Met    DC Equipment Recommendations: Unable to determine at this time  Discharge Recommendations: Recommend post-acute placement for additional occupational therapy services prior to discharge home     Subjective  \"It's a bit better\"     Objective     12/08/23 1234   Prior Living Situation   Prior Services None   Housing / Facility 1 Story House   Bathroom Set up Walk In Shower   Equipment Owned Front-Wheel Walker   Lives with - Patient's Self Care Capacity Spouse   Comments L TKA 12/5, dc'd same day, GLF.   Prior Level of ADL Function   Self Feeding Independent   Grooming / Hygiene Independent   Bathing Independent   Dressing Independent   Toileting Independent   Prior Level of IADL Function   Medication Management Independent   Laundry Independent   Kitchen Mobility Independent   Finances Independent   Home Management Independent   Shopping Independent   Prior Level Of Mobility " Independent Without Device in Home   Driving / Transportation Driving Independent   Occupation (Pre-Hospital Vocational) Retired Due To Age   Leisure Interests Unable To Determine At This Time   History of Falls   History of Falls Yes   Precautions   Precautions Fall Risk;Weight Bearing As Tolerated Left Lower Extremity   Vitals   O2 (LPM) 1   O2 Delivery Device Silicone Nasal Cannula   Pain 0 - 10 Group   Location Knee   Location Orientation Left   Therapist Pain Assessment Post Activity Pain Same as Prior to Activity;Nurse Notified   Cognition    Cognition / Consciousness WDL   Level of Consciousness Alert   Passive ROM Upper Body   Passive ROM Upper Body WDL   Active ROM Upper Body   Active ROM Upper Body  WDL   Strength Upper Body   Upper Body Strength  WDL   Sensation Upper Body   Upper Extremity Sensation  WDL   Upper Body Muscle Tone   Upper Body Muscle Tone  WDL   Coordination Upper Body   Coordination WDL   Balance Assessment   Sitting Balance (Static) Fair   Sitting Balance (Dynamic) Fair -   Standing Balance (Static) Fair -   Standing Balance (Dynamic) Poor +   Weight Shift Sitting Fair   Weight Shift Standing Poor   Bed Mobility    Supine to Sit Minimal Assist   Scooting Contact Guard Assist   Comments extra time   ADL Assessment   Eating Independent   Grooming Standby Assist;Seated   Upper Body Dressing Supervision   Lower Body Dressing Maximal Assist   Toileting Total Assist   Comments angie   How much help from another person does the patient currently need...   Putting on and taking off regular lower body clothing? 2   Bathing (including washing, rinsing, and drying)? 2   Toileting, which includes using a toilet, bedpan, or urinal? 2   Putting on and taking off regular upper body clothing? 3   Taking care of personal grooming such as brushing teeth? 4   Eating meals? 4   6 Clicks Daily Activity Score 17   Functional Mobility   Sit to Stand Moderate Assist   Bed, Chair, Wheelchair Transfer Moderate  Assist   Transfer Method Stand Step   Comments FWW, extra time due to pain   Visual Perception   Visual Perception  WDL   Activity Tolerance   Sitting in Chair 1 hr   Sitting Edge of Bed 10   Standing 5   Short Term Goals   Short Term Goal # 1 Pt will perform sup to sit with SBA within 5 days   Short Term Goal # 2 Pt will perform ADL transfers with CGA within 5 days   Short Term Goal # 3 Pt will perform grooming at sink with SBA within 5 days   Short Term Goal # 4 Pt will perform Lb dressing with SBA within 5 days   Short Term Goal # 5 Pt will perform toileting in br with SBA within 5 days   Education Group   Education Provided Energy Conservation;Transfers   Role of Occupational Therapist Patient Response Patient;Acceptance;Explanation;Verbal Demonstration   Energy Conservation Patient Response Patient;Acceptance;Explanation;Verbal Demonstration   Transfers Patient Response Patient;Acceptance;Explanation;Action Demonstration   Occupational Therapy Initial Treatment Plan    Treatment Interventions Self Care / Activities of Daily Living;Neuro Re-Education / Balance;Therapeutic Activity   Treatment Frequency 3 Times per Week   Duration Until Therapy Goals Met   Problem List   Problem List Decreased Active Daily Living Skills;Decreased Homemaking Skills;Decreased Functional Mobility;Decreased Activity Tolerance;Impaired Postural Control / Balance   Anticipated Discharge Equipment and Recommendations   DC Equipment Recommendations Unable to determine at this time   Discharge Recommendations Recommend post-acute placement for additional occupational therapy services prior to discharge home   Interdisciplinary Plan of Care Collaboration   IDT Collaboration with  Nursing   Patient Position at End of Therapy Seated;Chair Alarm On;Call Light within Reach;Tray Table within Reach;Phone within Reach   Collaboration Comments LEONOR Menjivar

## 2023-12-09 NOTE — CARE PLAN
Problem: Fall Risk  Goal: Patient will remain free from falls  Outcome: Progressing     Problem: Pain - Post Surgery  Goal: Alleviation or reduction of pain post surgery  Outcome: Progressing     Problem: Skin Integrity  Goal: Skin integrity is maintained or improved  Outcome: Progressing     The patient is Stable - Low risk of patient condition declining or worsening    Shift Goals  Clinical Goals: Pt will remain free from falls wihin my shift, pain will be managed at or below 2/10 throughout my shift, see PT today and ambulate safetly  Patient Goals: rest, participate with PT  Family Goals: na    Progress made toward(s) clinical / shift goals:  Pt remained free from falls throughout shift. Pt reports pain is managed under currently ordered prn regimen, pt able to tolerate PT/OT session today. Pt able to ambulate with x2 assist with FWW, able to sit up in chair for majority of shift.    Patient is not progressing towards the following goals:    Problem: Diabetes Management  Goal: Patient will achieve and maintain glucose in satisfactory range  Outcome: Not Progressing    Fsbs remained elevated this shift; insulin orders adjusted by hospitalist.

## 2023-12-09 NOTE — PROGRESS NOTES
Subjective: Doing well overnight, no issues.  Has been mobilizing little bit better.  Still complains of a lot of knee and ankle pain, but improving    Objective: Dressing clean dry and intact.  Compartment soft compressible.  Painless passive knee motion.  Some tenderness around the ankle.  Neurovascularly intact.    Impression: Status post left total knee with severe postoperative pain, decreased mobility    Plan: Doing better, slowly improving.  Mobilizing with PT.  Awaiting placement.

## 2023-12-10 ENCOUNTER — APPOINTMENT (OUTPATIENT)
Dept: RADIOLOGY | Facility: MEDICAL CENTER | Age: 78
DRG: 561 | End: 2023-12-10
Attending: INTERNAL MEDICINE
Payer: MEDICARE

## 2023-12-10 LAB
GLUCOSE BLD STRIP.AUTO-MCNC: 207 MG/DL (ref 65–99)
GLUCOSE BLD STRIP.AUTO-MCNC: 245 MG/DL (ref 65–99)
GLUCOSE BLD STRIP.AUTO-MCNC: 285 MG/DL (ref 65–99)
GLUCOSE BLD STRIP.AUTO-MCNC: 315 MG/DL (ref 65–99)
HCT VFR BLD AUTO: 33.8 % (ref 42–52)
HGB BLD-MCNC: 11 G/DL (ref 14–18)

## 2023-12-10 PROCEDURE — 700102 HCHG RX REV CODE 250 W/ 637 OVERRIDE(OP): Performed by: INTERNAL MEDICINE

## 2023-12-10 PROCEDURE — A9270 NON-COVERED ITEM OR SERVICE: HCPCS | Performed by: INTERNAL MEDICINE

## 2023-12-10 PROCEDURE — A9270 NON-COVERED ITEM OR SERVICE: HCPCS | Performed by: HOSPITALIST

## 2023-12-10 PROCEDURE — 700102 HCHG RX REV CODE 250 W/ 637 OVERRIDE(OP): Performed by: HOSPITALIST

## 2023-12-10 PROCEDURE — 770001 HCHG ROOM/CARE - MED/SURG/GYN PRIV*

## 2023-12-10 PROCEDURE — 94760 N-INVAS EAR/PLS OXIMETRY 1: CPT

## 2023-12-10 PROCEDURE — 93970 EXTREMITY STUDY: CPT

## 2023-12-10 PROCEDURE — 36415 COLL VENOUS BLD VENIPUNCTURE: CPT

## 2023-12-10 PROCEDURE — 85018 HEMOGLOBIN: CPT

## 2023-12-10 PROCEDURE — 99233 SBSQ HOSP IP/OBS HIGH 50: CPT | Performed by: INTERNAL MEDICINE

## 2023-12-10 PROCEDURE — 85014 HEMATOCRIT: CPT

## 2023-12-10 PROCEDURE — 82962 GLUCOSE BLOOD TEST: CPT | Mod: 91

## 2023-12-10 RX ORDER — ACETAMINOPHEN 500 MG
1000 TABLET ORAL 3 TIMES DAILY
Status: DISCONTINUED | OUTPATIENT
Start: 2023-12-10 | End: 2023-12-11 | Stop reason: HOSPADM

## 2023-12-10 RX ADMIN — ASPIRIN 81 MG: 81 TABLET, COATED ORAL at 05:39

## 2023-12-10 RX ADMIN — ACETAMINOPHEN 1000 MG: 500 TABLET ORAL at 10:18

## 2023-12-10 RX ADMIN — AMLODIPINE BESYLATE 10 MG: 5 TABLET ORAL at 18:01

## 2023-12-10 RX ADMIN — MAGNESIUM HYDROXIDE 30 ML: 1200 LIQUID ORAL at 07:01

## 2023-12-10 RX ADMIN — ACETAMINOPHEN 1000 MG: 500 TABLET ORAL at 16:00

## 2023-12-10 RX ADMIN — ATORVASTATIN CALCIUM 10 MG: 10 TABLET, FILM COATED ORAL at 18:01

## 2023-12-10 RX ADMIN — ASPIRIN 81 MG: 81 TABLET, COATED ORAL at 18:02

## 2023-12-10 RX ADMIN — BISACODYL 10 MG: 10 SUPPOSITORY RECTAL at 18:02

## 2023-12-10 RX ADMIN — OXYCODONE HYDROCHLORIDE 10 MG: 10 TABLET ORAL at 07:48

## 2023-12-10 RX ADMIN — LEVOTHYROXINE SODIUM 88 MCG: 88 TABLET ORAL at 05:39

## 2023-12-10 RX ADMIN — TAMSULOSIN HYDROCHLORIDE 0.8 MG: 0.4 CAPSULE ORAL at 05:36

## 2023-12-10 ASSESSMENT — LIFESTYLE VARIABLES: SUBSTANCE_ABUSE: 0

## 2023-12-10 ASSESSMENT — ENCOUNTER SYMPTOMS
SHORTNESS OF BREATH: 0
ABDOMINAL PAIN: 0
FALLS: 1
CHILLS: 0
HEARTBURN: 0
DIZZINESS: 0
COUGH: 0
BACK PAIN: 0
FEVER: 0
NERVOUS/ANXIOUS: 0
PALPITATIONS: 0
BLURRED VISION: 0
VOMITING: 0
DOUBLE VISION: 0
HEADACHES: 0

## 2023-12-10 ASSESSMENT — PAIN DESCRIPTION - PAIN TYPE
TYPE: SURGICAL PAIN

## 2023-12-10 ASSESSMENT — FIBROSIS 4 INDEX: FIB4 SCORE: 0.97

## 2023-12-10 NOTE — PROGRESS NOTES
Received bedside report from night RN. Patient A&Ox3. Denies any complains at this time. Discussed POC and understood. Safety and fall precaution in place. Call light placed within reach with instructions to call anytime for assistance. All needs met at this time.

## 2023-12-10 NOTE — DISCHARGE PLANNING
Patient asked for CM to speak with daughters Gregorio [phone] and Ana Maria [phone]. Patient states wife has memory issues possible Dementia.

## 2023-12-10 NOTE — CARE PLAN
The patient is Stable - Low risk of patient condition declining or worsening    Shift Goals  Clinical Goals: Pt will free from falls, maintained 02 sat above 90% on RA  Patient Goals: Remain comfortable throughouth shift  Family Goals: n/a    Progress made toward(s) clinical / shift goals:  Educate pt to use call light for assistance, visual monitoring and checking. Pt on 02 at 2lpm via nc with 02 sat 93%     Patient is not progressing towards the following goals:

## 2023-12-10 NOTE — ASSESSMENT & PLAN NOTE
Patient with recent surgery.  However there is also concern for DVT.  Will order ultrasound of the lower extremity.  Monitor.

## 2023-12-10 NOTE — CARE PLAN
The patient is Stable - Low risk of patient condition declining or worsening    Shift Goals  Clinical Goals: pain control 3/10 or less; free from falls; monitor for fevers  Patient Goals: rest; pain control  Family Goals: n/a    Progress made toward(s) clinical / shift goals:  pain control 3/10 or less; US BLE with no indication for DVT; afebrile during this shift; up for meal    Patient is not progressing towards the following goals:

## 2023-12-10 NOTE — PROGRESS NOTES
"Hospital Medicine Daily Progress Note    Date of Service  12/10/2023    Chief Complaint  Saleem Whitney is a 78 y.o. male admitted 12/5/2023 with knee pain.    Hospital Course  As per chart review:  \"78 y.o. male with a past medical history of diabetes, hypertension, hyperlipidemia and arthritis s/p total left knee arthroplasty done today with Dr. Thomas who presented 12/5/2023 with worsening left knee pain and weakness of the left lower extremity after his procedure.  Patient reports that he did fall because of the weakness.  Pain is now moderate in severity.  He denies having weakness in other parts of his body.  He denies having fevers or chills.\"    Interval Problem Update  12/6: Patient seen at bedside this morning.  The patient states that he is still having difficulty moving his leg.  We appreciate further recommendation by orthopedic surgery.  Will monitor hemoglobin as it seems the patient had soaked dressing earlier this morning and was changed by orthopedics request.  Repeat hemoglobin seems to be stable however.  Continue to monitor closely.  The patient will require PT/OT evaluation once approved by orthopedics.  The patient did have an episode of fever yesterday, however he has had recent surgery.  We did order blood cultures.  Will monitor closely for any signs of infection.    12/7: Patient seen at bedside this morning.  It seems the patient has functional impairment due to pain.  We have increased the patient's pain medications, as patient requiring IV pain medication.  It seems due to the patient's pain unable to fully work with physical therapy, the patient will most likely require skilled nursing facility placement.  It seems the patient has not had another episode of fever, we will continue to monitor for any signs of infection.    12/8: Patient seen at bedside this morning.  Patient still complaining of significant pain.  Patient will most likely require placement upon discharge.  " Pending placement, we appreciate further recommendations by case management.  Continue to monitor closely.  Blood cultures negative to date.    12/9: Patient seen at bedside this morning.  Overall the patient mentions he feels better.  We are pending placement.  We appreciate further recommendations by orthopedic surgery.  Blood cultures negative to date.    12/10: Patient seen at bedside this morning.  Patient complaining of pain in his left leg especially when trying to move it.  There is also concern of some worsening edema in the lower extremities so we have ordered ultrasound of the lower extremity to rule out DVT.  We have added Tylenol scheduled 1 g 3 times daily.  If needed we will also add gabapentin.  Continue to monitor closely.  Pending placement.    I have discussed this patient's plan of care and discharge plan at IDT rounds today with Case Management, Nursing, Nursing leadership, and other members of the IDT team.    Consultants/Specialty  Orthopedics - Dr Thomas    Code Status  DNAR/DNI    Disposition  The patient is not medically cleared for discharge to home or a post-acute facility.          Review of Systems  Review of Systems   Constitutional:  Positive for malaise/fatigue. Negative for chills and fever.   HENT:  Negative for hearing loss and nosebleeds.    Eyes:  Negative for blurred vision and double vision.   Respiratory:  Negative for cough and shortness of breath.    Cardiovascular:  Negative for chest pain and palpitations.   Gastrointestinal:  Negative for abdominal pain, heartburn and vomiting.   Genitourinary:  Negative for dysuria and urgency.   Musculoskeletal:  Positive for falls and joint pain. Negative for back pain.   Skin:  Negative for itching and rash.   Neurological:  Negative for dizziness and headaches.   Psychiatric/Behavioral:  Negative for substance abuse. The patient is not nervous/anxious.    All other systems reviewed and are negative.       Physical Exam  Temp:  [36.4  °C (97.6 °F)-37.7 °C (99.8 °F)] 36.6 °C (97.8 °F)  Pulse:  [72-97] 88  Resp:  [16-18] 18  BP: (114-151)/(56-75) 129/56  SpO2:  [85 %-97 %] 94 %    Physical Exam  Vitals and nursing note reviewed.   Constitutional:       Appearance: He is ill-appearing.   HENT:      Head: Normocephalic and atraumatic.      Right Ear: External ear normal.      Left Ear: External ear normal.      Nose: Nose normal.      Mouth/Throat:      Mouth: Mucous membranes are moist.      Pharynx: Oropharynx is clear.   Eyes:      General:         Right eye: No discharge.         Left eye: No discharge.      Extraocular Movements: Extraocular movements intact.      Pupils: Pupils are equal, round, and reactive to light.   Cardiovascular:      Rate and Rhythm: Normal rate and regular rhythm.      Heart sounds: No murmur heard.  Pulmonary:      Effort: Pulmonary effort is normal. No respiratory distress.      Breath sounds: Normal breath sounds.   Abdominal:      General: Abdomen is flat. Bowel sounds are normal. There is no distension.      Palpations: Abdomen is soft.      Tenderness: There is no abdominal tenderness.   Musculoskeletal:         General: Swelling and tenderness present.      Cervical back: Normal range of motion and neck supple.   Skin:     General: Skin is warm.   Neurological:      General: No focal deficit present.      Mental Status: He is alert and oriented to person, place, and time.   Psychiatric:         Mood and Affect: Mood normal.         Behavior: Behavior normal.         Fluids    Intake/Output Summary (Last 24 hours) at 12/10/2023 0953  Last data filed at 12/10/2023 0800  Gross per 24 hour   Intake 480 ml   Output 1225 ml   Net -745 ml         Laboratory  Recent Labs     12/08/23  0131 12/08/23  0526 12/09/23  0130   WBC 12.8*  --  11.2*   RBC 3.60*  --  3.77*   HEMOGLOBIN 10.6* 11.0* 11.0*   HEMATOCRIT 32.3* 33.1* 34.2*   MCV 89.7  --  90.7   MCH 29.4  --  29.2   MCHC 32.8  --  32.2*   RDW 40.0  --  41.1    PLATELETCT 226  --  291   MPV 10.2  --  9.9       Recent Labs     12/08/23  0131 12/09/23  0130   SODIUM 134* 136   POTASSIUM 3.9 4.3   CHLORIDE 99 97   CO2 24 29   GLUCOSE 221* 227*   BUN 24* 26*   CREATININE 1.06 1.18   CALCIUM 8.3* 8.5                     Imaging  DX-HIP-BILATERAL-WITH PELVIS-3/4 VIEWS   Final Result      Mild bilateral hip osteoarthritis      DX-KNEE 3 VIEWS LEFT   Final Result      Total knee arthroplasty without periprosthetic fracture.         US-EXTREMITY VENOUS LOWER BILAT    (Results Pending)        Assessment/Plan  * Left lower extremity weakness- (present on admission)  Assessment & Plan  Had left Total Knee Arthroplasty today 12/5/2023 w Dr Thomas.    Developed left lower extremity weakness in recovery.    Likely secondary to nerve block  Anticipate improvement over the next 24 hours   PT/OT     12/6: Continue PT/OT. Surgeon evaluated patient at bedside today, we appreciate further recommendations.    12/10: Pending placement.    Edema of left lower extremity- (present on admission)  Assessment & Plan  Patient with recent surgery.  However there is also concern for DVT.  Will order ultrasound of the lower extremity.  Monitor.    Failure to thrive in adult  Assessment & Plan  Patient with significant pain, with functional impairment due to severe pain and unable to fully work with physical therapy.  Will try to continue to work with physical therapy and address pain management.    Anemia  Assessment & Plan  There could be a component of acute blood loss anemia due to recent surgery.  Monitor hemoglobin.    12/10: Hb seems to be stable. Monitor for any signs of bleeding.    Fever  Assessment & Plan  Patient had recent surgery  We have ordered blood cultures  Monitor    12/7: No further episodes of fever. Monitor for signs of infection.    12/9: Blood cultures negative to date    Acute pain of left knee- (present on admission)  Assessment & Plan  Had left Total Knee Arthroplasty today  "12/5/2023 w Dr Thomas.    Developed left lower extremity weakness in recovery.    Patient fell hitting his while attempting to walk and then developed pain in his knee  I will check knee and hip x-rays to rule out occult fractures    12/7: Patient still with significant pain. We will increase dose of PRN medications. PT/OT    12/10: Pending placement.    S/P total knee arthroplasty, left- (present on admission)  Assessment & Plan  Had left Total Knee Arthroplasty today 12/5/2023 w Dr Thomas.   Multimodal pain control.  Pharmacologic prophylaxis to start tomorrow.   Consider physical and Occupational Therapy when okay with orthopedics.      12/7: PT/OT and pain management. We had to increase PRN medications. Patient requiring IV pain medication    12/10: Pending placement.  We are also pending ultrasound of the lower extremities to rule out DVT.    Hypomagnesemia- (present on admission)  Assessment & Plan  Replace as needed  monitor    Advance care planning- (present on admission)  Assessment & Plan  As per previous hospitalist:  \"I had a discussion with the patient regarding goals of care, diagnoses, prognosis, and CODE STATUS. We discussed his  and comorbidities.  The patient has advanced age and multiple comorbid conditions including diabetes, hypertension and hyperlipidemia.  However, he enjoys a good mental capacity and relatively good functional status.  At this point he wants to proceed with a DNAR/DNI code.  However, he is open to all forms of diagnostic and therapeutic invasive and noninvasive interventions as indicated.\"    Dyslipidemia- (present on admission)  Assessment & Plan  Cardiac diet   Resume atorvastatin     Leukocytosis- (present on admission)  Assessment & Plan  Most likely reactive from recent surgery.  The patient did have an episode of fever yesterday.  We did order blood cultures.  For now we will hold antibiotics.    BPH associated with nocturia- (present on admission)  Assessment & " Plan  Resume tamsulosin      Hypothyroidism (acquired)- (present on admission)  Assessment & Plan  Continue levothyroxine    Type 2 diabetes mellitus without complication, without long-term current use of insulin (HCC)- (present on admission)  Assessment & Plan  Last glycated hemoglobin was 6.8%  I will start short acting insulin for now  I will order Accu-Checks, hypoglycemia protocol  Adjust according to blood sugars trend.     12/10: We have increased to high insulin sliding scale.  Will monitor closely and if needed we will start the patient on long-acting as well.         VTE prophylaxis: Aspirin BID    I have performed a physical exam and reviewed and updated ROS and Plan today (12/10/2023). In review of yesterday's note (12/9/2023), there are no changes except as documented above.      I spend at least 51 minutes providing care for this patient. This included face to face interview, physical examination.  Review of lab work including CBC, BMP.  Discussion with multidisciplinary team including case management, nursing staff and pharmacy.

## 2023-12-10 NOTE — PROGRESS NOTES
Pt on chair and was trying to get up without calling an assistance. When asked by this RN. pt stated that he needs to pack and needs to go home. Reoriented patient. Assisted pt back to bed.     Wife on the phone stated that pt told her that he's not in the hospital and going home tonight. Notified pt is still in the same room and hospital and staying overnight. Reoriented pt again.    1800: Pt removed PIV and pulse ox.

## 2023-12-10 NOTE — PROGRESS NOTES
Received bedside report from morning RN. Pt alert and oriented x2, oriented to self, time. Disoriented to place and situation. Resp even and unlabored no s/s of distress noted. Most of his needs anticipated by staff. Safety and fall precautions in place, bed in low pos. Encouraged pt to use call light for assistance.

## 2023-12-10 NOTE — DISCHARGE PLANNING
Care Transition Team Assessment  Case Management role explained to patient and daughter demographics verified. Patient deferred to daughter Imani stating you are handling all this so you answer the questions. Permission to speak to daughters Imani and Ana. Patient states and daughter confirms mom had memory loss and possible dementia. SNF referral protocol [id number] to daughter and patient. It was stated they will not consider [facility].  Referrals sent per protocol. CM ciera be following for needs and disposition.   Information Source  Orientation Level: Oriented X4  Information Given By: Patient (daughter)  Informant's Name: Saleem/Imani  Who is responsible for making decisions for patient? : Patient    Readmission Evaluation  Is this a readmission?: No    Elopement Risk  Legal Hold: No  Ambulatory or Self Mobile in Wheelchair: Yes  Disoriented: No  Psychiatric Symptoms: None  History of Wandering: No  Elopement this Admit: No  Vocalizing Wanting to Leave: No  Displays Behaviors, Body Language Wanting to Leave: No-Not at Risk for Elopement  Elopement Risk: Not at Risk for Elopement    Interdisciplinary Discharge Planning  Primary Care Physician: abelardo Flores  Lives with - Patient's Self Care Capacity: Spouse  Patient or legal guardian wants to designate a caregiver: No  Support Systems: Spouse / Significant Other, Children  Housing / Facility: [address]  Do You Take your Prescribed Medications Regularly: Yes  Able to Return to Previous ADL's: No  Mobility Issues: Yes  Prior Services: None  Patient Prefers to be Discharged to:: snf  Assistance Needed: Yes  Durable Medical Equipment: Walker    Discharge Preparedness  What are your discharge supports?: Child, Spouse (spouse has memory issues undiagnosised dementia per daughter)  Prior Functional Level: Ambulatory, Drives Self, Independent with Activities of Daily Living    Functional Assesment  Prior Functional Level: Ambulatory, Drives Self, Independent with  Activities of Daily Living    Finances  Financial Barriers to Discharge: No  Prescription Coverage: Yes    Vision / Hearing Impairment  Vision Impairment : Yes  Right Eye Vision: Wears Glasses  Left Eye Vision: Wears Glasses  Hearing Impairment : No         Advance Directive  Advance Directive?: Living Will    Domestic Abuse  Have you ever been the victim of abuse or violence?: No  Physical Abuse or Sexual Abuse: No  Verbal Abuse or Emotional Abuse: No  Possible Abuse/Neglect Reported to:: Not Applicable    Psychological Assessment  History of Substance Abuse: None  History of Psychiatric Problems: No    Discharge Risks or Barriers  Discharge risks or barriers?: Post-acute placement / services, Complex medical needs  Patient risk factors: Vulnerable adult, Complex medical needs, Cognitive / sensory / physical deficit    Anticipated Discharge Information  Discharge Disposition: D/T to SNF with Medicare cert in anticipation of skilled care (03)

## 2023-12-10 NOTE — CARE PLAN
The patient is Stable - Low risk of patient condition declining or worsening    Shift Goals  Clinical Goals: Pt's pain will be managed 3/10 or less, ambulate to chair and free from falls during this shift  Patient Goals: Able to manage pain, bed bath and rest comfortably  Family Goals: n/a    Progress made toward(s) clinical / shift goals:  Pt reported constant pain, medicated per MAR. Transferred to chair with max assist. Full bed bath done. Fall risk precautions in place, pt did not sustain any fall during this shift.     Patient is not progressing towards the following goals:  Constant pain.    Problem: Pain - Post Surgery  Goal: Alleviation or reduction of pain post surgery  Outcome: Not Progressing

## 2023-12-10 NOTE — PROGRESS NOTES
4 Eyes Skin Assessment Completed by Tyler RN and ESPERANZA Jones.    Head WDL  Ears WDL  Nose WDL  Mouth WDL  Neck WDL  Breast/Chest WDL  Shoulder Blades WDL  Spine WDL  (R) Arm/Elbow/Hand Bruising  (L) Arm/Elbow/Hand Bruising  Abdomen WDL  Groin WDL  Scrotum/Coccyx/Buttocks redness and blanching  (R) Leg WDL  (L) Leg Redness, Blanching, Swelling, and Incision  (R) Heel/Foot/Toe WDL  (L) Heel/Foot/Toe Redness, Blanching, and Swelling          Devices In Places Pulse Ox, SCD's, Nasal Cannula, and DORI's      Interventions In Place Gray Ear Foams, Waffle Overlay, and Pillows    Possible Skin Injury No    Pictures Uploaded Into Epic Yes  Wound Consult Placed N/A  RN Wound Prevention Protocol Ordered Yes

## 2023-12-10 NOTE — THERAPY
Occupational Therapy  Daily Treatment     Patient Name: Saleem Whitney  Age:  78 y.o., Sex:  male  Medical Record #: [id number]  Today's Date: 12/9/2023     Precautions  Precautions: (P) Fall Risk    Assessment  Pt continues to be limited by L knee pain, impaired balance, decreased activity tolerance to perform ADL's as at prior level. Improved STS and ADL transfers and LB dressing; see below for details of session. OT will follow while in  house.      Plan  Treatment Plan Status: (P) Continue Current Treatment Plan  Type of Treatment: (P) Self Care / Activities of Daily Living, Neuro Re-Education / Balance, Therapeutic Activity  Treatment Frequency: (P) 3 Times per Week  Treatment Duration: (P) Until Therapy Goals Met    DC Equipment Recommendations: (P) Unable to determine at this time  Discharge Recommendations: (P) Recommend post-acute placement for additional occupational therapy services prior to discharge home    Subjective  Agreeable     Objective   12/09/23 1640   Precautions   Precautions Fall Risk   Vitals   O2 Delivery Device None - Room Air   Pain 0 - 10 Group   Location Knee   Location Orientation Left   Therapist Pain Assessment Post Activity Pain Same as Prior to Activity;Nurse Notified   Cognition    Cognition / Consciousness WDL   Level of Consciousness Alert   Balance   Sitting Balance (Static) Fair   Sitting Balance (Dynamic) Fair   Standing Balance (Static) Fair -   Standing Balance (Dynamic) Poor +   Weight Shift Sitting Fair   Weight Shift Standing Poor   Skilled Intervention Verbal Cuing;Sequencing   Bed Mobility    Sit to Supine Minimal Assist   Scooting Contact Guard Assist   Skilled Intervention Verbal Cuing   Activities of Daily Living   Grooming Supervision;Seated   Lower Body Dressing Moderate Assist   Skilled Intervention Verbal Cuing   How much help from another person does the patient currently need...   Putting on and taking off regular lower body clothing? 2   Bathing  (including washing, rinsing, and drying)? 2   Toileting, which includes using a toilet, bedpan, or urinal? 2   Putting on and taking off regular upper body clothing? 3   Taking care of personal grooming such as brushing teeth? 4   Eating meals? 4   6 Clicks Daily Activity Score 17   Functional Mobility   Sit to Stand Minimal Assist  (x2)   Bed, Chair, Wheelchair Transfer Minimal Assist  (x2)   Transfer Method Stand Step   Distance (Feet) 4   # of Times Distance was Traveled 1   Skilled Intervention Verbal Cuing   Comments fww   Activity Tolerance   Sitting in Chair >2hrs   Sitting Edge of Bed 8   Standing 5   Short Term Goals   Goal Outcome # 1 Progressing as expected   Goal Outcome # 2 Progressing as expected   Goal Outcome # 3 Progressing as expected   Goal Outcome # 4 Progressing as expected   Goal Outcome # 5 Progressing as expected   Education Group   Education Provided Activities of Daily Living   Role of Occupational Therapist Patient Response Patient;Acceptance;Explanation;Verbal Demonstration   Energy Conservation Patient Response Patient;Acceptance;Explanation;Verbal Demonstration   Transfers Patient Response Patient;Acceptance;Explanation;Action Demonstration   ADL Patient Response Patient;Acceptance;Explanation;Action Demonstration   Occupational Therapy Treatment Plan    O.T. Treatment Plan Continue Current Treatment Plan   Treatment Interventions Self Care / Activities of Daily Living;Neuro Re-Education / Balance;Therapeutic Activity   Treatment Frequency 3 Times per Week   Duration Until Therapy Goals Met   Anticipated Discharge Equipment and Recommendations   DC Equipment Recommendations Unable to determine at this time   Discharge Recommendations Recommend post-acute placement for additional occupational therapy services prior to discharge home   Interdisciplinary Plan of Care Collaboration   IDT Collaboration with  Nursing;Certified Nursing Assistant   Patient Position at End of Therapy In Bed;Bed  Alarm On;Call Light within Reach;Tray Table within Reach;Phone within Reach   Collaboration Comments Aware of visit.

## 2023-12-11 VITALS
TEMPERATURE: 96.9 F | WEIGHT: 197.09 LBS | HEART RATE: 81 BPM | DIASTOLIC BLOOD PRESSURE: 73 MMHG | RESPIRATION RATE: 17 BRPM | BODY MASS INDEX: 26.12 KG/M2 | SYSTOLIC BLOOD PRESSURE: 140 MMHG | OXYGEN SATURATION: 92 % | HEIGHT: 73 IN

## 2023-12-11 LAB
ALBUMIN SERPL BCP-MCNC: 3.3 G/DL (ref 3.2–4.9)
ALBUMIN/GLOB SERPL: 0.9 G/DL
ALP SERPL-CCNC: 79 U/L (ref 30–99)
ALT SERPL-CCNC: 14 U/L (ref 2–50)
ANION GAP SERPL CALC-SCNC: 11 MMOL/L (ref 7–16)
AST SERPL-CCNC: 16 U/L (ref 12–45)
BACTERIA BLD CULT: NORMAL
BACTERIA BLD CULT: NORMAL
BILIRUB SERPL-MCNC: 0.8 MG/DL (ref 0.1–1.5)
BUN SERPL-MCNC: 29 MG/DL (ref 8–22)
CALCIUM ALBUM COR SERPL-MCNC: 9.5 MG/DL (ref 8.5–10.5)
CALCIUM SERPL-MCNC: 8.9 MG/DL (ref 8.4–10.2)
CHLORIDE SERPL-SCNC: 96 MMOL/L (ref 96–112)
CO2 SERPL-SCNC: 28 MMOL/L (ref 20–33)
CREAT SERPL-MCNC: 1.05 MG/DL (ref 0.5–1.4)
ERYTHROCYTE [DISTWIDTH] IN BLOOD BY AUTOMATED COUNT: 38.3 FL (ref 35.9–50)
GFR SERPLBLD CREATININE-BSD FMLA CKD-EPI: 72 ML/MIN/1.73 M 2
GLOBULIN SER CALC-MCNC: 3.6 G/DL (ref 1.9–3.5)
GLUCOSE BLD STRIP.AUTO-MCNC: 229 MG/DL (ref 65–99)
GLUCOSE BLD STRIP.AUTO-MCNC: 265 MG/DL (ref 65–99)
GLUCOSE SERPL-MCNC: 202 MG/DL (ref 65–99)
HCT VFR BLD AUTO: 35.2 % (ref 42–52)
HGB BLD-MCNC: 11.6 G/DL (ref 14–18)
MAGNESIUM SERPL-MCNC: 2.1 MG/DL (ref 1.5–2.5)
MCH RBC QN AUTO: 29.4 PG (ref 27–33)
MCHC RBC AUTO-ENTMCNC: 33 G/DL (ref 32.3–36.5)
MCV RBC AUTO: 89.3 FL (ref 81.4–97.8)
PLATELET # BLD AUTO: 380 K/UL (ref 164–446)
PMV BLD AUTO: 9.5 FL (ref 9–12.9)
POTASSIUM SERPL-SCNC: 4.2 MMOL/L (ref 3.6–5.5)
PROT SERPL-MCNC: 6.9 G/DL (ref 6–8.2)
RBC # BLD AUTO: 3.94 M/UL (ref 4.7–6.1)
SIGNIFICANT IND 70042: NORMAL
SIGNIFICANT IND 70042: NORMAL
SITE SITE: NORMAL
SITE SITE: NORMAL
SODIUM SERPL-SCNC: 135 MMOL/L (ref 135–145)
SOURCE SOURCE: NORMAL
SOURCE SOURCE: NORMAL
WBC # BLD AUTO: 10.2 K/UL (ref 4.8–10.8)

## 2023-12-11 PROCEDURE — 36415 COLL VENOUS BLD VENIPUNCTURE: CPT

## 2023-12-11 PROCEDURE — 94760 N-INVAS EAR/PLS OXIMETRY 1: CPT

## 2023-12-11 PROCEDURE — 99239 HOSP IP/OBS DSCHRG MGMT >30: CPT | Performed by: INTERNAL MEDICINE

## 2023-12-11 PROCEDURE — 83735 ASSAY OF MAGNESIUM: CPT

## 2023-12-11 PROCEDURE — A9270 NON-COVERED ITEM OR SERVICE: HCPCS | Performed by: INTERNAL MEDICINE

## 2023-12-11 PROCEDURE — 700102 HCHG RX REV CODE 250 W/ 637 OVERRIDE(OP): Performed by: HOSPITALIST

## 2023-12-11 PROCEDURE — 82962 GLUCOSE BLOOD TEST: CPT

## 2023-12-11 PROCEDURE — 85027 COMPLETE CBC AUTOMATED: CPT

## 2023-12-11 PROCEDURE — A9270 NON-COVERED ITEM OR SERVICE: HCPCS | Performed by: HOSPITALIST

## 2023-12-11 PROCEDURE — 700102 HCHG RX REV CODE 250 W/ 637 OVERRIDE(OP): Performed by: INTERNAL MEDICINE

## 2023-12-11 PROCEDURE — 80053 COMPREHEN METABOLIC PANEL: CPT

## 2023-12-11 RX ORDER — ACETAMINOPHEN 500 MG
1000 TABLET ORAL 3 TIMES DAILY
Qty: 30 TABLET | Refills: 0 | Status: SHIPPED
Start: 2023-12-11 | End: 2024-01-08

## 2023-12-11 RX ORDER — OXYCODONE HYDROCHLORIDE 5 MG/1
5 TABLET ORAL EVERY 6 HOURS PRN
Qty: 10 TABLET | Refills: 0 | Status: SHIPPED | OUTPATIENT
Start: 2023-12-11 | End: 2023-12-13

## 2023-12-11 RX ORDER — ASPIRIN 81 MG/1
81 TABLET ORAL 2 TIMES DAILY
Qty: 100 TABLET | Status: SHIPPED
Start: 2023-12-11 | End: 2024-01-08

## 2023-12-11 RX ADMIN — LEVOTHYROXINE SODIUM 88 MCG: 88 TABLET ORAL at 05:42

## 2023-12-11 RX ADMIN — ACETAMINOPHEN 1000 MG: 500 TABLET ORAL at 08:40

## 2023-12-11 RX ADMIN — LOSARTAN POTASSIUM 100 MG: 50 TABLET, FILM COATED ORAL at 05:42

## 2023-12-11 RX ADMIN — OXYCODONE HYDROCHLORIDE 10 MG: 10 TABLET ORAL at 13:01

## 2023-12-11 RX ADMIN — TAMSULOSIN HYDROCHLORIDE 0.8 MG: 0.4 CAPSULE ORAL at 05:42

## 2023-12-11 RX ADMIN — ASPIRIN 81 MG: 81 TABLET, COATED ORAL at 05:43

## 2023-12-11 ASSESSMENT — PAIN DESCRIPTION - PAIN TYPE
TYPE: SURGICAL PAIN
TYPE: SURGICAL PAIN

## 2023-12-11 NOTE — PROGRESS NOTES
"S:  Patient still reporting significant pain and difficulty mobilizing although possibly a little bit better.    O:  He is alert and oriented.  The swelling is still minimal.  He still cannot move his leg because of weakness and pain    Dressing is clean, dry, and intact    Blood pressure (!) 144/73, pulse 90, temperature 36.3 °C (97.3 °F), temperature source Temporal, resp. rate 17, height 1.854 m (6' 0.99\"), weight 89.4 kg (197 lb 1.5 oz), SpO2 90 %.    Recent Labs     12/09/23  0130 12/10/23  1003 12/11/23  0110   WBC 11.2*  --  10.2   RBC 3.77*  --  3.94*   HEMOGLOBIN 11.0* 11.0* 11.6*   HEMATOCRIT 34.2* 33.8* 35.2*   MCV 90.7  --  89.3   MCH 29.2  --  29.4   MCHC 32.2*  --  33.0   RDW 41.1  --  38.3   PLATELETCT 291  --  380   MPV 9.9  --  9.5         Intake/Output Summary (Last 24 hours) at 12/11/2023 0633  Last data filed at 12/11/2023 0142  Gross per 24 hour   Intake 180 ml   Output 1120 ml   Net -940 ml             A:  Failure to thrive following left total knee with persistent pain and immobility  Patient Active Problem List    Diagnosis Date Noted    Failure to thrive in adult 12/07/2023    Fever 12/06/2023    Anemia 12/06/2023    Left lower extremity weakness 12/05/2023    S/P total knee arthroplasty, left 12/05/2023    Acute pain of left knee 12/05/2023    Degenerative arthritis of left knee 10/23/2023    Trigger ring finger of left hand 07/07/2023    Vitamin B12 deficiency 07/07/2023    Hypomagnesemia 05/18/2023    Hyponatremia 05/17/2023    Advance care planning 05/17/2023    Facial laceration 04/17/2023    Long term (current) use of oral hypoglycemic drugs 03/02/2023    Dyslipidemia 03/02/2023    Small bowel obstruction (HCC) 09/08/2022    Leukocytosis 09/08/2022    Lactic acidosis 09/08/2022    Elevated serum creatinine 07/22/2022    Itching of ear 01/13/2021    PVD (peripheral vascular disease) ([facility]) 12/26/2020    Degenerative arthritis of knee, bilateral 12/22/2020    Acute right-sided low back " pain 08/06/2020    Chronic pain of right knee 06/16/2020    Motion sickness 10/02/2019    Altitude sickness prophylaxis 10/02/2019    Leg mass, right 07/12/2019    Vitamin D deficiency 06/10/2019    Leg cramping 06/10/2019    Baker cyst, right 04/16/2019    Erythematous rash 04/16/2019    Edema of left lower extremity 01/08/2019    Nocturia more than twice per night 11/05/2018    Pitting edema 08/07/2018    Idiopathic chronic gout, left ankle and foot, without tophus (tophi) 06/25/2018    Memory changes 05/24/2018    Chronic pain of left knee 09/25/2017    Diastasis of rectus abdominis 06/22/2017    BPH associated with nocturia 06/07/2017    Type 2 diabetes mellitus without complication, without long-term current use of insulin (HCC) 11/09/2016    (HCC) Hypertension associated with diabetes 11/09/2016    Hypothyroidism (acquired) 11/09/2016    (HCC) Hyperlipidemia due to type 2 diabetes mellitus 11/09/2016    Primary insomnia 11/09/2016         PLAN: It does not appear that being home is safe at this point and he is more likely a candidate for inpatient rehab facility such as skilled nursing and I think this is being arranged.  I do not see any significant complications looking at the knee x-rays and wound.   Concentration Of Solution Injected (Mg/Ml): 20.0

## 2023-12-11 NOTE — CARE PLAN
The patient is Stable - Low risk of patient condition declining or worsening    Shift Goals  Clinical Goals: Patient will be free from fall throughout the shift  Patient Goals: pain mngt  Family Goals: n/a    Progress made toward(s) clinical / shift goals:  Patient is free from fall, fall precautions observe    Patient is not progressing towards the following goals:    Problem: Knowledge Deficit - Standard  Goal: Patient and family/care givers will demonstrate understanding of plan of care, disease process/condition, diagnostic tests and medications  Outcome: Progressing     Problem: Pain - Standard  Goal: Alleviation of pain or a reduction in pain to the patient’s comfort goal  Outcome: Progressing     Problem: Fall Risk  Goal: Patient will remain free from falls  Outcome: Progressing     Problem: Post-Operative Knee Replacement  Goal: Patient will demonstrate understanding of knee replacement post-surgical weight bearing restrictions and DME usage during hospital stay and post discharge  Outcome: Progressing  Goal: Patient's neurovascular status will be maintained or improve  Outcome: Progressing  Goal: Early mobilization post surgery  Outcome: Progressing     Problem: Pain - Post Surgery  Goal: Alleviation or reduction of pain post surgery  Outcome: Progressing     Problem: Incision Care  Goal: Optimal post surgical incision care  Outcome: Progressing     Problem: Diabetes Management  Goal: Patient will achieve and maintain glucose in satisfactory range  Outcome: Progressing     Problem: Skin Integrity  Goal: Skin integrity is maintained or improved  Outcome: Progressing

## 2023-12-11 NOTE — CARE PLAN
The patient is Stable - Low risk of patient condition declining or worsening    Shift Goals  Clinical Goals: Free from fall or injuries, rest or sleep at least 4 hours, pain controlled at 3/10 or better with current regimen  Patient Goals: Free from fall or injuries, rest or sleep at least 4 hours, pain controlled at 3/10 or better with current regimen  Family Goals: n/a    Progress made toward(s) clinical / shift goals: No falls or injuries overnight, patient was able to rest or sleep at least 4 hours, patient's pain controlled at 3/10 or better with current regimen    Patient is not progressing towards the following goals:

## 2023-12-11 NOTE — DISCHARGE SUMMARY
Discharge Summary    CHIEF COMPLAINT ON ADMISSION  Chief Complaint   Patient presents with    Knee Pain     Pt had Left Total Knee at [facility] this am, unable to bear any wt on LLE    Fall       Reason for Admission  Weakness    Admission Date  12/5/2023     CODE STATUS  DNAR/DNI    HPI & HOSPITAL COURSE  Patient is 78-year-old male with history of diabetes hypertension hyperlipidemia status post knee arthroplasty done with Dr. Thomas on December 5 with worsening left knee pain weakness lower extremity after his procedure.  Patient had a fall because of the weakness and having moderate amount of pain due to surgery on the fall.  Patient with difficulty moving leg and was seen by orthopedic surgery who recommend continuation of PT OT.  Patient did have 1 episode of fever during hospitalization however no evidence of infection and fever resolved.  Patient had lower extremity ultrasound which showed no evidence of DVT.  He does show a presence of a right popliteal cyst but nothing on the surgical site.  Patient will need continued therapies to regain his mobility and will transition to skilled nursing for continuation of physical and Occupational Therapy.  At this time he is medically cleared he is doing well on scheduled Tylenol 1 g every 8 hours around-the-clock and has not needed narcotic medication x 24 hours.    Therefore, he is discharged in good and stable condition to skilled nursing facility.    The patient met 2-midnight criteria for an inpatient stay at the time of discharge.      FOLLOW UP ITEMS POST DISCHARGE  PCP-2 weeks  Orthopedic surgery-1 week    DISCHARGE DIAGNOSES  Principal Problem:    Left lower extremity weakness (POA: Yes)  Active Problems:    Type 2 diabetes mellitus without complication, without long-term current use of insulin (HCC) (POA: Yes)      Overview: Chronic condition. Followed by endocrinology. Onset around age 71. Fasting       blood sugar 130s.            Current medication regimen:  metformin 1000mg BID, glipizide 10mg daily,       Trulicity 1.5mg weekly      Patient tolerating and reports compliant with medications. Does not need       refill of medications today. Denies vision changes, dry mouth, chest pain,       nausea/vomiting/diarrhea, polydipsia, polyphagia, polyuria, hypoglycemia,       numbness/tingling. He checks his feet at home.      Last eye exam: 02/2021      Last foot exam: 01/2022      Diet: tries to eat healthy in general      Exercise: not so much now due to leg pain      Vaccines: flu received 09/2021, PPSV23 completed 11/2016, Tdap received       08/2017            He has been told to drink more water now so he increased his fluid intake       daily and has to urinate more frequently now due to increased fluid       intake. He otherwise denies dribbling, nocturia, weak urinary stream.    Hypothyroidism (acquired) (POA: Yes)      Overview: Chronic condition.      Current medication regimen: levothyroxine 88mcg daily      Patient tolerating and reports compliant with medications. He feels       euthyroid with current medication. Does not need refill of medications       today. No acute concerns at this time.    BPH associated with nocturia (POA: Yes)      Overview: Chronic condition.             Current regimen: tamsulosin 0.8 mg every morning            He has been having nocturia once nightly. He has been taking tamsulosin       0.4mg daily which he did not find helpful and would like to stop taking       the medication for now. He reports his symptoms are mild at this time.    Edema of left lower extremity (POA: Yes)    Leukocytosis (POA: Yes)    Dyslipidemia (POA: Yes)    Advance care planning (POA: Yes)    Hypomagnesemia (POA: Yes)    S/P total knee arthroplasty, left (POA: Yes)    Acute pain of left knee (POA: Yes)    Fever (POA: Unknown)    Anemia (POA: Unknown)    Failure to thrive in adult (POA: Unknown)  Resolved Problems:    * No resolved hospital problems.  *      FOLLOW UP  Future Appointments   Date Time Provider Department Center   12/18/2023 11:20 AM [facility]   1/8/2024  7:00 AM Cathryn Yadav M.D. [facility]   5/8/2024  2:00 PM Saleem Cardoso M.D. [facility] D[facility]  [address]  [address]  [facility] [address]  [phone]    Follow up        MEDICATIONS ON DISCHARGE     Medication List        START taking these medications        Instructions   acetaminophen 500 MG Tabs  Commonly known as: Tylenol   Take 2 Tablets by mouth in the morning, at noon, and at bedtime.  Dose: 1,000 mg     aspirin 81 MG EC tablet   Take 1 Tablet by mouth 2 times a day.  Dose: 81 mg            CHANGE how you take these medications        Instructions   oxyCODONE immediate-release 5 MG Tabs  What changed: when to take this  Commonly known as: Roxicodone   Take 1 Tablet by mouth every 6 hours as needed for Severe Pain for up to 2 days.  Dose: 5 mg     Trulicity 1.5 MG/0.5ML Sopn  What changed: additional instructions  Generic drug: Dulaglutide   Inject 0.5 mL under the skin every 7 days.  Dose: 0.5 mL     Vitamin B-12 2500 MCG Subl  What changed: when to take this   Place 1 Tablet under the tongue every day.  Dose: 2,500 mcg     vitamin D 1000 Unit (25 mcg) Tabs  What changed: when to take this  Commonly known as: cholecalciferol   Take 1 Tab by mouth every day.  Dose: 1,000 Units            CONTINUE taking these medications        Instructions   amLODIPine 10 MG Tabs  Commonly known as: Norvasc   TAKE 1 TABLET EVERY DAY  Dose: 10 mg     atorvastatin 10 MG Tabs  Commonly known as: Lipitor   Take 1 Tablet by mouth every day.  Dose: 10 mg     glipiZIDE 10 MG Tabs  Commonly known as: Glucotrol   TAKE 1 TABLET EVERY DAY  Dose: 10 mg     levothyroxine 88 MCG Tabs  Commonly known as: Synthroid   Take 1 Tablet by mouth every morning on an empty stomach.  Dose: 88 mcg     losartan 100 MG Tabs  Commonly known as: Cozaar    Take 1 Tablet by mouth every day.  Dose: 100 mg     MAGNESIUM PO   Take 1 Capsule by mouth every evening. (OTC)  Dose: 1 Capsule     metformin 1000 MG tablet  Commonly known as: Glucophage   Take 1 Tablet by mouth 2 times a day with meals.  Dose: 1,000 mg     tamsulosin 0.4 MG capsule  Commonly known as: Flomax   Take 0.8 mg by mouth every day.  Dose: 0.8 mg            STOP taking these medications      traMADol 50 MG Tabs  Commonly known as: Ultram              Allergies  Allergies   Allergen Reactions    Kdc:Red Dye+Yellow Dye+Ci Pigment Blue 63+Oxymorphone Hives and Shortness of Breath    Latex Rash     Adhesives-rash       DIET  Orders Placed This Encounter   Procedures    Diet Order Diet: Cardiac; Second Modifier: (optional): Consistent CHO (Diabetic)     Standing Status:   Standing     Number of Occurrences:   1     Order Specific Question:   Diet:     Answer:   Cardiac [6]     Order Specific Question:   Second Modifier: (optional)     Answer:   Consistent CHO (Diabetic) [4]       ACTIVITY  As tolerated.  Weight bearing as tolerated    LINES, DRAINS, AND WOUNDS  This is an automated list. Peripheral IVs will be removed prior to discharge.  Peripheral IV 12/10/23 20 G Anterior;Right Forearm (Active)   Site Assessment Clean;Dry;Intact 12/11/23 0800   Dressing Type Transparent 12/11/23 0800   Line Status Scrubbed the hub prior to access;Flushed;Saline locked 12/11/23 0800   Dressing Status Clean;Dry;Intact 12/11/23 0800   Dressing Intervention N/A 12/11/23 0800   Dressing Change Due 12/16/23 12/11/23 0800   Date IV Connector(s) Changed 12/10/23 12/10/23 1931   Infiltration Grading ([facility], [facility]) 0 12/11/23 0800   [facility]) 0 12/11/23 0800       Wound 05/17/23 Incision Abdomen dermabond (Active)       Wound 12/05/23 Incision Knee Left acticoat opsite (Active)   Wound Image   12/06/23 0409   Site Assessment MARGY 12/11/23 0829   Periwound Assessment MARGY 12/11/23 0829   Margins MARGY 12/11/23 0829    Closure MARGY 12/11/23 0829   Drainage Amount Scant 12/11/23 0829   Drainage Description Serosanguineous 12/10/23 0800   Treatments Ice applied 12/11/23 0829   Wound Cleansing Not Applicable 12/10/23 0800   Dressing Status Clean;Dry;Intact 12/11/23 0829   Dressing Changed Observed 12/11/23 0829   Dressing Cleansing/Solutions Normal Saline 12/06/23 0700   Dressing Options [place] Dressing - Silver 12/11/23 0829       Peripheral IV 12/10/23 20 G Anterior;Right Forearm (Active)   Site Assessment Clean;Dry;Intact 12/11/23 0800   Dressing Type Transparent 12/11/23 0800   Line Status Scrubbed the hub prior to access;Flushed;Saline locked 12/11/23 0800   Dressing Status Clean;Dry;Intact 12/11/23 0800   Dressing Intervention N/A 12/11/23 0800   Dressing Change Due 12/16/23 12/11/23 0800   Date IV Connector(s) Changed 12/10/23 12/10/23 1931   Infiltration Grading ([facility], [facility]) 0 12/11/23 0800   Phlebitis Scale ([facility] Only) 0 12/11/23 0800               MENTAL STATUS ON TRANSFER             CONSULTATIONS  Wilbur Thomas-orthopedic surgery    PROCEDURES  12/5 -Martha-  total knee arthroplasty-[facility]    LABORATORY  Lab Results   Component Value Date    SODIUM 135 12/11/2023    POTASSIUM 4.2 12/11/2023    CHLORIDE 96 12/11/2023    CO2 28 12/11/2023    GLUCOSE 202 (H) 12/11/2023    BUN 29 (H) 12/11/2023    CREATININE 1.05 12/11/2023        Lab Results   Component Value Date    WBC 10.2 12/11/2023    HEMOGLOBIN 11.6 (L) 12/11/2023    HEMATOCRIT 35.2 (L) 12/11/2023    PLATELETCT 380 12/11/2023        Total time of the discharge process exceeds 36 minutes.

## 2023-12-11 NOTE — PROGRESS NOTES
Received bedside patient report from ESPERANZA Barnett. Patient resting comfortably in bed, no complaints at this time. Safety precautions in place. Will continue to monitor.

## 2023-12-11 NOTE — DISCHARGE INSTRUCTIONS
Total Knee Replacement, Care After  After the procedure, it is common to have stiffness and discomfort, or redness, pain, and swelling around your cut from surgery (incision). You may also have a small amount of blood or clear fluid coming from your incision.  Follow these instructions at home:  Your doctor may give you more instructions. If you have problems, contact your doctor.  Medicines  Take over-the-counter and prescription medicines only as told by your doctor.  If you were prescribed a blood thinner, take it as told by your doctor.  If told, take steps to prevent problems with pooping (constipation). You may need to:  Drink enough fluid to keep your pee (urine) pale yellow.  Take medicines. You will be told what medicines to take.  Eat foods that are high in fiber. These include beans, whole grains, and fresh fruits and vegetables.  Limit foods that are high in fat and sugar. These include fried or sweet foods.  Incision care    Follow instructions from your doctor about how to take care of your incision. Make sure you:  Wash your hands with soap and water for at least 20 seconds before and after you change your bandage. If you cannot use soap and water, use hand .  Change your bandage.  Leave stitches, staples, or skin glue in place for at least 2 weeks.  Leave tape strips alone unless you are told to take them off. You may trim the edges of the tape strips if they curl up.  Do not take baths, swim, or use a hot tub until your doctor says it is okay.  Check your incision every day for signs of infection. Check for:  More redness, swelling, or pain.  More fluid or blood.  Warmth.  Pus or a bad smell.  Activity  Rest as told by your doctor.  Get up to take short walks every 1 to 2 hours. Ask for help if you feel weak or unsteady.  Follow instructions from your doctor about:  Using a walker, crutches, or a cane.  How much body weight you may safely support on your affected leg (weight-bearing  restrictions).  How to get out of a bed and chair and how to go up and down stairs. A physical therapist will show you how to do this.  Do exercises as told by your doctor or physical therapist.  Avoid activities that put stress on your knees. These include running, jumping rope, and doing jumping jacks.  Do not play contact sports until your doctor says it is okay.  Return to your normal activities when your doctor says that it is safe.  Managing pain, stiffness, and swelling    If told, put ice on your knee. To do this:  Put ice in a plastic bag or use an icing device (cold flow pad). Follow your doctor's directions about how to use the icing device.  Place a towel between your skin and the bag or between your skin and the icing device.  Leave the ice on for 20 minutes, 2-3 times a day.  Take off the ice if your skin turns bright red. This is very important. If you cannot feel pain, heat, or cold, you have a greater risk of damage to the area.  Move your toes often.  Raise your leg above the level of your heart while you are sitting or lying down.  Use a few pillows to keep your leg straight.  Do not put a pillow just under the knee. If the knee is bent for a long time, this may make the knee stiff.  Wear elastic knee support as told by your doctor.  Safety    To help prevent falls:  Keep floors clear of objects you may trip over.  Place items that you may need within easy reach.  Wear an apron or tool belt with pockets for carrying objects. This leaves your hands free to help with your balance.  Do not drive or use machines until your doctor says it is safe.  General instructions  Wear compression stockings as told by your doctor.  Continue with breathing exercises. This helps prevent lung infection.  Do not smoke or use any products that contain nicotine or tobacco. If you need help quitting, ask your doctor.  If you plan to visit a dentist:  Tell your doctor. Ask about things to do before your teeth are  cleaned.  Tell your dentist about your new joint.  Keep all follow-up visits.  Contact a doctor if:  You have a fever or chills.  You have a cough or feel short of breath.  Your medicine is not controlling your pain.  You have any of these signs of infection around your incision:  More redness, swelling, or pain.  More fluid or blood.  Warmth.  Pus or a bad smell.  You fall.  Get help right away if:  You have very bad pain.  You have trouble breathing.  You have chest pain.  You have pain in your calf or leg.  You have redness, swelling, or warmth in your calf or leg.  Your incision breaks open after the stitches or staples are taken out.  These symptoms may be an emergency. Get help right away. Call your local emergency services (911 in the [place]  Do not wait to see if the symptoms will go away.  Do not drive yourself to the hospital.  Summary  After the procedure, it is common to have stiffness and discomfort, or redness, pain, and swelling around your incision.  Follow instructions from your doctor about how to take care of your incision.  Use crutches, a walker, or a cane as told by your doctor.  If you were prescribed a blood thinner, take it as told by your doctor.  Keep all follow-up visits.  This information is not intended to replace advice given to you by your health care provider. Make sure you discuss any questions you have with your health care provider.  Document Revised: 06/08/2021 Document Reviewed: 06/08/2021  [employer] Patient Education © 2023 [employer]

## 2023-12-11 NOTE — DISCHARGE PLANNING
Care Transition Team Final Discharge Disposition    Actual Discharge Information  Discharge Disposition: D/T to SNF with Medicare cert in anticipation of skilled care (03)    Spoke with family and they choose [facility] as first  choice. Per jaci they have a bed and will  at 1 pm by w/c Gideon.     Having difficulty getting into [facility] site on [place] [employer].-not responding. CM will keep trying

## 2023-12-11 NOTE — PROGRESS NOTES
Received patient from Night shift RN . Patient is awake and alert x 3, disoriented to place.No sign of distress. Dressing to left knee in place, cdi. LLE is still swollen and painful as per patient.  Fall precautions observe, kept bed in lowest position,bed alarm on  and call light within reach.    1141-Discharge paper work reviewed with patient and relatives at bedside.Copy given.Questions encouraged and aswered.    1222- Called to [facility], report given to Sravan    1326-Patient was taken by [facility]. [facility]

## 2023-12-15 ENCOUNTER — HOSPITAL ENCOUNTER (EMERGENCY)
Facility: MEDICAL CENTER | Age: 78
End: 2023-12-15
Attending: EMERGENCY MEDICINE
Payer: MEDICARE

## 2023-12-15 ENCOUNTER — APPOINTMENT (OUTPATIENT)
Dept: RADIOLOGY | Facility: MEDICAL CENTER | Age: 78
End: 2023-12-15
Attending: EMERGENCY MEDICINE
Payer: MEDICARE

## 2023-12-15 VITALS
RESPIRATION RATE: 15 BRPM | OXYGEN SATURATION: 97 % | DIASTOLIC BLOOD PRESSURE: 76 MMHG | SYSTOLIC BLOOD PRESSURE: 140 MMHG | TEMPERATURE: 98 F | HEART RATE: 80 BPM

## 2023-12-15 DIAGNOSIS — S01.01XA LACERATION OF SCALP, INITIAL ENCOUNTER: ICD-10-CM
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
## 2023-12-15 DIAGNOSIS — S09.90XA CLOSED HEAD INJURY, INITIAL ENCOUNTER: ICD-10-CM

## 2023-12-15 PROCEDURE — 70450 CT HEAD/BRAIN W/O DYE: CPT

## 2023-12-15 PROCEDURE — 90715 TDAP VACCINE 7 YRS/> IM: CPT | Performed by: EMERGENCY MEDICINE

## 2023-12-15 PROCEDURE — 99284 EMERGENCY DEPT VISIT MOD MDM: CPT

## 2023-12-15 PROCEDURE — 304999 HCHG REPAIR-SIMPLE/INTERMED LEVEL 1

## 2023-12-15 PROCEDURE — 90471 IMMUNIZATION ADMIN: CPT

## 2023-12-15 PROCEDURE — 305308 HCHG STAPLER,SKIN,DISP.

## 2023-12-15 PROCEDURE — 304217 HCHG IRRIGATION SYSTEM

## 2023-12-15 PROCEDURE — 700111 HCHG RX REV CODE 636 W/ 250 OVERRIDE (IP): Performed by: EMERGENCY MEDICINE

## 2023-12-15 RX ORDER — OXYCODONE HYDROCHLORIDE 5 MG/1
5 TABLET ORAL EVERY 6 HOURS PRN
Status: SHIPPED | COMMUNITY
End: 2024-01-08

## 2023-12-15 RX ORDER — MAGNESIUM OXIDE 400 MG/1
400 TABLET ORAL DAILY
Status: SHIPPED | COMMUNITY
End: 2024-01-08

## 2023-12-15 RX ORDER — INSULIN LISPRO 100 [IU]/ML
2-10 INJECTION, SOLUTION INTRAVENOUS; SUBCUTANEOUS
Status: SHIPPED | COMMUNITY
End: 2024-01-08

## 2023-12-15 RX ADMIN — CLOSTRIDIUM TETANI TOXOID ANTIGEN (FORMALDEHYDE INACTIVATED), CORYNEBACTERIUM DIPHTHERIAE TOXOID ANTIGEN (FORMALDEHYDE INACTIVATED), BORDETELLA PERTUSSIS TOXOID ANTIGEN (GLUTARALDEHYDE INACTIVATED), BORDETELLA PERTUSSIS FILAMENTOUS HEMAGGLUTININ ANTIGEN (FORMALDEHYDE INACTIVATED), BORDETELLA PERTUSSIS PERTACTIN ANTIGEN, AND BORDETELLA PERTUSSIS FIMBRIAE 2/3 ANTIGEN 0.5 ML: 5; 2; 2.5; 5; 3; 5 INJECTION, SUSPENSION INTRAMUSCULAR at 08:54

## 2023-12-15 NOTE — DISCHARGE PLANNING
note:  Received a message from RN that pt is being dc back to [facility].     CM called Aub[facility] at [facility] who confirmed pt is ok to return.     CM LVM for wife.

## 2023-12-15 NOTE — DISCHARGE INSTRUCTIONS
Your CT was reassuring, if you develop the worst headache of life, cannot stop vomiting, develop significant worsening pain otherwise or have any other concerns return to the emergency department

## 2023-12-15 NOTE — ED PROVIDER NOTES
ED Provider Note    CHIEF COMPLAINT  Chief Complaint   Patient presents with    Fall    Head Injury       EXTERNAL RECORDS REVIEWED  2023 recent mission for fall, reassuring ultrasounds, and imaging, patient discharged in good condition    HPI/JOSÉ      Saleem Whitney is a 78 y.o. male who presents to after fall.  Patient reports that they are walking, tripped and fell backwards first onto his buttocks and then onto his head.  He denies any significant low back pain neck pain or back pain otherwise.  He denies any any difficulty ambulating since the fall.  Patient uses aspirin, last dose was last night.  Patient denies any use of anticoagulants.  Patient denies any loss consciousness.  He denies any vomiting.  Patient denies any focal weakness or numbness.    PAST MEDICAL HISTORY   has a past medical history of Bowel habit changes (2023), Cancer (HCC), Cataract, Dental disorder, Elevated serum creatinine (2022), Hyperlipidemia, Hypertension, Hypothyroid, Pneumonia (), Renal disorder, Snoring, and Type II or unspecified type diabetes mellitus without mention of complication, not stated as uncontrolled.    SURGICAL HISTORY   has a past surgical history that includes hand surgery; lap,diagnostic abdomen (2023); turbt (transurethral resection of bladder tumor) (2023); and total knee arthroplasty (Left, 2023).    FAMILY HISTORY  Family History   Problem Relation Age of Onset    Diabetes Mother     Stroke Father         45    Diabetes Sister     Diabetes Maternal Uncle        SOCIAL HISTORY  Social History     Tobacco Use    Smoking status: Former     Current packs/day: 0.00     Average packs/day: 1 pack/day for 5.0 years (5.0 ttl pk-yrs)     Types: Cigarettes     Start date: 1966     Quit date: 1971     Years since quittin.1    Smokeless tobacco: Never   Vaping Use    Vaping Use: Never used   Substance and Sexual Activity    Alcohol use: Yes     Alcohol/week: 4.2  oz     Types: 7 Glasses of wine per week    Drug use: No    Sexual activity: Yes     Partners: Female       CURRENT MEDICATIONS  Home Medications    **Home medications have not yet been reviewed for this encounter**         ALLERGIES  Allergies   Allergen Reactions    Kdc:Red Dye+Yellow Dye+Ci Pigment Blue 63+Oxymorphone Hives and Shortness of Breath    Latex Rash     Adhesives-rash       PHYSICAL EXAM  VITAL SIGNS: BP (!) 140/70   Pulse 80   Temp 36.6 °C (97.9 °F) (Temporal)   Resp 16   SpO2 95%    Physical Exam  Constitutional:       Appearance: Normal appearance.   HENT:      Head:      Comments: Small cephalhematoma on patient's posterior scalp with associated overlying laceration (3.5cm), bleeding well-controlled.     Mouth/Throat:      Mouth: Mucous membranes are moist.   Pulmonary:      Effort: Pulmonary effort is normal.   Musculoskeletal:      Comments: C/T/L without any midline TTP or bony abn/stepoffs     No bony abn of clavicles, shoulders, elbows wrists and hands without any TTP or major ROM limitation; compartments soft    No chest TTP on compression    Abd without any TTP or ecchymosis    Pelvis is stable on compression    BL hips, knees ankle without TTP or major limitations of ROM; compartments soft    All distal pulses are intact     no focal motor or sensory deficit          Neurological:      General: No focal deficit present.      Mental Status: He is alert and oriented to person, place, and time.   Psychiatric:         Mood and Affect: Mood normal.           DIAGNOSTIC STUDIES / PROCEDURES      RADIOLOGY  I have independently interpreted the diagnostic imaging associated with this visit and am waiting the final reading from the radiologist.   My preliminary interpretation is as follows: CT fails to reveal any evidence of ICH or skull fracture  Radiologist interpretation:   CT-HEAD W/O   Final Result      1.  No acute intracranial abnormality      2.  Posterior scalp hematoma      3.   Underlying cerebral volume loss and white matter changes           Laceration Repair Procedure    Indication: Laceration    Location/Description: see above    Procedure: The patient was placed in the appropriate position The area was then irrigated with high pressure normal saline. The laceration was closed with staples. There were no additional lacerations requiring repair. The wound area was then dressed with a sterile dressing.      Total repaired wound length: 3.5 cm.     Other Items: Staple count: 5    The patient tolerated the procedure well.    Complications: None      COURSE & MEDICAL DECISION MAKING      INITIAL ASSESSMENT, COURSE AND PLAN  Care Narrative: Patient here after minor head trauma.  Mechanical ground-level fall.  Patient is very well-appearing.  Reassuring musculoskeletal exam.  Given patient's use of aspirin, advanced age, and evidence of head trauma will check CT head.  Patient without any complaints of neck pain, no tenderness of his spine to suggest associated fracture.  Therefore imaging evaluation of these areas was deferred.  Tetanus updated, laceration repaired as above.  Patient tolerated well.  Patient discharged back to his skilled nursing facility in good condition        DISPOSITION AND DISCUSSIONS      Escalation of care considered, and ultimately not performed: CT C-spine, T-spine L-spine deferred, see above for reasoning      FINAL DIAGNOSIS  1. Laceration of scalp, initial encounter    2. Closed head injury, initial encounter    3. Ground-level fall

## 2023-12-15 NOTE — ED TRIAGE NOTES
Chief Complaint   Patient presents with    Fall    Head Injury      Pt RALEIGH MARTÍNEZ from [facility]. Pt slipped and fell backwards hit the corner of a table and then hit the tiled floor. No LOC, asa daily. Pt AOx4 at this time. Laceration noted on the back of head. Bleeding controlled.

## 2023-12-15 NOTE — ED NOTES
Bedside report given to TANYA. [facility]. Wife at bedside. Pt AOx4 upon discharge. Discharge instructions given and discussed.

## 2023-12-15 NOTE — DISCHARGE PLANNING
DC Transport Scheduled    Received request at: 12/15/2023 at 0946    Transport Company Scheduled:  [employer]  Spoke with Kaylene at [facility] to schedule transport.    Scheduled Date: 12/15/2023  Scheduled Time: 1030    Destination: [facility] at [address] [place] [address] Suraj DONALDSON     Notified care team of scheduled transport via Vo[employer].     If there are any changes needed to the DC transportation scheduled, please contact [facility] at ext. [phone] between the hours of 6931-9184 Mon-Fri. If outside those hours, contact the ED Case Manager at ext. [phone].

## 2024-01-07 SDOH — ECONOMIC STABILITY: INCOME INSECURITY: IN THE LAST 12 MONTHS, WAS THERE A TIME WHEN YOU WERE NOT ABLE TO PAY THE MORTGAGE OR RENT ON TIME?: NO

## 2024-01-07 SDOH — ECONOMIC STABILITY: FOOD INSECURITY: WITHIN THE PAST 12 MONTHS, YOU WORRIED THAT YOUR FOOD WOULD RUN OUT BEFORE YOU GOT MONEY TO BUY MORE.: NEVER TRUE

## 2024-01-07 SDOH — HEALTH STABILITY: PHYSICAL HEALTH: ON AVERAGE, HOW MANY MINUTES DO YOU ENGAGE IN EXERCISE AT THIS LEVEL?: 0 MIN

## 2024-01-07 SDOH — ECONOMIC STABILITY: FOOD INSECURITY: WITHIN THE PAST 12 MONTHS, THE FOOD YOU BOUGHT JUST DIDN'T LAST AND YOU DIDN'T HAVE MONEY TO GET MORE.: NEVER TRUE

## 2024-01-07 SDOH — HEALTH STABILITY: PHYSICAL HEALTH: ON AVERAGE, HOW MANY DAYS PER WEEK DO YOU ENGAGE IN MODERATE TO STRENUOUS EXERCISE (LIKE A BRISK WALK)?: 0 DAYS

## 2024-01-07 SDOH — ECONOMIC STABILITY: HOUSING INSECURITY: IN THE LAST 12 MONTHS, HOW MANY PLACES HAVE YOU LIVED?: 1

## 2024-01-07 SDOH — ECONOMIC STABILITY: INCOME INSECURITY: HOW HARD IS IT FOR YOU TO PAY FOR THE VERY BASICS LIKE FOOD, HOUSING, MEDICAL CARE, AND HEATING?: NOT HARD AT ALL

## 2024-01-07 ASSESSMENT — SOCIAL DETERMINANTS OF HEALTH (SDOH)
DO YOU BELONG TO ANY CLUBS OR ORGANIZATIONS SUCH AS CHURCH GROUPS UNIONS, FRATERNAL OR ATHLETIC GROUPS, OR SCHOOL GROUPS?: YES
WITHIN THE PAST 12 MONTHS, YOU WORRIED THAT YOUR FOOD WOULD RUN OUT BEFORE YOU GOT THE MONEY TO BUY MORE: NEVER TRUE
HOW HARD IS IT FOR YOU TO PAY FOR THE VERY BASICS LIKE FOOD, HOUSING, MEDICAL CARE, AND HEATING?: NOT HARD AT ALL
HOW OFTEN DO YOU ATTENT MEETINGS OF THE CLUB OR ORGANIZATION YOU BELONG TO?: MORE THAN 4 TIMES PER YEAR
HOW OFTEN DO YOU ATTEND CHURCH OR RELIGIOUS SERVICES?: NEVER
HOW OFTEN DO YOU ATTEND CHURCH OR RELIGIOUS SERVICES?: NEVER
HOW OFTEN DO YOU HAVE A DRINK CONTAINING ALCOHOL: 4 OR MORE TIMES A WEEK
IN A TYPICAL WEEK, HOW MANY TIMES DO YOU TALK ON THE PHONE WITH FAMILY, FRIENDS, OR NEIGHBORS?: MORE THAN THREE TIMES A WEEK
HOW OFTEN DO YOU HAVE SIX OR MORE DRINKS ON ONE OCCASION: NEVER
HOW MANY DRINKS CONTAINING ALCOHOL DO YOU HAVE ON A TYPICAL DAY WHEN YOU ARE DRINKING: 1 OR 2
HOW OFTEN DO YOU GET TOGETHER WITH FRIENDS OR RELATIVES?: MORE THAN THREE TIMES A WEEK
DO YOU BELONG TO ANY CLUBS OR ORGANIZATIONS SUCH AS CHURCH GROUPS UNIONS, FRATERNAL OR ATHLETIC GROUPS, OR SCHOOL GROUPS?: YES
HOW OFTEN DO YOU ATTENT MEETINGS OF THE CLUB OR ORGANIZATION YOU BELONG TO?: MORE THAN 4 TIMES PER YEAR
HOW OFTEN DO YOU GET TOGETHER WITH FRIENDS OR RELATIVES?: MORE THAN THREE TIMES A WEEK
IN A TYPICAL WEEK, HOW MANY TIMES DO YOU TALK ON THE PHONE WITH FAMILY, FRIENDS, OR NEIGHBORS?: MORE THAN THREE TIMES A WEEK

## 2024-01-07 ASSESSMENT — LIFESTYLE VARIABLES
HOW OFTEN DO YOU HAVE SIX OR MORE DRINKS ON ONE OCCASION: NEVER
HOW MANY STANDARD DRINKS CONTAINING ALCOHOL DO YOU HAVE ON A TYPICAL DAY: 1 OR 2
AUDIT-C TOTAL SCORE: 4
HOW OFTEN DO YOU HAVE A DRINK CONTAINING ALCOHOL: 4 OR MORE TIMES A WEEK
SKIP TO QUESTIONS 9-10: 1

## 2024-01-08 ENCOUNTER — OFFICE VISIT (OUTPATIENT)
Dept: MEDICAL GROUP | Facility: MEDICAL CENTER | Age: 79
End: 2024-01-08
Payer: MEDICARE

## 2024-01-08 VITALS
HEIGHT: 72 IN | WEIGHT: 197.09 LBS | DIASTOLIC BLOOD PRESSURE: 62 MMHG | HEART RATE: 66 BPM | OXYGEN SATURATION: 96 % | BODY MASS INDEX: 26.7 KG/M2 | TEMPERATURE: 97.5 F | SYSTOLIC BLOOD PRESSURE: 126 MMHG

## 2024-01-08 DIAGNOSIS — E11.69 HYPERLIPIDEMIA DUE TO TYPE 2 DIABETES MELLITUS (HCC): ICD-10-CM

## 2024-01-08 DIAGNOSIS — N40.1 BPH ASSOCIATED WITH NOCTURIA: ICD-10-CM

## 2024-01-08 DIAGNOSIS — R35.1 BPH ASSOCIATED WITH NOCTURIA: ICD-10-CM

## 2024-01-08 DIAGNOSIS — I73.9 PVD (PERIPHERAL VASCULAR DISEASE) (HCC): ICD-10-CM

## 2024-01-08 DIAGNOSIS — E11.9 TYPE 2 DIABETES MELLITUS WITHOUT COMPLICATION, WITHOUT LONG-TERM CURRENT USE OF INSULIN (HCC): ICD-10-CM

## 2024-01-08 DIAGNOSIS — I15.2 HYPERTENSION ASSOCIATED WITH DIABETES (HCC): ICD-10-CM

## 2024-01-08 DIAGNOSIS — E03.9 HYPOTHYROIDISM (ACQUIRED): ICD-10-CM

## 2024-01-08 DIAGNOSIS — Z12.5 PROSTATE CANCER SCREENING: ICD-10-CM

## 2024-01-08 DIAGNOSIS — E11.59 HYPERTENSION ASSOCIATED WITH DIABETES (HCC): ICD-10-CM

## 2024-01-08 DIAGNOSIS — E78.5 HYPERLIPIDEMIA DUE TO TYPE 2 DIABETES MELLITUS (HCC): ICD-10-CM

## 2024-01-08 DIAGNOSIS — E53.8 VITAMIN B12 DEFICIENCY: ICD-10-CM

## 2024-01-08 PROCEDURE — 99214 OFFICE O/P EST MOD 30 MIN: CPT | Performed by: INTERNAL MEDICINE

## 2024-01-08 PROCEDURE — 3074F SYST BP LT 130 MM HG: CPT | Performed by: INTERNAL MEDICINE

## 2024-01-08 PROCEDURE — 3078F DIAST BP <80 MM HG: CPT | Performed by: INTERNAL MEDICINE

## 2024-01-08 RX ORDER — TAMSULOSIN HYDROCHLORIDE 0.4 MG/1
0.8 CAPSULE ORAL DAILY
Qty: 90 CAPSULE | Refills: 3 | Status: SHIPPED | OUTPATIENT
Start: 2024-01-08

## 2024-01-08 ASSESSMENT — PATIENT HEALTH QUESTIONNAIRE - PHQ9: CLINICAL INTERPRETATION OF PHQ2 SCORE: 0

## 2024-01-08 ASSESSMENT — FIBROSIS 4 INDEX: FIB4 SCORE: 0.9

## 2024-01-08 NOTE — PROGRESS NOTES
Subjective:      Diagnoses of Hypothyroidism (acquired), (HCC) Hyperlipidemia due to type 2 diabetes mellitus, (HCC) Hypertension associated with diabetes, Type 2 diabetes mellitus without complication, without long-term current use of insulin (HCC), Vitamin B12 deficiency, Prostate cancer screening, BPH associated with nocturia, and PVD (peripheral vascular disease) ([facility]) were pertinent to this visit.    HISTORY OF THE PRESENT ILLNESS: Patient is a 78 y.o. male. This pleasant patient is here today to establish care. His prior PCP was Dr Flores.     Problem   Pvd (Peripheral Vascular Disease) ([facility])    Chronic condition affecting bilateral lower extremities. He has history of bilateral vein cauterization. Uses compression stockings.       Type 2 Diabetes Mellitus Without Complication, Without Long-Term Current Use of Insulin ([facility])    Chronic condition. Followed by endocrinology. Onset around age 71.     Current medication regimen: metformin 1000mg BID, glipizide 10mg daily, Trulicity 1.5mg weekly  Patient tolerating and reports compliant with medications. Does not need refill of medications today. Denies vision changes, dry mouth, chest pain, nausea/vomiting/diarrhea, polydipsia, polyphagia, polyuria, hypoglycemia, numbness/tingling. He checks his feet at home.  Last eye exam: Jul 26, 2023   Last foot exam: Nov 2023   Diet: tries to eat healthy in general  Exercise: not so much now due to leg pain  Vaccines: flu received 09/2021, PPSV23 completed 11/2016, Tdap received 08/2017       ([facility]) Hypertension associated with diabetes    Chronic condition.    Current medication regimen: amlodipine 10 mg daily, losartan 100mg daily  Patient tolerating and reports compliant with medications. He does not regularly monitor blood pressure at home. Does not need refill of medications today. Denies headache, dizziness, vision changes, chest pain, dyspnea, edema.     Hypothyroidism (Acquired)    Chronic condition.  Current medication  regimen: levothyroxine 88mcg    Patient tolerating and reports compliant with medications. He feels euthyroid with current medication. Does not need refill of medications today. No acute concerns at this time.     (HCC) Hyperlipidemia due to type 2 diabetes mellitus    Chronic condition.    Current regimen: atorvastatin 10mg daily.   He reports compliance and tolerating medication well. Denies musculoskeletal pain, headache, diarrhea. He does not need medication refill today. No acute concerns at this time.        Past Medical History:   Diagnosis Date    Bowel habit changes 06/2023    bowel blockage    Cancer (HCC)     kidney    Cataract     Dental disorder     implants    Elevated serum creatinine 07/22/2022    Hyperlipidemia     Hypertension     Hypothyroid     Pneumonia 2020    Renal disorder     kidney ca    Snoring     no sleep study    Type II or unspecified type diabetes mellitus without mention of complication, not stated as uncontrolled      Past Surgical History:   Procedure Laterality Date    PB TOTAL KNEE ARTHROPLASTY Left 12/5/2023    Procedure: LEFT TOTAL KNEE ARTHROPLASTY;  Surgeon: Wilbur Thomas M.D.;  Location: [facility] [place];  Service: Orthopedics    TURBT (TRANSURETHRAL RESECTION OF BLADDER TUMOR)  7/31/2023    Procedure: BIPOLAR TRANSURETHRAL RESECTION OF BLADDER TUMOR WITH INSTILLATION GEMCITABINE;  Surgeon: Bradly Velasco M.D.;  Location: SURGERY [facility];  Service: Urology    HI LAP,DIAGNOSTIC ABDOMEN  5/17/2023    Procedure: LAPAROSCOPY diagnostic;  Surgeon: Galo Ashton M.D.;  Location: SURGERY [facility];  Service: General    HAND SURGERY       Family History   Problem Relation Age of Onset    Diabetes Mother     Stroke Father         45    Diabetes Sister     Diabetes Maternal Uncle      Social History     Tobacco Use    Smoking status: Former     Current packs/day: 0.00     Average packs/day: 1 pack/day for 5.0 years (5.0 ttl pk-yrs)     Types: Cigarettes      "Start date: 1966     Quit date: 1971     Years since quittin.2    Smokeless tobacco: Never   Vaping Use    Vaping Use: Never used   Substance Use Topics    Alcohol use: Yes     Alcohol/week: 4.2 oz     Types: 7 Glasses of wine per week    Drug use: No     Current Outpatient Medications Ordered in Epic   Medication Sig Dispense Refill    tamsulosin (FLOMAX) 0.4 MG capsule Take 2 Capsules by mouth every day. 90 Capsule 3    levothyroxine (SYNTHROID) 88 MCG Tab Take 1 Tablet by mouth every morning on an empty stomach. 90 Tablet 0    metformin (GLUCOPHAGE) 1000 MG tablet Take 1 Tablet by mouth 2 times a day with meals. 180 Tablet 3    losartan (COZAAR) 100 MG Tab Take 1 Tablet by mouth every day. 90 Tablet 3    atorvastatin (LIPITOR) 10 MG Tab Take 1 Tablet by mouth every day. 90 Tablet 3    amLODIPine (NORVASC) 10 MG Tab TAKE 1 TABLET EVERY DAY 90 Tablet 3    glipiZIDE (GLUCOTROL) 10 MG Tab TAKE 1 TABLET EVERY DAY 90 Tablet 3    Cyanocobalamin (VITAMIN B-12) 2500 MCG SL Tab Place 1 Tablet under the tongue every day. 100 Tablet 3    Dulaglutide (TRULICITY) 1.5 MG/0.5ML Solution Pen-injector Inject 0.5 mL under the skin every 7 days. 6 mL 3    vitamin D (CHOLECALCIFEROL) 1000 UNIT Tab Take 1 Tab by mouth every day. 90 Tab 3     No current Epic-ordered facility-administered medications on file.     Review of Systems   Musculoskeletal:  Positive for joint pain (POST OP KNEE PAIN).   All other systems reviewed and are negative.    Objective:     Exam: /62 (BP Location: Left arm, Patient Position: Sitting, BP Cuff Size: Adult)   Pulse 66   Temp 36.4 °C (97.5 °F) (Temporal)   Ht 1.816 m (5' 11.5\")   Wt 89.4 kg (197 lb 1.5 oz)   SpO2 96%  Body mass index is 27.11 kg/m².    Physical Exam  Constitutional:       Appearance: Normal appearance.   HENT:      Head: Normocephalic and atraumatic.   Cardiovascular:      Rate and Rhythm: Normal rate and regular rhythm.      Pulses: Normal pulses.      Heart " sounds: Normal heart sounds. No murmur heard.  Pulmonary:      Effort: Pulmonary effort is normal. No respiratory distress.      Breath sounds: Normal breath sounds.   Neurological:      Mental Status: He is alert and oriented to person, place, and time.   Psychiatric:         Mood and Affect: Mood normal.       Assessment & Plan:   78 y.o. male with the following -    Problem List Items Addressed This Visit       Hypothyroidism (acquired) (Chronic)     Continue levothyroxine 88 mcg daily.  Up-to-date on thyroid levels         PVD (peripheral vascular disease) (HCC) (Chronic)     Stable         Type 2 diabetes mellitus without complication, without long-term current use of insulin (HCC) (Chronic)     Stable, continue current management         (HCC) Hyperlipidemia due to type 2 diabetes mellitus    Relevant Orders    Lipid Profile    ([facility]) Hypertension associated with diabetes    Relevant Orders    CBC WITH DIFFERENTIAL    BPH associated with nocturia    Relevant Medications    tamsulosin (FLOMAX) 0.4 MG capsule    Vitamin B12 deficiency    Relevant Orders    VITAMIN B12     Other Visit Diagnoses       Prostate cancer screening        Relevant Orders    PROSTATE SPECIFIC AG SCREENING            Return in about 6 months (around 7/8/2024), or if symptoms worsen or fail to improve, for labs .    Please note that this dictation was created using voice recognition software. I have made every reasonable attempt to correct obvious errors, but I expect that there are errors of grammar and possibly content that I did not discover before finalizing the note.

## 2024-02-01 ENCOUNTER — OFFICE VISIT (OUTPATIENT)
Dept: MEDICAL GROUP | Facility: MEDICAL CENTER | Age: 79
End: 2024-02-01
Payer: MEDICARE

## 2024-02-01 VITALS
DIASTOLIC BLOOD PRESSURE: 82 MMHG | SYSTOLIC BLOOD PRESSURE: 124 MMHG | TEMPERATURE: 97.8 F | BODY MASS INDEX: 26.95 KG/M2 | WEIGHT: 199 LBS | OXYGEN SATURATION: 97 % | HEART RATE: 70 BPM | HEIGHT: 72 IN

## 2024-02-01 DIAGNOSIS — G62.9 NEUROPATHY: ICD-10-CM

## 2024-02-01 DIAGNOSIS — E11.9 TYPE 2 DIABETES MELLITUS WITHOUT COMPLICATION, WITHOUT LONG-TERM CURRENT USE OF INSULIN (HCC): ICD-10-CM

## 2024-02-01 PROCEDURE — 3074F SYST BP LT 130 MM HG: CPT | Performed by: INTERNAL MEDICINE

## 2024-02-01 PROCEDURE — 99214 OFFICE O/P EST MOD 30 MIN: CPT | Performed by: INTERNAL MEDICINE

## 2024-02-01 PROCEDURE — 3079F DIAST BP 80-89 MM HG: CPT | Performed by: INTERNAL MEDICINE

## 2024-02-01 RX ORDER — GABAPENTIN 100 MG/1
100-300 CAPSULE ORAL EVERY EVENING
Qty: 270 CAPSULE | Refills: 1 | Status: SHIPPED | OUTPATIENT
Start: 2024-02-01

## 2024-02-01 RX ORDER — DULAGLUTIDE 1.5 MG/.5ML
0.5 INJECTION, SOLUTION SUBCUTANEOUS
Qty: 6 ML | Refills: 3 | Status: SHIPPED | OUTPATIENT
Start: 2024-02-01

## 2024-02-01 ASSESSMENT — FIBROSIS 4 INDEX: FIB4 SCORE: 0.9

## 2024-02-01 NOTE — PROGRESS NOTES
Subjective:     CC:   Diagnoses of Neuropathy and (HCC) Type 2 diabetes mellitus without complication, without long-term current use of insulin were pertinent to this visit.    HPI: Saleem is a pleasant 78 y.o. male who presents today for evaluation of neuropathic pain    Problem   Neuropathy    Chronic problem, associated with diabetes.    Time ago patient remembers being prescribed medication, however he does not know the name.    We have discussed a trial of gabapentin 100-300 mg every evening.  Side effects advised.       Type 2 Diabetes Mellitus Without Complication, Without Long-Term Current Use of Insulin (Hcc)    Chronic condition. Followed by endocrinology. Onset around age 71.   Current medication regimen: metformin 1000mg BID, glipizide 10mg daily, Trulicity 1.5mg weekly.  Patient tolerating and reports compliant with medications. Does not need refill of medications today. Denies vision changes, dry mouth, chest pain, nausea/vomiting/diarrhea, polydipsia, polyphagia, polyuria, hypoglycemia, numbness/tingling. He checks his feet at home.  Last eye exam: Jul 26, 2023   Last foot exam: Nov 2023   Diet: tries to eat healthy in general  Exercise: not so much now due to leg pain  Vaccines: flu received 09/2021, PPSV23 completed 11/2016, Tdap received 08/2017          Past Medical History:   Diagnosis Date    Bowel habit changes 06/2023    bowel blockage    Cancer (HCC)     kidney    Cataract     Dental disorder     implants    Elevated serum creatinine 07/22/2022    Hyperlipidemia     Hypertension     Hypothyroid     Pneumonia 2020    Renal disorder     kidney ca    Snoring     no sleep study    Type II or unspecified type diabetes mellitus without mention of complication, not stated as uncontrolled      Current Outpatient Medications Ordered in Epic   Medication Sig Dispense Refill    gabapentin (NEURONTIN) 100 MG Cap Take 1-3 Capsules by mouth every evening. 270 Capsule 1    Dulaglutide (TRULICITY) 1.5  "MG/0.5ML Solution Pen-injector Inject 0.5 mL under the skin every 7 days. 6 mL 3    tamsulosin (FLOMAX) 0.4 MG capsule Take 2 Capsules by mouth every day. 90 Capsule 3    levothyroxine (SYNTHROID) 88 MCG Tab Take 1 Tablet by mouth every morning on an empty stomach. 90 Tablet 0    metformin (GLUCOPHAGE) 1000 MG tablet Take 1 Tablet by mouth 2 times a day with meals. 180 Tablet 3    losartan (COZAAR) 100 MG Tab Take 1 Tablet by mouth every day. 90 Tablet 3    atorvastatin (LIPITOR) 10 MG Tab Take 1 Tablet by mouth every day. 90 Tablet 3    amLODIPine (NORVASC) 10 MG Tab TAKE 1 TABLET EVERY DAY 90 Tablet 3    glipiZIDE (GLUCOTROL) 10 MG Tab TAKE 1 TABLET EVERY DAY 90 Tablet 3    Cyanocobalamin (VITAMIN B-12) 2500 MCG SL Tab Place 1 Tablet under the tongue every day. 100 Tablet 3    vitamin D (CHOLECALCIFEROL) 1000 UNIT Tab Take 1 Tab by mouth every day. 90 Tab 3    mupirocin (BACTROBAN) 2 % Ointment  (Patient not taking: Reported on 2/1/2024)       No current [facility]-ordered facility-administered medications on file.     Review of Systems   Neurological:         Positive for nerve pain in bilateral feet     Objective:     Exam:  /82 (BP Location: Left arm, Patient Position: Sitting, BP Cuff Size: Adult)   Pulse 70   Temp 36.6 °C (97.8 °F) (Temporal)   Ht 1.816 m (5' 11.5\")   Wt 90.3 kg (199 lb)   SpO2 97%   BMI 27.37 kg/m²  Body mass index is 27.37 kg/m².    Physical Exam  Constitutional:       Appearance: Normal appearance.   HENT:      Head: Normocephalic and atraumatic.   Cardiovascular:      Pulses:           Dorsalis pedis pulses are 2+ on the right side.        Posterior tibial pulses are 2+ on the right side.   Feet:      Right foot:      Skin integrity: Skin integrity normal.      Toenail Condition: Right toenails are normal.   Neurological:      Mental Status: He is alert.       Assessment & Plan:   Saleem  is a pleasant 78 y.o. male with the following -     Problem List Items Addressed This Visit  "      Type 2 diabetes mellitus without complication, without long-term current use of insulin (HCC) (Chronic)     Chronic, stable, followed by endocrinology.  He needs refill on Trulicity today.         Relevant Medications    Dulaglutide (TRULICITY) 1.5 MG/0.5ML Solution Pen-injector    Neuropathy    Relevant Medications    gabapentin (NEURONTIN) 100 MG Cap     No follow-ups on file.  As previously scheduled on 7/8/2024 or earlier as needed    Please note that this dictation was created using voice recognition software. I have made every reasonable attempt to correct obvious errors, but I expect that there are errors of grammar and possibly content that I did not discover before finalizing the note.

## 2024-02-10 ENCOUNTER — HOSPITAL ENCOUNTER (OUTPATIENT)
Dept: LAB | Facility: MEDICAL CENTER | Age: 79
End: 2024-02-10
Attending: INTERNAL MEDICINE
Payer: MEDICARE

## 2024-02-10 DIAGNOSIS — Z12.5 PROSTATE CANCER SCREENING: ICD-10-CM

## 2024-02-10 DIAGNOSIS — E78.5 HYPERLIPIDEMIA DUE TO TYPE 2 DIABETES MELLITUS (HCC): ICD-10-CM

## 2024-02-10 DIAGNOSIS — E53.8 VITAMIN B12 DEFICIENCY: ICD-10-CM

## 2024-02-10 DIAGNOSIS — I15.2 HYPERTENSION ASSOCIATED WITH DIABETES (HCC): ICD-10-CM

## 2024-02-10 DIAGNOSIS — E11.69 HYPERLIPIDEMIA DUE TO TYPE 2 DIABETES MELLITUS (HCC): ICD-10-CM

## 2024-02-10 DIAGNOSIS — E11.59 HYPERTENSION ASSOCIATED WITH DIABETES (HCC): ICD-10-CM

## 2024-02-10 LAB
BASOPHILS # BLD AUTO: 0.2 % (ref 0–1.8)
BASOPHILS # BLD: 0.02 K/UL (ref 0–0.12)
CHOLEST SERPL-MCNC: 109 MG/DL (ref 100–199)
EOSINOPHIL # BLD AUTO: 0.4 K/UL (ref 0–0.51)
EOSINOPHIL NFR BLD: 4.4 % (ref 0–6.9)
ERYTHROCYTE [DISTWIDTH] IN BLOOD BY AUTOMATED COUNT: 43.2 FL (ref 35.9–50)
FASTING STATUS PATIENT QL REPORTED: NORMAL
HCT VFR BLD AUTO: 41.2 % (ref 42–52)
HDLC SERPL-MCNC: 50 MG/DL
HGB BLD-MCNC: 13.3 G/DL (ref 14–18)
IMM GRANULOCYTES # BLD AUTO: 0.04 K/UL (ref 0–0.11)
IMM GRANULOCYTES NFR BLD AUTO: 0.4 % (ref 0–0.9)
LDLC SERPL CALC-MCNC: 45 MG/DL
LYMPHOCYTES # BLD AUTO: 2.11 K/UL (ref 1–4.8)
LYMPHOCYTES NFR BLD: 23.3 % (ref 22–41)
MCH RBC QN AUTO: 28.8 PG (ref 27–33)
MCHC RBC AUTO-ENTMCNC: 32.3 G/DL (ref 32.3–36.5)
MCV RBC AUTO: 89.2 FL (ref 81.4–97.8)
MONOCYTES # BLD AUTO: 0.62 K/UL (ref 0–0.85)
MONOCYTES NFR BLD AUTO: 6.8 % (ref 0–13.4)
NEUTROPHILS # BLD AUTO: 5.88 K/UL (ref 1.82–7.42)
NEUTROPHILS NFR BLD: 64.9 % (ref 44–72)
NRBC # BLD AUTO: 0 K/UL
NRBC BLD-RTO: 0 /100 WBC (ref 0–0.2)
PLATELET # BLD AUTO: 260 K/UL (ref 164–446)
PMV BLD AUTO: 9 FL (ref 9–12.9)
PSA SERPL-MCNC: 1.3 NG/ML (ref 0–4)
RBC # BLD AUTO: 4.62 M/UL (ref 4.7–6.1)
TRIGL SERPL-MCNC: 68 MG/DL (ref 0–149)
VIT B12 SERPL-MCNC: 2391 PG/ML (ref 211–911)
WBC # BLD AUTO: 9.1 K/UL (ref 4.8–10.8)

## 2024-02-10 PROCEDURE — 80061 LIPID PANEL: CPT

## 2024-02-10 PROCEDURE — 36415 COLL VENOUS BLD VENIPUNCTURE: CPT

## 2024-02-10 PROCEDURE — 82607 VITAMIN B-12: CPT

## 2024-02-10 PROCEDURE — 84153 ASSAY OF PSA TOTAL: CPT | Mod: GA

## 2024-02-10 PROCEDURE — 85025 COMPLETE CBC W/AUTO DIFF WBC: CPT

## 2024-05-08 ENCOUNTER — OFFICE VISIT (OUTPATIENT)
Dept: ENDOCRINOLOGY | Facility: MEDICAL CENTER | Age: 79
End: 2024-05-08
Attending: INTERNAL MEDICINE
Payer: MEDICARE

## 2024-05-08 VITALS
DIASTOLIC BLOOD PRESSURE: 75 MMHG | OXYGEN SATURATION: 95 % | HEIGHT: 72 IN | BODY MASS INDEX: 27.9 KG/M2 | WEIGHT: 206 LBS | HEART RATE: 77 BPM | SYSTOLIC BLOOD PRESSURE: 134 MMHG

## 2024-05-08 DIAGNOSIS — E08.42 DIABETIC POLYNEUROPATHY ASSOCIATED WITH DIABETES MELLITUS DUE TO UNDERLYING CONDITION (HCC): ICD-10-CM

## 2024-05-08 DIAGNOSIS — E11.42 CONTROLLED TYPE 2 DIABETES MELLITUS WITH DIABETIC POLYNEUROPATHY, WITHOUT LONG-TERM CURRENT USE OF INSULIN (HCC): ICD-10-CM

## 2024-05-08 DIAGNOSIS — E78.5 DYSLIPIDEMIA: ICD-10-CM

## 2024-05-08 DIAGNOSIS — E55.9 VITAMIN D DEFICIENCY: ICD-10-CM

## 2024-05-08 DIAGNOSIS — E03.9 HYPOTHYROIDISM (ACQUIRED): ICD-10-CM

## 2024-05-08 DIAGNOSIS — E11.9 TYPE 2 DIABETES MELLITUS WITHOUT COMPLICATION, WITHOUT LONG-TERM CURRENT USE OF INSULIN (HCC): ICD-10-CM

## 2024-05-08 DIAGNOSIS — E03.9 ACQUIRED HYPOTHYROIDISM: ICD-10-CM

## 2024-05-08 DIAGNOSIS — Z79.84 LONG TERM (CURRENT) USE OF ORAL HYPOGLYCEMIC DRUGS: ICD-10-CM

## 2024-05-08 LAB
HBA1C MFR BLD: 6.8 % (ref ?–5.8)
POCT INT CON NEG: NEGATIVE
POCT INT CON POS: POSITIVE

## 2024-05-08 PROCEDURE — 99215 OFFICE O/P EST HI 40 MIN: CPT | Performed by: INTERNAL MEDICINE

## 2024-05-08 PROCEDURE — 3075F SYST BP GE 130 - 139MM HG: CPT | Performed by: INTERNAL MEDICINE

## 2024-05-08 PROCEDURE — 3078F DIAST BP <80 MM HG: CPT | Performed by: INTERNAL MEDICINE

## 2024-05-08 RX ORDER — DULOXETIN HYDROCHLORIDE 60 MG/1
60 CAPSULE, DELAYED RELEASE ORAL DAILY
Qty: 90 CAPSULE | Refills: 3 | Status: SHIPPED | OUTPATIENT
Start: 2024-05-08

## 2024-05-08 RX ORDER — GLIPIZIDE 10 MG/1
10 TABLET ORAL
Qty: 90 TABLET | Refills: 3 | Status: SHIPPED | OUTPATIENT
Start: 2024-05-08

## 2024-05-08 RX ORDER — LEVOTHYROXINE SODIUM 88 UG/1
88 TABLET ORAL
Qty: 90 TABLET | Refills: 3 | Status: SHIPPED | OUTPATIENT
Start: 2024-05-08

## 2024-05-08 ASSESSMENT — FIBROSIS 4 INDEX: FIB4 SCORE: 1.32

## 2024-05-08 NOTE — PROGRESS NOTES
CHIEF COMPLAINT: Patient is here for follow up of Type 2 Diabetes Mellitus    HPI:     Saleem Whitney is a 78 y.o. male with Type 2 Diabetes Mellitus here for follow up.      Labs from 5/8/2024 show A1c is 5.8%  Labs from 5/17/2023 Hba1c is 7.0%  Labs from 12/12/2023 HbA1c was 7.4%    He was previously seen by the nurse practitioner   He has a medical history of hyperlipidemia,, hypertension, peripheral vascular disease, type 2 diabetes, primary hypothyroidism.  He also reports diabetic neuropathy      He is on  Metformin at 1000 mg twice a day  Glipizide 10 mg pill 1 tablet with breakfast  Trulicity 1.5 mg weekly      He denies side effects with his medications  He  had bladder surgery and has stage 1 urothelial ca without muscle invasion. He had cystoscopy since then which was negative      He is on atorvastatin 10 mg daily for hyperlipidemia  He denies muscle aches and weakness    Latest Reference Range & Units 02/10/24 07:49   Cholesterol,Tot 100 - 199 mg/dL 109   Triglycerides 0 - 149 mg/dL 68   HDL >=40 mg/dL 50   LDL <100 mg/dL 45         He has hypertension is on losartan and amlodipine  Blood pressure is at goal  He does not have albuminuria   Latest Reference Range & Units 11/28/23 08:38   Micro Alb Creat Ratio 0 - 30 mg/g 15   Creatinine, Urine mg/dL 104.30   Microalbumin, Urine Random mg/dL 1.6           He has DPN and takes Duloxetine 30mg daily to help with neuropathic pain.  He repots improved pain scores since starting this medication ( from 1o down to 4) but he is worried that the pain is coming back.          He had an eye exam in 7/2023 at [facility] which showed no diabetic retinopathy      For his primary hypothyroidism  TPO antibodies were negative  He has been on levothyroxine 88 mcg daily which has been his dose for at least 3 years  He denies fatigue, constipation and cold intolerance  His TSH is 3.9 on 11/2023  TSH is 3.1 on 4/2023  His TSH was 3.0 on February  2022      His last vitamin D was normal at 42 on April 2023        BG Diary:  Patient is not testing BGs    Weight has been stable    Diabetes Complications   Retinopathy: No known retinopathy.  Last eye exam: 8/25/2022 from [facility]  Neuropathy: He report paresthesias or numbness in both feet. Denies any foot wounds.  Exercise: Minimal.  Diet: Fair.  Patient's medications, allergies, and social histories were reviewed and updated as appropriate.    ROS:     CONS:     No fever, no chills   EYES:     No diplopia, no blurry vision   CV:           No chest pain, no palpitations   PULM:     No SOB, no cough, no hemoptysis.   GI:            No nausea, no vomiting, no diarrhea, no constipation   ENDO:     No polyuria, no polydipsia, no heat intolerance, no cold intolerance       Past Medical History:  Problem List:  2024-02: Neuropathy  2023-12: Failure to thrive in adult  2023-12: Anemia  2023-12: Left lower extremity weakness  2023-12: S/P total knee arthroplasty, left  2023-12: Acute pain of left knee  2023-10: Degenerative arthritis of left knee  2023-07: Trigger ring finger of left hand  2023-07: Vitamin B12 deficiency  2023-05: Hypomagnesemia  2023-05: Hyponatremia  2023-05: Advance care planning  2023-05: Acute cough  2023-04: Facial laceration  2023-04: Visit for suture removal  2023-03: Long term (current) use of oral hypoglycemic drugs  2023-03: Dyslipidemia  2022-09: Small bowel obstruction (HCC)  2022-09: Leukocytosis  2022-09: Lactic acidosis  2022-07: Elevated serum creatinine  2021-01: Itching of ear  2020-12: PVD (peripheral vascular disease) ([facility])  2020-12: Degenerative arthritis of knee, bilateral  2020-08: Acute right-sided low back pain  2020-06: Chronic pain of right knee  2019-10: Motion sickness  2019-10: Altitude sickness prophylaxis  2019-07: Leg mass, right  2019-06: Vitamin D deficiency  2019-06: Leg cramping  2019-04: Baker cyst, right  2019-04: Erythematous rash  2019-01: Edema of  left lower extremity  2018: Quadriceps muscle strain  2018: Nocturia more than twice per night  2018: Pitting edema  2018: Idiopathic chronic gout, left ankle and foot, without tophus   (tophi)  2018: Memory changes  2017: Chronic pain of left knee  2017: Diastasis of rectus abdominis  2017: Left flank pain  2017: BPH associated with nocturia  2016: Swelling of left hand  2016: (HCC) Obesity, diabetes, and hypertension syndrome  2016: Type 2 diabetes mellitus without complication, without long-  term current use of insulin (HCC)  2016: (HCC) Hypertension associated with diabetes  2016: Hypothyroidism (acquired)  2016: (HCC) Hyperlipidemia due to type 2 diabetes mellitus  2016: Primary insomnia  2016: Screening for AAA (abdominal aortic aneurysm)      Past Surgical History:  Past Surgical History:   Procedure Laterality Date    PB TOTAL KNEE ARTHROPLASTY Left 2023    Procedure: LEFT TOTAL KNEE ARTHROPLASTY;  Surgeon: Wilbur Thomsa M.D.;  Location: [facility] [place];  Service: Orthopedics    TURBT (TRANSURETHRAL RESECTION OF BLADDER TUMOR)  2023    Procedure: BIPOLAR TRANSURETHRAL RESECTION OF BLADDER TUMOR WITH INSTILLATION GEMCITABINE;  Surgeon: Bradly Velasco M.D.;  Location: SURGERY [facility];  Service: Urology    ND LAP,DIAGNOSTIC ABDOMEN  2023    Procedure: LAPAROSCOPY diagnostic;  Surgeon: Galo Ashton M.D.;  Location: SURGERY [facility];  Service: General    HAND SURGERY          Allergies:  Patient has no known allergies.     Social History:  Social History     Tobacco Use    Smoking status: Former     Current packs/day: 0.00     Average packs/day: 1 pack/day for 5.0 years (5.0 ttl pk-yrs)     Types: Cigarettes     Start date: 1966     Quit date: 1971     Years since quittin.5    Smokeless tobacco: Never   Vaping Use    Vaping Use: Never used   Substance Use Topics    Alcohol use: Yes     Alcohol/week: 4.2  "oz     Types: 7 Glasses of wine per week    Drug use: No        Family History:   family history includes Diabetes in his maternal uncle, mother, and sister; Stroke in his father.      PHYSICAL EXAM:   OBJECTIVE:  Vital signs: /75   Pulse 77   Ht 1.816 m (5' 11.5\")   Wt 93.4 kg (206 lb)   SpO2 95%   BMI 28.33 kg/m²   GENERAL: Well-developed, well-nourished in no apparent distress.   EYE:  No ocular asymmetry, PERRLA  HENT: Pink, moist mucous membranes.    NECK: No thyromegaly.   CARDIOVASCULAR:  No murmurs  LUNGS: Clear breath sounds  ABDOMEN: Soft, nontender   EXTREMITIES: No clubbing, cyanosis, or edema.   NEUROLOGICAL: No gross focal motor abnormalities   LYMPH: No cervical adenopathy palpated.   SKIN: No rashes, lesions.   Monofilament testing with a 10 gram force: sensation: intact bilaterally  Visual Inspection: Feet without maceration, ulcers, or fissures.  Pedal pulses: intact bilaterally        Labs:  Lab Results   Component Value Date/Time    HBA1C 6.8 (A) 05/08/2024 02:19 PM        Lab Results   Component Value Date/Time    WBC 9.1 02/10/2024 07:49 AM    RBC 4.62 (L) 02/10/2024 07:49 AM    HEMOGLOBIN 13.3 (L) 02/10/2024 07:49 AM    MCV 89.2 02/10/2024 07:49 AM    MCH 28.8 02/10/2024 07:49 AM    MCHC 32.3 02/10/2024 07:49 AM    RDW 43.2 02/10/2024 07:49 AM    MPV 9.0 02/10/2024 07:49 AM       Lab Results   Component Value Date/Time    SODIUM 135 12/11/2023 01:10 AM    POTASSIUM 4.2 12/11/2023 01:10 AM    CHLORIDE 96 12/11/2023 01:10 AM    CO2 28 12/11/2023 01:10 AM    ANION 11.0 12/11/2023 01:10 AM    GLUCOSE 202 (H) 12/11/2023 01:10 AM    BUN 29 (H) 12/11/2023 01:10 AM    CREATININE 1.05 12/11/2023 01:10 AM    CALCIUM 8.9 12/11/2023 01:10 AM    ASTSGOT 16 12/11/2023 01:10 AM    ALTSGPT 14 12/11/2023 01:10 AM    TBILIRUBIN 0.8 12/11/2023 01:10 AM    ALBUMIN 3.3 12/11/2023 01:10 AM    TOTPROTEIN 6.9 12/11/2023 01:10 AM    GLOBULIN 3.6 (H) 12/11/2023 01:10 AM    AGRATIO 0.9 12/11/2023 01:10 AM "       Lab Results   Component Value Date/Time    CHOLSTRLTOT 116 12/12/2022 0742    TRIGLYCERIDE 162 (H) 12/12/2022 0742    HDL 41 12/12/2022 0742    LDL 43 12/12/2022 0742       Lab Results   Component Value Date/Time    MALBCRT 15 11/28/2023 08:38 AM    MICROALBUR 1.6 11/28/2023 08:38 AM        Lab Results   Component Value Date/Time    TSHULTRASEN 3.040 02/02/2022 0847     No results found for: FREEDIR  Lab Results   Component Value Date/Time    FREET3 2.71 02/02/2022 0847     No results found for: THYSTIMIG        ASSESSMENT/PLAN:     1. Controlled type 2 diabetes mellitus with diabetic polyneuropathy, without long-term current use of insulin (HCC)  Fair control  Given his advanced age and A1c of  7.0% is acceptable  His current A1c is 6.8%  Continue Metfomin 1000mg bid   Glipizide 10 mg with breakfast   Trulicity 1.5 mg weekly   he is up-to-date with his labs   Because he has well-controlled and stable type 2 diabetes   I want him to follow-up with his PCP for now  Recommend follow-up with pcp for diabetes   He can return to endocrinology and see me again if medically necessary based on the discretion of his primary care    2. Hypothyroidism (acquired)  Controlled  Continue levothyroxine 88 mcg daily  Reviewed proper administration of thyroid hormone  Patient should take thyroid hormone 30-60 minutes before breakfast on an empty stomach plain water and not take it together with food, iron, calcium, and antacids.  Iron, calcium, and antacids should be taken at least 4 hours apart from thyroid hormone.    Continue monitoring TSH with pcp       3. Dyslipidemia  Stable  Continue Lipitor 10 mg daily  Repeat fasting lipids with pcp      4. Diabetic polyneuropathy associated with diabetes mellitus due to underlying condition (HCC)  Controlled   Continue Duloxetine 60mg daily     5. Vitamin D deficiency  Stable   Vitamin D labs were reviewed with patient  Continue current supplements  Continue monitoring levels with  pcp    6. Long term (current) use of oral hypoglycemic drugs  Patient is on multiple oral agents and a GLP-1 for type 2 diabetes management      Return if symptoms worsen or fail to improve.    Total time spent on day of service was over 60 minutes which included obtaining a detailed history and physical exam, ordering labs, coordinating care and scheduling future follow-up  I reviewed his labs and workup and had a discussion with the patient about future follow-up    Thank you kindly for allowing me to participate in the diabetes care plan for this patient.    Saleem Cardoso MD, [facility], [facility]      CC:   Jose Flores D.O.

## 2024-07-15 ENCOUNTER — OFFICE VISIT (OUTPATIENT)
Dept: MEDICAL GROUP | Facility: MEDICAL CENTER | Age: 79
End: 2024-07-15
Payer: MEDICARE

## 2024-07-15 ENCOUNTER — HOSPITAL ENCOUNTER (OUTPATIENT)
Facility: MEDICAL CENTER | Age: 79
End: 2024-07-15
Attending: INTERNAL MEDICINE
Payer: MEDICARE

## 2024-07-15 VITALS
TEMPERATURE: 97.8 F | RESPIRATION RATE: 20 BRPM | SYSTOLIC BLOOD PRESSURE: 132 MMHG | HEART RATE: 78 BPM | DIASTOLIC BLOOD PRESSURE: 78 MMHG | OXYGEN SATURATION: 98 % | HEIGHT: 72 IN | WEIGHT: 196.65 LBS | BODY MASS INDEX: 26.64 KG/M2

## 2024-07-15 DIAGNOSIS — E11.9 TYPE 2 DIABETES MELLITUS WITHOUT COMPLICATION, WITHOUT LONG-TERM CURRENT USE OF INSULIN (HCC): ICD-10-CM

## 2024-07-15 DIAGNOSIS — E03.9 HYPOTHYROIDISM (ACQUIRED): Chronic | ICD-10-CM

## 2024-07-15 DIAGNOSIS — R35.0 URINARY FREQUENCY: ICD-10-CM

## 2024-07-15 DIAGNOSIS — E11.59 HYPERTENSION ASSOCIATED WITH DIABETES (HCC): ICD-10-CM

## 2024-07-15 DIAGNOSIS — R30.9 PAINFUL URINATION: ICD-10-CM

## 2024-07-15 DIAGNOSIS — E55.9 VITAMIN D DEFICIENCY: ICD-10-CM

## 2024-07-15 DIAGNOSIS — E53.8 VITAMIN B12 DEFICIENCY: ICD-10-CM

## 2024-07-15 DIAGNOSIS — I15.2 HYPERTENSION ASSOCIATED WITH DIABETES (HCC): ICD-10-CM

## 2024-07-15 DIAGNOSIS — R31.9 HEMATURIA, UNSPECIFIED TYPE: ICD-10-CM

## 2024-07-15 DIAGNOSIS — G62.9 NEUROPATHY: ICD-10-CM

## 2024-07-15 LAB
APPEARANCE UR: CLEAR
APPEARANCE UR: CLEAR
BILIRUB UR QL STRIP.AUTO: NEGATIVE
BILIRUB UR STRIP-MCNC: NEGATIVE MG/DL
COLOR UR AUTO: YELLOW
COLOR UR: YELLOW
GLUCOSE UR STRIP.AUTO-MCNC: NEGATIVE MG/DL
GLUCOSE UR STRIP.AUTO-MCNC: NEGATIVE MG/DL
KETONES UR STRIP.AUTO-MCNC: NEGATIVE MG/DL
KETONES UR STRIP.AUTO-MCNC: NEGATIVE MG/DL
LEUKOCYTE ESTERASE UR QL STRIP.AUTO: NEGATIVE
LEUKOCYTE ESTERASE UR QL STRIP.AUTO: NEGATIVE
MICRO URNS: NORMAL
NITRITE UR QL STRIP.AUTO: NEGATIVE
NITRITE UR QL STRIP.AUTO: NEGATIVE
PH UR STRIP.AUTO: 5.5 [PH] (ref 5–8)
PH UR STRIP.AUTO: 6 [PH] (ref 5–8)
PROT UR QL STRIP: NEGATIVE MG/DL
PROT UR QL STRIP: NEGATIVE MG/DL
RBC UR QL AUTO: NEGATIVE
RBC UR QL AUTO: NORMAL
SP GR UR STRIP.AUTO: 1.01
SP GR UR STRIP.AUTO: 1.02
UROBILINOGEN UR STRIP-MCNC: NORMAL MG/DL
UROBILINOGEN UR STRIP.AUTO-MCNC: 0.2 MG/DL

## 2024-07-15 PROCEDURE — 99214 OFFICE O/P EST MOD 30 MIN: CPT | Performed by: INTERNAL MEDICINE

## 2024-07-15 PROCEDURE — 81002 URINALYSIS NONAUTO W/O SCOPE: CPT | Performed by: INTERNAL MEDICINE

## 2024-07-15 PROCEDURE — 3078F DIAST BP <80 MM HG: CPT | Performed by: INTERNAL MEDICINE

## 2024-07-15 PROCEDURE — 3075F SYST BP GE 130 - 139MM HG: CPT | Performed by: INTERNAL MEDICINE

## 2024-07-15 PROCEDURE — 81003 URINALYSIS AUTO W/O SCOPE: CPT

## 2024-07-15 RX ORDER — GABAPENTIN 100 MG/1
100-300 CAPSULE ORAL EVERY EVENING
Qty: 270 CAPSULE | Refills: 1 | Status: SHIPPED | OUTPATIENT
Start: 2024-07-15

## 2024-07-15 ASSESSMENT — FIBROSIS 4 INDEX: FIB4 SCORE: 1.32

## 2024-07-15 ASSESSMENT — ENCOUNTER SYMPTOMS: FLANK PAIN: 0

## 2024-07-16 ENCOUNTER — TELEPHONE (OUTPATIENT)
Dept: MEDICAL GROUP | Facility: MEDICAL CENTER | Age: 79
End: 2024-07-16
Payer: MEDICARE

## 2024-08-26 DIAGNOSIS — E53.8 VITAMIN B12 DEFICIENCY: ICD-10-CM

## 2024-08-26 RX ORDER — CYANOCOBALAMIN (VITAMIN B-12) 2500 MCG
2500 TABLET, SUBLINGUAL SUBLINGUAL DAILY
Qty: 100 TABLET | Refills: 3 | Status: SHIPPED | OUTPATIENT
Start: 2024-08-26

## 2024-08-26 NOTE — TELEPHONE ENCOUNTER
Received request via: Patient    Was the patient seen in the last year in this department? Yes    Does the patient have an active prescription (recently filled or refills available) for medication(s) requested? No    [facility]    Does the patient have [facility] and need 100-day supply? (This applies to ALL medications) Patient does not have SCP

## 2024-09-04 SDOH — HEALTH STABILITY: PHYSICAL HEALTH: ON AVERAGE, HOW MANY DAYS PER WEEK DO YOU ENGAGE IN MODERATE TO STRENUOUS EXERCISE (LIKE A BRISK WALK)?: 0 DAYS

## 2024-09-04 SDOH — ECONOMIC STABILITY: FOOD INSECURITY: WITHIN THE PAST 12 MONTHS, YOU WORRIED THAT YOUR FOOD WOULD RUN OUT BEFORE YOU GOT MONEY TO BUY MORE.: NEVER TRUE

## 2024-09-04 SDOH — HEALTH STABILITY: PHYSICAL HEALTH: ON AVERAGE, HOW MANY MINUTES DO YOU ENGAGE IN EXERCISE AT THIS LEVEL?: 0 MIN

## 2024-09-04 SDOH — ECONOMIC STABILITY: FOOD INSECURITY: WITHIN THE PAST 12 MONTHS, THE FOOD YOU BOUGHT JUST DIDN'T LAST AND YOU DIDN'T HAVE MONEY TO GET MORE.: NEVER TRUE

## 2024-09-04 SDOH — ECONOMIC STABILITY: INCOME INSECURITY: HOW HARD IS IT FOR YOU TO PAY FOR THE VERY BASICS LIKE FOOD, HOUSING, MEDICAL CARE, AND HEATING?: NOT HARD AT ALL

## 2024-09-04 SDOH — ECONOMIC STABILITY: INCOME INSECURITY: IN THE LAST 12 MONTHS, WAS THERE A TIME WHEN YOU WERE NOT ABLE TO PAY THE MORTGAGE OR RENT ON TIME?: NO

## 2024-09-04 ASSESSMENT — SOCIAL DETERMINANTS OF HEALTH (SDOH)
IN A TYPICAL WEEK, HOW MANY TIMES DO YOU TALK ON THE PHONE WITH FAMILY, FRIENDS, OR NEIGHBORS?: THREE TIMES A WEEK
HOW OFTEN DO YOU ATTENT MEETINGS OF THE CLUB OR ORGANIZATION YOU BELONG TO?: MORE THAN 4 TIMES PER YEAR
HOW MANY DRINKS CONTAINING ALCOHOL DO YOU HAVE ON A TYPICAL DAY WHEN YOU ARE DRINKING: 1 OR 2
HOW OFTEN DO YOU ATTENT MEETINGS OF THE CLUB OR ORGANIZATION YOU BELONG TO?: MORE THAN 4 TIMES PER YEAR
DO YOU BELONG TO ANY CLUBS OR ORGANIZATIONS SUCH AS CHURCH GROUPS UNIONS, FRATERNAL OR ATHLETIC GROUPS, OR SCHOOL GROUPS?: YES
IN A TYPICAL WEEK, HOW MANY TIMES DO YOU TALK ON THE PHONE WITH FAMILY, FRIENDS, OR NEIGHBORS?: THREE TIMES A WEEK
HOW OFTEN DO YOU HAVE A DRINK CONTAINING ALCOHOL: 4 OR MORE TIMES A WEEK
HOW OFTEN DO YOU GET TOGETHER WITH FRIENDS OR RELATIVES?: ONCE A WEEK
DO YOU BELONG TO ANY CLUBS OR ORGANIZATIONS SUCH AS CHURCH GROUPS UNIONS, FRATERNAL OR ATHLETIC GROUPS, OR SCHOOL GROUPS?: YES
WITHIN THE PAST 12 MONTHS, YOU WORRIED THAT YOUR FOOD WOULD RUN OUT BEFORE YOU GOT THE MONEY TO BUY MORE: NEVER TRUE
HOW OFTEN DO YOU ATTEND CHURCH OR RELIGIOUS SERVICES?: NEVER
HOW OFTEN DO YOU HAVE SIX OR MORE DRINKS ON ONE OCCASION: LESS THAN MONTHLY
IN THE PAST 12 MONTHS, HAS THE ELECTRIC, GAS, OIL, OR WATER COMPANY THREATENED TO SHUT OFF SERVICE IN YOUR HOME?: NO
HOW HARD IS IT FOR YOU TO PAY FOR THE VERY BASICS LIKE FOOD, HOUSING, MEDICAL CARE, AND HEATING?: NOT HARD AT ALL
HOW OFTEN DO YOU GET TOGETHER WITH FRIENDS OR RELATIVES?: ONCE A WEEK
HOW OFTEN DO YOU ATTEND CHURCH OR RELIGIOUS SERVICES?: NEVER

## 2024-09-04 ASSESSMENT — LIFESTYLE VARIABLES
HOW MANY STANDARD DRINKS CONTAINING ALCOHOL DO YOU HAVE ON A TYPICAL DAY: 1 OR 2
AUDIT-C TOTAL SCORE: 5
HOW OFTEN DO YOU HAVE SIX OR MORE DRINKS ON ONE OCCASION: LESS THAN MONTHLY
HOW OFTEN DO YOU HAVE A DRINK CONTAINING ALCOHOL: 4 OR MORE TIMES A WEEK
SKIP TO QUESTIONS 9-10: 0

## 2024-09-05 ENCOUNTER — OFFICE VISIT (OUTPATIENT)
Dept: MEDICAL GROUP | Facility: MEDICAL CENTER | Age: 79
End: 2024-09-05
Payer: MEDICARE

## 2024-09-05 VITALS
HEIGHT: 73 IN | DIASTOLIC BLOOD PRESSURE: 68 MMHG | WEIGHT: 192 LBS | OXYGEN SATURATION: 98 % | HEART RATE: 83 BPM | SYSTOLIC BLOOD PRESSURE: 146 MMHG | BODY MASS INDEX: 25.45 KG/M2

## 2024-09-05 DIAGNOSIS — I15.2 HYPERTENSION ASSOCIATED WITH DIABETES (HCC): ICD-10-CM

## 2024-09-05 DIAGNOSIS — E11.59 HYPERTENSION ASSOCIATED WITH DIABETES (HCC): ICD-10-CM

## 2024-09-05 DIAGNOSIS — Z00.00 MEDICARE ANNUAL WELLNESS VISIT, SUBSEQUENT: Primary | ICD-10-CM

## 2024-09-05 DIAGNOSIS — E11.42 CONTROLLED TYPE 2 DIABETES MELLITUS WITH DIABETIC POLYNEUROPATHY, WITHOUT LONG-TERM CURRENT USE OF INSULIN (HCC): ICD-10-CM

## 2024-09-05 PROCEDURE — 3078F DIAST BP <80 MM HG: CPT | Performed by: INTERNAL MEDICINE

## 2024-09-05 PROCEDURE — G0439 PPPS, SUBSEQ VISIT: HCPCS | Performed by: INTERNAL MEDICINE

## 2024-09-05 PROCEDURE — 3077F SYST BP >= 140 MM HG: CPT | Performed by: INTERNAL MEDICINE

## 2024-09-05 RX ORDER — AMLODIPINE BESYLATE 10 MG/1
10 TABLET ORAL
Qty: 90 TABLET | Refills: 3 | Status: SHIPPED | OUTPATIENT
Start: 2024-09-05

## 2024-09-05 ASSESSMENT — FIBROSIS 4 INDEX: FIB4 SCORE: 1.32

## 2024-09-05 ASSESSMENT — ACTIVITIES OF DAILY LIVING (ADL): BATHING_REQUIRES_ASSISTANCE: 0

## 2024-09-05 ASSESSMENT — ENCOUNTER SYMPTOMS: GENERAL WELL-BEING: EXCELLENT

## 2024-09-05 ASSESSMENT — PATIENT HEALTH QUESTIONNAIRE - PHQ9: CLINICAL INTERPRETATION OF PHQ2 SCORE: 0

## 2024-09-05 NOTE — PROGRESS NOTES
Chief Complaint   Patient presents with    Annual Exam     Verbal consent was acquired by the patient to use [employer] ambient listening note generation during this visit Yes     History of Present Illness  The patient is a pleasant 78-year-old male presenting for a Medicare annual wellness visit.    A few days ago, he experienced a mild intestinal blockage, characterized by constipation and bile reflux. However, he was able to have a bowel movement and has been fine since. He does not take any regular medication for bowel movements. His last similar episode was a year ago. He admits to not drinking the recommended amount of water daily but does not add salt to his diet and has been reducing his intake of processed foods.    He has a history of knee surgery and has noticed some balance issues since then, which he attributes to his equilibrium. He underwent physical therapy post-surgery.    He does not use hearing aids and reports having near 20/20 vision.    He is up-to-date on all vaccines except for the influenza vaccine.    He reports feeling anxious due to increased irritability towards his wife, who has been ill for the past 3 weeks. He is concerned about her inadequate food and fluid intake.    His last eye exam was conducted 2 weeks ago.  At [facility]    SOCIAL HISTORY  He has friends and he meets them once a week.      Patient Active Problem List    Diagnosis Date Noted    Neuropathy 02/01/2024    Failure to thrive in adult 12/07/2023    Anemia 12/06/2023    Left lower extremity weakness 12/05/2023    S/P total knee arthroplasty, left 12/05/2023    Acute pain of left knee 12/05/2023    Degenerative arthritis of left knee 10/23/2023    Trigger ring finger of left hand 07/07/2023    Vitamin B12 deficiency 07/07/2023    Hypomagnesemia 05/18/2023    Hyponatremia 05/17/2023    Advance care planning 05/17/2023    Facial laceration 04/17/2023    Long term (current) use of oral hypoglycemic  drugs 03/02/2023    Dyslipidemia 03/02/2023    Small bowel obstruction (HCC) 09/08/2022    Leukocytosis 09/08/2022    Lactic acidosis 09/08/2022    Elevated serum creatinine 07/22/2022    Itching of ear 01/13/2021    PVD (peripheral vascular disease) (HCC) 12/26/2020    Degenerative arthritis of knee, bilateral 12/22/2020    Acute right-sided low back pain 08/06/2020    Chronic pain of right knee 06/16/2020    Motion sickness 10/02/2019    Altitude sickness prophylaxis 10/02/2019    Leg mass, right 07/12/2019    Vitamin D deficiency 06/10/2019    Leg cramping 06/10/2019    Baker cyst, right 04/16/2019    Erythematous rash 04/16/2019    Edema of left lower extremity 01/08/2019    Nocturia more than twice per night 11/05/2018    Pitting edema 08/07/2018    Idiopathic chronic gout, left ankle and foot, without tophus (tophi) 06/25/2018    Memory changes 05/24/2018    Chronic pain of left knee 09/25/2017    Diastasis of rectus abdominis 06/22/2017    BPH associated with nocturia 06/07/2017    Controlled type 2 diabetes mellitus with diabetic polyneuropathy, without long-term current use of insulin (HCC) 11/09/2016    (HCC) Hypertension associated with diabetes 11/09/2016    Hypothyroidism (acquired) 11/09/2016    (HCC) Hyperlipidemia due to type 2 diabetes mellitus 11/09/2016    Primary insomnia 11/09/2016       Current Outpatient Medications   Medication Sig Dispense Refill    amLODIPine (NORVASC) 10 MG Tab Take 1 Tablet by mouth every day. 90 Tablet 3    Cyanocobalamin (VITAMIN B-12) 2500 MCG SL Tab Place 1 Tablet under the tongue every day. 100 Tablet 3    gabapentin (NEURONTIN) 100 MG Cap Take 1-3 Capsules by mouth every evening. 270 Capsule 1    levothyroxine (SYNTHROID) 88 MCG Tab Take 1 Tablet by mouth every morning on an empty stomach. 90 Tablet 3    glipiZIDE (GLUCOTROL) 10 MG Tab Take 1 Tablet by mouth every day. 90 Tablet 3    DULoxetine (CYMBALTA) 60 MG Cap DR Particles delayed-release capsule Take 1 Capsule  by mouth every day. 90 Capsule 3    Dulaglutide (TRULICITY) 1.5 MG/0.5ML Solution Pen-injector Inject 0.5 mL under the skin every 7 days. 6 mL 3    tamsulosin (FLOMAX) 0.4 MG capsule Take 2 Capsules by mouth every day. 90 Capsule 3    metformin (GLUCOPHAGE) 1000 MG tablet Take 1 Tablet by mouth 2 times a day with meals. 180 Tablet 3    losartan (COZAAR) 100 MG Tab Take 1 Tablet by mouth every day. 90 Tablet 3    atorvastatin (LIPITOR) 10 MG Tab Take 1 Tablet by mouth every day. 90 Tablet 3    vitamin D (CHOLECALCIFEROL) 1000 UNIT Tab Take 1 Tab by mouth every day. 90 Tab 3     No current facility-administered medications for this visit.          Current supplements as per medication list.     Allergies: Kdc:red dye+yellow dye+ci pigment blue 63+oxymorphone and Latex    Current social contact/activities: friends,      He  reports that he quit smoking about 52 years ago. His smoking use included cigarettes. He started smoking about 57 years ago. He has a 5 pack-year smoking history. He has never used smokeless tobacco. He reports current alcohol use of about 4.2 oz of alcohol per week. He reports that he does not use drugs.  Counseling given: Not Answered      ROS:    Gait: Uses no assistive device  Ostomy: No  Other tubes: No  Amputations: No  Chronic oxygen use: No  Last eye exam: 08/2024  Wears hearing aids: No   : Denies any urinary leakage during the last 6 months    Screening:    Depression Screening  Little interest or pleasure in doing things?  0 - not at all  Feeling down, depressed , or hopeless? 0 - not at all  Patient Health Questionnaire Score: 0     If depressive symptoms identified deferred to follow up visit unless specifically addressed in assessment and plan.    Interpretation of PHQ-9 Total Score   Score Severity   1-4 No Depression   5-9 Mild Depression   10-14 Moderate Depression   15-19 Moderately Severe Depression   20-27 Severe Depression    Screening for Cognitive Impairment  Do you or any  of your friends or family members have any concern about your memory?  No   Three Minute Recall (Leader, Season, Table) 3/3    Rodrick clock face with all 12 numbers and set the hands to show 10 minutes after 11.  Yes    Cognitive concerns identified deferred for follow up unless specifically addressed in assessment and plan.    Fall Risk Assessment  Has the patient had two or more falls in the last year or any fall with injury in the last year?  Yes    Safety Assessment  Do you always wear your seatbelt?  Yes  Any changes to home needed to function safely? No  Difficulty hearing.  No  Patient counseled about all safety risks that were identified.    Functional Assessment ADLs  Are there any barriers preventing you from cooking for yourself or meeting nutritional needs?  No.    Are there any barriers preventing you from driving safely or obtaining transportation?  No.    Are there any barriers preventing you from using a telephone or calling for help?  No    Are there any barriers preventing you from shopping?  No.    Are there any barriers preventing you from taking care of your own finances?  No    Are there any barriers preventing you from managing your medications?  No    Are there any barriers preventing you from showering, bathing or dressing yourself? No    Are there any barriers preventing you from doing housework or laundry? No  Are there any barriers preventing you from using the toilet?No  Are you currently engaging in any exercise or physical activity?  No.      Self-Assessment of Health  What is your perception of your health? Excellent    Do you sleep more than six hours a night? Yes    In the past 7 days, how much did pain keep you from doing your normal work? None    Do you spend quality time with family or friends (virtually or in person)? Yes    Do you usually eat a heart healthy diet that constists of a variety of fruits, vegetables, whole grains and fiber? Yes    Do you eat foods high in fat and/or  Fast Food more than three times per week? No    How concerned are you that your medical conditions are not being well managed? Not at all    Are you worried that in the next 2 months, you may not have stable housing that you own, rent, or stay in as part of a household? No      Advance Care Planning  Do you have an Advance Directive, Living Will, Durable Power of , or POLST? Yes          is not on file - instructed patient to bring in a copy to scan into their chart      Health Maintenance Summary            Overdue - Diabetes: Retinopathy Screening (Yearly) Overdue since 7/26/2024 07/26/2023  AMB EXTERNAL RETINAL SCREENING RESULTS    08/25/2022  REFERRAL FOR RETINAL SCREENING EXAM    02/10/2021  Done    05/02/2018  REFERRAL FOR RETINAL SCREENING EXAM              Overdue - Influenza Vaccine (1) Overdue since 9/1/2024      10/17/2023  Imm Admin: Influenza Vaccine Adult HD    09/23/2022  Imm Admin: Influenza Vaccine Adult HD    09/16/2021  Imm Admin: Influenza Vaccine Adult HD    08/25/2020  Imm Admin: Influenza Vaccine Adult HD    09/19/2019  Imm Admin: Influenza Vaccine Adult HD    Only the first 5 history entries have been loaded, but more history exists.              Overdue - COVID-19 Vaccine (7 - 2023-24 season) Overdue since 9/1/2024      10/24/2023  Imm Admin: Covid-19 Mrna (Spikevax) Moderna 12+ Years    09/23/2022  Imm Admin: PFIZER BIVALENT SARS-COV-2 VACCINE (12+)    04/11/2022  Imm Admin: COVID-19 Vaccine, unspecified - HISTORICAL DATA    10/01/2021  Imm Admin: PFIZER PURPLE CAP SARS-COV-2 VACCINATION (12+)    02/05/2021  Imm Admin: PFIZER PURPLE CAP SARS-COV-2 VACCINATION (12+)    Only the first 5 history entries have been loaded, but more history exists.              A1c Screening (Every 6 Months) Next due on 11/8/2024 05/08/2024  POCT Hemoglobin A1C    11/07/2023  POCT  A1C    05/17/2023  HEMOGLOBIN A1C    12/12/2022  HEMOGLOBIN A1C    08/23/2022  POCT Hemoglobin A1C    Only the  first 5 history entries have been loaded, but more history exists.              Diabetes: Urine Protein Screening (Yearly) Next due on 11/28/2024 11/28/2023  MICROALBUMIN CREAT RATIO URINE    04/13/2023  MICROALBUMIN CREAT RATIO URINE    12/12/2022  MICROALBUMIN CREAT RATIO URINE    02/02/2022  MICROALBUMIN CREAT RATIO URINE    06/10/2020  MICROALBUMIN CREAT RATIO URINE    Only the first 5 history entries have been loaded, but more history exists.              Ordered - SERUM CREATININE (Yearly) Ordered on 7/15/2024      12/11/2023  Comp Metabolic Panel    12/09/2023  Basic Metabolic Panel    12/08/2023  Comp Metabolic Panel    12/07/2023  Comp Metabolic Panel    12/06/2023  Comp Metabolic Panel (CMP)    Only the first 5 history entries have been loaded, but more history exists.              Ordered - Fasting Lipid Profile (Yearly) Ordered on 7/15/2024      02/10/2024  Lipid Profile    11/28/2023  Lipid Profile    04/13/2023  Lipid Profile    12/12/2022  Lipid Profile    02/02/2022  Lipid Profile    Only the first 5 history entries have been loaded, but more history exists.              Diabetes: Monofilament / LE Exam (Yearly) Next due on 5/8/2025 05/08/2024  SmartData: WORKFLOW - DIABETES - DIABETIC FOOT EXAM PERFORMED    11/07/2023  Diabetic Monofilament LE Exam    11/07/2023  SmartData: WORKFLOW - DIABETES - DIABETIC FOOT EXAM PERFORMED    07/06/2023  SmartData: WORKFLOW - DIABETES - DIABETIC FOOT EXAM PERFORMED    03/02/2023  SmartData: WORKFLOW - DIABETES - DIABETIC FOOT EXAM PERFORMED    Only the first 5 history entries have been loaded, but more history exists.              Annual Wellness Visit (Yearly) Next due on 9/5/2025 09/05/2024  Visit Dx: Medicare annual wellness visit, subsequent    05/12/2021  Visit Dx: Medicare annual wellness visit, subsequent    05/21/2019  Visit Dx: Medicare annual wellness visit, subsequent    04/16/2019  Visit Dx: Medicare annual wellness visit, subsequent     06/21/2018  Visit Dx: Medicare annual wellness visit, subsequent    Only the first 5 history entries have been loaded, but more history exists.              IMM DTaP/Tdap/Td Vaccine (3 - Td or Tdap) Next due on 12/15/2033      12/15/2023  Imm Admin: Tdap Vaccine    08/08/2017  Imm Admin: Tdap Vaccine              Hepatitis A Vaccine (Hep A) (Series Information) Aged Out      08/08/2017  Imm Admin: Hepatitis A Vaccine, Ped/Adol    08/08/2017  Imm Admin: Hep A/HEP B Combined Vaccine (TwinRix)    02/09/2017  Imm Admin: Hep A/HEP B Combined Vaccine (TwinRix)    02/09/2017  Imm Admin: Hepatitis A Vaccine, Ped/Adol              Zoster (Shingles) Vaccines (Series Information) Completed      11/04/2018  Imm Admin: Zoster Vaccine Recombinant (RZV) (SHINGRIX)    07/13/2018  Imm Admin: Zoster Vaccine Recombinant (RZV) (SHINGRIX)    08/08/2017  Imm Admin: Zoster Vaccine Live (ZVL) (Zostavax) - HISTORICAL DATA              Pneumococcal Vaccine: 65+ Years (Series Information) Completed      01/01/2021  Imm Admin: Pneumococcal Conjugate Vaccine (Prevnar/PCV-13)    11/09/2016  Imm Admin: Pneumococcal polysaccharide vaccine (PPSV-23)    10/08/2015  Imm Admin: Pneumococcal Conjugate Vaccine (Prevnar/PCV-13)              HPV Vaccines (Series Information) Aged Out      No completion history exists for this topic.              Polio Vaccine (Inactivated Polio) (Series Information) Aged Out      No completion history exists for this topic.              Meningococcal Immunization (Series Information) Aged Out      No completion history exists for this topic.              Discontinued - Colorectal Cancer Screening  Discontinued        Frequency changed to Never automatically (Topic No Longer Applies)    07/29/2021  REFERRAL TO GI FOR COLONOSCOPY    07/28/2021  REFERRAL TO GI FOR COLONOSCOPY    07/28/2021  REFERRAL TO GI FOR COLONOSCOPY    07/28/2021  REFERRAL TO GI FOR COLONOSCOPY    Only the first 5 history entries have been loaded, but  "more history exists.                  Patient Care Team:  Cathryn Yadav M.D. as PCP - General (Internal Medicine)    Social History     Tobacco Use    Smoking status: Former     Current packs/day: 0.00     Average packs/day: 1 pack/day for 5.0 years (5.0 ttl pk-yrs)     Types: Cigarettes     Start date: 1966     Quit date: 1971     Years since quittin.8    Smokeless tobacco: Never   Vaping Use    Vaping status: Never Used   Substance Use Topics    Alcohol use: Yes     Alcohol/week: 4.2 oz     Types: 7 Glasses of wine per week    Drug use: No     Family History   Problem Relation Age of Onset    Diabetes Mother     Stroke Father         45    Diabetes Sister     Diabetes Maternal Uncle      He  has a past medical history of Bowel habit changes (2023), Cancer (HCC), Cataract, Dental disorder, Elevated serum creatinine (2022), Hyperlipidemia, Hypertension, Hypothyroid, Pneumonia (), Renal disorder, Snoring, and Type II or unspecified type diabetes mellitus without mention of complication, not stated as uncontrolled.   Past Surgical History:   Procedure Laterality Date    PB TOTAL KNEE ARTHROPLASTY Left 2023    Procedure: LEFT TOTAL KNEE ARTHROPLASTY;  Surgeon: Wilbur Thomas M.D.;  Location: [facility] [place];  Service: Orthopedics    TURBT (TRANSURETHRAL RESECTION OF BLADDER TUMOR)  2023    Procedure: BIPOLAR TRANSURETHRAL RESECTION OF BLADDER TUMOR WITH INSTILLATION GEMCITABINE;  Surgeon: Bradly Velasco M.D.;  Location: SUR[facility]ERY [facility];  Service: Urology    CO LAP,DIAGNOSTIC ABDOMEN  2023    Procedure: LAPAROSCOPY diagnostic;  Surgeon: Galo Ashton M.D.;  Location: SURGERY [facility];  Service: General    HAND SURGERY         Exam:   BP (!) 146/68 (BP Location: Left arm)   Pulse 83   Ht 1.854 m (6' 1\")   Wt 87.1 kg (192 lb)   SpO2 98%  Body mass index is 25.33 kg/m².    Physical Exam  Constitutional:       General: He is not in acute " distress.     Appearance: Normal appearance. He is normal weight. He is not toxic-appearing.   HENT:      Head: Normocephalic and atraumatic.   Cardiovascular:      Rate and Rhythm: Normal rate and regular rhythm.      Pulses: Normal pulses.      Heart sounds: Normal heart sounds. No murmur heard.  Pulmonary:      Effort: Pulmonary effort is normal. No respiratory distress.      Breath sounds: Normal breath sounds.   Neurological:      General: No focal deficit present.      Mental Status: He is alert and oriented to person, place, and time.      Gait: Gait normal.   Psychiatric:         Mood and Affect: Mood normal.         Thought Content: Thought content normal.       Hearing good.    Dentition good  Alert, oriented in no acute distress.  Eye contact is good, speech goal directed, affect calm    Assessment and Plan. The following treatment and monitoring plan is recommended:    Assessment & Plan  1. Medicare annual wellness visit.  He is up to date with screenings and vaccinations except for the COVID-19 and influenza shots. He will receive these at the pharmacy. His last colonoscopy was last year, and he had a normal PSA in February 2024. Records were requested from [facility].    2. Hypertension.  His blood pressure was elevated during today's visit, likely due to stress from his wife's illness. He was provided with a blood pressure log and instructed to record his readings daily. He will continue taking amlodipine 10 mg daily and losartan 100 mg daily. He will return for a follow-up appointment in a few weeks.    3. Diabetes Mellitus.  He continues to take Trulicity 1.5 mg weekly, glipizide 10 mg daily, and metformin 1000 mg twice daily. An A1c test is pending.    4. Imbalance.  This issue began after knee surgery. He is not interested in a referral to physical therapy as he does not believe it significantly affects his daily activities.    Follow-up  He will follow up in a few weeks.      Services  suggested: No services needed at this time  Health Care Screening: Age-appropriate preventive services recommended by USPTF and ACIP covered by Medicare were discussed today. Services ordered if indicated and agreed upon by the patient.  Referrals offered: Community-based lifestyle interventions to reduce health risks and promote self-management and wellness, fall prevention, nutrition, physical activity, tobacco-use cessation, weight loss, and mental health services as per orders if indicated.    Discussion today about general wellness and lifestyle habits:    Prevent falls and reduce trip hazards; Cautioned about securing or removing rugs.  Have a working fire alarm and carbon monoxide detector;   Engage in regular physical activity and social activities     Follow-up: Return in about 4 weeks (around 10/3/2024), or if symptoms worsen or fail to improve.    Please note that this dictation was created using voice recognition software. I have made every reasonable attempt to correct obvious errors, but I expect that there are errors of grammar and possibly content that I did not discover before finalizing the note.

## 2024-09-05 NOTE — LETTER
[facility]  Cathryn Yadav M.D.  [address]  [place] [address]  Fax: [phone]   Authorization for Release/Disclosure of   Protected Health Information   Name: SALEEM ARREAGA : 1945 SSN: [id number]   Address: [address] [place] [address]   [place] [address] Phone:    [phone] (home)    I authorize the entity listed below to release/disclose the PHI below to:   [facility]/Cathryn Yadav M.D. and Cathryn Yadav M.D.   Provider or Entity Name:  [facility]    Address   City, State, [facility]   Phone:      Fax:     Reason for request: continuity of care   Information to be released:    [  ] LAST COLONOSCOPY,  including any PATH REPORT and follow-up  [  ] LAST FIT/COLOGUARD RESULT [  ] LAST DEXA  [  ] LAST MAMMOGRAM  [  ] LAST PAP  [  ] LAST LABS [ x ] RETINA EXAM REPORT  [  ] IMMUNIZATION RECORDS  [  ] Release all info      [  ] Check here and initial the line next to each item to release ALL health information INCLUDING  _____ Care and treatment for drug and / or alcohol abuse  _____ HIV testing, infection status, or AIDS  _____ Genetic Testing    DATES OF SERVICE OR TIME PERIOD TO BE DISCLOSED: _____________  I understand and acknowledge that:  * This Authorization may be revoked at any time by you in writing, except if your health information has already been used or disclosed.  * Your health information that will be used or disclosed as a result of you signing this authorization could be re-disclosed by the recipient. If this occurs, your re-disclosed health information may no longer be protected by State or Federal laws.  * You may refuse to sign this Authorization. Your refusal will not affect your ability to obtain treatment.  * This Authorization becomes effective upon signing and will  on (date) __________.      If no date is indicated, this Authorization will  one (1) year from the signature date.    Name: Saleem Bolaños  Subjective   Patient ID: Kalyn is a 67 year old female.    Chief Complaint   Patient presents with   • UTI     Abdominal cramping and burning with urination. X 2 days     HPI  This is a 67-year-old lady, complains about dysuria, frequency, urgency, and abdominal cramps for 2 days.  She denied any fever, no new onset of back pain, no nausea, no vomiting.  She used Monistat last night without any help.    History reviewed. No pertinent past medical history.    MEDICATIONS:  Current Outpatient Medications   Medication Sig   • nitrofurantoin, macrocrystal-monohydrate, (MACROBID) 100 MG capsule Take 1 capsule by mouth in the morning and 1 capsule in the evening.   • rosuvastatin (CRESTOR) 20 MG tablet Take 20 mg by mouth daily.   • estradiol (VAGIFEM) 10 MCG vaginal tablet PLACE ONE TABLET IN THE VAGINA TWICE A WEEK   • omeprazole (PriLOSEC) 40 MG capsule Take 40 mg by mouth daily.   • gabapentin (NEURONTIN) 100 MG capsule Take 100 mg by mouth.     No current facility-administered medications for this visit.       ALLERGIES:  ALLERGIES:   Allergen Reactions   • Erythromycin NAUSEA and Other (See Comments)     Severe stomach pains       PAST SURGICAL HISTORY:  History reviewed. No pertinent surgical history.    FAMILY HISTORY:  History reviewed. No pertinent family history.    SOCIAL HISTORY:  Social History     Tobacco Use   • Smoking status: Never Smoker   • Smokeless tobacco: Never Used         Patient's medications, allergies, past medical, surgical, social and family histories were reviewed and updated as appropriate.  Patient's medications, allergies, past medical, surgical, and social history  were reviewed and updated as appropriate.    Review of Systems   Constitutional: Negative for chills, diaphoresis, fatigue and fever.   Respiratory: Negative for cough, chest tightness, shortness of breath and wheezing.    Cardiovascular: Negative for chest pain, palpitations and leg swelling.   Gastrointestinal: Positive for  Chelo  Signature: Date:   9/5/2024     PLEASE FAX REQUESTED RECORDS BACK TO: [phone]   abdominal pain. Negative for abdominal distention, constipation, diarrhea, nausea and vomiting.        Lower abdominal cramps   Genitourinary: Positive for dysuria, frequency and urgency. Negative for decreased urine volume, difficulty urinating, flank pain, genital sores and hematuria.   Musculoskeletal: Negative for back pain.   Neurological: Negative for dizziness and weakness.       Objective   Physical Exam  Constitutional:       Appearance: Normal appearance.   Cardiovascular:      Rate and Rhythm: Normal rate and regular rhythm.      Heart sounds: Normal heart sounds.   Pulmonary:      Effort: Pulmonary effort is normal.      Breath sounds: Normal breath sounds.   Abdominal:      General: There is no distension.      Tenderness: There is no abdominal tenderness. There is no right CVA tenderness, left CVA tenderness, guarding or rebound.   Neurological:      Mental Status: She is alert.       Visit Vitals  /83 (BP Location: LUE - Left upper extremity, Patient Position: Sitting, Cuff Size: Regular)   Pulse 62   Temp 96.9 °F (36.1 °C) (Temporal)   Resp 18   Wt 78.9 kg (174 lb)   SpO2 99%   BMI 27.25 kg/m²       Assessment   Problem List Items Addressed This Visit    None     Visit Diagnoses     Urinary tract infection without hematuria, site unspecified    -  Primary    Relevant Medications    nitrofurantoin, macrocrystal-monohydrate, (MACROBID) 100 MG capsule    Other Relevant Orders    POCT URINE DIP AUTO (Completed)    URINE, BACTERIAL CULTURE    Acute cystitis with hematuria        Relevant Medications    nitrofurantoin, macrocrystal-monohydrate, (MACROBID) 100 MG capsule          Discussed with patient's lab findings, recommend to start patient on antibiotics, also recommend patient to drink plenty of water, take vitamin C 500 mg 3-4 times a day, patient can also start to take over-the-counter probiotics to balance the GI microbe during antibiotic treatment.  Patient is instructed to follow-up with  primary doctor if there is no improvement, or go to the ER if there is any worsening of symptoms.  Patient verbalized understanding the instructions, answered patient's all questions to their satisfaction.  Also recommend the patient to visit her OB/GYN knee for gynecological exam.    Instructions provided as documented in the AVS.    Thank you for visiting [facility].

## 2024-09-26 ENCOUNTER — APPOINTMENT (OUTPATIENT)
Dept: RADIOLOGY | Facility: MEDICAL CENTER | Age: 79
End: 2024-09-26
Attending: INTERNAL MEDICINE
Payer: MEDICARE

## 2024-09-26 DIAGNOSIS — R31.9 HEMATURIA, UNSPECIFIED TYPE: ICD-10-CM

## 2024-09-26 DIAGNOSIS — R35.0 URINARY FREQUENCY: ICD-10-CM

## 2024-09-26 PROBLEM — N13.30 HYDRONEPHROSIS: Status: ACTIVE | Noted: 2024-09-26

## 2024-09-26 PROCEDURE — 76775 US EXAM ABDO BACK WALL LIM: CPT

## 2024-10-03 ENCOUNTER — OFFICE VISIT (OUTPATIENT)
Dept: MEDICAL GROUP | Facility: MEDICAL CENTER | Age: 79
End: 2024-10-03
Payer: MEDICARE

## 2024-10-03 VITALS
BODY MASS INDEX: 26.4 KG/M2 | DIASTOLIC BLOOD PRESSURE: 60 MMHG | HEART RATE: 68 BPM | SYSTOLIC BLOOD PRESSURE: 104 MMHG | HEIGHT: 73 IN | WEIGHT: 199.2 LBS | OXYGEN SATURATION: 98 %

## 2024-10-03 DIAGNOSIS — K59.09 OTHER CONSTIPATION: ICD-10-CM

## 2024-10-03 DIAGNOSIS — E11.59 HYPERTENSION ASSOCIATED WITH DIABETES (HCC): ICD-10-CM

## 2024-10-03 DIAGNOSIS — Z23 NEED FOR VACCINATION: ICD-10-CM

## 2024-10-03 DIAGNOSIS — I15.2 HYPERTENSION ASSOCIATED WITH DIABETES (HCC): ICD-10-CM

## 2024-10-03 DIAGNOSIS — E11.9 TYPE 2 DIABETES MELLITUS WITHOUT COMPLICATION, WITHOUT LONG-TERM CURRENT USE OF INSULIN (HCC): ICD-10-CM

## 2024-10-03 PROBLEM — K59.00 CONSTIPATION: Status: ACTIVE | Noted: 2024-10-03

## 2024-10-03 PROCEDURE — G0008 ADMIN INFLUENZA VIRUS VAC: HCPCS | Performed by: INTERNAL MEDICINE

## 2024-10-03 PROCEDURE — 3074F SYST BP LT 130 MM HG: CPT | Performed by: INTERNAL MEDICINE

## 2024-10-03 PROCEDURE — 99214 OFFICE O/P EST MOD 30 MIN: CPT | Mod: 25 | Performed by: INTERNAL MEDICINE

## 2024-10-03 PROCEDURE — 3078F DIAST BP <80 MM HG: CPT | Performed by: INTERNAL MEDICINE

## 2024-10-03 PROCEDURE — 90662 IIV NO PRSV INCREASED AG IM: CPT | Performed by: INTERNAL MEDICINE

## 2024-10-03 RX ORDER — LOSARTAN POTASSIUM 100 MG/1
100 TABLET ORAL
Qty: 90 TABLET | Refills: 3 | Status: SHIPPED | OUTPATIENT
Start: 2024-10-03

## 2024-10-03 ASSESSMENT — ENCOUNTER SYMPTOMS
DIARRHEA: 0
CONSTIPATION: 1

## 2024-10-03 ASSESSMENT — FIBROSIS 4 INDEX: FIB4 SCORE: 1.32

## 2024-10-14 DIAGNOSIS — N40.1 BPH ASSOCIATED WITH NOCTURIA: ICD-10-CM

## 2024-10-14 DIAGNOSIS — R35.1 BPH ASSOCIATED WITH NOCTURIA: ICD-10-CM

## 2024-10-16 RX ORDER — TAMSULOSIN HYDROCHLORIDE 0.4 MG/1
0.8 CAPSULE ORAL
Qty: 180 CAPSULE | Refills: 3 | Status: SHIPPED | OUTPATIENT
Start: 2024-10-16

## 2024-10-30 ENCOUNTER — HOSPITAL ENCOUNTER (OUTPATIENT)
Dept: LAB | Facility: MEDICAL CENTER | Age: 79
End: 2024-10-30
Attending: INTERNAL MEDICINE
Payer: MEDICARE

## 2024-10-30 DIAGNOSIS — E03.9 HYPOTHYROIDISM (ACQUIRED): Chronic | ICD-10-CM

## 2024-10-30 DIAGNOSIS — E11.9 TYPE 2 DIABETES MELLITUS WITHOUT COMPLICATION, WITHOUT LONG-TERM CURRENT USE OF INSULIN (HCC): ICD-10-CM

## 2024-10-30 DIAGNOSIS — E11.59 HYPERTENSION ASSOCIATED WITH DIABETES (HCC): ICD-10-CM

## 2024-10-30 DIAGNOSIS — E53.8 VITAMIN B12 DEFICIENCY: ICD-10-CM

## 2024-10-30 DIAGNOSIS — E55.9 VITAMIN D DEFICIENCY: ICD-10-CM

## 2024-10-30 DIAGNOSIS — I15.2 HYPERTENSION ASSOCIATED WITH DIABETES (HCC): ICD-10-CM

## 2024-10-30 LAB
25(OH)D3 SERPL-MCNC: 27 NG/ML (ref 30–100)
ALBUMIN SERPL BCP-MCNC: 3.9 G/DL (ref 3.2–4.9)
ALBUMIN/GLOB SERPL: 1.3 G/DL
ALP SERPL-CCNC: 79 U/L (ref 30–99)
ALT SERPL-CCNC: 11 U/L (ref 2–50)
ANION GAP SERPL CALC-SCNC: 10 MMOL/L (ref 7–16)
AST SERPL-CCNC: 13 U/L (ref 12–45)
BASOPHILS # BLD AUTO: 0.1 % (ref 0–1.8)
BASOPHILS # BLD: 0.01 K/UL (ref 0–0.12)
BILIRUB SERPL-MCNC: 0.4 MG/DL (ref 0.1–1.5)
BUN SERPL-MCNC: 20 MG/DL (ref 8–22)
CALCIUM ALBUM COR SERPL-MCNC: 9.2 MG/DL (ref 8.5–10.5)
CALCIUM SERPL-MCNC: 9.1 MG/DL (ref 8.4–10.2)
CHLORIDE SERPL-SCNC: 103 MMOL/L (ref 96–112)
CHOLEST SERPL-MCNC: 108 MG/DL (ref 100–199)
CO2 SERPL-SCNC: 26 MMOL/L (ref 20–33)
CREAT SERPL-MCNC: 1.14 MG/DL (ref 0.5–1.4)
EOSINOPHIL # BLD AUTO: 0.41 K/UL (ref 0–0.51)
EOSINOPHIL NFR BLD: 5.4 % (ref 0–6.9)
ERYTHROCYTE [DISTWIDTH] IN BLOOD BY AUTOMATED COUNT: 42.2 FL (ref 35.9–50)
EST. AVERAGE GLUCOSE BLD GHB EST-MCNC: 148 MG/DL
FASTING STATUS PATIENT QL REPORTED: NORMAL
GFR SERPLBLD CREATININE-BSD FMLA CKD-EPI: 65 ML/MIN/1.73 M 2
GLOBULIN SER CALC-MCNC: 3.1 G/DL (ref 1.9–3.5)
GLUCOSE SERPL-MCNC: 158 MG/DL (ref 65–99)
HBA1C MFR BLD: 6.8 % (ref 4–5.6)
HCT VFR BLD AUTO: 41.9 % (ref 42–52)
HDLC SERPL-MCNC: 46 MG/DL
HGB BLD-MCNC: 13.7 G/DL (ref 14–18)
IMM GRANULOCYTES # BLD AUTO: 0.03 K/UL (ref 0–0.11)
IMM GRANULOCYTES NFR BLD AUTO: 0.4 % (ref 0–0.9)
LDLC SERPL CALC-MCNC: 45 MG/DL
LYMPHOCYTES # BLD AUTO: 1.78 K/UL (ref 1–4.8)
LYMPHOCYTES NFR BLD: 23.2 % (ref 22–41)
MCH RBC QN AUTO: 29.1 PG (ref 27–33)
MCHC RBC AUTO-ENTMCNC: 32.7 G/DL (ref 32.3–36.5)
MCV RBC AUTO: 89.1 FL (ref 81.4–97.8)
MONOCYTES # BLD AUTO: 0.55 K/UL (ref 0–0.85)
MONOCYTES NFR BLD AUTO: 7.2 % (ref 0–13.4)
NEUTROPHILS # BLD AUTO: 4.88 K/UL (ref 1.82–7.42)
NEUTROPHILS NFR BLD: 63.7 % (ref 44–72)
NRBC # BLD AUTO: 0 K/UL
NRBC BLD-RTO: 0 /100 WBC (ref 0–0.2)
PLATELET # BLD AUTO: 242 K/UL (ref 164–446)
PMV BLD AUTO: 9.2 FL (ref 9–12.9)
POTASSIUM SERPL-SCNC: 4.3 MMOL/L (ref 3.6–5.5)
PROT SERPL-MCNC: 7 G/DL (ref 6–8.2)
RBC # BLD AUTO: 4.7 M/UL (ref 4.7–6.1)
SODIUM SERPL-SCNC: 139 MMOL/L (ref 135–145)
TRIGL SERPL-MCNC: 84 MG/DL (ref 0–149)
TSH SERPL DL<=0.005 MIU/L-ACNC: 4.6 UIU/ML (ref 0.38–5.33)
VIT B12 SERPL-MCNC: 1495 PG/ML (ref 211–911)
WBC # BLD AUTO: 7.7 K/UL (ref 4.8–10.8)

## 2024-10-30 PROCEDURE — 82306 VITAMIN D 25 HYDROXY: CPT

## 2024-10-30 PROCEDURE — 80053 COMPREHEN METABOLIC PANEL: CPT

## 2024-10-30 PROCEDURE — 36415 COLL VENOUS BLD VENIPUNCTURE: CPT

## 2024-10-30 PROCEDURE — 85025 COMPLETE CBC W/AUTO DIFF WBC: CPT

## 2024-10-30 PROCEDURE — 80061 LIPID PANEL: CPT

## 2024-10-30 PROCEDURE — 83036 HEMOGLOBIN GLYCOSYLATED A1C: CPT

## 2024-10-30 PROCEDURE — 84443 ASSAY THYROID STIM HORMONE: CPT

## 2024-10-30 PROCEDURE — 82607 VITAMIN B-12: CPT

## 2024-12-06 DIAGNOSIS — E78.5 HYPERLIPIDEMIA DUE TO TYPE 2 DIABETES MELLITUS (HCC): ICD-10-CM

## 2024-12-06 DIAGNOSIS — E11.69 HYPERLIPIDEMIA DUE TO TYPE 2 DIABETES MELLITUS (HCC): ICD-10-CM

## 2024-12-09 RX ORDER — ATORVASTATIN CALCIUM 10 MG/1
10 TABLET, FILM COATED ORAL
Qty: 90 TABLET | Refills: 3 | Status: SHIPPED | OUTPATIENT
Start: 2024-12-09

## 2025-01-09 ENCOUNTER — HOSPITAL ENCOUNTER (OUTPATIENT)
Facility: MEDICAL CENTER | Age: 80
End: 2025-01-09
Attending: INTERNAL MEDICINE
Payer: MEDICARE

## 2025-01-09 ENCOUNTER — OFFICE VISIT (OUTPATIENT)
Dept: MEDICAL GROUP | Age: 80
End: 2025-01-09
Payer: MEDICARE

## 2025-01-09 VITALS
BODY MASS INDEX: 27.04 KG/M2 | WEIGHT: 204 LBS | DIASTOLIC BLOOD PRESSURE: 68 MMHG | HEIGHT: 73 IN | HEART RATE: 70 BPM | SYSTOLIC BLOOD PRESSURE: 134 MMHG | TEMPERATURE: 98.2 F | OXYGEN SATURATION: 97 % | RESPIRATION RATE: 16 BRPM

## 2025-01-09 DIAGNOSIS — R49.9 CHANGE IN VOICE: ICD-10-CM

## 2025-01-09 DIAGNOSIS — E11.9 TYPE 2 DIABETES MELLITUS WITHOUT COMPLICATION, WITHOUT LONG-TERM CURRENT USE OF INSULIN (HCC): ICD-10-CM

## 2025-01-09 DIAGNOSIS — E78.5 DYSLIPIDEMIA: ICD-10-CM

## 2025-01-09 DIAGNOSIS — E11.42 CONTROLLED TYPE 2 DIABETES MELLITUS WITH DIABETIC POLYNEUROPATHY, WITHOUT LONG-TERM CURRENT USE OF INSULIN (HCC): ICD-10-CM

## 2025-01-09 DIAGNOSIS — Z12.5 PROSTATE CANCER SCREENING: ICD-10-CM

## 2025-01-09 LAB
CREAT UR-MCNC: 178.3 MG/DL
MICROALBUMIN UR-MCNC: 3.9 MG/DL
MICROALBUMIN/CREAT UR: 22 MG/G (ref 0–30)

## 2025-01-09 PROCEDURE — 3078F DIAST BP <80 MM HG: CPT | Performed by: INTERNAL MEDICINE

## 2025-01-09 PROCEDURE — 82570 ASSAY OF URINE CREATININE: CPT

## 2025-01-09 PROCEDURE — 99214 OFFICE O/P EST MOD 30 MIN: CPT | Performed by: INTERNAL MEDICINE

## 2025-01-09 PROCEDURE — 3075F SYST BP GE 130 - 139MM HG: CPT | Performed by: INTERNAL MEDICINE

## 2025-01-09 PROCEDURE — 82043 UR ALBUMIN QUANTITATIVE: CPT

## 2025-01-09 RX ORDER — TIRZEPATIDE 5 MG/.5ML
5 INJECTION, SOLUTION SUBCUTANEOUS
Qty: 2 ML | Refills: 0 | Status: SHIPPED | OUTPATIENT
Start: 2025-01-09 | End: 2025-01-09

## 2025-01-09 RX ORDER — TIRZEPATIDE 2.5 MG/.5ML
2.5 INJECTION, SOLUTION SUBCUTANEOUS
Qty: 2 ML | Refills: 0 | Status: SHIPPED | OUTPATIENT
Start: 2025-01-09

## 2025-01-09 RX ORDER — TIRZEPATIDE 5 MG/.5ML
5 INJECTION, SOLUTION SUBCUTANEOUS
Qty: 2 ML | Refills: 0 | Status: SHIPPED | OUTPATIENT
Start: 2025-01-09 | End: 2025-01-22 | Stop reason: SDUPTHER

## 2025-01-09 ASSESSMENT — ENCOUNTER SYMPTOMS
CHILLS: 0
SHORTNESS OF BREATH: 0
DEPRESSION: 0
SORE THROAT: 0
FEVER: 0

## 2025-01-09 ASSESSMENT — PATIENT HEALTH QUESTIONNAIRE - PHQ9: CLINICAL INTERPRETATION OF PHQ2 SCORE: 0

## 2025-01-09 ASSESSMENT — FIBROSIS 4 INDEX: FIB4 SCORE: 1.31

## 2025-01-09 NOTE — PROGRESS NOTES
CC:   Chief Complaint   Patient presents with    Follow-Up     Diagnoses of Type 2 diabetes mellitus without complication, without long-term current use of insulin (HCC), Controlled type 2 diabetes mellitus with diabetic polyneuropathy, without long-term current use of insulin (HCC), Dyslipidemia, Change in voice, and Prostate cancer screening were pertinent to this visit.  Verbal consent was acquired by the patient to use [employer] ambient listening note generation during this visit Yes     History of Present Illness  Saleem is a pleasant 79 y.o. male who presents today to discuss the following problems:     Short visit, patient is late.    He has been managing his diabetes with metformin, glipizide, and Trulicity. He reports that his insurance will no longer cover Trulicity, necessitating a change in his medication regimen. He experiences diarrhea within 2 days of each Trulicity injection, which persists for a day. His last A1c was under 7. He had an eye exam in August 2024.    He reports a sensation of a foreign body in his throat, which he attributes to aging. He consumes approximately 2 to 3 glasses of water daily, in addition to coffee and soft drinks. He does not experience any difficulty swallowing or choking on food but notes a change in his voice quality.    He has been experiencing balance issues, leading to near falls. He expresses interest in physical therapy for gait training.    He reports a progressive worsening of his neuropathy symptoms despite being on medication.    FAMILY HISTORY  He does not have a family history of prostate cancer that he is aware of.    Past Medical History:   Diagnosis Date    Bowel habit changes 06/2023    bowel blockage    Cancer (HCC)     kidney    Cataract     Dental disorder     implants    Elevated serum creatinine 07/22/2022    Hyperlipidemia     Hypertension     Hypothyroid     Pneumonia 2020    Renal disorder     kidney ca    Snoring     no sleep study    Type II  or unspecified type diabetes mellitus without mention of complication, not stated as uncontrolled        Current Outpatient Medications Ordered in Epic   Medication Sig Dispense Refill    Tirzepatide (MOUNJARO) 2.5 MG/0.5ML Solution Auto-injector Inject 2.5 mg under the skin every 7 days. 2 mL 0    Tirzepatide (MOUNJARO) 5 MG/0.5ML Solution Auto-injector Inject 5 mg under the skin every 7 days. 2 mL 0    Tirzepatide (MOUNJARO) 7.5 MG/0.5ML Solution Auto-injector Inject 7.5 mg under the skin every 7 days. 6 mL 1    atorvastatin (LIPITOR) 10 MG Tab TAKE 1 TABLET EVERY DAY 90 Tablet 3    tamsulosin (FLOMAX) 0.4 MG capsule TAKE 2 CAPSULES EVERY  Capsule 3    losartan (COZAAR) 100 MG Tab Take 1 Tablet by mouth every day. 90 Tablet 3    metformin (GLUCOPHAGE) 1000 MG tablet Take 1 Tablet by mouth 2 times a day with meals. 180 Tablet 3    amLODIPine (NORVASC) 10 MG Tab Take 1 Tablet by mouth every day. 90 Tablet 3    Cyanocobalamin (VITAMIN B-12) 2500 MCG SL Tab Place 1 Tablet under the tongue every day. 100 Tablet 3    gabapentin (NEURONTIN) 100 MG Cap Take 1-3 Capsules by mouth every evening. 270 Capsule 1    levothyroxine (SYNTHROID) 88 MCG Tab Take 1 Tablet by mouth every morning on an empty stomach. 90 Tablet 3    glipiZIDE (GLUCOTROL) 10 MG Tab Take 1 Tablet by mouth every day. 90 Tablet 3    DULoxetine (CYMBALTA) 60 MG Cap DR Particles delayed-release capsule Take 1 Capsule by mouth every day. 90 Capsule 3    vitamin D (CHOLECALCIFEROL) 1000 UNIT Tab Take 1 Tab by mouth every day. 90 Tab 3     No current Epic-ordered facility-administered medications on file.     Review of Systems   Constitutional:  Negative for chills and fever.   HENT:  Negative for congestion and sore throat.    Respiratory:  Negative for shortness of breath.    Cardiovascular:  Negative for chest pain.   Psychiatric/Behavioral:  Negative for depression.      Objective:     Exam:  /68 (BP Location: Right arm, Patient Position:  "Sitting, BP Cuff Size: Adult)   Pulse 70   Temp 36.8 °C (98.2 °F) (Temporal)   Resp 16   Ht 1.854 m (6' 1\")   Wt 92.5 kg (204 lb)   SpO2 97%   BMI 26.91 kg/m²  Body mass index is 26.91 kg/m².    Physical Exam  Constitutional:       Appearance: Normal appearance.   HENT:      Head: Normocephalic and atraumatic.      Nose: Nose normal. No congestion.      Mouth/Throat:      Mouth: Mucous membranes are moist.      Pharynx: Oropharynx is clear. No oropharyngeal exudate or posterior oropharyngeal erythema.   Neck:      Vascular: No carotid bruit.   Cardiovascular:      Rate and Rhythm: Normal rate and regular rhythm.      Pulses: Normal pulses.      Heart sounds: Normal heart sounds. No murmur heard.  Musculoskeletal:      Cervical back: No rigidity or tenderness.   Lymphadenopathy:      Cervical: No cervical adenopathy.   Neurological:      Mental Status: He is alert and oriented to person, place, and time.   Psychiatric:         Mood and Affect: Mood normal.         Thought Content: Thought content normal.         Judgment: Judgment normal.       Results:  Results  Lab Results   Component Value Date/Time    HBA1C 6.8 (H) 10/30/2024 08:58 AM        Assessment & Plan:   Saleem  is a pleasant 79 y.o. male with the following -   Assessment & Plan  1. Type 2 diabetes mellitus.  His insurance no longer covers Trulicity. Trulicity will be replaced with Mounjaro, starting at 2.5 mg weekly, then increasing to 5 mg weekly after 4 weeks, and subsequently to 7.5 mg weekly. Prior authorization will be submitted. He is not due yet for A1c. It will be obtained prior to his next visit. A urine sample will be collected today. Blood tests for kidney function, A1c, and cholesterol have been ordered.    2. Neuropathic pain.  He reports worsening neuropathy despite medication. Unfortunately, there was insufficient time to address this issue during the current visit. He will schedule an appointment in a few weeks to discuss this " issue in detail.    3. Throat discomfort.  He reports dry mouth and a change in his voice over the last several months. No lumps were detected on physical examination. An ultrasound of the neck will be obtained due to the persistent voice change. He was advised to increase his water intake to 64 ounces daily.    4. Imbalance.  This is an ongoing problem. He is currently not interested in a referral to physical therapy. Oneal Chi was suggested as a potential aid for improving equilibrium.    Problem List Items Addressed This Visit       Change in voice    Relevant Orders    US-SOFT TISSUES OF HEAD - NECK    Controlled type 2 diabetes mellitus with diabetic polyneuropathy, without long-term current use of insulin ([facility])    Relevant Medications    Tirzepatide (MOUNJARO) 2.5 MG/0.5ML Solution Auto-injector    Tirzepatide (MOUNJARO) 5 MG/0.5ML Solution Auto-injector    Tirzepatide (MOUNJARO) 7.5 MG/0.5ML Solution Auto-injector    Other Relevant Orders    Microalbumin Creat Ratio Urine (Clinic Collect)    Comp Metabolic Panel    HEMOGLOBIN A1C    Dyslipidemia    Relevant Orders    Lipid Profile     Other Visit Diagnoses       Type 2 diabetes mellitus without complication, without long-term current use of insulin ([facility])        Relevant Medications    Tirzepatide (MOUNJARO) 2.5 MG/0.5ML Solution Auto-injector    Tirzepatide (MOUNJARO) 5 MG/0.5ML Solution Auto-injector    Tirzepatide (MOUNJARO) 7.5 MG/0.5ML Solution Auto-injector    Prostate cancer screening              1-2 weeks     Please note that this dictation was created using voice recognition software. I have made every reasonable attempt to correct obvious errors, but I expect that there are errors of grammar and possibly content that I did not discover before finalizing the note.

## 2025-01-16 ENCOUNTER — APPOINTMENT (OUTPATIENT)
Dept: RADIOLOGY | Facility: MEDICAL CENTER | Age: 80
End: 2025-01-16
Attending: INTERNAL MEDICINE
Payer: MEDICARE

## 2025-01-16 DIAGNOSIS — R49.9 CHANGE IN VOICE: ICD-10-CM

## 2025-01-16 PROCEDURE — 76536 US EXAM OF HEAD AND NECK: CPT

## 2025-01-22 ENCOUNTER — OFFICE VISIT (OUTPATIENT)
Dept: MEDICAL GROUP | Age: 80
End: 2025-01-22
Payer: MEDICARE

## 2025-01-22 VITALS
HEART RATE: 72 BPM | OXYGEN SATURATION: 98 % | TEMPERATURE: 98.1 F | RESPIRATION RATE: 16 BRPM | BODY MASS INDEX: 27.17 KG/M2 | DIASTOLIC BLOOD PRESSURE: 70 MMHG | SYSTOLIC BLOOD PRESSURE: 128 MMHG | WEIGHT: 205 LBS | HEIGHT: 73 IN

## 2025-01-22 DIAGNOSIS — E11.9 TYPE 2 DIABETES MELLITUS WITHOUT COMPLICATION, WITHOUT LONG-TERM CURRENT USE OF INSULIN (HCC): ICD-10-CM

## 2025-01-22 DIAGNOSIS — R42 DIZZINESS: ICD-10-CM

## 2025-01-22 DIAGNOSIS — R26.89 IMBALANCE: ICD-10-CM

## 2025-01-22 DIAGNOSIS — I15.2 HYPERTENSION ASSOCIATED WITH DIABETES (HCC): ICD-10-CM

## 2025-01-22 DIAGNOSIS — E11.59 HYPERTENSION ASSOCIATED WITH DIABETES (HCC): ICD-10-CM

## 2025-01-22 DIAGNOSIS — M54.2 CERVICALGIA: ICD-10-CM

## 2025-01-22 PROCEDURE — 3078F DIAST BP <80 MM HG: CPT | Performed by: INTERNAL MEDICINE

## 2025-01-22 PROCEDURE — 3074F SYST BP LT 130 MM HG: CPT | Performed by: INTERNAL MEDICINE

## 2025-01-22 PROCEDURE — 99214 OFFICE O/P EST MOD 30 MIN: CPT | Performed by: INTERNAL MEDICINE

## 2025-01-22 RX ORDER — TIRZEPATIDE 5 MG/.5ML
5 INJECTION, SOLUTION SUBCUTANEOUS
Qty: 2 ML | Refills: 0 | Status: SHIPPED | OUTPATIENT
Start: 2025-01-22

## 2025-01-22 ASSESSMENT — ENCOUNTER SYMPTOMS
NECK PAIN: 1
DIZZINESS: 1

## 2025-01-22 ASSESSMENT — FIBROSIS 4 INDEX: FIB4 SCORE: 1.31

## 2025-01-22 NOTE — PROGRESS NOTES
CC:   Chief Complaint   Patient presents with    Results     Imaging from 1/16/25      Diagnoses of Type 2 diabetes mellitus without complication, without long-term current use of insulin (HCC), Cervicalgia, (HCC) Hypertension associated with diabetes, Dizziness, and Imbalance were pertinent to this visit.  Verbal consent was acquired by the patient to use [employer] ambient listening note generation during this visit Yes     History of Present Illness  Saleem is a pleasant 79 y.o. male presenting for a follow-up visit, last seen 2 weeks ago.    He reports experiencing balance issues, describing symptoms of lightheadedness, particularly upon standing.    He acknowledges inadequate hydration, consuming only 3 to 4 glasses of water daily, and does not add salt to his food. He admits to not monitoring his blood pressure regularly and has not undergone recent blood tests.    He has been prescribed Mounjaro for his diabetes but has not yet initiated the treatment due to the high cost. He expresses a preference to continue with Trulicity, which he finds more affordable. He plans to take his first dose of Mounjaro on the upcoming Sunday. He also mentions that his insurance does not cover Trulicity, and he has been paying out-of-pocket for this medication.    He has been experiencing left-sided neck pain for the past 5 weeks. He reports no recent changes in his sleeping environment, such as new pillows or mattresses, and uses 2 regular foam pillows. The pain is constant throughout the day and has not been managed with any analgesics. He has not attempted any stretching exercises due to the associated discomfort.     He recalls undergoing a CT scan of his neck following a fall 2 years ago but has not had any recent imaging studies.    Past Medical History:   Diagnosis Date    Bowel habit changes 06/2023    bowel blockage    Cancer (HCC)     kidney    Cataract     Dental disorder     implants    Elevated serum creatinine  07/22/2022    Hyperlipidemia     Hypertension     Hypothyroid     Pneumonia 2020    Renal disorder     kidney ca    Snoring     no sleep study    Type II or unspecified type diabetes mellitus without mention of complication, not stated as uncontrolled        Current Outpatient Medications Ordered in Epic   Medication Sig Dispense Refill    Tirzepatide (MOUNJARO) 5 MG/0.5ML Solution Auto-injector Inject 5 mg under the skin every 7 days. 2 mL 0    Tirzepatide (MOUNJARO) 2.5 MG/0.5ML Solution Auto-injector Inject 2.5 mg under the skin every 7 days. 2 mL 0    Tirzepatide (MOUNJARO) 7.5 MG/0.5ML Solution Auto-injector Inject 7.5 mg under the skin every 7 days. 6 mL 1    atorvastatin (LIPITOR) 10 MG Tab TAKE 1 TABLET EVERY DAY 90 Tablet 3    tamsulosin (FLOMAX) 0.4 MG capsule TAKE 2 CAPSULES EVERY  Capsule 3    losartan (COZAAR) 100 MG Tab Take 1 Tablet by mouth every day. 90 Tablet 3    metformin (GLUCOPHAGE) 1000 MG tablet Take 1 Tablet by mouth 2 times a day with meals. 180 Tablet 3    amLODIPine (NORVASC) 10 MG Tab Take 1 Tablet by mouth every day. 90 Tablet 3    Cyanocobalamin (VITAMIN B-12) 2500 MCG SL Tab Place 1 Tablet under the tongue every day. 100 Tablet 3    gabapentin (NEURONTIN) 100 MG Cap Take 1-3 Capsules by mouth every evening. 270 Capsule 1    levothyroxine (SYNTHROID) 88 MCG Tab Take 1 Tablet by mouth every morning on an empty stomach. 90 Tablet 3    glipiZIDE (GLUCOTROL) 10 MG Tab Take 1 Tablet by mouth every day. 90 Tablet 3    DULoxetine (CYMBALTA) 60 MG Cap DR Particles delayed-release capsule Take 1 Capsule by mouth every day. 90 Capsule 3    vitamin D (CHOLECALCIFEROL) 1000 UNIT Tab Take 1 Tab by mouth every day. 90 Tab 3     No current Epic-ordered facility-administered medications on file.     Review of Systems   Musculoskeletal:  Positive for neck pain.   Neurological:  Positive for dizziness.     Objective:     Exam:  /70 (BP Location: Right arm, Patient Position: Sitting, BP Cuff  "Size: Adult)   Pulse 72   Temp 36.7 °C (98.1 °F) (Temporal)   Resp 16   Ht 1.854 m (6' 1\")   Wt 93 kg (205 lb)   SpO2 98%   BMI 27.05 kg/m²  Body mass index is 27.05 kg/m².    Physical Exam  Constitutional:       Appearance: Normal appearance.   HENT:      Head: Normocephalic and atraumatic.   Musculoskeletal:      Cervical back: Tenderness present. No swelling, edema, deformity, erythema, signs of trauma, lacerations, rigidity, spasms, torticollis, bony tenderness or crepitus. No pain with movement. Decreased range of motion.        Back:    Neurological:      Mental Status: He is alert.       Results:  Lab Results   Component Value Date/Time    HBA1C 6.8 (H) 10/30/2024 08:58 AM        FINDINGS:  Alignment of the vertebral bodies on the sagittal reconstructions is normal.       There are no acute cervical spine  fractures.     Again there is multilevel degenerative disc space narrowing with endplate spurring throughout the cervical spine with relative sparing of the C2-3 level. Findings have progressed slightly since the prior examination. There is bilateral uncovertebral   joint and facet arthropathy throughout the cervical spine with no gross canal stenosis.     The visualized soft tissues are unremarkable.  The visualized lung apices are clear.     IMPRESSION:     1.  There is no acute fracture of the cervical spine.  2.  Slight progression of diffuse degenerative disc disease and arthropathy with no canal stenosis.  Results      Assessment & Plan:   Saleem  is a pleasant 79 y.o. male with the following -   Assessment & Plan  1. Left-sided neck pain.  The patient reports persistent left-sided neck pain that began approximately 5 weeks ago. He has not tried any medications or stretching exercises for relief. Physical examination reveals tenderness in the trapezius muscle. A previous CT scan from 2 years ago showed bone spurs and chronic degenerative changes of the cervical spine. He is advised to use " Voltaren gel, an over-the-counter topical analgesic, and to perform prescribed neck exercises at home. A referral for physical therapy has been made to address his neck pain.    2. Diabetes mellitus.  The patient has not started Mounjaro due to its cost and has expressed a preference to continue with Trulicity, which he finds more affordable. He is advised to contact his insurance provider to ascertain coverage details for Trulicity, Ozempic, and Mounjaro, and to inform the clinic of the outcome. Blood test orders for cholesterol and blood sugar levels have been reprinted and provided to the patient.    3. Balance issues.  The patient reports worsening equilibrium, feeling lightheaded and dizzy, especially when getting up.  Probably secondary to orthostatic hypotension.  He admits to inadequate water intake, consuming only 3-4 glasses a day. He is advised to increase his water intake to 8 glasses daily and to add a small amount of salt to his water. He is also encouraged to monitor his blood pressure more frequently. A referral for physical therapy has been made to address his balance issues.    Follow-up  The patient will follow up in 1 month.    Problem List Items Addressed This Visit       (HCC) Hypertension associated with diabetes    Relevant Medications    Tirzepatide (MOUNJARO) 5 MG/0.5ML Solution Auto-injector     Other Visit Diagnoses       Type 2 diabetes mellitus without complication, without long-term current use of insulin (HCC)        Relevant Medications    Tirzepatide (MOUNJARO) 5 MG/0.5ML Solution Auto-injector    Other Relevant Orders    HEMOGLOBIN A1C    Cervicalgia        Relevant Orders    Referral to Physical Therapy    Dizziness        Imbalance        Relevant Orders    Referral to Physical Therapy          No follow-ups on file. 1-2 months    Please note that this dictation was created using voice recognition software. I have made every reasonable attempt to correct obvious errors, but I  expect that there are errors of grammar and possibly content that I did not discover before finalizing the note.

## 2025-01-27 ENCOUNTER — PATIENT MESSAGE (OUTPATIENT)
Dept: MEDICAL GROUP | Age: 80
End: 2025-01-27
Payer: MEDICARE

## 2025-02-25 ENCOUNTER — PHYSICAL THERAPY (OUTPATIENT)
Dept: PHYSICAL THERAPY | Facility: MEDICAL CENTER | Age: 80
End: 2025-02-25
Attending: INTERNAL MEDICINE
Payer: MEDICARE

## 2025-02-25 DIAGNOSIS — M54.2 CERVICALGIA: ICD-10-CM

## 2025-02-25 PROCEDURE — 97161 PT EVAL LOW COMPLEX 20 MIN: CPT

## 2025-02-25 PROCEDURE — 97140 MANUAL THERAPY 1/> REGIONS: CPT

## 2025-02-25 ASSESSMENT — ENCOUNTER SYMPTOMS
PAIN SCALE AT LOWEST: 0
PAIN SCALE AT HIGHEST: 6
PAIN SCALE: 4

## 2025-02-25 NOTE — OP THERAPY EVALUATION
"  Outpatient Physical Therapy  INITIAL EVALUATION    [facility] Outpatient Physical Therapy  [address]  [place] [address]  Phone:  [phone]  Fax:  [phone]    Date of Evaluation: 2025    Patient: Saleem Whitney  YOB: 1945  MRN: [id number]     Referring Provider: [place] [address] [address]   Referring Diagnosis Cervicalgia [M54.2]     Time Calculation  Start time: 730  Stop time: 815 Time Calculation (min): 45 minutes         Chief Complaint: Neck Pain    Visit Diagnoses     ICD-10-CM   1. Cervicalgia  M54.2       Date of onset of impairment: No data found    Subjective:   History of Present Illness:     Mechanism of injury:  Pt is a 79 y.o male presenting to physical therapy with complaints of neck pain. Pt reports that his neck pain started 3-4 months ago, no known injury noting that it was a gradual onset. Pt notes that he has constant pain now, feels it most when he lays down for bed. Reports all imaging was normal. Pt reports that he has been very sedentary since his knee replacement one year ago. Tends to sit most of the day in a recliner chair.     Per MD on 2025:\"He has been experiencing left-sided neck pain for the past 5 weeks. He reports no recent changes in his sleeping environment, such as new pillows or mattresses, and uses 2 regular foam pillows. The pain is constant throughout the day and has not been managed with any analgesics. He has not attempted any stretching exercises due to the associated discomfort.     Pt denies dizziness, nausea, headaches, numbness/tingling into extremities, and recent visual changes. Pt consents to evaluation and treatment today.     Sleep disturbance:  Difficulty falling asleep  Pain:     Current pain ratin    At best pain ratin (30 %)    At worst pain ratin    Location:  L side of the neck to the top of the shoulder, moving into " the R side    Pain Comments::  Quality: dull ache, constant     Aggravating Factors: laying down, turning to the R     Relieving Factors:  propping up on pillow, ibuprophen   Activities of Daily Living:     Patient reported ADL status: Pt reported ADLs: IND  Work: retired   Exercise: none  Hobbies:   Patient Goals:     Patient goals for therapy:  Decreased pain, increased strength and increased motion      Past Medical History:   Diagnosis Date   • Bowel habit changes 2023    bowel blockage   • Cancer (HCC)     kidney   • Cataract    • Dental disorder     implants   • Elevated serum creatinine 2022   • Hyperlipidemia    • Hypertension    • Hypothyroid    • Pneumonia    • Renal disorder     kidney ca   • Snoring     no sleep study   • Type II or unspecified type diabetes mellitus without mention of complication, not stated as uncontrolled      Past Surgical History:   Procedure Laterality Date   • PB TOTAL KNEE ARTHROPLASTY Left 2023    Procedure: LEFT TOTAL KNEE ARTHROPLASTY;  Surgeon: Wilbur Thomas M.D.;  Location: [facility];  Service: Orthopedics   • TURBT (TRANSURETHRAL RESECTION OF BLADDER TUMOR)  2023    Procedure: BIPOLAR TRANSURETHRAL RESECTION OF BLADDER TUMOR WITH INSTILLATION GEMCITABINE;  Surgeon: Bradly Velasco M.D.;  Location: SURGERY [facility];  Service: Urology   • FL LAP,DIAGNOSTIC ABDOMEN  2023    Procedure: LAPAROSCOPY diagnostic;  Surgeon: Galo Ashton M.D.;  Location: SURGERY [facility];  Service: General   • HAND SURGERY       Social History     Tobacco Use   • Smoking status: Former     Current packs/day: 0.00     Average packs/day: 1 pack/day for 5.0 years (5.0 ttl pk-yrs)     Types: Cigarettes     Start date: 1966     Quit date: 1971     Years since quittin.3   • Smokeless tobacco: Never   Substance Use Topics   • Alcohol use: Yes     Alcohol/week: 4.2 oz     Types: 7 Glasses of wine per week     Family and Occupational  History     Socioeconomic History   • Marital status:      Spouse name: Not on file   • Number of children: Not on file   • Years of education: Not on file   • Highest education level: Bachelor's degree (e.g., BA, AB, BS)   Occupational History   • Not on file       Objective     Observations   Central spine     Positive for forward head/neck, rounded shoulders and thoracic kyphosis.    Postural Observations  Seated posture: poor  Standing posture: poor  Correction of posture: has no consistent effect      Shoulder Screen    Shoulder strength within functional limits.    Palpation   Left   Hypertonic in the levator scapulae and upper trapezius.   Tenderness of the levator scapulae, middle trapezius and upper trapezius.   Trigger point to levator scapulae and upper trapezius.     Right   Tenderness of the levator scapulae, middle trapezius and upper trapezius.   Trigger point to upper trapezius.     Active Range of Motion     Cervical Spine   Flexion: decreased (35)  Extension: decreased (20)  Left lateral flexion: Active left cervical lateral flexion: 25.  Right lateral flexion: Active right cervical lateral flexion: 15.  Left rotation: decreased (50)  Right rotation: decreased (40)  Left Shoulder   Flexion: 120 degrees with pain  Extension: WFL  Abduction: WFL  External rotation BTH: WFL  Internal rotation BTB: WFL    Right Shoulder   Flexion: 125 degrees with pain  Extension: WFL  Abduction: WFL  External rotation BTH: WFL  Internal rotation BTB: WFL        Therapeutic Exercises (CPT 61078):     1. Pt education, exam findings    2. chin tuck    3. scapular retraction    Therapeutic Treatments and Modalities:     1. Manual Therapy (CPT 37653), STM over L upper trap/levator scapular, MWM with trigger point release while performing lateral flexion and R rotation    Time-based treatments/modalities:    Physical Therapy Timed Treatment Charges  Manual therapy minutes (CPT 75276): 8 minutes  Therapeutic exercise  minutes (CPT 78047): 5 minutes      Assessment, Response and Plan:   Impairments: abnormal or restricted ROM, activity intolerance, impaired physical strength, lacks appropriate home exercise program and pain with function    Assessment details:  Pt is a 79 y.o male presenting to physical therapy with complaints of neck pain. 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 Pt demonstrates poor postural awareness and weakness in DNF group. Pt also reporting poor sleep hygiene (difficulty finding a comfortable position nightly) due to neck pain.     Pt educated on exam findings and future POC, pt acknowledged understanding and is agreeable. Pt will benefit from skilled physical therapy to address the following impairments in order to improve functional mobility and overall QOL.  Pt should progress well towards goals if compliant with HEP and POC.     Barriers to therapy:  Age  Prognosis: fair    Goals:   Short Term Goals:   1. Pt will report independence and compliance with written HEP   2. Pt will demonstrate >/= to 10 degree improvement in flexion/extension c/s AROM   3. Pt will demo c/s rotation and lateral flexion to WFL with no more than 2/10 increase in s/s    Short term goal time span:  2-4 weeks      Long Term Goals:    1. Pt will report independence and compliance with written HEP   2. Pt will report improvement in sleep quality by >/= to 75%   3. Pt will demo c/s AROM to WFL   4. Pt will demo improved postural awareness within clinic at least 80% of the time with no VC in order to improve awareness at home  5. Pt will have a significant improvement in NDI with MCID of >/= to 8.5 points (eval:10)   Long term goal time span:  6-8  weeks    Plan:   Therapy options:  Physical therapy treatment to continue  Planned therapy interventions:  E Stim Unattended (CPT 58866), Manual Therapy (CPT 77965), Neuromuscular Re-education (CPT 46864), Mechanical Traction (CPT 78191), Therapeutic Exercise (CPT 78042) and Therapeutic Activities (CPT 89448)  Frequency: 1-2x per week.  Duration in weeks:  8  Discussed with:  Patient  Plan details:  Upoc:04/25/2025      Functional Assessment Used  Neck Disability Total: 10     Referring provider co-signature:  I have reviewed this plan of care and my co-signature certifies the need for services.    Certification Period: 02/25/2025 to  04/25/2025    Physician Signature: ________________________________ Date: ______________

## 2025-03-05 ENCOUNTER — RESULTS FOLLOW-UP (OUTPATIENT)
Dept: MEDICAL GROUP | Age: 80
End: 2025-03-05

## 2025-03-05 ENCOUNTER — APPOINTMENT (OUTPATIENT)
Dept: MEDICAL GROUP | Age: 80
End: 2025-03-05
Payer: MEDICARE

## 2025-03-05 VITALS
TEMPERATURE: 98.1 F | HEART RATE: 70 BPM | SYSTOLIC BLOOD PRESSURE: 112 MMHG | HEIGHT: 73 IN | BODY MASS INDEX: 27.04 KG/M2 | OXYGEN SATURATION: 98 % | DIASTOLIC BLOOD PRESSURE: 60 MMHG | WEIGHT: 204 LBS | RESPIRATION RATE: 16 BRPM

## 2025-03-05 DIAGNOSIS — E03.9 ACQUIRED HYPOTHYROIDISM: ICD-10-CM

## 2025-03-05 DIAGNOSIS — G62.9 NEUROPATHY: ICD-10-CM

## 2025-03-05 DIAGNOSIS — I15.2 HYPERTENSION ASSOCIATED WITH DIABETES (HCC): ICD-10-CM

## 2025-03-05 DIAGNOSIS — E11.9 TYPE 2 DIABETES MELLITUS WITHOUT COMPLICATION, WITHOUT LONG-TERM CURRENT USE OF INSULIN (HCC): ICD-10-CM

## 2025-03-05 DIAGNOSIS — E08.42 DIABETIC POLYNEUROPATHY ASSOCIATED WITH DIABETES MELLITUS DUE TO UNDERLYING CONDITION (HCC): ICD-10-CM

## 2025-03-05 DIAGNOSIS — E11.42 CONTROLLED TYPE 2 DIABETES MELLITUS WITH DIABETIC POLYNEUROPATHY, WITHOUT LONG-TERM CURRENT USE OF INSULIN (HCC): ICD-10-CM

## 2025-03-05 DIAGNOSIS — E11.59 HYPERTENSION ASSOCIATED WITH DIABETES (HCC): ICD-10-CM

## 2025-03-05 LAB
HBA1C MFR BLD: 7 % (ref ?–5.8)
POCT INT CON NEG: NEGATIVE
POCT INT CON POS: POSITIVE

## 2025-03-05 PROCEDURE — 83036 HEMOGLOBIN GLYCOSYLATED A1C: CPT | Performed by: INTERNAL MEDICINE

## 2025-03-05 PROCEDURE — 3078F DIAST BP <80 MM HG: CPT | Performed by: INTERNAL MEDICINE

## 2025-03-05 PROCEDURE — 99214 OFFICE O/P EST MOD 30 MIN: CPT | Performed by: INTERNAL MEDICINE

## 2025-03-05 PROCEDURE — 3074F SYST BP LT 130 MM HG: CPT | Performed by: INTERNAL MEDICINE

## 2025-03-05 RX ORDER — GABAPENTIN 100 MG/1
100-300 CAPSULE ORAL EVERY EVENING
Qty: 270 CAPSULE | Refills: 1 | Status: SHIPPED | OUTPATIENT
Start: 2025-03-05

## 2025-03-05 RX ORDER — GLIPIZIDE 10 MG/1
10 TABLET ORAL
Qty: 90 TABLET | Refills: 3 | Status: SHIPPED | OUTPATIENT
Start: 2025-03-05

## 2025-03-05 RX ORDER — LEVOTHYROXINE SODIUM 88 UG/1
88 TABLET ORAL
Qty: 90 TABLET | Refills: 3 | Status: SHIPPED | OUTPATIENT
Start: 2025-03-05

## 2025-03-05 RX ORDER — DULOXETIN HYDROCHLORIDE 60 MG/1
60 CAPSULE, DELAYED RELEASE ORAL DAILY
Qty: 90 CAPSULE | Refills: 3 | Status: SHIPPED | OUTPATIENT
Start: 2025-03-05

## 2025-03-05 ASSESSMENT — FIBROSIS 4 INDEX: FIB4 SCORE: 1.31

## 2025-03-05 NOTE — PROGRESS NOTES
CC:   Chief Complaint   Patient presents with    Medication Refill     Mounjaro 90 days,      Diagnoses of Type 2 diabetes mellitus without complication, without long-term current use of insulin (HCC), Diabetic polyneuropathy associated with diabetes mellitus due to underlying condition (HCC), Neuropathy, Controlled type 2 diabetes mellitus with diabetic polyneuropathy, without long-term current use of insulin (HCC), Acquired hypothyroidism, and (HCC) Hypertension associated with diabetes were pertinent to this visit.  Verbal consent was acquired by the patient to use [employer] ambient listening note generation during this visit Yes   History of Present Illness  Saleem is a pleasant 79 y.o. male with a past medical history of diabetes, presenting for a follow-up visit.     He has not yet completed his laboratory tests. He has been experiencing gastrointestinal disturbances, including diarrhea, which he attributes to the medication. These symptoms are reminiscent of those he experienced while on Trulicity. His current medication regimen includes Mounjaro 7.5 mg, which he reports as effective in managing his blood glucose levels. He is seeking a 90-day refill of this medication.    Past Medical History:   Diagnosis Date    Bowel habit changes 06/2023    bowel blockage    Cancer (HCC)     kidney    Cataract     Dental disorder     implants    Elevated serum creatinine 07/22/2022    Hyperlipidemia     Hypertension     Hypothyroid     Pneumonia 2020    Renal disorder     kidney ca    Snoring     no sleep study    Type II or unspecified type diabetes mellitus without mention of complication, not stated as uncontrolled        Current Outpatient Medications Ordered in Epic   Medication Sig Dispense Refill    DULoxetine (CYMBALTA) 60 MG Cap DR Particles delayed-release capsule Take 1 Capsule by mouth every day. 90 Capsule 3    gabapentin (NEURONTIN) 100 MG Cap Take 1-3 Capsules by mouth every evening. 270 Capsule 1     "glipiZIDE (GLUCOTROL) 10 MG Tab Take 1 Tablet by mouth every day. 90 Tablet 3    levothyroxine (SYNTHROID) 88 MCG Tab Take 1 Tablet by mouth every morning on an empty stomach. 90 Tablet 3    Tirzepatide (MOUNJARO) 7.5 MG/0.5ML Solution Auto-injector Inject 7.5 mg under the skin every 7 days. 6 mL 1    atorvastatin (LIPITOR) 10 MG Tab TAKE 1 TABLET EVERY DAY 90 Tablet 3    tamsulosin (FLOMAX) 0.4 MG capsule TAKE 2 CAPSULES EVERY  Capsule 3    losartan (COZAAR) 100 MG Tab Take 1 Tablet by mouth every day. 90 Tablet 3    metformin (GLUCOPHAGE) 1000 MG tablet Take 1 Tablet by mouth 2 times a day with meals. 180 Tablet 3    amLODIPine (NORVASC) 10 MG Tab Take 1 Tablet by mouth every day. 90 Tablet 3    Cyanocobalamin (VITAMIN B-12) 2500 MCG SL Tab Place 1 Tablet under the tongue every day. 100 Tablet 3    vitamin D (CHOLECALCIFEROL) 1000 UNIT Tab Take 1 Tab by mouth every day. 90 Tab 3     No current Epic-ordered facility-administered medications on file.     Review of Systems   All other systems reviewed and are negative.      Objective:     Exam:  /60 (BP Location: Right arm, Patient Position: Sitting, BP Cuff Size: Adult)   Pulse 70   Temp 36.7 °C (98.1 °F) (Temporal)   Resp 16   Ht 1.854 m (6' 1\")   Wt 92.5 kg (204 lb)   SpO2 98%   BMI 26.91 kg/m²  Body mass index is 26.91 kg/m².    Physical Exam  Constitutional:       Appearance: Normal appearance.   HENT:      Head: Normocephalic and atraumatic.   Pulmonary:      Effort: Pulmonary effort is normal. No respiratory distress.   Neurological:      Mental Status: He is alert and oriented to person, place, and time.   Psychiatric:         Mood and Affect: Mood normal.         Behavior: Behavior normal.         Thought Content: Thought content normal.         Judgment: Judgment normal.       Results:  Results  Lab Results   Component Value Date/Time    HBA1C 7.0 (A) 03/05/2025 09:09 AM        Assessment & Plan:   Saleem  is a pleasant 79 y.o. male " with the following -   Assessment & Plan  1. Type 2 diabetes, currently uncontrolled.  He has been on Mounjaro for a duration of 3 months, during which he has achieved the maximum dosage of 7.5 mg weekly. The plan is to maintain his current regimen of metformin 1000 mg twice daily, glipizide 10 mg daily, and Mounjaro 7.5 mg weekly. A prescription for Mounjaro 7.5 mg for 90 days will be sent to [facility]. Blood test orders have been printed, and he is advised to visit the lab after June 2025.    2. Hypertension, chronic.  His blood pressure is well-managed, currently at 112/60. He will continue his current medication regimen of losartan 100 mg daily and Amlodipine 10 mg.     Problem List Items Addressed This Visit       (HCC) Hypertension associated with diabetes    Relevant Medications    glipiZIDE (GLUCOTROL) 10 MG Tab    Tirzepatide (MOUNJARO) 7.5 MG/0.5ML Solution Auto-injector    Controlled type 2 diabetes mellitus with diabetic polyneuropathy, without long-term current use of insulin ([facility])    Relevant Medications    DULoxetine (CYMBALTA) 60 MG Cap DR Particles delayed-release capsule    gabapentin (NEURONTIN) 100 MG Cap    glipiZIDE (GLUCOTROL) 10 MG Tab    Tirzepatide (MOUNJARO) 7.5 MG/0.5ML Solution Auto-injector    Neuropathy    Relevant Medications    DULoxetine (CYMBALTA) 60 MG Cap DR Particles delayed-release capsule    gabapentin (NEURONTIN) 100 MG Cap     Other Visit Diagnoses         Type 2 diabetes mellitus without complication, without long-term current use of insulin ([facility])        Relevant Medications    glipiZIDE (GLUCOTROL) 10 MG Tab    Tirzepatide (MOUNJARO) 7.5 MG/0.5ML Solution Auto-injector    Other Relevant Orders    POCT  A1C (Completed)      Diabetic polyneuropathy associated with diabetes mellitus due to underlying condition (HCC)        Relevant Medications    DULoxetine (CYMBALTA) 60 MG Cap DR Particles delayed-release capsule    gabapentin (NEURONTIN) 100 MG Cap    glipiZIDE  (GLUCOTROL) 10 MG Tab    Tirzepatide (MOUNJARO) 7.5 MG/0.5ML Solution Auto-injector      Acquired hypothyroidism        Relevant Medications    levothyroxine (SYNTHROID) 88 MCG Tab          Follow-up  The patient is scheduled for a follow-up visit in 3.5 months.    Please note that this dictation was created using voice recognition software. I have made every reasonable attempt to correct obvious errors, but I expect that there are errors of grammar and possibly content that I did not discover before finalizing the note.

## 2025-03-11 NOTE — OP THERAPY DAILY TREATMENT
Outpatient Physical Therapy  DAILY TREATMENT     [facility] Outpatient Physical Therapy  [address]  [place] [address]  Phone:  [phone]  Fax:  [phone]    Date: 03/12/2025    Patient: Saleem Whitney  YOB: 1945  MRN: [id number]     Time Calculation    Start time: 1355  Stop time: 1430 Time Calculation (min): 35 minutes         Chief Complaint: Neck Pain    Visit #: 2    SUBJECTIVE: of note, pt arrived 10 minutes late to session today.   Pt states that he is doing well with no pain. Slight tenderness in his L shoulder.     OBJECTIVE:  Current objective measures:           Therapeutic Exercises (CPT 58809):     1. UBE, x 4 minutes, cardiovascular warm up    2. chin tuck    3. scapular retraction    Therapeutic Treatments and Modalities:     1. Manual Therapy (CPT 39452), STM over L upper trap/levator scapular, MWM with trigger point release while performing lateral flexion and R rotation    Time-based treatments/modalities:    Physical Therapy Timed Treatment Charges  Manual therapy minutes (CPT 35415): 15 minutes  Therapeutic exercise minutes (CPT 31763): 20 minutes    ASSESSMENT:   Response to treatment: STM to BL UT and levator scapulae today was tolerated well. Review of IND HEP with good carryover. Pt tolerated seated UT and Levator stretching today well and reported relief with this. Plan to add periscapular strengthening next session.     PLAN/RECOMMENDATIONS:   Plan for treatment: therapy treatment to continue next visit.  Planned interventions for next visit: continue with current treatment.

## 2025-03-12 ENCOUNTER — PHYSICAL THERAPY (OUTPATIENT)
Dept: PHYSICAL THERAPY | Facility: MEDICAL CENTER | Age: 80
End: 2025-03-12
Attending: INTERNAL MEDICINE
Payer: MEDICARE

## 2025-03-12 DIAGNOSIS — M54.2 CERVICALGIA: ICD-10-CM

## 2025-03-12 PROCEDURE — 97140 MANUAL THERAPY 1/> REGIONS: CPT

## 2025-03-12 PROCEDURE — 97110 THERAPEUTIC EXERCISES: CPT

## 2025-03-19 ENCOUNTER — PHYSICAL THERAPY (OUTPATIENT)
Dept: PHYSICAL THERAPY | Facility: MEDICAL CENTER | Age: 80
End: 2025-03-19
Attending: INTERNAL MEDICINE
Payer: MEDICARE

## 2025-03-19 DIAGNOSIS — M54.2 CERVICALGIA: ICD-10-CM

## 2025-03-19 PROCEDURE — 97110 THERAPEUTIC EXERCISES: CPT

## 2025-03-19 NOTE — OP THERAPY DAILY TREATMENT
Outpatient Physical Therapy  DAILY TREATMENT     [facility] Outpatient Physical Therapy  [address]  [place] [address]  Phone:  [phone]  Fax:  [phone]    Date: 03/19/2025    Patient: Saleem Whitney  YOB: 1945  MRN: [id number]     Time Calculation    Start time: 1100  Stop time: 1140 Time Calculation (min): 40 minutes         Chief Complaint: Neck Pain    Visit #: 3    SUBJECTIVE:  Pt states that he was doing great after last weeks session. Did have some pain on his drive over today.     OBJECTIVE:  Current objective measures:           Therapeutic Exercises (CPT 17728):     1. UBE, x 4 minutes, cardiovascular warm up    2. chin tuck, 10 x 10 seconds, hep    3. scapular retraction, 10 x 10 seconds, hep    4. rows, 2x 10 17lb SC    5. SAPD, 2x 10 PTB    Therapeutic Treatments and Modalities:     1. Manual Therapy (CPT 34856), STM over L upper trap/levator scapular, MWM with trigger point release while performing lateral flexion and R rotation    Time-based treatments/modalities:    Physical Therapy Timed Treatment Charges  Therapeutic exercise minutes (CPT 77978): 40 minutes    ASSESSMENT:   Response to treatment: STM to BL UT and levator scapulae today was tolerated well. Review of IND HEP with good carryover. Pt tolerated seated UT and levator stretching today well and reported relief with this. Pt with good tolerance with periscapular strengthening today and will continue to benefit from this in future sessions to address postural.     PLAN/RECOMMENDATIONS:   Plan for treatment: therapy treatment to continue next visit.  Planned interventions for next visit: continue with current treatment.

## 2025-03-21 NOTE — OP THERAPY DAILY TREATMENT
Outpatient Physical Therapy  DAILY TREATMENT     [facility] Outpatient Physical Therapy  [address]  [place] [address]  Phone:  [phone]  Fax:  [phone]    Date: 03/24/2025    Patient: Saleem Whitney  YOB: 1945  MRN: [id number]     Time Calculation                   Chief Complaint: No chief complaint on file.    Visit #: 4    SUBJECTIVE:  Pt states    OBJECTIVE:  Current objective measures:           Therapeutic Exercises (CPT 99369):     1. UBE, x 4 minutes, cardiovascular warm up    2. chin tuck, 10 x 10 seconds, hep    3. scapular retraction, 10 x 10 seconds, hep    4. rows, 2x 10 17lb SC    5. SAPD, 2x 10 PTB    Therapeutic Treatments and Modalities:     1. Manual Therapy (CPT 70872), STM over L upper trap/levator scapular, MWM with trigger point release while performing lateral flexion and R rotation    Time-based treatments/modalities:           Pain rating (1-10) before treatment:  {PAIN NUMBERS_1-10:11130}  Pain rating (1-10) after treatment:  {PAIN NUMBERS_1-10:12369}    ASSESSMENT:   Response to treatment:     STM to BL UT and levator scapulae today was tolerated well. Review of IND HEP with good carryover. Pt tolerated seated UT and levator stretching today well and reported relief with this. Pt with good tolerance with periscapular strengthening today and will continue to benefit from this in future sessions to address postural.     PLAN/RECOMMENDATIONS:   Plan for treatment: therapy treatment to continue next visit.  Planned interventions for next visit: continue with current treatment.

## 2025-03-24 ENCOUNTER — APPOINTMENT (OUTPATIENT)
Dept: PHYSICAL THERAPY | Facility: MEDICAL CENTER | Age: 80
End: 2025-03-24
Attending: INTERNAL MEDICINE
Payer: MEDICARE

## 2025-03-27 ENCOUNTER — APPOINTMENT (OUTPATIENT)
Dept: PHYSICAL THERAPY | Facility: MEDICAL CENTER | Age: 80
End: 2025-03-27
Attending: INTERNAL MEDICINE
Payer: MEDICARE

## 2025-04-04 ENCOUNTER — APPOINTMENT (OUTPATIENT)
Dept: PHYSICAL THERAPY | Facility: MEDICAL CENTER | Age: 80
End: 2025-04-04
Attending: INTERNAL MEDICINE
Payer: MEDICARE

## 2025-04-07 ENCOUNTER — APPOINTMENT (OUTPATIENT)
Dept: PHYSICAL THERAPY | Facility: MEDICAL CENTER | Age: 80
End: 2025-04-07
Attending: INTERNAL MEDICINE
Payer: MEDICARE

## 2025-04-14 ENCOUNTER — APPOINTMENT (OUTPATIENT)
Dept: PHYSICAL THERAPY | Facility: MEDICAL CENTER | Age: 80
End: 2025-04-14
Attending: INTERNAL MEDICINE
Payer: MEDICARE

## 2025-04-16 ENCOUNTER — APPOINTMENT (OUTPATIENT)
Dept: PHYSICAL THERAPY | Facility: MEDICAL CENTER | Age: 80
End: 2025-04-16
Attending: INTERNAL MEDICINE
Payer: MEDICARE

## 2025-04-21 ENCOUNTER — APPOINTMENT (OUTPATIENT)
Dept: PHYSICAL THERAPY | Facility: MEDICAL CENTER | Age: 80
End: 2025-04-21
Attending: INTERNAL MEDICINE
Payer: MEDICARE

## 2025-04-23 ENCOUNTER — APPOINTMENT (OUTPATIENT)
Dept: PHYSICAL THERAPY | Facility: MEDICAL CENTER | Age: 80
End: 2025-04-23
Attending: INTERNAL MEDICINE
Payer: MEDICARE

## 2025-04-28 ENCOUNTER — APPOINTMENT (OUTPATIENT)
Dept: PHYSICAL THERAPY | Facility: MEDICAL CENTER | Age: 80
End: 2025-04-28
Attending: INTERNAL MEDICINE
Payer: MEDICARE

## 2025-04-30 ENCOUNTER — APPOINTMENT (OUTPATIENT)
Dept: PHYSICAL THERAPY | Facility: MEDICAL CENTER | Age: 80
End: 2025-04-30
Attending: INTERNAL MEDICINE
Payer: MEDICARE

## 2025-05-09 ENCOUNTER — APPOINTMENT (OUTPATIENT)
Dept: RADIOLOGY | Facility: MEDICAL CENTER | Age: 80
End: 2025-05-09
Attending: EMERGENCY MEDICINE
Payer: MEDICARE

## 2025-05-09 ENCOUNTER — HOSPITAL ENCOUNTER (EMERGENCY)
Facility: MEDICAL CENTER | Age: 80
End: 2025-05-09
Attending: EMERGENCY MEDICINE
Payer: MEDICARE

## 2025-05-09 VITALS
BODY MASS INDEX: 26.82 KG/M2 | RESPIRATION RATE: 18 BRPM | WEIGHT: 202.38 LBS | TEMPERATURE: 98 F | HEART RATE: 66 BPM | OXYGEN SATURATION: 93 % | SYSTOLIC BLOOD PRESSURE: 161 MMHG | HEIGHT: 73 IN | DIASTOLIC BLOOD PRESSURE: 78 MMHG

## 2025-05-09 DIAGNOSIS — S81.812A LACERATION OF LEFT LOWER EXTREMITY, INITIAL ENCOUNTER: ICD-10-CM

## 2025-05-09 DIAGNOSIS — S80.12XA CONTUSION OF LEFT LOWER EXTREMITY, INITIAL ENCOUNTER: ICD-10-CM

## 2025-05-09 PROCEDURE — 700111 HCHG RX REV CODE 636 W/ 250 OVERRIDE (IP): Performed by: EMERGENCY MEDICINE

## 2025-05-09 PROCEDURE — 304217 HCHG IRRIGATION SYSTEM

## 2025-05-09 PROCEDURE — 36415 COLL VENOUS BLD VENIPUNCTURE: CPT

## 2025-05-09 PROCEDURE — 99284 EMERGENCY DEPT VISIT MOD MDM: CPT

## 2025-05-09 PROCEDURE — 304999 HCHG REPAIR-SIMPLE/INTERMED LEVEL 1

## 2025-05-09 PROCEDURE — 305308 HCHG STAPLER,SKIN,DISP.

## 2025-05-09 PROCEDURE — 305000 HCHG REPAIR-SIMPLE/INTERMED LEVEL 2

## 2025-05-09 PROCEDURE — 73590 X-RAY EXAM OF LOWER LEG: CPT | Mod: LT

## 2025-05-09 RX ADMIN — LIDOCAINE HYDROCHLORIDE 20 ML: 10 INJECTION, SOLUTION INFILTRATION; PERINEURAL at 14:30

## 2025-05-09 ASSESSMENT — FIBROSIS 4 INDEX: FIB4 SCORE: 1.31

## 2025-05-09 NOTE — ED TRIAGE NOTES
"Chief Complaint   Patient presents with    Leg Laceration     Left lower leg. Cut it on a rock.   Roughly 6in vertical laceration with exposed adipose tissue. Dressing removed and clean gauze applied.   Last Tdap Dec 2023.      Blood Pressure : 126/78, Pulse: 71, Respiration: 18, Temperature: 36.7 °C (98 °F), Height: 185.4 cm (6' 1\"), Weight: 91.8 kg (202 lb 6.1 oz), BMI (Calculated): 26.7, BSA (Calculated): 2.2, Pulse Oximetry: 96 %, O2 Delivery Device: None - Room Air  "

## 2025-05-09 NOTE — ED PROVIDER NOTES
ED Provider Note    CHIEF COMPLAINT  Chief Complaint   Patient presents with    Leg Laceration     Left lower leg. Cut it on a rock.   Roughly 6in vertical laceration with exposed adipose tissue. Dressing removed and clean gauze applied.   Last Tdap Dec 2023.        EXTERNAL RECORDS REVIEWED      HPI/ROS  LIMITATION TO HISTORY     OUTSIDE HISTORIAN(S):      Saleem Whitney is a 79 y.o. male who presents to the emergency department with left lower extremity pain.  Patient was working in his yard had large rock fall on the inner aspect of his left lower leg.  Large laceration immediately pain and bleeding.  Direct pressure was placed to control bleeding patient was brought here.  Patient is able to ambulate on the extremity he reports that his last tetanus was 2 years prior reports no other injury or recent illness.    PAST MEDICAL HISTORY   has a past medical history of Bowel habit changes (2023), Cancer (HCC), Cataract, Dental disorder, Elevated serum creatinine (2022), Hyperlipidemia, Hypertension, Hypothyroid, Pneumonia (), Renal disorder, Snoring, and Type II or unspecified type diabetes mellitus without mention of complication, not stated as uncontrolled.    SURGICAL HISTORY   has a past surgical history that includes hand surgery; lap,diagnostic abdomen (2023); turbt (transurethral resection of bladder tumor) (2023); and total knee arthroplasty (Left, 2023).    FAMILY HISTORY  Family History   Problem Relation Age of Onset    Diabetes Mother     Stroke Father         45    Diabetes Sister     Diabetes Maternal Uncle        SOCIAL HISTORY  Social History     Tobacco Use    Smoking status: Former     Current packs/day: 0.00     Average packs/day: 1 pack/day for 5.0 years (5.0 ttl pk-yrs)     Types: Cigarettes     Start date: 1966     Quit date: 1971     Years since quittin.5    Smokeless tobacco: Never   Vaping Use    Vaping status: Never Used   Substance and  "Sexual Activity    Alcohol use: Yes     Alcohol/week: 4.2 oz     Types: 7 Glasses of wine per week    Drug use: No    Sexual activity: Yes     Partners: Female       CURRENT MEDICATIONS  Home Medications       Reviewed by Amanuel Barger R.N. (Registered Nurse) on 05/09/25 at 1322  Med List Status: Not Addressed     Medication Last Dose Status   amLODIPine (NORVASC) 10 MG Tab  Active   atorvastatin (LIPITOR) 10 MG Tab  Active   Cyanocobalamin (VITAMIN B-12) 2500 MCG SL Tab  Active   DULoxetine (CYMBALTA) 60 MG Cap DR Particles delayed-release capsule  Active   gabapentin (NEURONTIN) 100 MG Cap  Active   glipiZIDE (GLUCOTROL) 10 MG Tab  Active   levothyroxine (SYNTHROID) 88 MCG Tab  Active   losartan (COZAAR) 100 MG Tab  Active   metformin (GLUCOPHAGE) 1000 MG tablet  Active   tamsulosin (FLOMAX) 0.4 MG capsule  Active   Tirzepatide (MOUNJARO) 7.5 MG/0.5ML Solution Auto-injector  Active   vitamin D (CHOLECALCIFEROL) 1000 UNIT Tab  Active                    ALLERGIES  Allergies   Allergen Reactions    Kdc:Red Dye+Yellow Dye+Ci Pigment Blue 63+Oxymorphone Hives and Shortness of Breath    Latex Rash     Adhesives-rash       PHYSICAL EXAM  VITAL SIGNS: /78   Pulse 71   Temp 36.7 °C (98 °F) (Temporal)   Resp 18   Ht 1.854 m (6' 1\")   Wt 91.8 kg (202 lb 6.1 oz)   SpO2 96%   BMI 26.70 kg/m²      Pulse ox interpretation: I interpret this pulse ox as normal.  Constitutional: Alert and oriented x 3, minimal distress  HEENT: Atraumatic normocephalic, pupils are equal round reactive to light extraocular movements are intact. The nares is clear, external ears are normal, mouth shows moist mucous membranes normal dentition for age  Neck: Supple, no JVD no tracheal deviation  Cardiovascular: Regular rate and rhythm no murmur rub or gallop 2+ pulses peripherally x4  Thorax & Lungs: No respiratory distress, no wheezes rales or rhonchi, No chest tenderness.   GI: Soft nontender nondistended positive bowel sounds, no " peritoneal signs  Skin: 10 cm linear laceration through the medial aspect of the left lower extremity just proximal to the ankle superficial subcutaneous involvement no deep structure involvement normal cap refill and sensation distally  Musculoskeletal:as above in the left lower extremity all other extremities unremarkable  Neurologic: Cranial nerves III through XII are grossly intact no sensory deficit no cerebellar dysfunction   Psychiatric: Appropriate affect for situation at this time          RADIOLOGY/PROCEDURES   I have independently interpreted the diagnostic imaging associated with this visit and am waiting the final reading from the radiologist.   My preliminary interpretation is as follows:   No acute fracture subluxation or dislocation    Radiologist interpretation:  DX-TIBIA AND FIBULA LEFT   Final Result      No evidence of fracture or dislocation.          COURSE & MEDICAL DECISION MAKING      Laceration Repair Procedure Note    Indication: Laceration     Procedure: The patient was placed in the appropriate position and anesthesia around the laceration was obtained by infiltration using 1% Lidocaine without epinephrine. The area was then irrigated with high pressure normal saline. The laceration was closed with staples. There were no additional lacerations requiring repair. The wound area was then dressed with a sterile dressing.      Total repaired wound length: 10 cm.     Other Items: None    The patient tolerated the procedure well.    Complications: None.      ASSESSMENT, COURSE AND PLAN  Care Narrative: Laceration repaired as above.  X-rays negative.  Tdap is up-to-date.  Given instructions to return in 14 days for staple removal or sooner for increased pain redness swelling drainage fevers chills nausea vomiting any other acute symptom change or concern.  Given local wound care instructions.  Discharged in stable and improved condition.          /78   Pulse 71   Temp 36.7 °C (98 °F)  "(Temporal)   Resp 18   Ht 1.854 m (6' 1\")   Wt 91.8 kg (202 lb 6.1 oz)   SpO2 96%   BMI 26.70 kg/m²     [facility], Emergency Dept  [address]  [facility] [id number] [phone]  In 10 days  For suture removal, For wound re-check    [facility], Emergency Dept  [address]  [facility] [id number] [phone]    in 12-24 hours if symptoms persist, immediately If symptoms worsen, or if you develop any other symptoms or concerns      FINAL DIAGNOSIS  1. Laceration of left lower extremity, initial encounter Active   2. Contusion of left lower extremity, initial encounter         Electronically signed by: Calvin Yoon M.D.    "

## 2025-05-17 ENCOUNTER — HOSPITAL ENCOUNTER (OUTPATIENT)
Dept: LAB | Facility: MEDICAL CENTER | Age: 80
End: 2025-05-17
Attending: INTERNAL MEDICINE
Payer: MEDICARE

## 2025-05-17 DIAGNOSIS — E11.9 TYPE 2 DIABETES MELLITUS WITHOUT COMPLICATION, WITHOUT LONG-TERM CURRENT USE OF INSULIN (HCC): ICD-10-CM

## 2025-05-17 DIAGNOSIS — E11.42 CONTROLLED TYPE 2 DIABETES MELLITUS WITH DIABETIC POLYNEUROPATHY, WITHOUT LONG-TERM CURRENT USE OF INSULIN (HCC): ICD-10-CM

## 2025-05-17 DIAGNOSIS — E78.5 DYSLIPIDEMIA: ICD-10-CM

## 2025-05-17 LAB
ALBUMIN SERPL BCP-MCNC: 4.3 G/DL (ref 3.2–4.9)
ALBUMIN/GLOB SERPL: 1.3 G/DL
ALP SERPL-CCNC: 71 U/L (ref 30–99)
ALT SERPL-CCNC: 15 U/L (ref 2–50)
ANION GAP SERPL CALC-SCNC: 8 MMOL/L (ref 7–16)
AST SERPL-CCNC: 17 U/L (ref 12–45)
BILIRUB SERPL-MCNC: 0.5 MG/DL (ref 0.1–1.5)
BUN SERPL-MCNC: 28 MG/DL (ref 8–22)
CALCIUM ALBUM COR SERPL-MCNC: 9.4 MG/DL (ref 8.5–10.5)
CALCIUM SERPL-MCNC: 9.6 MG/DL (ref 8.5–10.5)
CHLORIDE SERPL-SCNC: 101 MMOL/L (ref 96–112)
CHOLEST SERPL-MCNC: 115 MG/DL (ref 100–199)
CO2 SERPL-SCNC: 29 MMOL/L (ref 20–33)
CREAT SERPL-MCNC: 1.25 MG/DL (ref 0.5–1.4)
EST. AVERAGE GLUCOSE BLD GHB EST-MCNC: 151 MG/DL
FASTING STATUS PATIENT QL REPORTED: NORMAL
GFR SERPLBLD CREATININE-BSD FMLA CKD-EPI: 58 ML/MIN/1.73 M 2
GLOBULIN SER CALC-MCNC: 3.3 G/DL (ref 1.9–3.5)
GLUCOSE SERPL-MCNC: 135 MG/DL (ref 65–99)
HBA1C MFR BLD: 6.9 % (ref 4–5.6)
HDLC SERPL-MCNC: 47 MG/DL
LDLC SERPL CALC-MCNC: 45 MG/DL
POTASSIUM SERPL-SCNC: 5.1 MMOL/L (ref 3.6–5.5)
PROT SERPL-MCNC: 7.6 G/DL (ref 6–8.2)
SODIUM SERPL-SCNC: 138 MMOL/L (ref 135–145)
TRIGL SERPL-MCNC: 117 MG/DL (ref 0–149)

## 2025-05-17 PROCEDURE — 80061 LIPID PANEL: CPT

## 2025-05-17 PROCEDURE — 36415 COLL VENOUS BLD VENIPUNCTURE: CPT

## 2025-05-17 PROCEDURE — 80053 COMPREHEN METABOLIC PANEL: CPT

## 2025-05-17 PROCEDURE — 83036 HEMOGLOBIN GLYCOSYLATED A1C: CPT | Mod: GA

## 2025-05-19 ENCOUNTER — OFFICE VISIT (OUTPATIENT)
Dept: MEDICAL GROUP | Age: 80
End: 2025-05-19
Payer: MEDICARE

## 2025-05-19 ENCOUNTER — TELEPHONE (OUTPATIENT)
Dept: PHYSICAL THERAPY | Facility: MEDICAL CENTER | Age: 80
End: 2025-05-19

## 2025-05-19 ENCOUNTER — RESULTS FOLLOW-UP (OUTPATIENT)
Dept: MEDICAL GROUP | Age: 80
End: 2025-05-19

## 2025-05-19 VITALS
TEMPERATURE: 98.7 F | OXYGEN SATURATION: 97 % | HEIGHT: 73 IN | BODY MASS INDEX: 26.9 KG/M2 | RESPIRATION RATE: 16 BRPM | HEART RATE: 74 BPM | SYSTOLIC BLOOD PRESSURE: 114 MMHG | DIASTOLIC BLOOD PRESSURE: 68 MMHG | WEIGHT: 203 LBS

## 2025-05-19 DIAGNOSIS — E11.42 CONTROLLED TYPE 2 DIABETES MELLITUS WITH DIABETIC POLYNEUROPATHY, WITHOUT LONG-TERM CURRENT USE OF INSULIN (HCC): ICD-10-CM

## 2025-05-19 DIAGNOSIS — I15.2 HYPERTENSION ASSOCIATED WITH DIABETES (HCC): ICD-10-CM

## 2025-05-19 DIAGNOSIS — G62.9 NEUROPATHY: ICD-10-CM

## 2025-05-19 DIAGNOSIS — E53.8 VITAMIN B12 DEFICIENCY: ICD-10-CM

## 2025-05-19 DIAGNOSIS — L03.116 CELLULITIS OF LEFT LOWER EXTREMITY: Primary | ICD-10-CM

## 2025-05-19 DIAGNOSIS — Z48.02: ICD-10-CM

## 2025-05-19 DIAGNOSIS — E11.59 HYPERTENSION ASSOCIATED WITH DIABETES (HCC): ICD-10-CM

## 2025-05-19 PROBLEM — S81.812A LACERATION OF LEFT LEG: Status: ACTIVE | Noted: 2025-05-19

## 2025-05-19 PROCEDURE — 3074F SYST BP LT 130 MM HG: CPT | Performed by: INTERNAL MEDICINE

## 2025-05-19 PROCEDURE — 3078F DIAST BP <80 MM HG: CPT | Performed by: INTERNAL MEDICINE

## 2025-05-19 PROCEDURE — 15853 REMOVAL SUTR/STAPL XREQ ANES: CPT | Performed by: INTERNAL MEDICINE

## 2025-05-19 PROCEDURE — 99214 OFFICE O/P EST MOD 30 MIN: CPT | Performed by: INTERNAL MEDICINE

## 2025-05-19 RX ORDER — GABAPENTIN 300 MG/1
300-600 CAPSULE ORAL EVERY EVENING
Qty: 180 CAPSULE | Refills: 3 | Status: SHIPPED | OUTPATIENT
Start: 2025-05-19

## 2025-05-19 RX ORDER — CYANOCOBALAMIN (VITAMIN B-12) 2500 MCG
2500 TABLET, SUBLINGUAL SUBLINGUAL DAILY
Qty: 100 TABLET | Refills: 3 | Status: SHIPPED | OUTPATIENT
Start: 2025-05-19

## 2025-05-19 RX ORDER — LOSARTAN POTASSIUM 100 MG/1
100 TABLET ORAL
Qty: 90 TABLET | Refills: 3 | Status: SHIPPED | OUTPATIENT
Start: 2025-05-19

## 2025-05-19 RX ORDER — CEPHALEXIN 500 MG/1
500 CAPSULE ORAL 3 TIMES DAILY
Qty: 21 CAPSULE | Refills: 0 | Status: SHIPPED | OUTPATIENT
Start: 2025-05-19

## 2025-05-19 RX ORDER — AMLODIPINE BESYLATE 10 MG/1
10 TABLET ORAL
Qty: 90 TABLET | Refills: 3 | Status: SHIPPED | OUTPATIENT
Start: 2025-05-19

## 2025-05-19 ASSESSMENT — ENCOUNTER SYMPTOMS
CHILLS: 0
FEVER: 0

## 2025-05-19 ASSESSMENT — FIBROSIS 4 INDEX: FIB4 SCORE: 1.47

## 2025-05-19 NOTE — OP THERAPY DISCHARGE SUMMARY
Outpatient Physical Therapy  DISCHARGE SUMMARY NOTE      [facility]  [address]  [place] [address]  Phone:  [phone]  Fax:  [phone]    Date of Visit: 05/19/2025    Patient: Saleem Whitney  YOB: 1945  MRN: [id number]     Referring Provider: No referring provider defined for this encounter.   Referring Diagnosis No admission diagnoses are documented for this encounter.         Functional Assessment Used        Your patient is being discharged from Physical Therapy with the following comments:   Patient cancelled or missed more than 2 scheduled appointments/non-compliant  Patient has failed to schedule or reschedule follow-up visits    Comments:  Pt was seen in skilled PT for 3 sessions with this provider. Pt has failed to schedule follow up appointments and will be discharged today due to failure to comply with POC.      Limitations Remaining:  Remaining limitations are unknown.     Recommendations:  D/C patient, if pt requests to return to receive additional skilled physical therapy services, please obtain new referral.     Manuela Muro, PT    Date: 5/19/2025

## 2025-05-19 NOTE — PROGRESS NOTES
CC:   Chief Complaint   Patient presents with    Diabetes Follow-up    Suture / Staple Removal     Left leg, X10 days ago     Medication Refill     Vitamin B 12, Amlodipine, losartan, gabapentin,      The primary encounter diagnosis was Cellulitis of left lower extremity. Diagnoses of Vitamin B12 deficiency, Hypertension associated with diabetes (HCC), Neuropathy, Encounter for removal of staples, and Controlled type 2 diabetes mellitus with diabetic polyneuropathy, without long-term current use of insulin (HCC) were also pertinent to this visit.    Verbal consent was acquired by the patient to use [employer] App ambient listening note generation during this visit Yes     History of Present Illness  Saleem is a pleasant 79 y.o. male with a history of type 2 diabetes, hypertension, and hyperlipidemia, presenting for a follow-up on his diabetes. He was last seen on 03/05/2025.     He was seen in the emergency room on 05/09/2025 for a laceration, which was closed with 20 staples.    He reports a recent incident involving a small pond in his yard, which had developed a leak. While attempting to locate the source of the leak, he sustained an injury from a rock. The wound has been tender and erythematous since the day of the incident, with no signs of drainage. He has not experienced any systemic symptoms such as fever or chills. Initially, he kept the wound dry and wrapped but removed the dressing a few days ago, believing it would expedite healing. However, he notes an increase in pain over the past few days. He also mentions that the stitches have been causing discomfort.     He has previously taken antibiotics and has no known allergies.    He is currently on Mounjaro 7.5 mg weekly, metformin 1000 mg twice daily, and glipizide 10 mg daily. His A1c has slightly decreased from 7.0 to 6.9 percent. He reports satisfactory control of his diabetes with Mounjaro 7.5 mg.    He is on cholesterol medication and reports no issues  "with his cholesterol levels.    He is currently taking gabapentin 100 mg 3 times daily for neuropathy but reports that it is not effectively managing his symptoms. He experiences tingling sensations, which have been progressively worsening.    He is on amlodipine and losartan for his hypertension.    He is on prescription strength vitamin B12 and needs refills.    PAST SURGICAL HISTORY:  Knee replacement  Head injury repair with staples    Past Medical History[1]    Current Medications and Prescriptions Ordered in Epic[2]    Review of Systems   Constitutional:  Negative for chills and fever.   All other systems reviewed and are negative.    Objective:     Exam:  /68 (BP Location: Right arm, Patient Position: Sitting, BP Cuff Size: Adult)   Pulse 74   Temp 37.1 °C (98.7 °F) (Temporal)   Resp 16   Ht 1.854 m (6' 1\")   Wt 92.1 kg (203 lb)   SpO2 97%   BMI 26.78 kg/m²  Body mass index is 26.78 kg/m².    Physical Exam  Constitutional:       Appearance: Normal appearance.   HENT:      Head: Normocephalic and atraumatic.   Pulmonary:      Effort: Pulmonary effort is normal. No respiratory distress.   Neurological:      Mental Status: He is alert.   Psychiatric:         Mood and Affect: Mood normal.         Behavior: Behavior normal.         Thought Content: Thought content normal.         Judgment: Judgment normal.       Results: Reviewed and discussed   Latest Reference Range & Units 05/17/25 07:55   Sodium 135 - 145 mmol/L 138   Potassium 3.6 - 5.5 mmol/L 5.1   Chloride 96 - 112 mmol/L 101   Co2 20 - 33 mmol/L 29   Anion Gap 7.0 - 16.0  8.0   Glucose 65 - 99 mg/dL 135 (H)   Bun 8 - 22 mg/dL 28 (H)   Creatinine 0.50 - 1.40 mg/dL 1.25   GFR (CKD-EPI) >60 mL/min/1.73 m 2 58 !   Calcium 8.5 - 10.5 mg/dL 9.6   Correct Calcium 8.5 - 10.5 mg/dL 9.4   AST(SGOT) 12 - 45 U/L 17   ALT(SGPT) 2 - 50 U/L 15   Alkaline Phosphatase 30 - 99 U/L 71   Total Bilirubin 0.1 - 1.5 mg/dL 0.5   Albumin 3.2 - 4.9 g/dL 4.3   Total " Protein 6.0 - 8.2 g/dL 7.6   Globulin 1.9 - 3.5 g/dL 3.3   A-G Ratio g/dL 1.3   Glycohemoglobin 4.0 - 5.6 % 6.9 (H)   Estim. Avg Glu mg/dL 151   Fasting Status  Fasting   Cholesterol,Tot 100 - 199 mg/dL 115   Triglycerides 0 - 149 mg/dL 117   HDL >=40 mg/dL 47   LDL <100 mg/dL 45   (H): Data is abnormally high  !: Data is abnormal  Results      Assessment & Plan:   Saleem  is a pleasant 79 y.o. male with the following -   Assessment & Plan  1. Type 2 Diabetes Mellitus.  - His A1c has slightly decreased from 7.0 to 6.9%.  - He will continue his current regimen of Mounjaro 7.5 mg weekly, metformin 1000 mg twice daily, and glipizide 10 mg daily.  - A quick foot exam was performed, and pulses and sensation were found to be normal.  - He will continue monitoring his blood glucose levels.    2. Hyperlipidemia.  - His cholesterol levels are within the normal range.  - He will continue taking atorvastatin 10 mg daily.  - Recent lab results indicate that his cholesterol levels are well-managed.  - No changes in medication are necessary at this time.    3. Infected wound on the left lower extremity.  - The wound appears to be mildly infected, with redness and warmth persisting 10 days post-injury.  - There is no MRSA risk.  - A course of Keflex will be initiated. He is advised to keep the wound clean with soap and water and avoid submerging it in water.  - He will return on Wednesday for a follow-up to assess the progress. If the wound does not improve, a referral to wound care may be necessary.    4. Staples removal.  - A total of 10 staples were removed during this visit, with 10 remaining.  - The remaining staples will be removed on Wednesday.  - The wound was cleaned and inspected, and the remaining staples were left in place to ensure proper healing.  - Follow-up for staple removal is scheduled for Wednesday.    5. Neuropathy.  - He reports that his current dose of gabapentin (100 mg, capsules daily) is not effectively  managing his symptoms.  - The dosage will be adjusted to 300 mg, with the option to take 1-2 pills as needed.   - Monitoring of symptoms and effectiveness of the adjusted dosage will be necessary.    6. Hypertension.  - He will continue his current medications, amlodipine and losartan.  - Blood pressure management remains stable with the current regimen.  - No changes in medication are necessary at this time.  - Regular monitoring of blood pressure will continue.    7. Vitamin B12 deficiency.  - Refills for prescription-strength vitamin B12 will be provided.  - He will continue taking the prescribed vitamin B12.  - Monitoring of vitamin B12 levels will be necessary to ensure adequate supplementation.  - No changes in supplementation are necessary at this time.    Follow-up  - The patient will follow up on Wednesday.    PROCEDURE  Procedure: Staples Removal from Left Lower Extremity    All questions were answered and agreement to proceed was given after the following Pre-Procedure details were reviewed:  - Risks and Benefits: Discussed potential for infection, pain, and benefits of removal.  - Side effects: Pain and discomfort during removal.  - Consent: Verbal consent obtained.    Intra-Procedure:  - Time-Out: Confirmed patient identity, procedure, and site.  - Site Preparation: Cleaned with alcohol.    Post-Procedure:  - Tolerance Level: Patient tolerated the procedure well.  - Home Care Instructions: Keep the area clean with soap and water, avoid submerging in water, monitor for signs of infection, and return for follow-up on Wednesday.    Problem List Items Addressed This Visit       (HCC) Hypertension associated with diabetes    Relevant Medications    amLODIPine (NORVASC) 10 MG Tab    losartan (COZAAR) 100 MG Tab    Controlled type 2 diabetes mellitus with diabetic polyneuropathy, without long-term current use of insulin (HCC)    Relevant Medications    gabapentin (NEURONTIN) 300 MG Cap    Other Relevant Orders     Diabetic Monofilament LE Exam (Completed)    Encounter for removal of staples    Neuropathy    Relevant Medications    gabapentin (NEURONTIN) 300 MG Cap    Vitamin B12 deficiency    Relevant Medications    Cyanocobalamin (VITAMIN B-12) 2500 MCG SL Tab     Other Visit Diagnoses         Cellulitis of left lower extremity    -  Primary    Relevant Medications    cephALEXin (KEFLEX) 500 MG Cap          Wednesday WOUND STAPLES REMOVAL     Please note that this dictation was created using voice recognition software. I have made every reasonable attempt to correct obvious errors, but I expect that there are errors of grammar and possibly content that I did not discover before finalizing the note.       [1]   Past Medical History:  Diagnosis Date    Bowel habit changes 06/2023    bowel blockage    Cancer (HCC)     kidney    Cataract     Dental disorder     implants    Elevated serum creatinine 07/22/2022    Hyperlipidemia     Hypertension     Hypothyroid     Pneumonia 2020    Renal disorder     kidney ca    Snoring     no sleep study    Type II or unspecified type diabetes mellitus without mention of complication, not stated as uncontrolled    [2]   Current Outpatient Medications Ordered in Epic   Medication Sig Dispense Refill    Cyanocobalamin (VITAMIN B-12) 2500 MCG SL Tab Place 1 Tablet under the tongue every day. 100 Tablet 3    amLODIPine (NORVASC) 10 MG Tab Take 1 Tablet by mouth every day. 90 Tablet 3    losartan (COZAAR) 100 MG Tab Take 1 Tablet by mouth every day. 90 Tablet 3    gabapentin (NEURONTIN) 300 MG Cap Take 1-2 Capsules by mouth every evening. 180 Capsule 3    cephALEXin (KEFLEX) 500 MG Cap Take 1 Capsule by mouth 3 times a day. 21 Capsule 0    DULoxetine (CYMBALTA) 60 MG Cap DR Particles delayed-release capsule Take 1 Capsule by mouth every day. 90 Capsule 3    glipiZIDE (GLUCOTROL) 10 MG Tab Take 1 Tablet by mouth every day. 90 Tablet 3    levothyroxine (SYNTHROID) 88 MCG Tab Take 1 Tablet by mouth  every morning on an empty stomach. 90 Tablet 3    Tirzepatide (MOUNJARO) 7.5 MG/0.5ML Solution Auto-injector Inject 7.5 mg under the skin every 7 days. 6 mL 1    atorvastatin (LIPITOR) 10 MG Tab TAKE 1 TABLET EVERY DAY 90 Tablet 3    tamsulosin (FLOMAX) 0.4 MG capsule TAKE 2 CAPSULES EVERY  Capsule 3    metformin (GLUCOPHAGE) 1000 MG tablet Take 1 Tablet by mouth 2 times a day with meals. 180 Tablet 3    vitamin D (CHOLECALCIFEROL) 1000 UNIT Tab Take 1 Tab by mouth every day. 90 Tab 3     No current Epic-ordered facility-administered medications on file.

## 2025-05-21 ENCOUNTER — OFFICE VISIT (OUTPATIENT)
Dept: MEDICAL GROUP | Age: 80
End: 2025-05-21
Payer: MEDICARE

## 2025-05-21 VITALS
HEIGHT: 73 IN | BODY MASS INDEX: 27.04 KG/M2 | SYSTOLIC BLOOD PRESSURE: 130 MMHG | OXYGEN SATURATION: 98 % | TEMPERATURE: 98.6 F | DIASTOLIC BLOOD PRESSURE: 78 MMHG | HEART RATE: 90 BPM | WEIGHT: 204 LBS

## 2025-05-21 DIAGNOSIS — E11.9 TYPE 2 DIABETES MELLITUS WITHOUT COMPLICATION, WITHOUT LONG-TERM CURRENT USE OF INSULIN (HCC): ICD-10-CM

## 2025-05-21 DIAGNOSIS — E11.59 HYPERTENSION ASSOCIATED WITH DIABETES (HCC): ICD-10-CM

## 2025-05-21 DIAGNOSIS — S81.802D WOUND OF LEFT LOWER EXTREMITY, SUBSEQUENT ENCOUNTER: Primary | ICD-10-CM

## 2025-05-21 DIAGNOSIS — E78.5 DYSLIPIDEMIA: ICD-10-CM

## 2025-05-21 DIAGNOSIS — M65.332 TRIGGER FINGER, LEFT MIDDLE FINGER: Chronic | ICD-10-CM

## 2025-05-21 DIAGNOSIS — E11.42 CONTROLLED TYPE 2 DIABETES MELLITUS WITH DIABETIC POLYNEUROPATHY, WITHOUT LONG-TERM CURRENT USE OF INSULIN (HCC): ICD-10-CM

## 2025-05-21 DIAGNOSIS — Z48.02: ICD-10-CM

## 2025-05-21 DIAGNOSIS — E55.9 VITAMIN D DEFICIENCY: ICD-10-CM

## 2025-05-21 DIAGNOSIS — I15.2 HYPERTENSION ASSOCIATED WITH DIABETES (HCC): ICD-10-CM

## 2025-05-21 DIAGNOSIS — D23.4 CYST, DERMOID, SCALP AND NECK: Chronic | ICD-10-CM

## 2025-05-21 ASSESSMENT — FIBROSIS 4 INDEX: FIB4 SCORE: 1.47

## 2025-05-21 ASSESSMENT — ENCOUNTER SYMPTOMS
FEVER: 0
CHILLS: 0

## 2025-05-21 NOTE — PROGRESS NOTES
"CC:   Chief Complaint   Patient presents with    Suture / Staple Removal     The primary encounter diagnosis was Wound of left lower extremity, subsequent encounter. Diagnoses of Trigger finger, left middle finger, Hypertension associated with diabetes (HCC), Type 2 diabetes mellitus without complication, without long-term current use of insulin (HCC), Dyslipidemia, Vitamin D deficiency, Cyst, dermoid, scalp and neck, Controlled type 2 diabetes mellitus with diabetic polyneuropathy, without long-term current use of insulin (HCC), and Encounter for removal of staples were also pertinent to this visit.  Verbal consent was acquired by the patient to use [employer] ambient listening note generation during this visit Yes     History of Present Illness  Saleem is a pleasant 79 y.o. male presenting for follow-up.    He reports no significant change in his wound appearance. He has been adhering to the prescribed antibiotic regimen of Keflex, taking two doses on 05/19/2025, three on 05/20/2025, and two on 05/21/2025. He has not applied any topical treatments to the wound but attempted to clean it with soap and water on 05/20/2025.     He also reports the presence of two cysts on the posterior aspect of his neck, which have been present for several years and are persistently pruritic. He has not consulted a dermatologist for this issue.    Additionally, he has a history of trigger finger in his left middle finger, for which he received cortisone injections in the past.    He has type 2 diabetes.    Past Medical History[1]    Current Medications and Prescriptions Ordered in Epic[2]    Review of Systems   Constitutional:  Negative for chills and fever.   All other systems reviewed and are negative.    Objective:     Exam:  /78 (BP Location: Right arm, Patient Position: Sitting, BP Cuff Size: Adult)   Pulse 90   Temp 37 °C (98.6 °F) (Temporal)   Ht 1.854 m (6' 1\")   Wt 92.5 kg (204 lb)   SpO2 98%   BMI 26.91 kg/m²  " Body mass index is 26.91 kg/m².    Physical Exam  Constitutional:       Appearance: Normal appearance.   HENT:      Head: Normocephalic and atraumatic.   Pulmonary:      Effort: Pulmonary effort is normal. No respiratory distress.   Skin:            Comments: Left lower extremity wound: Staples successfully removed. Small amount of serosanguineous drainage noted, with surrounding erythema present.   Neurological:      Mental Status: He is alert.   Psychiatric:         Mood and Affect: Mood normal.         Behavior: Behavior normal.         Thought Content: Thought content normal.         Judgment: Judgment normal.         Results:  Results      Assessment & Plan:   Saleem  is a pleasant 79 y.o. male with the following -     Assessment & Plan  1. Wound on the left lower extremity.  - The wound continues to exhibit minor oozing of serosanguineous fluid. The swelling has shown improvement.  - All staples were successfully removed without any complications. A sterile wound dressing was applied.   - Comprehensive wound care instructions were provided. He was advised to maintain the wound's cleanliness and dryness, and to keep it covered to prevent dog hair from entering.   - He was instructed to continue with the Keflex regimen. A referral to wound care was made for further management.    2. Trigger finger in the left middle finger.  - A referral to a hand specialist at [facility] was initiated for further evaluation and management.    3. Posterior neck cyst.  - He presents with two cysts on the posterior neck that have been present for several years.  - He was advised to schedule an appointment with his dermatologist for further evaluation.  - If the dermatologist deems the cysts too extensive for removal, a referral to a general surgeon will be considered.    4. Type 2 diabetes.  - The condition is currently under control.  - The current medication regimen of Mounjaro 7.5 mg, metformin 1000 mg twice daily,  and Jardiance 10 mg daily will be continued.    5. Encounter for staple removal.  - A total of 10 staples were successfully removed without any complications.    PROCEDURE  Procedure: Staples removal from the wound on the left lower extremity    All questions were answered and agreement to proceed was given after the following Pre-Procedure details were reviewed:  - Risks and Benefits: Discussed the potential for minor pain during removal and benefits of preventing infection and promoting healing.  - Side effects: Discussed potential minor bleeding and discomfort during removal.  - Consent: Verbal consent obtained.    Intra-Procedure:  - Time-Out: Confirmed patient's identity and procedure details.  - Site Preparation: Cleaned the wound area with alcohol.  - Hemostasis: Monitored for bleeding; none observed.  - Dressing: Applied sterile wound dressing.    Post-Procedure:  - Tolerance Level: Patient tolerated the procedure well.  - Complications: None observed.  - Home Care Instructions: Advised to keep the wound covered, elevated, and continue taking antibiotics. Referral to wound care for further management was provided.    Problem List Items Addressed This Visit       (HCC) Hypertension associated with diabetes    Relevant Orders    Comp Metabolic Panel    TSH WITH REFLEX TO FT4    Controlled type 2 diabetes mellitus with diabetic polyneuropathy, without long-term current use of insulin (HCC)    Cyst, dermoid, scalp and neck    Dyslipidemia    Relevant Orders    Lipid Profile    CBC WITH DIFFERENTIAL    Comp Metabolic Panel    TSH WITH REFLEX TO FT4    Encounter for removal of staples    Vitamin D deficiency    Relevant Orders    VITAMIN D,25 HYDROXY (DEFICIENCY)     Other Visit Diagnoses         Wound of left lower extremity, subsequent encounter    -  Primary    Relevant Orders    Referral to Wound Clinic    Comp Metabolic Panel      Trigger finger, left middle finger  (Chronic)       Relevant Orders    Referral  to Hand Surgery      Type 2 diabetes mellitus without complication, without long-term current use of insulin (HCC)        Relevant Orders    HEMOGLOBIN A1C          Follow-up  - The patient is scheduled for a follow-up visit in 3 months.    Please note that this dictation was created using voice recognition software. I have made every reasonable attempt to correct obvious errors, but I expect that there are errors of grammar and possibly content that I did not discover before finalizing the note.       [1]   Past Medical History:  Diagnosis Date    Bowel habit changes 06/2023    bowel blockage    Cancer (HCC)     kidney    Cataract     Dental disorder     implants    Elevated serum creatinine 07/22/2022    Hyperlipidemia     Hypertension     Hypothyroid     Pneumonia 2020    Renal disorder     kidney ca    Snoring     no sleep study    Type II or unspecified type diabetes mellitus without mention of complication, not stated as uncontrolled    [2]   Current Outpatient Medications Ordered in Epic   Medication Sig Dispense Refill    Cyanocobalamin (VITAMIN B-12) 2500 MCG SL Tab Place 1 Tablet under the tongue every day. 100 Tablet 3    amLODIPine (NORVASC) 10 MG Tab Take 1 Tablet by mouth every day. 90 Tablet 3    losartan (COZAAR) 100 MG Tab Take 1 Tablet by mouth every day. 90 Tablet 3    gabapentin (NEURONTIN) 300 MG Cap Take 1-2 Capsules by mouth every evening. 180 Capsule 3    cephALEXin (KEFLEX) 500 MG Cap Take 1 Capsule by mouth 3 times a day. 21 Capsule 0    DULoxetine (CYMBALTA) 60 MG Cap DR Particles delayed-release capsule Take 1 Capsule by mouth every day. 90 Capsule 3    glipiZIDE (GLUCOTROL) 10 MG Tab Take 1 Tablet by mouth every day. 90 Tablet 3    levothyroxine (SYNTHROID) 88 MCG Tab Take 1 Tablet by mouth every morning on an empty stomach. 90 Tablet 3    Tirzepatide (MOUNJARO) 7.5 MG/0.5ML Solution Auto-injector Inject 7.5 mg under the skin every 7 days. 6 mL 1    atorvastatin (LIPITOR) 10 MG Tab TAKE  1 TABLET EVERY DAY 90 Tablet 3    tamsulosin (FLOMAX) 0.4 MG capsule TAKE 2 CAPSULES EVERY  Capsule 3    metformin (GLUCOPHAGE) 1000 MG tablet Take 1 Tablet by mouth 2 times a day with meals. 180 Tablet 3    vitamin D (CHOLECALCIFEROL) 1000 UNIT Tab Take 1 Tab by mouth every day. 90 Tab 3     No current Epic-ordered facility-administered medications on file.

## 2025-05-29 NOTE — Clinical Note
REFERRAL APPROVAL NOTICE         Sent on May 29, 2025                   Saleem Bolaños Chelo  [address] [place] [address]                   Dear Mr. Whitney,    After a careful review of the medical information and benefit coverage, [facility] has processed your referral. See below for additional details.    If applicable, you must be actively enrolled with your insurance for coverage of the authorized service. If you have any questions regarding your coverage, please contact your insurance directly.    REFERRAL INFORMATION   Referral #:  [id number]  Referred-To Department    Referred-By Provider:  Hand Surgery    Cathryn Yadav M.D.   [address] [place] [address]  [phone] [address] [place] [address]  [place] [address]  [phone]    Referral Start Date:  05/21/2025  Referral End Date:   05/21/2026             SCHEDULING  If you do not already have an appointment, please call [phone] to make an appointment.     MORE INFORMATION  If you do not already have a [employer] account, sign up at: [email or web address]  You can access your medical information, make appointments, see lab results, billing information, and more.  If you have questions regarding this referral, please contact  the [facility] Referrals department at:             [phone]. Monday - Friday 8:00AM - 5:00PM.     Sincerely,    [facility]

## 2025-05-29 NOTE — Clinical Note
REFERRAL APPROVAL NOTICE         Sent on May 29, 2025                   Saleem Bolaños Chelo  [address] [place] [address]                   Dear Mr. Langaidee,    After a careful review of the medical information and benefit coverage, [facility] has processed your referral. See below for additional details.    If applicable, you must be actively enrolled with your insurance for coverage of the authorized service. If you have any questions regarding your coverage, please contact your insurance directly.    REFERRAL INFORMATION   Referral #:  [id number]  Referred-To Department    Referred-By Provider:  Wound Care    Cathryn Yadav M.D.   [facility]      [address] [place] [address]  [phone] [address] [facility] [address]  [place] [id number] [phone]    Referral Start Date:  05/21/2025  Referral End Date:   05/21/2026             SCHEDULING  If you do not already have an appointment, please call [phone] to make an appointment.     MORE INFORMATION  If you do not already have a [employer] account, sign up at: "[email or web address]  You can access your medical information, make appointments, see lab results, billing information, and more.  If you have questions regarding this referral, please contact  the [facility] Referrals department at:             [phone]. Monday - Friday 8:00AM - 5:00PM.     Sincerely,    [facility]

## 2025-06-03 ENCOUNTER — OFFICE VISIT (OUTPATIENT)
Dept: WOUND CARE | Facility: MEDICAL CENTER | Age: 80
End: 2025-06-03
Attending: NURSE PRACTITIONER
Payer: MEDICARE

## 2025-06-03 DIAGNOSIS — L08.9 WOUND INFECTION: ICD-10-CM

## 2025-06-03 DIAGNOSIS — T14.8XXA WOUND INFECTION: ICD-10-CM

## 2025-06-03 DIAGNOSIS — E11.42 CONTROLLED TYPE 2 DIABETES MELLITUS WITH DIABETIC POLYNEUROPATHY, WITHOUT LONG-TERM CURRENT USE OF INSULIN (HCC): ICD-10-CM

## 2025-06-03 DIAGNOSIS — R60.0 EDEMA OF LEFT LOWER EXTREMITY: ICD-10-CM

## 2025-06-03 DIAGNOSIS — S81.812D LACERATION OF LEFT LOWER EXTREMITY, SUBSEQUENT ENCOUNTER: Primary | ICD-10-CM

## 2025-06-03 PROCEDURE — 99214 OFFICE O/P EST MOD 30 MIN: CPT

## 2025-06-03 PROCEDURE — 11042 DBRDMT SUBQ TIS 1ST 20SQCM/<: CPT | Performed by: NURSE PRACTITIONER

## 2025-06-03 PROCEDURE — 11042 DBRDMT SUBQ TIS 1ST 20SQCM/<: CPT

## 2025-06-03 PROCEDURE — 99213 OFFICE O/P EST LOW 20 MIN: CPT

## 2025-06-03 PROCEDURE — 99214 OFFICE O/P EST MOD 30 MIN: CPT | Mod: 25 | Performed by: NURSE PRACTITIONER

## 2025-06-03 ASSESSMENT — ENCOUNTER SYMPTOMS
FEVER: 0
SHORTNESS OF BREATH: 0
CHILLS: 0
COUGH: 0
DIARRHEA: 0
CLAUDICATION: 0
BACK PAIN: 0
NAUSEA: 0
CONSTIPATION: 0
VOMITING: 0

## 2025-06-03 NOTE — PATIENT INSTRUCTIONS
"The following information is a summary of the education provided in the clinic today. This is not an exhaustive list of the education provided during your appointment.       DRESSING CHANGES    Keep your wound dressing clean, dry, and intact. Change your dressing every 2-3 days AND/OR if the dressing becomes, soiled, leaks, gets wet, or falls off.      You may  shower with the dressing(s) off. Please do not take baths, or swim in the ocean, lakes, rivers, pools, or hot tubs.      Wounds do not need to \"air out\" or \"breathe\". Gently dry your wound before placing a new dressing.     After you get out of the shower, wash the wound a second time (with soap and water, wound cleanser, or saline). Gently dry the wound before you place a new dressing.       If you need to change your dressings at home, you should wash your wound. Use normal saline, wound cleanser, hypochlorous acid, or unscented soap and water. Do not use hydrogen peroxide or rubbing alcohol to clean your wounds. Hydrogen peroxide and rubbing alcohol will damage new cells and tissue. Do not use betadine or iodine unless told to by your wound care team.    Do not soak your wounds in epsom salt baths. This can worsen your wound(s) or delay wound healing. It can also lead to infection or maceration (tissue is too wet).     If you do not have home health, the clinic will give you with leftover supplies from your appointment. We do not give out extra dressing supplies. We will order you supplies through a [email or web address] company. Your insurance may or may not pay for all these supplies. The company will reach out to you if insurance does not cover supplies. These supplies will be sent to your home within a few days. If you do have home health, they will provide wound care supplies.     The dressings we use may change as your wound changes.     COMPRESSION   -Compression helps reduce swelling and helps wounds heal quicker. It is still important to elevate your feet several " times daily to reduce swelling, even when you are wearing compression. It is important to wear compression every day, to help your wound heal, and to prevent new wounds from developing.       GENERAL HEALTH ADVICE   -High blood sugar, like with diabetes, can delay or reverse wound healing by impacting your immune system. It also increases the chances of infection. Wounds heal best when your blood sugar is consistently less than 140 mg/dL. It is important to check your blood sugar regularly.      -People with loss of protective sensation, also called neuropathy, might not be able to feel their feet well. It is important to check your feet every day, as you may not feel new injuries. You should also check your shoes every day, to make sure there are no objects inside the shoe that may hurt you. Check for rough areas inside the shoe as well. Never walk barefoot, even in your own home. Wear rubber-soled shoes to protect your feet.     -Nutrition is important for wound healing. Unless told otherwise by your doctor, eat more protein and consider supplementing with a multi-vitamin, zinc, and vitamin C.       CLINIC INFORMATION  The clinic's hours are Monday-Friday, 7:30 AM to 5:00 PM. We are closed most holidays and on weekends. If you leave us a message, please allow 24 hours for someone to return your call. If you have concerns or are having a medical emergency, call 911 or go to the hospital emergency room.     You might not see the same nurse or provider every visit.     If you notice any large changes in your wound(s), or signs of infection (redness, swelling, localized heat, increased pain, fever > 101 F, chills, nausea/vomiting) or have any questions about your home care instructions, please call the wound center at [phone]. If it's after hours, contact your primary care physician or go to the hospital emergency room. If you are admitted to any hospital, you will need a new referral to come back to the wound  clinic. Any wound care appointments that you already have may be cancelled.    If you are 5 or more minutes late for an appointment, we reserve the right to cancel and reschedule that appointment. For example, if your appointment is at 1:00 PM, and you arrive at 1:06 PM, you are more than five minutes late and might not be seen. If you are consistently late or not coming to your appointments (typically 3 late cancellations and/or no shows), we reserve the right to cancel your future appointments or discharge you from the clinic. It is then your responsibility to obtain a new referral if wound care is still needed.

## 2025-06-03 NOTE — PROGRESS NOTES
Provider Encounter- Full Thickness wound    HISTORY OF PRESENT ILLNESS  Wound History:    START OF CARE IN CLINIC: 6/3/2025    REFERRING PROVIDER: Cathryn Yadav      WOUND- Full Thickness Wound   LOCATION: Left distal medial lower leg   HISTORY: Patient injured his leg while working in his yard, dropped a rock, creating a large laceration.  He presented to the emergency room on day of injury, 5/9.  Laceration was closed with staples, wound dressed, patient was instructed to return 14 days later for staple removal.  Staples were removed 10 days later, at which time the wound partially opened.  He was prescribed Keflex, referred to the outpatient wound clinic.    Pertinent Medical History: Controlled DM2, hypertension, arthritis, idiopathic gout, neuropathy, PVD    TOBACCO USE: Former smoker, quit in 1971    Patient's problem list, allergies, and current medications reviewed and updated in Epic    Interval History:  6/3/2025 : Initial clinic visit with SERENA Onofre, CLAIRE, ARACELISN, CFGRISELDA.   Patient states he is feeling well,denies fevers, chills, nausea, vomiting, cough or shortness of breath.  He has completed his antibiotics.  Wound does not appear to be infected.  He does have significant swelling in his left leg, states left leg has been more swollen than right leg for several years.  He normally wears compression socks      REVIEW OF SYSTEMS:   Review of Systems   Constitutional:  Negative for chills and fever.   Respiratory:  Negative for cough and shortness of breath.    Cardiovascular:  Negative for chest pain and claudication.        Swelling in both legs, left leg significantly worse than the right.  Per patient, this was true even before his injury.   Gastrointestinal:  Negative for constipation, diarrhea, nausea and vomiting.   Genitourinary:  Negative for dysuria.   Musculoskeletal:  Negative for back pain.       PHYSICAL EXAMINATION:   There were no vitals taken for this visit.    Physical  Exam  Cardiovascular:      Pulses: Normal pulses.      Comments: Left DP pulse easily palpated, foot warm  Pulmonary:      Effort: Pulmonary effort is normal.   Musculoskeletal:      Left lower leg: Edema present.      Comments: 2+ pitting edema of left lower extremity  Trace edema right lower extremity   Skin:     Comments: Full-thickness wounds x 2 to distal medial left lower extremity: Initial assessment.  Both wounds are covered with dry eschar.  Full-thickness wound exposed with debridement.  Scant drainage.  No periwound erythema or induration.   Neurological:      Mental Status: He is alert and oriented to person, place, and time.         WOUND ASSESSMENT  Wound 05/17/23 Incision Abdomen dermabond (Active)   Number of days: 748       Wound 12/05/23 Incision Knee Left acticoat opsite (Active)   Number of days: 546       Wound 06/03/25 Pretibial Distal;Medial Left (Active)   Wound Image    06/03/25 1400   Site Assessment Pink;Red;Yellow;Tan 06/03/25 1400   Periwound Assessment Dry;Intact;Scar tissue;Edema 06/03/25 1400   Margins Attached edges 06/03/25 1400   Closure Secondary intention 06/03/25 1400   Drainage Amount MARGY 06/03/25 1400   Treatments Cleansed;Topical Lidocaine;Provider debridement;Site care 06/03/25 1400   Wound Cleansing Hypochlorus Acid 06/03/25 1400   Periwound Protectant No-sting Skin Prep;Silicone barrier cream 06/03/25 1400   Dressing Changed New 06/03/25 1400   Dressing Cleansing/Solutions Not Applicable 06/03/25 1400   Dressing Options Hydrofera Blue Ready;Silicone Adhesive Foam;Elastic tubular bandage 06/03/25 1400   Dressing Change/Treatment Frequency Every 72 hrs, and As Needed 06/03/25 1400   Wound Team Following Weekly 06/03/25 1400   Wound Length (cm) 2.3 cm 06/03/25 1400   Wound Width (cm) 1.1 cm 06/03/25 1400   Wound Surface Area (cm^2) 1.99 cm^2 06/03/25 1400   Post-Procedure Length (cm) 1.8 cm 06/03/25 1400   Post-Procedure Width (cm) 1.4 cm 06/03/25 1400   Post-Procedure Depth  (cm) 0.2 cm 06/03/25 1400   Post-Procedure Surface Area (cm^2) 1.98 cm^2 06/03/25 1400   Post-Procedure Volume (cm^3) 0.264 cm^3 06/03/25 1400   Tunneling (cm) 0 cm 06/03/25 1400   Undermining (cm) 0 cm 06/03/25 1400   Wound Odor None 06/03/25 1400   Exposed Structures None 06/03/25 1400   Number of days: 0       Wound 06/03/25 Pretibial Proximal;Medial Left (Active)   Wound Image    06/03/25 1400   Site Assessment Pink;Red 06/03/25 1400   Periwound Assessment Dry;Intact;Edema;Scar tissue 06/03/25 1400   Margins Attached edges 06/03/25 1400   Closure Secondary intention 06/03/25 1400   Drainage Amount MARGY 06/03/25 1400   Wound Cleansing Hypochlorus Acid 06/03/25 1400   Periwound Protectant Skin Protectant Wipes to Periwound;Silicone barrier cream 06/03/25 1400   Dressing Changed New 06/03/25 1400   Dressing Cleansing/Solutions Hypochlorous acid 06/03/25 1400   Dressing Options Hydrofera Blue Ready;Silicone Adhesive Foam;Elastic tubular bandage 06/03/25 1400   Dressing Change/Treatment Frequency Every 72 hrs, and As Needed 06/03/25 1400   Wound Team Following Weekly 06/03/25 1400   Wound Length (cm) 1.5 cm 06/03/25 1400   Wound Width (cm) 0.4 cm 06/03/25 1400   Wound Surface Area (cm^2) 0.47 cm^2 06/03/25 1400   Post-Procedure Length (cm) 1.5 cm 06/03/25 1400   Post-Procedure Width (cm) 0.4 cm 06/03/25 1400   Post-Procedure Depth (cm) 0.1 cm 06/03/25 1400   Post-Procedure Surface Area (cm^2) 0.47 cm^2 06/03/25 1400   Post-Procedure Volume (cm^3) 0.031 cm^3 06/03/25 1400   Tunneling (cm) 0 cm 06/03/25 1400   Undermining (cm) 0 cm 06/03/25 1400   Wound Odor None 06/03/25 1400   Exposed Structures None 06/03/25 1400   Number of days: 0         PROCEDURE:   -2% viscous lidocaine applied topically to wound bed for approximately 5 minutes prior to debridement  - Forceps used to debride wound bed.  Excisional debridement was performed to remove devitalized tissue until healthy, bleeding tissue was visualized.   Entire  surface of wound, 2.45 cm² debrided.  Tissue debrided into the subcutaneous layer.    -Bleeding controlled with manual pressure.    -Wound care completed by wound RN, refer to flowsheet  -Patient tolerated the procedure well, without c/o pain or discomfort.       Pertinent Labs and Diagnostics:    Labs:     A1c:   Lab Results   Component Value Date/Time    HBA1C 6.9 (H) 2025 07:55 AM          IMAGIN2025-x-ray of left tibia and fibula  IMPRESSION:     No evidence of fracture or dislocation.      VASCULAR STUDIES: No recent studies found in epic    LAST  WOUND CULTURE:  DATE :   Lab Results   Component Value Date/Time    CULTRSULT  2023 05:25 AM     No growth after 5 days of incubation.  Blood culture testing and Gram stain, if indicated, are  performed at [facility], [address] [place] [address].  Positive blood cultures are  sent to [facility], [address] [place] [address], for organism identification and  susceptibility testing.      CULTRSULT  2023 05:25 AM     No growth after 5 days of incubation.  Blood culture testing and Gram stain, if indicated, are  performed at [facility], [address] [place] [address].  Positive blood cultures are  sent to [facility], [address] [place] [address], for organism identification and  susceptibility testing.           ASSESSMENT AND PLAN:     1. Laceration of left lower extremity, subsequent encounter  2. Wound infection    6/3/2025: Patient injured his leg on  after dropping a rock onto his shin.  X-ray negative for fracture.  Laceration was stapled.  Partial dehiscence and infection noted at time of staple removal.  -Excisional debridement of wound in clinic today, medically necessary to promote wound healing.  -Patient to return to clinic weekly for assessment and debridement  -Patient to change dressing 1-2 times per week in between  clinic visits   - Patient advised to resume wearing his own compression sock to this leg when he gets home  - He just completed 10-day course of Augmentin.  No evidence of infection today.  Monitor each clinic visit    .  Wound care: Hydrofera Blue ready, foam cover dressing, Tubigrip    3. Edema of left lower extremity    6/3/2025: Patient's left lower extremity significantly more edematous than his right.  He states that this has been this way for many years.  - Patient to resume wearing his own compression socks  - Consider 2 layer compression wrap if no improvement in wounds by next visit    4. Controlled type 2 diabetes mellitus with diabetic polyneuropathy, without long-term current use of insulin ([facility])    6/3/2025: Patient does not check his blood sugars routinely.  - Currently well-controlled with an A1c of 6.9%.          PATIENT EDUCATION  - Importance of adequate nutrition for wound healing  -Advised to go to ER for any increased redness, swelling, drainage, or odor, or if patient develops fever, chills, nausea or vomiting.     My total time spent caring for the patient on the day of the encounter was 30 minutes.   This does not include time spent on separately billable procedures/tests.       Please note that this note may have been created using voice recognition software. I have worked with technical experts from [employer] to optimize the interface.  I have made every reasonable attempt to correct obvious errors, but there may be errors of grammar and possibly content that I did not discover before finalizing the note.    N

## 2025-06-03 NOTE — PROGRESS NOTES
The patient’s wounds were debrided today by the provider in the wound care clinic. Following the procedure, the patient was educated on current dressing selections, including the benefits of Hydrofera Blue Ready, proper application techniques, recommended dressing change frequency, and indications for earlier changes if the dressing becomes soiled, saturated, or dislodged. Additionally, the patient was educated on the importance of wearing compression stockings to support wound healing by helping to reduce edema and promote venous return. The role of compression in managing excess fluid in the lower extremities was emphasized. The patient was agreeable to the current wound care and compression plan and verbalized understanding.

## 2025-06-10 ENCOUNTER — OFFICE VISIT (OUTPATIENT)
Dept: WOUND CARE | Facility: MEDICAL CENTER | Age: 80
End: 2025-06-10
Attending: NURSE PRACTITIONER
Payer: MEDICARE

## 2025-06-10 DIAGNOSIS — E11.42 CONTROLLED TYPE 2 DIABETES MELLITUS WITH DIABETIC POLYNEUROPATHY, WITHOUT LONG-TERM CURRENT USE OF INSULIN (HCC): ICD-10-CM

## 2025-06-10 DIAGNOSIS — L73.9 FOLLICULITIS: ICD-10-CM

## 2025-06-10 DIAGNOSIS — R60.0 EDEMA OF LEFT LOWER EXTREMITY: ICD-10-CM

## 2025-06-10 DIAGNOSIS — T14.8XXA WOUND INFECTION: ICD-10-CM

## 2025-06-10 DIAGNOSIS — L08.9 WOUND INFECTION: ICD-10-CM

## 2025-06-10 DIAGNOSIS — S81.812D LACERATION OF LEFT LOWER EXTREMITY, SUBSEQUENT ENCOUNTER: Primary | ICD-10-CM

## 2025-06-10 PROCEDURE — 11042 DBRDMT SUBQ TIS 1ST 20SQCM/<: CPT

## 2025-06-10 PROCEDURE — 99213 OFFICE O/P EST LOW 20 MIN: CPT

## 2025-06-10 PROCEDURE — 11042 DBRDMT SUBQ TIS 1ST 20SQCM/<: CPT | Performed by: STUDENT IN AN ORGANIZED HEALTH CARE EDUCATION/TRAINING PROGRAM

## 2025-06-10 PROCEDURE — 99213 OFFICE O/P EST LOW 20 MIN: CPT | Mod: 25 | Performed by: STUDENT IN AN ORGANIZED HEALTH CARE EDUCATION/TRAINING PROGRAM

## 2025-06-10 RX ORDER — MUPIROCIN 2 %
OINTMENT (GRAM) TOPICAL
Qty: 22 G | Refills: 0 | Status: SHIPPED | OUTPATIENT
Start: 2025-06-10

## 2025-06-10 NOTE — PROGRESS NOTES
Advanced Wound Care   [facility]   [address]   [address]   [place] [address]   [phone] Fax: [phone]        DME: [facility]  Duration of Supply Order: 90 days  Dispense as written.      Wound 06/03/25 Atypical Full Thickness Leg Distal;Medial Left (Active)   Wound Image    06/10/25 1540   Site Assessment Pink;Red;Yellow 06/10/25 1540   Periwound Assessment Irritation;Scar tissue;Edema 06/10/25 1540   Margins Attached edges 06/10/25 1540   Closure Secondary intention 06/03/25 1400   Drainage Amount Moderate 06/10/25 1540   Drainage Description Serosanguineous 06/10/25 1540   Treatments Cleansed;Topical Lidocaine;Provider debridement;Site care 06/10/25 1540   Wound Cleansing Hypochlorus Acid 06/10/25 1540   Periwound Protectant No-sting Skin Prep;Silicone barrier cream 06/10/25 1540   Dressing Changed New 06/03/25 1400   Dressing Cleansing/Solutions Not Applicable 06/10/25 1540   Dressing Options Hydrofera Blue Ready;Silicone Adhesive Foam;Other (Comments) (Patient's own compression socks) 06/10/25 1540   Dressing Change/Treatment Frequency Monday, Wednesday, Friday, and As Needed 06/10/25 1540   Wound Team Following Weekly 06/03/25 1400   Non-staged Wound Description Full thickness 06/10/25 1540   Wound Length (cm) 1.2 cm 06/10/25 1540   Wound Width (cm) 0.8 cm 06/10/25 1540   Wound Depth (cm) 0.2 cm 06/10/25 1540   Wound Surface Area (cm^2) 0.75 cm^2 06/10/25 1540   Wound Volume (cm^3) 0.101 cm^3 06/10/25 1540   Post-Procedure Length (cm) 1.2 cm 06/10/25 1540   Post-Procedure Width (cm) 0.8 cm 06/10/25 1540   Post-Procedure Depth (cm) 0.3 cm 06/10/25 1540   Post-Procedure Surface Area (cm^2) 0.75 cm^2 06/10/25 1540   Post-Procedure Volume (cm^3) 0.151 cm^3 06/10/25 1540   Tunneling (cm) 0 cm 06/10/25 1540   Undermining (cm) 0 cm 06/10/25 1540   Wound Odor None 06/10/25 1540   Exposed Structures None 06/10/25 1540         PROCEDURE: Provider debridement using  curette to remove nonviable tissue from wound bed(s). 2% topical Lidocaine applied prior to debridement with ~5 minute dwell time. Patient tolerated well; denied pain.    Patient to change dressing 3 times per week. Cleanse wound(s) with saline and gauze. Patient/caregiver to apply dressing as instructed.

## 2025-06-10 NOTE — PATIENT INSTRUCTIONS
Keep dressings clean and dry. Change dressings every 3-4 days, and if they become over saturated, soiled or fall off.     On the days you are not changing your dressings, avoid getting the dressings wet. It is not harmful to get your wound wet when you are washing your wound before applying a new dressing.    If you need to change your dressings at home: Wash your wound with normal saline, wound cleanser, or unscented soap and water prior to applying your new dressings. Please avoid cleansing with hydrogen peroxide or rubbing alcohol. Hydrogen peroxide and rubbing alcohol are toxic to new tissue and skin cells.    Avoid prolonged standing or sitting without elevating your legs.    Remove your compression garments if you have severe pain, severe swelling, numbness, color change, or temperature change in your toes. If you need to remove your compression garments, do so by unrolling them. Do not cut the compression garments off, this is to prevent cutting yourself on accident.    Should you experience any significant changes in your wound(s), such as signs of infection (increasing redness, swelling, localized heat, increased pain, fever > 101 F, chills) or have any questions regarding your home care instructions, please contact the wound center at [phone]. If after hours, contact your primary care physician or go to the hospital emergency room.     If you are admitted to any hospital, you will need a new referral to come back to the wound clinic and any scheduled appointments that you already have, may be cancelled.     If you are 5 or more minutes late for an appointment, we reserve the right to cancel and reschedule that appointment. Additionally, if you are habitually late or not showing (3 late cancellations and/or no shows), we reserve the right to cancel your remaining appointments and it will be your responsibility to obtain a new referral if services are still needed.

## 2025-06-10 NOTE — PROGRESS NOTES
Provider Encounter- Full Thickness wound    HISTORY OF PRESENT ILLNESS  Wound History:    START OF CARE IN CLINIC: 6/3/2025    REFERRING PROVIDER: Cathryn Yadav      WOUND- Full Thickness Wound   LOCATION: Left distal medial lower leg   HISTORY: Patient injured his leg while working in his yard, dropped a rock, creating a large laceration.  He presented to the emergency room on day of injury, 5/9.  Laceration was closed with staples, wound dressed, patient was instructed to return 14 days later for staple removal.  Staples were removed 10 days later, at which time the wound partially opened.  He was prescribed Keflex, referred to the outpatient wound clinic.    Pertinent Medical History: Controlled DM2, hypertension, arthritis, idiopathic gout, neuropathy, PVD    TOBACCO USE: Former smoker, quit in 1971    Patient's problem list, allergies, and current medications reviewed and updated in Epic    Interval History:  6/3/2025 : Initial clinic visit with SERENA Onofre, AILEEN-BC, ARACELISN, CFGRISELDA.   Patient states he is feeling well,denies fevers, chills, nausea, vomiting, cough or shortness of breath.  He has completed his antibiotics.  Wound does not appear to be infected.  He does have significant swelling in his left leg, states left leg has been more swollen than right leg for several years.  He normally wears compression socks    6/10/2025: Clinic visit with Marquise Adler MD. Patient reports doing ok, denies any fevers or chills. His wound measures smaller. Reports that he will be leaving the country on Friday to take cruise to [place]. Will order supplies, hopefully before he leaves. Discussed dressing options while on trip should supplies not be available. He has mild folliculitis of periwound, will prescribe mupirocin.    REVIEW OF SYSTEMS:     Unchanged from previous wound clinic assessment on 6/3/2025, except as noted in interval history above    PHYSICAL EXAMINATION:   There were no vitals taken for  this visit.    Physical Exam  Cardiovascular:      Pulses: Normal pulses.      Comments: Left DP pulse easily palpated, foot warm  Pulmonary:      Effort: Pulmonary effort is normal.   Musculoskeletal:      Left lower leg: Edema present.      Comments: 2+ pitting edema of left lower extremity  Trace edema right lower extremity   Skin:     Comments: Full-thickness wounds x 2 to distal medial left lower extremity: Now single open wound, improving. Thin layer of slough and granulation tissue. No evidence of wound infection.    Patient has periwound folliculitis, mild. No evidence of cellulitis.   Neurological:      Mental Status: He is alert and oriented to person, place, and time.         WOUND ASSESSMENT  Wound 05/17/23 Incision Abdomen dermabond (Active)   Number of days: 755       Wound 12/05/23 Incision Knee Left acticoat opsite (Active)   Number of days: 553       Wound 06/03/25 Atypical Full Thickness Leg Distal;Medial Left (Active)   Wound Image    06/10/25 1540   Site Assessment Pink;Red;Yellow 06/10/25 1540   Periwound Assessment Irritation;Scar tissue;Edema 06/10/25 1540   Margins Attached edges 06/10/25 1540   Closure Secondary intention 06/03/25 1400   Drainage Amount Moderate 06/10/25 1540   Drainage Description Serosanguineous 06/10/25 1540   Treatments Cleansed;Topical Lidocaine;Provider debridement;Site care 06/10/25 1540   Wound Cleansing Hypochlorus Acid 06/10/25 1540   Periwound Protectant No-sting Skin Prep;Silicone barrier cream 06/10/25 1540   Dressing Changed New 06/03/25 1400   Dressing Cleansing/Solutions Not Applicable 06/10/25 1540   Dressing Options Hydrofera Blue Ready;Silicone Adhesive Foam;Other (Comments) 06/10/25 1540   Dressing Change/Treatment Frequency Monday, Wednesday, Friday, and As Needed 06/10/25 1540   Wound Team Following Weekly 06/03/25 1400   Non-staged Wound Description Full thickness 06/10/25 1540   Wound Length (cm) 1.2 cm 06/10/25 1540   Wound Width (cm) 0.8 cm 06/10/25  1540   Wound Depth (cm) 0.2 cm 06/10/25 1540   Wound Surface Area (cm^2) 0.75 cm^2 06/10/25 1540   Wound Volume (cm^3) 0.101 cm^3 06/10/25 1540   Post-Procedure Length (cm) 1.2 cm 06/10/25 1540   Post-Procedure Width (cm) 0.8 cm 06/10/25 1540   Post-Procedure Depth (cm) 0.3 cm 06/10/25 1540   Post-Procedure Surface Area (cm^2) 0.75 cm^2 06/10/25 1540   Post-Procedure Volume (cm^3) 0.151 cm^3 06/10/25 1540   Tunneling (cm) 0 cm 06/10/25 1540   Undermining (cm) 0 cm 06/10/25 1540   Wound Odor None 06/10/25 1540   Exposed Structures None 06/10/25 1540   Number of days: 7     PROCEDURE: Excisional debridement of left medial lower extremity wound  -2% viscous lidocaine applied topically to wound bed for approximately 5 minutes prior to debridement  - Forceps used to debride wound bed.  Excisional debridement was performed to remove devitalized tissue until healthy, bleeding tissue was visualized.   Entire surface of wound, 0.75 cm² debrided.  Tissue debrided into the subcutaneous layer.    -Bleeding controlled with manual pressure.    -Wound care completed by wound RN, refer to flowsheet  -Patient tolerated the procedure well, without c/o pain or discomfort.       Pertinent Labs and Diagnostics:    Labs:     A1c:   Lab Results   Component Value Date/Time    HBA1C 6.9 (H) 2025 07:55 AM          IMAGIN2025-x-ray of left tibia and fibula  IMPRESSION:     No evidence of fracture or dislocation.      VASCULAR STUDIES: No recent studies found in epic    LAST  WOUND CULTURE:  DATE :   Lab Results   Component Value Date/Time    CULTRSULT  2023 05:25 AM     No growth after 5 days of incubation.  Blood culture testing and Gram stain, if indicated, are  performed at [facility], [address] [place] [address].  Positive blood cultures are  sent to [facility], [address] [place] [address], for organism identification and  susceptibility testing.      CULTRSULT   12/06/2023 05:25 AM     No growth after 5 days of incubation.  Blood culture testing and Gram stain, if indicated, are  performed at [facility], [address] [place] [address].  Positive blood cultures are  sent to [facility], [address] [place] [address], for organism identification and  susceptibility testing.           ASSESSMENT AND PLAN:     1. Laceration of left lower extremity, subsequent encounter  2. Wound infection    6/10/2025: Patient injured his leg on 5/9 after dropping a rock onto his shin.  X-ray negative for fracture.  Laceration was stapled.  Partial dehiscence and infection noted at time of staple removal.  - Wound measuring smaller  - Excisional debridement of wound in clinic today, medically necessary to promote wound healing.  - Patient to return to clinic weekly for assessment and debridement  - Patient to change dressing 1-2 times per week in between clinic visits   - Patient to continue wearing own compression socks  - No evidence of infection.    .  Wound care: Hydrofera Blue ready, foam cover dressing, own compression sock    3. Edema of left lower extremity    6/10/2025: Patient's left lower extremity significantly more edematous than his right.  He states that this has been this way for many years.  - May consider compression wrap, however he is happy with his current compression socks and wound is measuring smaller.    4. Controlled type 2 diabetes mellitus with diabetic polyneuropathy, without long-term current use of insulin ([facility])    6/10/2025: Patient does not check his blood sugars routinely.  - Currently well-controlled with an A1c of 6.9%.    5. Folliculitis    6/10/2025  - Mild periwound folliculitis  - Rx for Mupirocin to use as needed  - No evidence of wound infection or cellulitis.      PATIENT EDUCATION  - Importance of adequate nutrition for wound healing  -Advised to go to ER for any increased redness, swelling,  drainage, or odor, or if patient develops fever, chills, nausea or vomiting.     My total time spent caring for the patient on the day of the encounter was 20 minutes, reviewing history, assessment, counseling and education, and coordination of care.  This does not include time spent on separately billable procedures/tests.        Please note that this note may have been created using voice recognition software. I have worked with technical experts from [facility] to optimize the interface.  I have made every reasonable attempt to correct obvious errors, but there may be errors of grammar and possibly content that I did not discover before finalizing the note.    N

## 2025-06-24 ENCOUNTER — NON-PROVIDER VISIT (OUTPATIENT)
Dept: WOUND CARE | Facility: MEDICAL CENTER | Age: 80
End: 2025-06-24
Attending: NURSE PRACTITIONER
Payer: MEDICARE

## 2025-06-24 PROCEDURE — 99213 OFFICE O/P EST LOW 20 MIN: CPT

## 2025-06-24 PROCEDURE — 97597 DBRDMT OPN WND 1ST 20 CM/<: CPT

## 2025-06-24 NOTE — PATIENT INSTRUCTIONS
"The following information is a summary of the education provided in the clinic today. This is not an exhaustive list of the education provided during your appointment.       DRESSING CHANGES    Keep your wound dressing clean, dry, and intact. Change your dressing every 3 days AND/OR if the dressing becomes, soiled, leaks, gets wet, or falls off.      You may  shower with the dressing(s) off. Please do not take baths, or swim in the ocean, lakes, rivers, pools, or hot tubs.      Wounds do not need to \"air out\" or \"breathe\". Gently dry your wound before placing a new dressing.     After you get out of the shower, wash the wound a second time (with soap and water, wound cleanser, or saline). Gently dry the wound before you place a new dressing.       If you need to change your dressings at home, you should wash your wound. Use normal saline, wound cleanser, hypochlorous acid, or unscented soap and water. Do not use hydrogen peroxide or rubbing alcohol to clean your wounds. Hydrogen peroxide and rubbing alcohol will damage new cells and tissue. Do not use betadine or iodine unless told to by your wound care team.    Do not soak your wounds in epsom salt baths. This can worsen your wound(s) or delay wound healing. It can also lead to infection or maceration (tissue is too wet).     If you do not have home health, the clinic will give you with leftover supplies from your appointment. We do not give out extra dressing supplies. We will order you supplies through a [employer] company. Your insurance may or may not pay for all these supplies. The company will reach out to you if insurance does not cover supplies. These supplies will be sent to your home within a few days. If you do have home health, they will provide wound care supplies.     The dressings we use may change as your wound changes.     COMPRESSION   -Today, a compression sock was applied. If you have pain, extreme swelling, new numbness or tingling in your feet, or a " change in the color or temperature of your feet, please remove the garments. You should wear your compression garments every day. You can take them off at night, but they should be put back on every morning before you begin walking around.         GENERAL HEALTH ADVICE   -Nutrition is important for wound healing. Unless told otherwise by your doctor, eat more protein and consider supplementing with a multi-vitamin, zinc, and vitamin C.         EXCISIONAL DEBRIDEMENT  -If you notice increased swelling, redness, pus, or a strong bad smell at the wound site, or have new fevers or chills, please go to the ER or urgent care.     CLINIC INFORMATION  The clinic's hours are Monday-Friday, 7:30 AM to 5:00 PM. We are closed most holidays and on weekends. If you leave us a message, please allow 24 hours for someone to return your call. If you have concerns or are having a medical emergency, call 911 or go to the hospital emergency room.     You might not see the same nurse or provider every visit.     If you notice any large changes in your wound(s), or signs of infection (redness, swelling, localized heat, increased pain, fever > 101 F, chills, nausea/vomiting) or have any questions about your home care instructions, please call the wound center at [phone]. If it's after hours, contact your primary care physician or go to the hospital emergency room. If you are admitted to any hospital, you will need a new referral to come back to the wound clinic. Any wound care appointments that you already have may be cancelled.    If you are 5 or more minutes late for an appointment, we reserve the right to cancel and reschedule that appointment. For example, if your appointment is at 1:00 PM, and you arrive at 1:06 PM, you are more than five minutes late and might not be seen. If you are consistently late or not coming to your appointments (typically 3 late cancellations and/or no shows), we reserve the right to cancel your future  appointments or discharge you from the clinic. It is then your responsibility to obtain a new referral if wound care is still needed.

## 2025-06-24 NOTE — PROGRESS NOTES
2% topical lidocaine applied with a 5 minute dwell time,  CSWD using curette to remove slough from wound bed.  Wound measures smaller with decrease drainage.  Pt has no questions/concerns. Pt own compression socks worn daily.

## 2025-07-01 ENCOUNTER — NON-PROVIDER VISIT (OUTPATIENT)
Dept: WOUND CARE | Facility: MEDICAL CENTER | Age: 80
End: 2025-07-01
Attending: NURSE PRACTITIONER
Payer: MEDICARE

## 2025-07-01 PROCEDURE — 97597 DBRDMT OPN WND 1ST 20 CM/<: CPT

## 2025-07-01 PROCEDURE — 99213 OFFICE O/P EST LOW 20 MIN: CPT

## 2025-07-01 NOTE — PATIENT INSTRUCTIONS
"The following information is a summary of the education provided in the clinic today. This is not an exhaustive list of the education provided during your appointment.       DRESSING CHANGES    Keep your wound dressing clean, dry, and intact. Change your dressing every 3 days AND/OR if the dressing becomes, soiled, leaks, gets wet, or falls off.      You may  shower with the dressing(s) on. Please do not take baths, or swim in the ocean, lakes, rivers, pools, or hot tubs.      Wounds do not need to \"air out\" or \"breathe\". Gently dry your wound before placing a new dressing.     After you get out of the shower, wash the wound a second time (with soap and water, wound cleanser, or saline). Gently dry the wound before you place a new dressing.       If you need to change your dressings at home, you should wash your wound. Use normal saline, wound cleanser, hypochlorous acid, or unscented soap and water. Do not use hydrogen peroxide or rubbing alcohol to clean your wounds. Hydrogen peroxide and rubbing alcohol will damage new cells and tissue. Do not use betadine or iodine unless told to by your wound care team.    Do not soak your wounds in epsom salt baths. This can worsen your wound(s) or delay wound healing. It can also lead to infection or maceration (tissue is too wet).     If you do not have home health, the clinic will give you with leftover supplies from your appointment. We do not give out extra dressing supplies. We will order you supplies through a [employer] company. Your insurance may or may not pay for all these supplies. The company will reach out to you if insurance does not cover supplies. These supplies will be sent to your home within a few days. If you do have home health, they will provide wound care supplies.     The dressings we use may change as your wound changes.       GENERAL HEALTH ADVICE   -People with loss of protective sensation, also called neuropathy, might not be able to feel their feet well. " It is important to check your feet every day, as you may not feel new injuries. You should also check your shoes every day, to make sure there are no objects inside the shoe that may hurt you. Check for rough areas inside the shoe as well. Never walk barefoot, even in your own home. Wear rubber-soled shoes to protect your feet.     -Nutrition is important for wound healing. Unless told otherwise by your doctor, eat more protein and consider supplementing with a multi-vitamin, zinc, and vitamin C.         CLINIC INFORMATION  The clinic's hours are Monday-Friday, 7:30 AM to 5:00 PM. We are closed most holidays and on weekends. If you leave us a message, please allow 24 hours for someone to return your call. If you have concerns or are having a medical emergency, call 911 or go to the hospital emergency room.     You might not see the same nurse or provider every visit.     If you notice any large changes in your wound(s), or signs of infection (redness, swelling, localized heat, increased pain, fever > 101 F, chills, nausea/vomiting) or have any questions about your home care instructions, please call the wound center at [phone]. If it's after hours, contact your primary care physician or go to the hospital emergency room. If you are admitted to any hospital, you will need a new referral to come back to the wound clinic. Any wound care appointments that you already have may be cancelled.    If you are 5 or more minutes late for an appointment, we reserve the right to cancel and reschedule that appointment. For example, if your appointment is at 1:00 PM, and you arrive at 1:06 PM, you are more than five minutes late and might not be seen. If you are consistently late or not coming to your appointments (typically 3 late cancellations and/or no shows), we reserve the right to cancel your future appointments or discharge you from the clinic. It is then your responsibility to obtain a new referral if wound care is  still needed.

## 2025-07-01 NOTE — PROGRESS NOTES
CSWD using curette to remove nonviable tissue from left medial LE wound bed. 2% topical Lidocaine applied prior to debridement with ~5 minute dwell time. Patient tolerated well; denied pain.     Changed treatment to Honey Colloid with transparent film so patient can shower:     Honey-Based Dressings (Honey Gel, Colloid, Alginate): Reviewed autolytic debridement and antimicrobial action. Discussed application, standard frequency, and indicators for earlier change (odor, increased drainage).     Educated patient that we may change dressing again next week if slough/drainage is resolved.

## 2025-07-03 NOTE — ASSESSMENT & PLAN NOTE
"Name: Sandy Mcgovern      : 1955      MRN: [id number]  Encounter Provider: Jeremiah Peres MD  Encounter Date: 7/3/2025   Encounter department: [facility] [place]  :  Assessment & Plan  Uterine procidentia  - Symptoms stable with pessary. Will continue  Orders:  •  Pessary     Vaginal atrophy  - Continue Estrace cream twice weekly. Refills to be provided upon request.         Vaginal pessary in situ  - Continue Trimo-gunter twice weekly. May use Replens or Rephresh while on backorder.  - Return to office for routine pessary maintenance in 3 months.    Orders:  •  Pessary        History of Present Illness   HPI  Sandy Mcgovern is a 69 y.o. female who presents for pessary maintenance. No further bleeding since episode in office last month at time of pessary maintenance. Denies discharge or odor. Prolapse symptoms remain well-controlled.   History obtained from: patient    Review of Systems   All other systems reviewed and are negative.    Medical History Reviewed by provider this encounter:  Tobacco  Med Hx  Surg Hx  Fam Hx  Soc Hx    .     Objective   /70 (BP Location: Left arm, Patient Position: Sitting, Cuff Size: Standard)   Ht 5' 3.5\" (1.613 m)   Wt 74.4 kg (164 lb)   LMP  (LMP Unknown)   BMI 28.60 kg/m²      Physical Exam  Vitals reviewed. Exam conducted with a chaperone present (Nesha Vázquez MA).   Constitutional:       Appearance: Normal appearance.     Cardiovascular:      Rate and Rhythm: Normal rate.   Pulmonary:      Effort: Pulmonary effort is normal.   Genitourinary:     Exam position: Lithotomy position.      Labia:         Right: No rash or tenderness.         Left: No rash or tenderness.       Vagina: Prolapsed vaginal walls present. No vaginal discharge, erythema, tenderness, bleeding or lesions.      Cervix: Normal. No cervical motion tenderness, discharge, friability, lesion, erythema or cervical bleeding.      Uterus: Normal.       Adnexa: Right " This problem is chronic, stable, and monitored appropriately by history/labs/imaging as needed, and is well-controlled on the current regimen.  Please continue current regimen    adnexa normal and left adnexa normal.        Right: No mass, tenderness or fullness.          Left: No mass, tenderness or fullness.        Comments: Pessary removed, cleaned, and replaced.     Neurological:      General: No focal deficit present.      Mental Status: She is alert and oriented to person, place, and time.     Psychiatric:         Mood and Affect: Mood normal.         Behavior: Behavior normal.         Thought Content: Thought content normal.         Judgment: Judgment normal.   Pessary    Date/Time: 7/3/2025 3:00 PM    Performed by: Jeremiah Peres MD  Authorized by: Jeremiah Peres MD    Universal Protocol:  procedure performed by consultantConsent: Verbal consent obtained  Patient understanding: patient states understanding of the procedure being performed  Patient consent: the patient's understanding of the procedure matches consent given  Procedure consent: procedure consent matches procedure scheduled  Patient identity confirmed: verbally with patient    Indication:     Indication for pessary: uterine prolapse and cystocele    Pre-procedure:     Pessary procedure type:  Cleaning/check  Problems:     Pessary complications: none    Procedure:     Pessary type:  Ring w/ support    Pessary size:  7    Patient tolerance of procedure:  Tolerated well, no immediate complications

## 2025-07-08 ENCOUNTER — NON-PROVIDER VISIT (OUTPATIENT)
Dept: WOUND CARE | Facility: MEDICAL CENTER | Age: 80
End: 2025-07-08
Attending: NURSE PRACTITIONER
Payer: MEDICARE

## 2025-07-08 PROCEDURE — 99213 OFFICE O/P EST LOW 20 MIN: CPT

## 2025-07-08 PROCEDURE — 97597 DBRDMT OPN WND 1ST 20 CM/<: CPT

## 2025-07-08 NOTE — PATIENT INSTRUCTIONS
"The following information is a summary of the education provided in the clinic today. This is not an exhaustive list of the education provided during your appointment.       DRESSING CHANGES    Keep your wound dressing clean, dry, and intact. Change your dressing every 3 days AND/OR if the dressing becomes soiled, leaks, gets wet, or falls off.      You may  shower with the dressing(s) off. Please do not take baths, or swim in the ocean, lakes, rivers, pools, or hot tubs.      Wounds do not need to \"air out\" or \"breathe\". Gently dry your wound before placing a new dressing.     After you get out of the shower, wash the wound a second time (with soap and water, wound cleanser, or saline). Gently dry the wound before you place a new dressing.       If you need to change your dressings at home, you should wash your wound. Use normal saline, wound cleanser, hypochlorous acid, or unscented soap and water. Do not use hydrogen peroxide or rubbing alcohol to clean your wounds. Hydrogen peroxide and rubbing alcohol will damage new cells and tissue. Do not use betadine or iodine unless told to by your wound care team.    Do not soak your wounds in epsom salt baths. This can worsen your wound(s) or delay wound healing. It can also lead to infection or maceration (tissue is too wet).     If you do not have home health, the clinic will give you with leftover supplies from your appointment. We do not give out extra dressing supplies. We will order you supplies through a [employer] company. Your insurance may or may not pay for all these supplies. The company will reach out to you if insurance does not cover supplies. These supplies will be sent to your home within a few days. If you do have home health, they will provide wound care supplies.     The dressings we use may change as your wound changes.     COMPRESSION   -Compression helps reduce swelling and helps wounds heal quicker. It is still important to elevate your feet several " times daily to reduce swelling, even when you are wearing compression. It is important to wear compression every day, to help your wound heal, and to prevent new wounds from developing.      -Today, a compression sock was applied. If you have pain, extreme swelling, new numbness or tingling in your feet, or a change in the color or temperature of your feet, please remove the garments. You should wear your compression garments every day. You can take them off at night, but they should be put back on every morning before you begin walking around.         GENERAL HEALTH ADVICE   -Nutrition is important for wound healing. Unless told otherwise by your doctor, eat more protein and consider supplementing with a multi-vitamin, zinc, and vitamin C.           CLINIC INFORMATION  The clinic's hours are Monday-Friday, 7:30 AM to 5:00 PM. We are closed most holidays and on weekends. If you leave us a message, please allow 24 hours for someone to return your call. If you have concerns or are having a medical emergency, call 911 or go to the hospital emergency room.     You might not see the same nurse or provider every visit.     If you notice any large changes in your wound(s), or signs of infection (redness, swelling, localized heat, increased pain, fever > 101 F, chills, nausea/vomiting) or have any questions about your home care instructions, please call the wound center at [phone]. If it's after hours, contact your primary care physician or go to the hospital emergency room. If you are admitted to any hospital, you will need a new referral to come back to the wound clinic. Any wound care appointments that you already have may be cancelled.    If you are 5 or more minutes late for an appointment, we reserve the right to cancel and reschedule that appointment. For example, if your appointment is at 1:00 PM, and you arrive at 1:06 PM, you are more than five minutes late and might not be seen. If you are consistently  late or not coming to your appointments (typically 3 late cancellations and/or no shows), we reserve the right to cancel your future appointments or discharge you from the clinic. It is then your responsibility to obtain a new referral if wound care is still needed.

## 2025-07-08 NOTE — PROGRESS NOTES
2% viscous lidocaine with dwell time of ~5 minutes. CSWD using curette to remove nonviable tissue from wound bed. New wound to left proximal LE. Patient unsure how he got. Applied dressing to it and educated patient on when and how to change it. Changed treatment to honey gel to better penetrate wound bed due to depth. Patient educated on how apply new dressings. Leftover materials provided. All questions and concerns were addressed.

## 2025-07-15 ENCOUNTER — OFFICE VISIT (OUTPATIENT)
Dept: WOUND CARE | Facility: MEDICAL CENTER | Age: 80
End: 2025-07-15
Attending: NURSE PRACTITIONER
Payer: MEDICARE

## 2025-07-15 DIAGNOSIS — S81.812D LACERATION OF LEFT LOWER EXTREMITY, SUBSEQUENT ENCOUNTER: Primary | ICD-10-CM

## 2025-07-15 DIAGNOSIS — S80.812D ABRASION OF LEFT LOWER LEG, SUBSEQUENT ENCOUNTER: ICD-10-CM

## 2025-07-15 DIAGNOSIS — E11.42 CONTROLLED TYPE 2 DIABETES MELLITUS WITH DIABETIC POLYNEUROPATHY, WITHOUT LONG-TERM CURRENT USE OF INSULIN (HCC): ICD-10-CM

## 2025-07-15 DIAGNOSIS — R60.0 EDEMA OF LEFT LOWER EXTREMITY: ICD-10-CM

## 2025-07-15 DIAGNOSIS — S51.811A ISTAP TYPE 3 SKIN TEAR OF RIGHT FOREARM: ICD-10-CM

## 2025-07-15 PROCEDURE — 11042 DBRDMT SUBQ TIS 1ST 20SQCM/<: CPT

## 2025-07-15 PROCEDURE — 11042 DBRDMT SUBQ TIS 1ST 20SQCM/<: CPT | Performed by: NURSE PRACTITIONER

## 2025-07-15 PROCEDURE — 99213 OFFICE O/P EST LOW 20 MIN: CPT

## 2025-07-15 PROCEDURE — 99213 OFFICE O/P EST LOW 20 MIN: CPT | Mod: 25 | Performed by: NURSE PRACTITIONER

## 2025-07-15 NOTE — PATIENT INSTRUCTIONS
"The following information is a summary of the education provided in the clinic today. This is not an exhaustive list of the education provided during your appointment.       DRESSING CHANGES    Keep your wound dressing clean, dry, and intact. Change your dressing every 2-3 days AND/OR if the dressing becomes soiled, leaks, gets wet, or falls off.      Please do not take baths, or swim in the ocean, lakes, rivers, pools, or hot tubs.      Wounds do not need to \"air out\" or \"breathe\". Gently dry your wound before placing a new dressing.     After you get out of the shower, wash the wound a second time (with soap and water, wound cleanser, or saline). Gently dry the wound before you place a new dressing.       If you need to change your dressings at home, you should wash your wound. Use normal saline, wound cleanser, hypochlorous acid, or unscented soap and water. Do not use hydrogen peroxide or rubbing alcohol to clean your wounds. Hydrogen peroxide and rubbing alcohol will damage new cells and tissue. Do not use betadine or iodine unless told to by your wound care team.    Do not soak your wounds in epsom salt baths. This can worsen your wound(s) or delay wound healing. It can also lead to infection or maceration (tissue is too wet).     If you do not have home health, the clinic will give you with leftover supplies from your appointment. We do not give out extra dressing supplies. We will order you supplies through a [employer] company. Your insurance may or may not pay for all these supplies. The company will reach out to you if insurance does not cover supplies. These supplies will be sent to your home within a few days. If you do have home health, they will provide wound care supplies.     The dressings we use may change as your wound changes.     COMPRESSION   -Compression helps reduce swelling and helps wounds heal quicker. It is still important to elevate your feet several times daily to reduce swelling, even when " you are wearing compression. It is important to wear compression every day, to help your wound heal, and to prevent new wounds from developing.         GENERAL HEALTH ADVICE   -High blood sugar, like with diabetes, can delay or reverse wound healing by impacting your immune system. It also increases the chances of infection. Wounds heal best when your blood sugar is consistently less than 140 mg/dL. It is important to check your blood sugar regularly.      -Nutrition is important for wound healing. Unless told otherwise by your doctor, eat more protein and consider supplementing with a multi-vitamin, zinc, and vitamin C.           CLINIC INFORMATION  The clinic's hours are Monday-Friday, 7:30 AM to 5:00 PM. We are closed most holidays and on weekends. If you leave us a message, please allow 24 hours for someone to return your call. If you have concerns or are having a medical emergency, call 911 or go to the hospital emergency room.     You might not see the same nurse or provider every visit.     If you notice any large changes in your wound(s), or signs of infection (redness, swelling, localized heat, increased pain, fever > 101 F, chills, nausea/vomiting) or have any questions about your home care instructions, please call the wound center at [phone]. If it's after hours, contact your primary care physician or go to the hospital emergency room. If you are admitted to any hospital, you will need a new referral to come back to the wound clinic. Any wound care appointments that you already have may be cancelled.    If you are 5 or more minutes late for an appointment, we reserve the right to cancel and reschedule that appointment. For example, if your appointment is at 1:00 PM, and you arrive at 1:06 PM, you are more than five minutes late and might not be seen. If you are consistently late or not coming to your appointments (typically 3 late cancellations and/or no shows), we reserve the right to cancel your  future appointments or discharge you from the clinic. It is then your responsibility to obtain a new referral if wound care is still needed.

## 2025-07-15 NOTE — PROGRESS NOTES
Assessment and Cleansing summary:  The wound beds on the left medial and anterior lower extremity were assessed and debrided by the provider in the wound clinic today. The new right anterolateral forearm wound bed was also assessed but not debrided. Following provider debridement, all wounds were irrigated with normal saline and cleansed using hypochlorous acid-soaked gauze. The patient tolerated the procedure well.    Wound Dressing Applied:  The current dressing selection was continued for the left medial and anterior lower extremity wounds. Refer to the wound care flow sheet for full dressing details. For the right forearm wound, the periwound skin was prepped with a no-sting barrier film to the periwound area. Hydrofera Blue Ready with beveled edges was applied directly to the wound bed and secured with Tegaderm. The patient was provided with leftover wound care supplies for continued use at home.    Patient education:  Education was reinforced on Hydrofera Blue, including its antimicrobial and moisture-balancing properties, appropriate application, change frequency, and signs indicating the need for earlier replacement. Brief education was also provided on honey-based dressings, reviewing their autolytic and antimicrobial functions, proper use, and when to change based on drainage or odor.

## 2025-07-15 NOTE — PROGRESS NOTES
Provider Encounter- Full Thickness wound    HISTORY OF PRESENT ILLNESS  Wound History:    START OF CARE IN CLINIC: 6/3/2025    REFERRING PROVIDER: Cathryn Yadav      WOUND- Full Thickness Wound   LOCATION: Left distal medial lower leg     WOUND- Full Thickness Wound   LOCATION: Left distal anterior/medial lower leg-first observed in clinic 7/8/2025     WOUND-skin tear, ISTAP III, shallow   LOCATION: Right forearm-first observed in clinic 7/15/2025       HISTORY: Patient injured his leg while working in his yard, dropped a rock, creating a large laceration.  He presented to the emergency room on day of injury, 5/9.  Laceration was closed with staples, wound dressed, patient was instructed to return 14 days later for staple removal.  Staples were removed 10 days later, at which time the wound partially opened.  He was prescribed Keflex, referred to the outpatient wound clinic.    Pertinent Medical History: Controlled DM2, hypertension, arthritis, idiopathic gout, neuropathy, PVD    TOBACCO USE: Former smoker, quit in 1971    Patient's problem list, allergies, and current medications reviewed and updated in Epic    Interval History:  6/3/2025 : Initial clinic visit with SERENA Onofre, FNP-BC, CWBASHIRN, CFGRISELDA.   Patient states he is feeling well,denies fevers, chills, nausea, vomiting, cough or shortness of breath.  He has completed his antibiotics.  Wound does not appear to be infected.  He does have significant swelling in his left leg, states left leg has been more swollen than right leg for several years.  He normally wears compression socks    6/10/2025: Clinic visit with Marquise Adler MD. Patient reports doing ok, denies any fevers or chills. His wound measures smaller. Reports that he will be leaving the country on Friday to take cruise to [place]. Will order supplies, hopefully before he leaves. Discussed dressing options while on trip should supplies not be available. He has mild folliculitis of  periwound, will prescribe mupirocin.    7/15/2025 : Clinic visit with Ciara Foster, SERENA, FNP-BC, CWBASHIRN, CFCN.   Patient states he is feeling well.  However, since he was last seen by provider he has developed 2 new wounds, a shallow wound to his left lower leg, just above and anterior from the original, first observed in clinic on 7/8.  Patient does not know the cause of this wound.  Today he presents with a shallow skin tear to his right forearm, no skin flap.  Patient states he fell yesterday, this is his only injury from that fall.  His original wound is progressing, well, measures smaller.    REVIEW OF SYSTEMS:     Unchanged from previous wound clinic assessment on 6/10/2025, except as noted in interval history above    PHYSICAL EXAMINATION:   There were no vitals taken for this visit.    Physical Exam  Cardiovascular:      Pulses: Normal pulses.      Comments: Left DP pulse easily palpated, foot warm  Pulmonary:      Effort: Pulmonary effort is normal.   Musculoskeletal:      Left lower leg: Edema present.      Comments: 2+ pitting edema of left lower extremity  Trace edema right lower extremity   Skin:     Comments: Full-thickness wound to distal medial left lower extremity: Wound area has decreased.  Thick slough to wound bed.  Minimal serosanguineous drainage, no odor.  No periwound erythema or induration    Full-thickness wound to left distal/anterior lower extremity: Measures smaller.  Thin layer of slough to wound bed.  Moderate serosanguineous drainage.  No evidence of infection.    Skin tear to right forearm: New wound, shallow.  No skin flap.  Wound bed clean.  Crusting of dried drainage to periwound.  Minimal serosanguineous drainage.  No evidence of infection   Neurological:      Mental Status: He is alert and oriented to person, place, and time.         WOUND ASSESSMENT  Wound 05/17/23 Incision Abdomen dermabond (Active)   Number of days: 790       Wound 12/05/23 Incision Knee Left acticoat opsite  (Active)   Number of days: 588       Wound 06/03/25 Atypical Full Thickness Leg Distal;Medial Left (Active)   Wound Image    07/15/25 1000   Site Assessment Pink;Yellow;Slough 07/15/25 1000   Periwound Assessment Scar tissue;Edema 07/15/25 1000   Margins Unattached edges 07/15/25 1000   Closure Secondary intention 07/01/25 1500   Drainage Amount Small 07/15/25 1000   Drainage Description Serosanguineous 07/15/25 1000   Treatments Cleansed;Topical Lidocaine;Provider debridement;Site care 07/15/25 1000   Wound Cleansing Hypochlorus Acid 07/15/25 1000   Periwound Protectant No-sting Skin Prep 07/15/25 1000   Dressing Status Removed 07/01/25 1500   Dressing Changed Changed 07/15/25 1000   Dressing Cleansing/Solutions Not Applicable 07/15/25 1000   Dressing Options Honey Gel;Other (Comments);Hydrofiber;Transparent Film 07/15/25 1000   Dressing Change/Treatment Frequency Twice Weekly 07/15/25 1000   Wound Team Following Weekly 07/15/25 1000   Non-staged Wound Description Full thickness 07/15/25 1000   Wound Length (cm) 0.4 cm 07/15/25 1000   Wound Width (cm) 0.3 cm 07/15/25 1000   Wound Depth (cm) 0.6 cm 07/15/25 1000   Wound Surface Area (cm^2) 0.09 cm^2 07/15/25 1000   Wound Volume (cm^3) 0.038 cm^3 07/15/25 1000   Post-Procedure Length (cm) 0.5 cm 07/15/25 1000   Post-Procedure Width (cm) 0.5 cm 07/15/25 1000   Post-Procedure Depth (cm) 0.6 cm 07/15/25 1000   Post-Procedure Surface Area (cm^2) 0.2 cm^2 07/15/25 1000   Post-Procedure Volume (cm^3) 0.079 cm^3 07/15/25 1000   Wound Healing % 62 07/15/25 1000   Tunneling (cm) 0 cm 07/15/25 1000   Undermining (cm) 0 cm 07/08/25 0930   Wound Odor None 07/15/25 1000   Exposed Structures None 07/15/25 1000   Number of days: 42       Wound 07/08/25 Leg Proximal;Anterior Left (Active)   Wound Image    07/15/25 1000   Site Assessment Red;Granulation tissue 07/15/25 1000   Periwound Assessment Dry;Edema 07/15/25 1000   Margins Attached edges 07/15/25 1000   Drainage Amount Scant  07/15/25 1000   Drainage Description Serosanguineous 07/15/25 1000   Treatments Cleansed;Topical Lidocaine;Provider debridement;Site care 07/15/25 1000   Wound Cleansing Hypochlorus Acid 07/15/25 1000   Periwound Protectant No-sting Skin Prep 07/15/25 1000   Dressing Changed Changed 07/15/25 1000   Dressing Cleansing/Solutions Not Applicable 07/15/25 1000   Dressing Options Hydrofera Blue Ready;Transparent Film 07/15/25 1000   Dressing Change/Treatment Frequency Twice Weekly 07/15/25 1000   Wound Team Following Weekly 07/15/25 1000   Wound Length (cm) 0.4 cm 07/15/25 1000   Wound Width (cm) 0.2 cm 07/15/25 1000   Wound Depth (cm) 0.1 cm 07/15/25 1000   Wound Surface Area (cm^2) 0.06 cm^2 07/15/25 1000   Wound Volume (cm^3) 0.004 cm^3 07/15/25 1000   Post-Procedure Length (cm) 0.5 cm 07/15/25 1000   Post-Procedure Width (cm) 0.4 cm 07/15/25 1000   Post-Procedure Depth (cm) 0.1 cm 07/15/25 1000   Post-Procedure Surface Area (cm^2) 0.16 cm^2 07/15/25 1000   Post-Procedure Volume (cm^3) 0.01 cm^3 07/15/25 1000   Tunneling (cm) 0 cm 07/15/25 1000   Undermining (cm) 0 cm 07/15/25 1000   Wound Odor None 07/15/25 1000   Exposed Structures None 07/15/25 1000   Number of days: 7       Wound 07/15/25 Arm Proximal;Anterior;Lateral Right (Active)   Wound Image   07/15/25 1000   Site Assessment Red;Yellow;Dry 07/15/25 1000   Periwound Assessment Ecchymosis;Dry 07/15/25 1000   Margins Unattached edges 07/15/25 1000   Closure Secondary intention 07/15/25 1000   Drainage Amount Small 07/15/25 1000   Drainage Description Serosanguineous 07/15/25 1000   Treatments Cleansed;Topical Lidocaine;Site care 07/15/25 1000   Wound Cleansing Hypochlorus Acid 07/15/25 1000   Periwound Protectant Skin Protectant Wipes to Periwound 07/15/25 1000   Dressing Changed New 07/15/25 1000   Dressing Cleansing/Solutions Not Applicable 07/15/25 1000   Dressing Options Hydrofera Blue Ready;Transparent Film 07/15/25 1000   Dressing Change/Treatment Frequency  Every 72 hrs, and As Needed 07/15/25 1000   Wound Team Following Weekly 07/15/25 1000   Wound Length (cm) 1.7 cm 07/15/25 1000   Wound Width (cm) 2.1 cm 07/15/25 1000   Wound Depth (cm) 0.1 cm 07/15/25 1000   Wound Surface Area (cm^2) 2.8 cm^2 07/15/25 1000   Wound Volume (cm^3) 0.187 cm^3 07/15/25 1000   Tunneling (cm) 0 cm 07/15/25 1000   Undermining (cm) 0 cm 07/15/25 1000   Wound Odor None 07/15/25 1000   Exposed Structures None 07/15/25 1000   Number of days: 0     PROCEDURE: Excisional debridement of left medial lower extremity wound, and left distal, anterior lower leg wound  -2% viscous lidocaine applied topically to wound beds for approximately 5 minutes prior to debridement  - Forceps used to debride wound beds.  Excisional debridement was performed to remove devitalized tissue until healthy, bleeding tissue was visualized.   Entire surface of wounds, 0.36 cm² debrided.  Tissue debrided into the subcutaneous layer.    -Bleeding controlled with manual pressure.    -Wound care completed by wound RN, refer to flowsheet  -Patient tolerated the procedure well, without c/o pain or discomfort.     No excisional debridement of right forearm skin tear      Pertinent Labs and Diagnostics:    Labs:     A1c:   Lab Results   Component Value Date/Time    HBA1C 6.9 (H) 2025 07:55 AM          IMAGIN2025-x-ray of left tibia and fibula  IMPRESSION:     No evidence of fracture or dislocation.      VASCULAR STUDIES: No recent studies found in epic    LAST  WOUND CULTURE:  DATE :   Lab Results   Component Value Date/Time    CULTRSULT  2023 05:25 AM     No growth after 5 days of incubation.  Blood culture testing and Gram stain, if indicated, are  performed at [facility], [address] [place] [address].  Positive blood cultures are  sent to [facility], [address] [place] [address], for organism identification and  susceptibility testing.      CULTRSULT   12/06/2023 05:25 AM     No growth after 5 days of incubation.  Blood culture testing and Gram stain, if indicated, are  performed at [facility], [address] [place] [address].  Positive blood cultures are  sent to [facility], [address] [place] [address], for organism identification and  susceptibility testing.           ASSESSMENT AND PLAN:     1. Laceration of left lower extremity, subsequent encounter  2.  Abrasion of left lower leg, subsequent encounter    7/15/2025: Patient injured his leg on 5/9 after dropping a rock onto his shin.  X-ray negative for fracture.  Laceration was stapled.  Partial dehiscence and infection noted at time of staple removal.  -Since patient was last seen by a provider, new wound noted to the distal leg on 7/8, superior and anterior from the original.  Patient does not know the cause.  - Both wounds measuring smaller  - Excisional debridement of wounds in clinic today, medically necessary to promote wound healing.  - Patient to return to clinic weekly for assessment and debridement  - Patient to change dressing 1-2 times per week in between clinic visits   - Patient to continue wearing own compression socks  - No evidence of infection to either wound.    .  Wound care, left anterior lower leg wound: Hydrofera Blue ready, foam cover dressing, own compression sock     Wound care, left distal medial lower leg wound: Honey gel, Hydrofiber, transparent film    3.  ISTAP type III skin tear right forearm    7/15/2025: Patient presents today with a new skin tear to his right forearm.  States he fell last night.  No other injuries  -Shallow wound, no skin flap  -Wound bed clean, no need for debridement  -Wound care initiated in clinic today    Wound care: Hydrofera Blue ready, transparent film    4. Edema of left lower extremity    7/15/2025: Patient's left lower extremity significantly more edematous than his right.  He states that this  has been this way for many years.  - May consider compression wrap, however he is happy with his current compression socks and wound is progressing    5. Controlled type 2 diabetes mellitus with diabetic polyneuropathy, without long-term current use of insulin ([facility])    7/15/2025: Patient has not checked his blood sugars for several weeks  - Currently well-controlled with an A1c of 6.9%.          PATIENT EDUCATION  - Importance of adequate nutrition for wound healing  -Advised to go to ER for any increased redness, swelling, drainage, or odor, or if patient develops fever, chills, nausea or vomiting.     My total time spent caring for the patient on the day of the encounter was 20minutes, reviewing history, assessment, counseling and education, and coordination of care.  This does not include time spent on separately billable procedures/tests.        Please note that this note may have been created using voice recognition software. I have worked with technical experts from [facility] [employer] to optimize the interface.  I have made every reasonable attempt to correct obvious errors, but there may be errors of grammar and possibly content that I did not discover before finalizing the note.    N

## 2025-07-21 ENCOUNTER — NON-PROVIDER VISIT (OUTPATIENT)
Dept: WOUND CARE | Facility: MEDICAL CENTER | Age: 80
End: 2025-07-21
Attending: NURSE PRACTITIONER
Payer: MEDICARE

## 2025-07-21 PROCEDURE — 99214 OFFICE O/P EST MOD 30 MIN: CPT

## 2025-07-21 PROCEDURE — 97597 DBRDMT OPN WND 1ST 20 CM/<: CPT

## 2025-07-21 NOTE — PROGRESS NOTES
Advanced Wound Care   [facility]   [address]   [address]   [place] [address]   [phone] Fax: [phone]        DME: [facility]  Duration of Supply Order: 60 days  Dispense as written.      Wound 05/17/23 Incision Abdomen dermabond (Active)       Wound 12/05/23 Incision Knee Left acticoat opsite (Active)       Wound 06/03/25 Atypical Full Thickness Leg Distal;Medial Left (Active)   Wound Image   07/21/25 0855   Site Assessment Pink;Yellow;Slough 07/21/25 0855   Periwound Assessment Scar tissue;Fragile;Edema;Satellite lesions 07/21/25 0855   Margins Unattached edges 07/21/25 0855   Closure Secondary intention 07/01/25 1500   Drainage Amount Moderate 07/21/25 0855   Drainage Description Serosanguineous 07/21/25 0855   Treatments Cleansed;Topical Lidocaine;CSWD - Conservative Sharp Wound Debridement;Site care 07/21/25 0855   Wound Cleansing Hypochlorus Acid 07/21/25 0855   Periwound Protectant No-sting Skin Prep;TRIAD paste 07/21/25 0855   Dressing Status Removed 07/01/25 1500   Dressing Changed Changed 07/15/25 1000   Dressing Cleansing/Solutions Not Applicable 07/21/25 0855   Dressing Options Honey Gel;Hydrofiber;Silicone Adhesive Foam 07/21/25 0855   Dressing Change/Treatment Frequency Every 72 hrs, and As Needed 07/21/25 0855   Wound Team Following Weekly 07/21/25 0855   Non-staged Wound Description Full thickness 07/21/25 0855   Wound Length (cm) 0.4 cm 07/21/25 0855   Wound Width (cm) 0.5 cm 07/21/25 0855   Wound Depth (cm) 0.6 cm 07/21/25 0855   Wound Surface Area (cm^2) 0.16 cm^2 07/21/25 0855   Wound Volume (cm^3) 0.063 cm^3 07/21/25 0855   Post-Procedure Length (cm) 0.4 cm 07/21/25 0855   Post-Procedure Width (cm) 0.5 cm 07/21/25 0855   Post-Procedure Depth (cm) 0.6 cm 07/21/25 0855   Post-Procedure Surface Area (cm^2) 0.16 cm^2 07/21/25 0855   Post-Procedure Volume (cm^3) 0.063 cm^3 07/21/25 0855   Wound Healing % 38 07/21/25 0855   Tunneling (cm) 0 cm  07/21/25 0855   Undermining (cm) 0 cm 07/21/25 0855   Wound Odor None 07/21/25 0855   Pulses Left;DP (Faintly palpable) 07/21/25 0855   Exposed Structures None 07/21/25 0855       Wound 07/08/25 Leg Proximal;Anterior Left (Active)   Wound Image   07/21/25 0855   Site Assessment Scabbed 07/21/25 0855   Periwound Assessment Dry;Edema 07/21/25 0855   Margins Attached edges 07/21/25 0855   Drainage Amount None 07/21/25 0855   Drainage Description MARGY 07/21/25 0855   Treatments Cleansed;Site care 07/21/25 0855   Wound Cleansing Hypochlorus Acid 07/21/25 0855   Periwound Protectant No-sting Skin Prep;TRIAD paste 07/21/25 0855   Dressing Changed Changed 07/15/25 1000   Dressing Cleansing/Solutions Not Applicable 07/21/25 0855   Dressing Options Silicone Adhesive Foam 07/21/25 0855   Dressing Change/Treatment Frequency Every 72 hrs, and As Needed 07/21/25 0855   Wound Team Following Weekly 07/21/25 0855   Wound Length (cm) -- (Scab. Not measured.) 07/21/25 0855   Wound Width (cm) 0.2 cm 07/15/25 1000   Wound Depth (cm) 0.1 cm 07/15/25 1000   Wound Surface Area (cm^2) 0.06 cm^2 07/15/25 1000   Wound Volume (cm^3) 0.004 cm^3 07/15/25 1000   Post-Procedure Length (cm) -- (No debridement performed.) 07/21/25 0855   Post-Procedure Width (cm) 0.4 cm 07/15/25 1000   Post-Procedure Depth (cm) 0.1 cm 07/15/25 1000   Post-Procedure Surface Area (cm^2) 0.16 cm^2 07/15/25 1000   Post-Procedure Volume (cm^3) 0.01 cm^3 07/15/25 1000   Tunneling (cm) 0 cm 07/21/25 0855   Undermining (cm) 0 cm 07/21/25 0855   Wound Odor None 07/21/25 0855   Exposed Structures None 07/21/25 0855       Wound 07/15/25 Arm Proximal;Anterior;Lateral Right (Active)   Wound Image   07/21/25 0855   Site Assessment Pink;Yellow;Slough 07/21/25 0855   Periwound Assessment Dry;Scar tissue 07/21/25 0855   Margins Unattached edges 07/21/25 0855   Closure Secondary intention 07/15/25 1000   Drainage Amount Moderate 07/21/25 0855   Drainage Description Serous 07/21/25  0855   Treatments Cleansed;Topical Lidocaine;CSWD - Conservative Sharp Wound Debridement;Site care 07/21/25 0855   Wound Cleansing Hypochlorus Acid 07/21/25 0855   Periwound Protectant No-sting Skin Prep 07/21/25 0855   Dressing Changed New 07/15/25 1000   Dressing Cleansing/Solutions Hypochlorous acid gel (Very tiny amount) 07/21/25 0855   Dressing Options Hydrofera Blue Ready;Silicone Adhesive Foam 07/21/25 0855   Dressing Change/Treatment Frequency Every 72 hrs, and As Needed 07/21/25 0855   Wound Team Following Weekly 07/21/25 0855   Wound Length (cm) 4 cm 07/21/25 0855   Wound Width (cm) 1.3 cm 07/21/25 0855   Wound Depth (cm) 0.1 cm 07/21/25 0855   Wound Surface Area (cm^2) 4.08 cm^2 07/21/25 0855   Wound Volume (cm^3) 0.272 cm^3 07/21/25 0855   Post-Procedure Length (cm) 4 cm 07/21/25 0855   Post-Procedure Width (cm) 1.3 cm 07/21/25 0855   Post-Procedure Depth (cm) 0.1 cm 07/21/25 0855   Post-Procedure Surface Area (cm^2) 4.08 cm^2 07/21/25 0855   Post-Procedure Volume (cm^3) 0.272 cm^3 07/21/25 0855   Wound Healing % -45 07/21/25 0855   Tunneling (cm) 0 cm 07/21/25 0855   Undermining (cm) 0 cm 07/21/25 0855   Wound Odor None 07/21/25 0855   Pulses Right;Radial;2+ 07/21/25 0855   Exposed Structures None 07/21/25 0855     PROCEDURE: Conservative sharp wound debridement using curette to remove nonviable tissue from wound bed(s). 2% topical Lidocaine applied prior to debridement with ~5 minute dwell time. Patient tolerated well; denied pain.    ASSESSMENT AND PLAN:   There are no diagnoses linked to this encounter.    Patient to change dressing 3 times per week. Cleanse wound(s) with saline and gauze. Patient/caregiver to apply dressing as instructed.

## 2025-07-21 NOTE — PROGRESS NOTES
"2% ldicoaine allowed to dwell on all wound beds for five minutes. Conservative sharp wound debrdiement performed to LLE medial wound bed and RUE wound bed using a curette to remove slough and nonviable tissue. Total combined area of less than 5 cm^2 debrided. Patient tolerated well. Refer to flowsheets and AVS for further details.       Switched from transparent film to silicone adhesive foam as patient's skin is fragile and tearing on removal. RUE dressing heavily saturated with what patient reports is \"the clear gel and honey\". Education provided on wound cleansing, dressing application and rationale, appropriate amount of honey gel to use, and when to change dressings. He verbalized understanding of all education.   "

## 2025-07-21 NOTE — PATIENT INSTRUCTIONS
"The following information is a summary of the education provided in the clinic today. This is not an exhaustive list of the education provided during your appointment.       DRESSING CHANGES    Keep your wound dressing clean, dry, and intact. Change your dressing every 2-3 days AND/OR if the dressing becomes soiled, leaks, gets wet, or falls off.      You may  shower with the dressing(s) off. Please do not take baths, or swim in the ocean, lakes, rivers, pools, or hot tubs.      Wounds do not need to \"air out\" or \"breathe\". Gently dry your wound before placing a new dressing.     After you get out of the shower, wash the wound a second time (with soap and water, wound cleanser, or saline). Gently dry the wound before you place a new dressing.       If you need to change your dressings at home, you should wash your wound. Use normal saline, wound cleanser, hypochlorous acid, or unscented soap and water. Do not use hydrogen peroxide or rubbing alcohol to clean your wounds. Hydrogen peroxide and rubbing alcohol will damage new cells and tissue. Do not use betadine or iodine unless told to by your wound care team.    Do not soak your wounds in epsom salt baths. This can worsen your wound(s) or delay wound healing. It can also lead to infection or maceration (tissue is too wet).     If you do not have home health, the clinic will give you with leftover supplies from your appointment. We do not give out extra dressing supplies. We will order you supplies through a [employer] company. Your insurance may or may not pay for all these supplies. The company will reach out to you if insurance does not cover supplies. These supplies will be sent to your home within a few days. If you do have home health, they will provide wound care supplies.     The dressings we use may change as your wound changes.       GENERAL HEALTH ADVICE   -High blood sugar, like with diabetes, can delay or reverse wound healing by impacting your immune system. " It also increases the chances of infection. Wounds heal best when your blood sugar is consistently less than 140 mg/dL. It is important to check your blood sugar regularly.      -Nutrition is important for wound healing. Unless told otherwise by your doctor, eat more protein and consider supplementing with a multi-vitamin, zinc, and vitamin C.         CLINIC INFORMATION  The clinic's hours are Monday-Friday, 7:30 AM to 5:00 PM. We are closed most holidays and on weekends. If you leave us a message, please allow 24 hours for someone to return your call. If you have concerns or are having a medical emergency, call 911 or go to the hospital emergency room.     You might not see the same nurse or provider every visit.     If you notice any large changes in your wound(s), or signs of infection (redness, swelling, localized heat, increased pain, fever > 101 F, chills, nausea/vomiting) or have any questions about your home care instructions, please call the wound center at [phone]. If it's after hours, contact your primary care physician or go to the hospital emergency room. If you are admitted to any hospital, you will need a new referral to come back to the wound clinic. Any wound care appointments that you already have may be cancelled.    If you are 5 or more minutes late for an appointment, we reserve the right to cancel and reschedule that appointment. For example, if your appointment is at 1:00 PM, and you arrive at 1:06 PM, you are more than five minutes late and might not be seen. If you are consistently late or not coming to your appointments (typically 3 late cancellations and/or no shows), we reserve the right to cancel your future appointments or discharge you from the clinic. It is then your responsibility to obtain a new referral if wound care is still needed.

## 2025-07-22 ENCOUNTER — APPOINTMENT (OUTPATIENT)
Dept: WOUND CARE | Facility: MEDICAL CENTER | Age: 80
End: 2025-07-22
Attending: NURSE PRACTITIONER
Payer: MEDICARE

## 2025-07-29 ENCOUNTER — NON-PROVIDER VISIT (OUTPATIENT)
Dept: WOUND CARE | Facility: MEDICAL CENTER | Age: 80
End: 2025-07-29
Attending: NURSE PRACTITIONER
Payer: MEDICARE

## 2025-07-29 PROCEDURE — 97602 WOUND(S) CARE NON-SELECTIVE: CPT

## 2025-07-29 PROCEDURE — 99213 OFFICE O/P EST LOW 20 MIN: CPT

## 2025-07-29 NOTE — PATIENT INSTRUCTIONS
"The following information is a summary of the education provided in the clinic today. This is not an exhaustive list of the education provided during your appointment.       DRESSING CHANGES    Keep your wound dressing clean, dry, and intact. Change your dressing every 2-3 days AND/OR if the dressing becomes soiled, leaks, gets wet, or falls off.      You may  shower with the dressing(s) off. Please do not take baths, or swim in the ocean, lakes, rivers, pools, or hot tubs.      Wounds do not need to \"air out\" or \"breathe\". Gently dry your wound before placing a new dressing.     After you get out of the shower, wash the wound a second time (with soap and water, wound cleanser, or saline). Gently dry the wound before you place a new dressing.       If you need to change your dressings at home, you should wash your wound. Use normal saline, wound cleanser, hypochlorous acid, or unscented soap and water. Do not use hydrogen peroxide or rubbing alcohol to clean your wounds. Hydrogen peroxide and rubbing alcohol will damage new cells and tissue. Do not use betadine or iodine unless told to by your wound care team.    Do not soak your wounds in epsom salt baths. This can worsen your wound(s) or delay wound healing. It can also lead to infection or maceration (tissue is too wet).     If you do not have home health, the clinic will give you with leftover supplies from your appointment. We do not give out extra dressing supplies. We will order you supplies through a [employer] company. Your insurance may or may not pay for all these supplies. The company will reach out to you if insurance does not cover supplies. These supplies will be sent to your home within a few days. If you do have home health, they will provide wound care supplies.     The dressings we use may change as your wound changes.     GENERAL HEALTH ADVICE   -Nutrition is important for wound healing. Unless told otherwise by your doctor, eat more protein and " consider supplementing with a multi-vitamin, zinc, and vitamin C.       Discharge: If your wound does not heal and you need assistance, please obtain new referral from your primary care physician or urgent care to come back to us. Keep area clean, it will be fragile for a few days, bathe and dry area gently, as scar tissue only ever regains a maximum of 80% of the tensile strength of the surrounding skin, remodeling of scar can continue for 6mo - a year.

## 2025-07-29 NOTE — CERTIFICATION
[facility]   [facility]   [address]   [address]   [place] [address]   [phone] Fax: [phone]    Discharge Note      Referring Physician: Cathryn Yadav M.D.  Wound Etiology: Trauma - full thickness wound  Wound location: LLE; right arm  Date of Discharge: 07/29/2025    Assessment:  Discharge patient at this time secondary to all wound except for the LLE wound have resolved. Patient feels confident that he can heal the remaining wound independently. Pt instr dc today, keep area clean, it will be fragile for a few days, bathe and dry area gently, as scar tissue only ever regains a maximum of 80% of the tensile strength of the surrounding skin, remodeling of scar can continue for 6mo - a year. Contact PCP for a referral back here if any problems with area opening and draining again. Pt understands.    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

## 2025-08-18 ENCOUNTER — HOSPITAL ENCOUNTER (OUTPATIENT)
Facility: MEDICAL CENTER | Age: 80
End: 2025-08-18
Attending: INTERNAL MEDICINE
Payer: MEDICARE

## 2025-08-18 ENCOUNTER — RESULTS FOLLOW-UP (OUTPATIENT)
Dept: MEDICAL GROUP | Age: 80
End: 2025-08-18
Payer: MEDICARE

## 2025-08-18 DIAGNOSIS — S81.802D WOUND OF LEFT LOWER EXTREMITY, SUBSEQUENT ENCOUNTER: ICD-10-CM

## 2025-08-18 DIAGNOSIS — E11.9 TYPE 2 DIABETES MELLITUS WITHOUT COMPLICATION, WITHOUT LONG-TERM CURRENT USE OF INSULIN (HCC): ICD-10-CM

## 2025-08-18 DIAGNOSIS — I15.2 HYPERTENSION ASSOCIATED WITH DIABETES (HCC): ICD-10-CM

## 2025-08-18 DIAGNOSIS — E55.9 VITAMIN D DEFICIENCY: ICD-10-CM

## 2025-08-18 DIAGNOSIS — E78.5 DYSLIPIDEMIA: ICD-10-CM

## 2025-08-18 DIAGNOSIS — E11.59 HYPERTENSION ASSOCIATED WITH DIABETES (HCC): ICD-10-CM

## 2025-08-18 LAB
25(OH)D3 SERPL-MCNC: 27 NG/ML (ref 30–100)
ALBUMIN SERPL BCP-MCNC: 4 G/DL (ref 3.2–4.9)
ALBUMIN/GLOB SERPL: 1.5 G/DL
ALP SERPL-CCNC: 76 U/L (ref 30–99)
ALT SERPL-CCNC: 17 U/L (ref 2–50)
ANION GAP SERPL CALC-SCNC: 11 MMOL/L (ref 7–16)
AST SERPL-CCNC: 15 U/L (ref 12–45)
BASOPHILS # BLD AUTO: 0.5 % (ref 0–1.8)
BASOPHILS # BLD: 0.04 K/UL (ref 0–0.12)
BILIRUB SERPL-MCNC: 0.4 MG/DL (ref 0.1–1.5)
BUN SERPL-MCNC: 24 MG/DL (ref 8–22)
CALCIUM ALBUM COR SERPL-MCNC: 8.9 MG/DL (ref 8.5–10.5)
CALCIUM SERPL-MCNC: 8.9 MG/DL (ref 8.5–10.5)
CHLORIDE SERPL-SCNC: 102 MMOL/L (ref 96–112)
CHOLEST SERPL-MCNC: 113 MG/DL (ref 100–199)
CO2 SERPL-SCNC: 24 MMOL/L (ref 20–33)
CREAT SERPL-MCNC: 1.18 MG/DL (ref 0.5–1.4)
EOSINOPHIL # BLD AUTO: 0.21 K/UL (ref 0–0.51)
EOSINOPHIL NFR BLD: 2.5 % (ref 0–6.9)
ERYTHROCYTE [DISTWIDTH] IN BLOOD BY AUTOMATED COUNT: 41.1 FL (ref 35.9–50)
EST. AVERAGE GLUCOSE BLD GHB EST-MCNC: 171 MG/DL
FASTING STATUS PATIENT QL REPORTED: NORMAL
GFR SERPLBLD CREATININE-BSD FMLA CKD-EPI: 62 ML/MIN/1.73 M 2
GLOBULIN SER CALC-MCNC: 2.7 G/DL (ref 1.9–3.5)
GLUCOSE SERPL-MCNC: 159 MG/DL (ref 65–99)
HBA1C MFR BLD: 7.6 % (ref 4–5.6)
HCT VFR BLD AUTO: 43 % (ref 42–52)
HDLC SERPL-MCNC: 37 MG/DL
HGB BLD-MCNC: 14.2 G/DL (ref 14–18)
IMM GRANULOCYTES # BLD AUTO: 0.03 K/UL (ref 0–0.11)
IMM GRANULOCYTES NFR BLD AUTO: 0.4 % (ref 0–0.9)
LDLC SERPL CALC-MCNC: 39 MG/DL
LYMPHOCYTES # BLD AUTO: 1.94 K/UL (ref 1–4.8)
LYMPHOCYTES NFR BLD: 22.9 % (ref 22–41)
MCH RBC QN AUTO: 29.3 PG (ref 27–33)
MCHC RBC AUTO-ENTMCNC: 33 G/DL (ref 32.3–36.5)
MCV RBC AUTO: 88.8 FL (ref 81.4–97.8)
MONOCYTES # BLD AUTO: 0.57 K/UL (ref 0–0.85)
MONOCYTES NFR BLD AUTO: 6.7 % (ref 0–13.4)
NEUTROPHILS # BLD AUTO: 5.69 K/UL (ref 1.82–7.42)
NEUTROPHILS NFR BLD: 67 % (ref 44–72)
NRBC # BLD AUTO: 0 K/UL
NRBC BLD-RTO: 0 /100 WBC (ref 0–0.2)
PLATELET # BLD AUTO: 288 K/UL (ref 164–446)
PMV BLD AUTO: 10 FL (ref 9–12.9)
POTASSIUM SERPL-SCNC: 4.4 MMOL/L (ref 3.6–5.5)
PROT SERPL-MCNC: 6.7 G/DL (ref 6–8.2)
RBC # BLD AUTO: 4.84 M/UL (ref 4.7–6.1)
SODIUM SERPL-SCNC: 137 MMOL/L (ref 135–145)
T4 FREE SERPL-MCNC: 1.26 NG/DL (ref 0.93–1.7)
TRIGL SERPL-MCNC: 186 MG/DL (ref 0–149)
TSH SERPL DL<=0.005 MIU/L-ACNC: 8.23 UIU/ML (ref 0.38–5.33)
WBC # BLD AUTO: 8.5 K/UL (ref 4.8–10.8)

## 2025-08-18 PROCEDURE — 83036 HEMOGLOBIN GLYCOSYLATED A1C: CPT | Mod: GA

## 2025-08-18 PROCEDURE — 80061 LIPID PANEL: CPT

## 2025-08-18 PROCEDURE — 84439 ASSAY OF FREE THYROXINE: CPT

## 2025-08-18 PROCEDURE — 82306 VITAMIN D 25 HYDROXY: CPT

## 2025-08-18 PROCEDURE — 84443 ASSAY THYROID STIM HORMONE: CPT

## 2025-08-18 PROCEDURE — 85025 COMPLETE CBC W/AUTO DIFF WBC: CPT

## 2025-08-18 PROCEDURE — 80053 COMPREHEN METABOLIC PANEL: CPT

## 2025-08-18 PROCEDURE — 36415 COLL VENOUS BLD VENIPUNCTURE: CPT

## 2025-08-20 ENCOUNTER — APPOINTMENT (OUTPATIENT)
Dept: MEDICAL GROUP | Age: 80
End: 2025-08-20
Payer: MEDICARE

## 2025-08-20 ENCOUNTER — OFFICE VISIT (OUTPATIENT)
Dept: MEDICAL GROUP | Age: 80
End: 2025-08-20
Payer: MEDICARE

## 2025-08-20 VITALS
SYSTOLIC BLOOD PRESSURE: 120 MMHG | WEIGHT: 200 LBS | HEART RATE: 80 BPM | RESPIRATION RATE: 16 BRPM | OXYGEN SATURATION: 98 % | BODY MASS INDEX: 26.51 KG/M2 | DIASTOLIC BLOOD PRESSURE: 70 MMHG | HEIGHT: 73 IN | TEMPERATURE: 98.8 F

## 2025-08-20 DIAGNOSIS — E11.59 HYPERTENSION ASSOCIATED WITH DIABETES (HCC): ICD-10-CM

## 2025-08-20 DIAGNOSIS — I15.2 HYPERTENSION ASSOCIATED WITH DIABETES (HCC): ICD-10-CM

## 2025-08-20 DIAGNOSIS — E78.5 DYSLIPIDEMIA: ICD-10-CM

## 2025-08-20 DIAGNOSIS — E11.9 TYPE 2 DIABETES MELLITUS WITHOUT COMPLICATION, WITHOUT LONG-TERM CURRENT USE OF INSULIN (HCC): Primary | ICD-10-CM

## 2025-08-20 DIAGNOSIS — E03.9 ACQUIRED HYPOTHYROIDISM: ICD-10-CM

## 2025-08-20 DIAGNOSIS — E53.8 VITAMIN B12 DEFICIENCY: ICD-10-CM

## 2025-08-20 PROCEDURE — G2211 COMPLEX E/M VISIT ADD ON: HCPCS | Performed by: INTERNAL MEDICINE

## 2025-08-20 PROCEDURE — 3074F SYST BP LT 130 MM HG: CPT | Performed by: INTERNAL MEDICINE

## 2025-08-20 PROCEDURE — 99214 OFFICE O/P EST MOD 30 MIN: CPT | Performed by: INTERNAL MEDICINE

## 2025-08-20 PROCEDURE — 3078F DIAST BP <80 MM HG: CPT | Performed by: INTERNAL MEDICINE

## 2025-08-20 RX ORDER — TIRZEPATIDE 10 MG/.5ML
10 INJECTION, SOLUTION SUBCUTANEOUS
Qty: 6 ML | Refills: 1 | Status: SHIPPED | OUTPATIENT
Start: 2025-08-20

## 2025-08-20 ASSESSMENT — FIBROSIS 4 INDEX: FIB4 SCORE: 1.02

## (undated) DEVICE — PACK SINGLE BASIN - (6/CA)

## (undated) DEVICE — COVER LIGHT HANDLE ALC PLUS DISP (18EA/BX)

## (undated) DEVICE — BOVIE  BLADE 6 EXTENDED - (50/PK)

## (undated) DEVICE — GOWN WARMING STANDARD FLEX - (30/CA)

## (undated) DEVICE — CATHETER URETHRAL FOLEY SILICONE OD20 FR 10 ML (10EA/CA)

## (undated) DEVICE — SPONGE GAUZESTER. 2X2 4-PL - (2/PK 50PK/BX 30BX/CS)

## (undated) DEVICE — SET SUCTION/IRRIGATION WITH DISPOSABLE TIP (6/CA )PART #0250-070-520 IS A SUB

## (undated) DEVICE — GLOVE BIOGEL SZ 8 SURGICAL PF LTX - (50PR/BX 4BX/CA)

## (undated) DEVICE — SPONGE GAUZESTER 4 X 4 4PLY - (128PK/CA)

## (undated) DEVICE — GLOVE SZ 7.5 BIOGEL PI MICRO - PF LF (50PR/BX)

## (undated) DEVICE — TOWELS CLOTH SURGICAL - (4/PK 20PK/CA)

## (undated) DEVICE — CONTAINER SPECIMEN BAG OR - STERILE 4 OZ W/LID (100EA/CA)

## (undated) DEVICE — TROCAR LAPSCP 100MM 12MM NTHRD - (6/BX)

## (undated) DEVICE — SUTURE 4-0 MONOCRYL PLUS PS-1 - 27 INCH (36/BX)

## (undated) DEVICE — TROCAR 5X100 NON BLADED Z-TH - READ KII (6/BX)

## (undated) DEVICE — DRAPE IOBAN II INCISE 23X17 - (10EA/BX 4BX/CA)

## (undated) DEVICE — CANNULA W/SEAL 5X100 Z-THRE - ADED KII (12/BX)

## (undated) DEVICE — BAG URODRAIN WITH TUBING - (20/CA)

## (undated) DEVICE — SUTURE 1 PDS II PLUS TP-1 - (12PK/BX)

## (undated) DEVICE — SUTURE 3-0 SILK SH C/R 18 IN - (12/BX)

## (undated) DEVICE — Device

## (undated) DEVICE — SET LEADWIRE 5 LEAD BEDSIDE DISPOSABLE ECG (1SET OF 5/EA)

## (undated) DEVICE — TUBE CONNECTING SUCTION - CLEAR PLASTIC STERILE 72 IN (50EA/CA)

## (undated) DEVICE — CHLORAPREP 26 ML APPLICATOR - ORANGE TINT(25/CA)

## (undated) DEVICE — CANISTER SUCTION 3000ML MECHANICAL FILTER AUTO SHUTOFF MEDI-VAC NONSTERILE LF DISP  (40EA/CA)

## (undated) DEVICE — SUTURE 0 SILK TIES (36PK/BX)

## (undated) DEVICE — SUTURE 1 VICRYL PLUS CTX - 8 X 18 INCH (12/BX)

## (undated) DEVICE — COVER FOOT UNIVERSAL DISP. - (25EA/CA)

## (undated) DEVICE — WATER IRRIGATION STERILE 1000ML (12EA/CA)

## (undated) DEVICE — SUTURE 2-0 ETHILON FS - (36/BX) 18 INCH

## (undated) DEVICE — ELECTRODE DUAL RETURN W/ CORD - (50/PK)

## (undated) DEVICE — PACK CYSTO III (2EA/CA)

## (undated) DEVICE — SENSOR OXIMETER ADULT SPO2 RD SET (20EA/BX)

## (undated) DEVICE — SUTURE 3-0 VICRYL PLUS CT-1 - 8 X 18 INCH (12/BX)

## (undated) DEVICE — GLOVE BIOGEL PI INDICATOR SZ 7.5 SURGICAL PF LF -(50/BX 4BX/CA)

## (undated) DEVICE — DRAPE LAPAROTOMY T SHEET - (12EA/CA)

## (undated) DEVICE — WATER IRRIG. STER 3000 ML - (4/CA)

## (undated) DEVICE — SUTURE GENERAL

## (undated) DEVICE — SUTURE 0 COATED VICRYL 6-18IN - (12PK/BX)

## (undated) DEVICE — LACTATED RINGERS INJ 1000 ML - (14EA/CA 60CA/PF)

## (undated) DEVICE — PACK MAJOR BASIN - (2EA/CA)

## (undated) DEVICE — GLOVE BIOGEL SZ 7.5 SURGICAL PF LTX - (50PR/BX 4BX/CA)

## (undated) DEVICE — SUTURE 3-0 SILK 12 X 18 IN - (36/BX)

## (undated) DEVICE — SUTURE 3-0 VICRYL PLUS SH - 8X 18 INCH (12/BX)

## (undated) DEVICE — BAG DRAINAGE ANTIREFLUX TOWER SLIDE TAP 4000 ML (20EA/CA)

## (undated) DEVICE — CLOSURE SKIN STRIP 1/2 X 4 IN - (STERI STRIP) (50/BX 4BX/CA)

## (undated) DEVICE — TUBING CLEARLINK DUO-VENT - C-FLO (48EA/CA)

## (undated) DEVICE — SLEEVE, VASO, THIGH, MED

## (undated) DEVICE — SUTURE 2-0 VICRYL PLUS CT-1 - 8 X 18 INCH(12/BX)

## (undated) DEVICE — TOWEL STOP TIMEOUT SAFETY FLAG (40EA/CA)

## (undated) DEVICE — SET EXTENSION WITH 2 PORTS (48EA/CA) ***PART #2C8610 IS A SUBSTITUTE*****

## (undated) DEVICE — SYRINGE 50ML CATHETER TIP (40EA/BX 4BX/CA)

## (undated) DEVICE — SODIUM CHL IRRIGATION 0.9% 1000ML (12EA/CA)

## (undated) DEVICE — CONNECTOR HOSE NEPTUNE FOR CYSTO ROOM

## (undated) DEVICE — SUCTION INSTRUMENT YANKAUER BULBOUS TIP W/O VENT (50EA/CA)

## (undated) DEVICE — TRAY CATHETER FOLEY URINE METER W/STATLOCK 350ML (10EA/CA)

## (undated) DEVICE — TUBING FILTER STRYKER

## (undated) DEVICE — STAPLER SKIN DISP - (6/BX 10BX/CA) VISISTAT

## (undated) DEVICE — GLOVE BIOGEL INDICATOR SZ 6 SURGICAL PF LTX -(50/BX)

## (undated) DEVICE — SYRINGE DISP. 12 CC LL - (100/BX)